# Patient Record
Sex: FEMALE | Race: OTHER | NOT HISPANIC OR LATINO | URBAN - METROPOLITAN AREA
[De-identification: names, ages, dates, MRNs, and addresses within clinical notes are randomized per-mention and may not be internally consistent; named-entity substitution may affect disease eponyms.]

---

## 2017-06-23 ENCOUNTER — EMERGENCY (EMERGENCY)
Facility: HOSPITAL | Age: 52
LOS: 1 days | Discharge: HOME | End: 2017-06-23
Attending: EMERGENCY MEDICINE | Admitting: EMERGENCY MEDICINE
Payer: MEDICAID

## 2017-06-23 VITALS
HEART RATE: 77 BPM | TEMPERATURE: 98 F | SYSTOLIC BLOOD PRESSURE: 155 MMHG | DIASTOLIC BLOOD PRESSURE: 105 MMHG | RESPIRATION RATE: 18 BRPM | OXYGEN SATURATION: 100 %

## 2017-06-23 DIAGNOSIS — Z90.710 ACQUIRED ABSENCE OF BOTH CERVIX AND UTERUS: Chronic | ICD-10-CM

## 2017-06-23 PROCEDURE — 99283 EMERGENCY DEPT VISIT LOW MDM: CPT

## 2017-06-23 PROCEDURE — 73564 X-RAY EXAM KNEE 4 OR MORE: CPT | Mod: 26,50

## 2017-06-23 NOTE — ED ADULT TRIAGE NOTE - CHIEF COMPLAINT QUOTE
patient c/o coxic and bilateral knee pain and swelling s/p fall on rear end 3 months ago. also c/o  tinglings to lower legs that began two days ago. Negative facial droop, arm weakness, or  weakness. Denies SOB SHARON or chest pain, appears in no distress. Did see PCP for coxic pain was told it was bone bruise.

## 2017-06-23 NOTE — ED PROVIDER NOTE - OBJECTIVE STATEMENT
53 yo F with hx of dm presenting with gradual intermentent nonraditing bilateral knee pain with no truma denies HA, dizziness, cp, sob, abd apin, n/v/d or dysuria. ptain improved with rest worse with walking

## 2017-06-23 NOTE — ED ADULT NURSE REASSESSMENT NOTE - NS ED NURSE REASSESS COMMENT FT1
Author received chart for Pt Darrion from  keisha Ortiz who reports Pt likely eloped from intake. Pt has not arrived in [place] area. 13:20 6/23/17 ELVIRA Begum

## 2017-06-23 NOTE — ED PROVIDER NOTE - PROGRESS NOTE DETAILS
Return to the ER immediately for any worsening symptoms, concerns, chest pain, fevers, shortness of breath, vomiting, abdominal pain, rashes, neck pain, back pain, numbness, paresthesias, pain or any difficulties at all.  Please follow up with your own private physician or our medical clinic at [phone] in the next 2-3 days.  Find a doctor at [phone].

## 2017-08-01 ENCOUNTER — FORM ENCOUNTER (OUTPATIENT)
Age: 52
End: 2017-08-01

## 2017-08-01 PROBLEM — Z00.00 ENCOUNTER FOR PREVENTIVE HEALTH EXAMINATION: Status: ACTIVE | Noted: 2017-08-01

## 2017-08-02 ENCOUNTER — OUTPATIENT (OUTPATIENT)
Dept: OUTPATIENT SERVICES | Facility: HOSPITAL | Age: 52
LOS: 1 days | End: 2017-08-02
Payer: MEDICAID

## 2017-08-02 ENCOUNTER — APPOINTMENT (OUTPATIENT)
Dept: ORTHOPEDIC SURGERY | Facility: HOSPITAL | Age: 52
End: 2017-08-02

## 2017-08-02 VITALS
WEIGHT: 158 LBS | DIASTOLIC BLOOD PRESSURE: 93 MMHG | BODY MASS INDEX: 26.98 KG/M2 | SYSTOLIC BLOOD PRESSURE: 135 MMHG | HEIGHT: 64 IN | HEART RATE: 81 BPM

## 2017-08-02 DIAGNOSIS — M62.89 OTHER SPECIFIED DISORDERS OF MUSCLE: ICD-10-CM

## 2017-08-02 DIAGNOSIS — M79.9 SOFT TISSUE DISORDER, UNSPECIFIED: ICD-10-CM

## 2017-08-02 DIAGNOSIS — Z90.710 ACQUIRED ABSENCE OF BOTH CERVIX AND UTERUS: Chronic | ICD-10-CM

## 2017-08-02 DIAGNOSIS — M25.571 PAIN IN RIGHT ANKLE AND JOINTS OF RIGHT FOOT: ICD-10-CM

## 2017-08-02 PROCEDURE — 73610 X-RAY EXAM OF ANKLE: CPT | Mod: 26,LT

## 2017-08-02 PROCEDURE — 73630 X-RAY EXAM OF FOOT: CPT | Mod: 26,50

## 2017-08-02 PROCEDURE — 73610 X-RAY EXAM OF ANKLE: CPT

## 2017-08-02 PROCEDURE — 73630 X-RAY EXAM OF FOOT: CPT

## 2017-08-02 PROCEDURE — G0463: CPT

## 2017-08-16 ENCOUNTER — OUTPATIENT (OUTPATIENT)
Dept: OUTPATIENT SERVICES | Facility: HOSPITAL | Age: 52
LOS: 1 days | End: 2017-08-16
Payer: MEDICAID

## 2017-08-16 ENCOUNTER — APPOINTMENT (OUTPATIENT)
Dept: ORTHOPEDIC SURGERY | Facility: HOSPITAL | Age: 52
End: 2017-08-16

## 2017-08-16 VITALS
RESPIRATION RATE: 14 BRPM | DIASTOLIC BLOOD PRESSURE: 38 MMHG | HEART RATE: 80 BPM | HEIGHT: 64 IN | WEIGHT: 162 LBS | SYSTOLIC BLOOD PRESSURE: 145 MMHG | BODY MASS INDEX: 27.66 KG/M2

## 2017-08-16 DIAGNOSIS — Z90.710 ACQUIRED ABSENCE OF BOTH CERVIX AND UTERUS: Chronic | ICD-10-CM

## 2017-08-16 DIAGNOSIS — M25.579 PAIN IN UNSPECIFIED ANKLE AND JOINTS OF UNSPECIFIED FOOT: ICD-10-CM

## 2017-08-16 PROCEDURE — G0463: CPT

## 2017-08-16 RX ORDER — DICLOFENAC SODIUM 10 MG/G
1 GEL TOPICAL
Qty: 160 | Refills: 0 | Status: ACTIVE | COMMUNITY
Start: 2017-08-16 | End: 1900-01-01

## 2017-08-18 DIAGNOSIS — M25.571 PAIN IN RIGHT ANKLE AND JOINTS OF RIGHT FOOT: ICD-10-CM

## 2017-10-02 ENCOUNTER — APPOINTMENT (OUTPATIENT)
Dept: NEUROLOGY | Facility: CLINIC | Age: 52
End: 2017-10-02

## 2017-11-30 ENCOUNTER — APPOINTMENT (OUTPATIENT)
Dept: NEUROLOGY | Facility: HOSPITAL | Age: 52
End: 2017-11-30

## 2018-02-01 ENCOUNTER — EMERGENCY (EMERGENCY)
Facility: HOSPITAL | Age: 53
LOS: 1 days | Discharge: HOME | End: 2018-02-01
Attending: EMERGENCY MEDICINE | Admitting: EMERGENCY MEDICINE
Payer: MEDICAID

## 2018-02-01 VITALS
RESPIRATION RATE: 16 BRPM | SYSTOLIC BLOOD PRESSURE: 171 MMHG | HEART RATE: 115 BPM | OXYGEN SATURATION: 100 % | TEMPERATURE: 101 F | DIASTOLIC BLOOD PRESSURE: 90 MMHG

## 2018-02-01 VITALS — HEART RATE: 98 BPM

## 2018-02-01 DIAGNOSIS — Z90.710 ACQUIRED ABSENCE OF BOTH CERVIX AND UTERUS: Chronic | ICD-10-CM

## 2018-02-01 LAB
ALBUMIN SERPL ELPH-MCNC: 4.4 G/DL — SIGNIFICANT CHANGE UP (ref 3.3–5)
ALP SERPL-CCNC: 77 U/L — SIGNIFICANT CHANGE UP (ref 40–120)
ALT FLD-CCNC: 20 U/L — SIGNIFICANT CHANGE UP (ref 4–33)
APPEARANCE UR: CLEAR — SIGNIFICANT CHANGE UP
AST SERPL-CCNC: 24 U/L — SIGNIFICANT CHANGE UP (ref 4–32)
BASE EXCESS BLDV CALC-SCNC: 4.9 MMOL/L — SIGNIFICANT CHANGE UP
BASOPHILS # BLD AUTO: 0.01 K/UL — SIGNIFICANT CHANGE UP (ref 0–0.2)
BASOPHILS NFR BLD AUTO: 0.2 % — SIGNIFICANT CHANGE UP (ref 0–2)
BILIRUB SERPL-MCNC: 0.3 MG/DL — SIGNIFICANT CHANGE UP (ref 0.2–1.2)
BILIRUB UR-MCNC: NEGATIVE — SIGNIFICANT CHANGE UP
BLOOD GAS VENOUS - CREATININE: 0.82 MG/DL — SIGNIFICANT CHANGE UP (ref 0.5–1.3)
BLOOD UR QL VISUAL: HIGH
BUN SERPL-MCNC: 13 MG/DL — SIGNIFICANT CHANGE UP (ref 7–23)
CALCIUM SERPL-MCNC: 9.4 MG/DL — SIGNIFICANT CHANGE UP (ref 8.4–10.5)
CHLORIDE BLDV-SCNC: 99 MMOL/L — SIGNIFICANT CHANGE UP (ref 96–108)
CHLORIDE SERPL-SCNC: 96 MMOL/L — LOW (ref 98–107)
CO2 SERPL-SCNC: 27 MMOL/L — SIGNIFICANT CHANGE UP (ref 22–31)
COLOR SPEC: SIGNIFICANT CHANGE UP
CREAT SERPL-MCNC: 0.93 MG/DL — SIGNIFICANT CHANGE UP (ref 0.5–1.3)
EOSINOPHIL # BLD AUTO: 0.07 K/UL — SIGNIFICANT CHANGE UP (ref 0–0.5)
EOSINOPHIL NFR BLD AUTO: 1.1 % — SIGNIFICANT CHANGE UP (ref 0–6)
GAS PNL BLDV: 137 MMOL/L — SIGNIFICANT CHANGE UP (ref 136–146)
GLUCOSE BLDV-MCNC: 241 — HIGH (ref 70–99)
GLUCOSE SERPL-MCNC: 231 MG/DL — HIGH (ref 70–99)
GLUCOSE UR-MCNC: >1000 — HIGH
HCO3 BLDV-SCNC: 27 MMOL/L — SIGNIFICANT CHANGE UP (ref 20–27)
HCT VFR BLD CALC: 35.1 % — SIGNIFICANT CHANGE UP (ref 34.5–45)
HCT VFR BLDV CALC: 36 % — SIGNIFICANT CHANGE UP (ref 34.5–45)
HGB BLD-MCNC: 11.6 G/DL — SIGNIFICANT CHANGE UP (ref 11.5–15.5)
HGB BLDV-MCNC: 11.7 G/DL — SIGNIFICANT CHANGE UP (ref 11.5–15.5)
IMM GRANULOCYTES # BLD AUTO: 0.02 # — SIGNIFICANT CHANGE UP
IMM GRANULOCYTES NFR BLD AUTO: 0.3 % — SIGNIFICANT CHANGE UP (ref 0–1.5)
KETONES UR-MCNC: NEGATIVE — SIGNIFICANT CHANGE UP
LACTATE BLDV-MCNC: 1.8 MMOL/L — SIGNIFICANT CHANGE UP (ref 0.5–2)
LEUKOCYTE ESTERASE UR-ACNC: NEGATIVE — SIGNIFICANT CHANGE UP
LYMPHOCYTES # BLD AUTO: 0.81 K/UL — LOW (ref 1–3.3)
LYMPHOCYTES # BLD AUTO: 13 % — SIGNIFICANT CHANGE UP (ref 13–44)
MCHC RBC-ENTMCNC: 27.2 PG — SIGNIFICANT CHANGE UP (ref 27–34)
MCHC RBC-ENTMCNC: 33 % — SIGNIFICANT CHANGE UP (ref 32–36)
MCV RBC AUTO: 82.2 FL — SIGNIFICANT CHANGE UP (ref 80–100)
MONOCYTES # BLD AUTO: 0.69 K/UL — SIGNIFICANT CHANGE UP (ref 0–0.9)
MONOCYTES NFR BLD AUTO: 11.1 % — SIGNIFICANT CHANGE UP (ref 2–14)
NEUTROPHILS # BLD AUTO: 4.63 K/UL — SIGNIFICANT CHANGE UP (ref 1.8–7.4)
NEUTROPHILS NFR BLD AUTO: 74.3 % — SIGNIFICANT CHANGE UP (ref 43–77)
NITRITE UR-MCNC: NEGATIVE — SIGNIFICANT CHANGE UP
NRBC # FLD: 0 — SIGNIFICANT CHANGE UP
PCO2 BLDV: 50 MMHG — SIGNIFICANT CHANGE UP (ref 41–51)
PH BLDV: 7.39 PH — SIGNIFICANT CHANGE UP (ref 7.32–7.43)
PH UR: 7.5 — SIGNIFICANT CHANGE UP (ref 4.6–8)
PLATELET # BLD AUTO: 266 K/UL — SIGNIFICANT CHANGE UP (ref 150–400)
PMV BLD: 10.9 FL — SIGNIFICANT CHANGE UP (ref 7–13)
PO2 BLDV: 27 MMHG — LOW (ref 35–40)
POTASSIUM BLDV-SCNC: 3.8 MMOL/L — SIGNIFICANT CHANGE UP (ref 3.4–4.5)
POTASSIUM SERPL-MCNC: 3.9 MMOL/L — SIGNIFICANT CHANGE UP (ref 3.5–5.3)
POTASSIUM SERPL-SCNC: 3.9 MMOL/L — SIGNIFICANT CHANGE UP (ref 3.5–5.3)
PROT SERPL-MCNC: 8 G/DL — SIGNIFICANT CHANGE UP (ref 6–8.3)
PROT UR-MCNC: NEGATIVE MG/DL — SIGNIFICANT CHANGE UP
RBC # BLD: 4.27 M/UL — SIGNIFICANT CHANGE UP (ref 3.8–5.2)
RBC # FLD: 14.3 % — SIGNIFICANT CHANGE UP (ref 10.3–14.5)
RBC CASTS # UR COMP ASSIST: SIGNIFICANT CHANGE UP (ref 0–?)
SAO2 % BLDV: 40.8 % — LOW (ref 60–85)
SODIUM SERPL-SCNC: 136 MMOL/L — SIGNIFICANT CHANGE UP (ref 135–145)
SP GR SPEC: 1.01 — SIGNIFICANT CHANGE UP (ref 1–1.04)
SQUAMOUS # UR AUTO: SIGNIFICANT CHANGE UP
UROBILINOGEN FLD QL: NORMAL MG/DL — SIGNIFICANT CHANGE UP
WBC # BLD: 6.23 K/UL — SIGNIFICANT CHANGE UP (ref 3.8–10.5)
WBC # FLD AUTO: 6.23 K/UL — SIGNIFICANT CHANGE UP (ref 3.8–10.5)

## 2018-02-01 PROCEDURE — 99284 EMERGENCY DEPT VISIT MOD MDM: CPT | Mod: 25

## 2018-02-01 PROCEDURE — 71046 X-RAY EXAM CHEST 2 VIEWS: CPT | Mod: 26

## 2018-02-01 RX ORDER — IBUPROFEN 200 MG
1 TABLET ORAL
Qty: 15 | Refills: 0
Start: 2018-02-01 | End: 2018-02-05

## 2018-02-01 RX ORDER — ACETAMINOPHEN 500 MG
1000 TABLET ORAL ONCE
Refills: 0 | Status: COMPLETED | OUTPATIENT
Start: 2018-02-01 | End: 2018-02-01

## 2018-02-01 RX ORDER — SODIUM CHLORIDE 9 MG/ML
1000 INJECTION INTRAMUSCULAR; INTRAVENOUS; SUBCUTANEOUS ONCE
Refills: 0 | Status: COMPLETED | OUTPATIENT
Start: 2018-02-01 | End: 2018-02-01

## 2018-02-01 RX ADMIN — SODIUM CHLORIDE 1000 MILLILITER(S): 9 INJECTION INTRAMUSCULAR; INTRAVENOUS; SUBCUTANEOUS at 07:57

## 2018-02-01 RX ADMIN — Medication 75 MILLIGRAM(S): at 07:57

## 2018-02-01 RX ADMIN — Medication 400 MILLIGRAM(S): at 07:57

## 2018-02-01 NOTE — ED PROVIDER NOTE - PLAN OF CARE
PLEASE DRINK PLENTY OF FLUIDS, TAKE TYLENOL 650MG EVERY 6HRS OR MOTRIN 600MG EVERY 8HRS, TAKE TAMIFLU, REST A LOT. FOLLOW UP WITH YOUR DOCTOR WITHIN NEXT 48HRS; RETURN TO ER FOR WORSENING SYMPTOMS.

## 2018-02-01 NOTE — ED ADULT TRIAGE NOTE - CHIEF COMPLAINT QUOTE
pt comes to ED for flu like symptoms x 1 day. pt denies going to PCP for these symptoms. pt only took Tylenol x 1. pt is complaining of cough and body aches. pt has a low grade temp in triage. face mask provided. pmhx of htn and DM FS in triage 307

## 2018-02-01 NOTE — ED PROVIDER NOTE - ATTENDING CONTRIBUTION TO CARE
I performed a face-to-face evaluation of the patient and performed a history and physical examination. I agree with the history and physical examination.    Maikel: 53 F, DM, flu-like Sx. Fever, tachycardia. Appears well. Lungs clear. Concern for flu. Check CXR. Give IVF.

## 2018-02-01 NOTE — ED ADULT NURSE NOTE - OBJECTIVE STATEMENT
Ambulatory complaining of "flu like symptoms" for 1 day. Patient report cough, fever/chills, and body aches. Has only taken medication (tylenol) for her symptoms and only one time. Denies recent travel, sick contacts, N/V/D, CP, SOB. States that she has not gone to her PCP for this issue. Came to the ED because "she might have the flu." Patient is well appearing, NAD noted at this time. Hx of DM,  in triage. Hx HTN. Waiting to be seen by provider. Safety maintained, needs attended, will continue to monitor.

## 2018-02-01 NOTE — ED PROVIDER NOTE - CARE PLAN
Principal Discharge DX:	Influenza-like illness Principal Discharge DX:	Influenza-like illness  Assessment and plan of treatment:	PLEASE DRINK PLENTY OF FLUIDS, TAKE TYLENOL 650MG EVERY 6HRS OR MOTRIN 600MG EVERY 8HRS, TAKE TAMIFLU, REST A LOT. FOLLOW UP WITH YOUR DOCTOR WITHIN NEXT 48HRS; RETURN TO ER FOR WORSENING SYMPTOMS.

## 2018-02-01 NOTE — ED PROVIDER NOTE - MEDICAL DECISION MAKING DETAILS
Maikel: 53 F, DM, flu-like Sx. Fever, tachycardia. Appears well. Lungs clear. Concern for flu. Check CXR. Give IVF.

## 2018-05-17 ENCOUNTER — EMERGENCY (EMERGENCY)
Facility: HOSPITAL | Age: 53
LOS: 1 days | Discharge: LEFT BEFORE DISCHARGE | End: 2018-05-17
Attending: EMERGENCY MEDICINE
Payer: MEDICAID

## 2018-05-17 VITALS
TEMPERATURE: 98 F | SYSTOLIC BLOOD PRESSURE: 172 MMHG | HEART RATE: 80 BPM | DIASTOLIC BLOOD PRESSURE: 123 MMHG | WEIGHT: 160.06 LBS | RESPIRATION RATE: 16 BRPM | OXYGEN SATURATION: 98 %

## 2018-05-17 DIAGNOSIS — Z90.710 ACQUIRED ABSENCE OF BOTH CERVIX AND UTERUS: Chronic | ICD-10-CM

## 2018-05-17 PROCEDURE — 99281 EMR DPT VST MAYX REQ PHY/QHP: CPT

## 2018-05-17 NOTE — ED ADULT NURSE NOTE - PMH
HTN (hypertension)    Other specified diabetes mellitus without complication, without long-term current use of insulin

## 2018-05-18 VITALS
HEART RATE: 83 BPM | DIASTOLIC BLOOD PRESSURE: 97 MMHG | TEMPERATURE: 98 F | RESPIRATION RATE: 18 BRPM | SYSTOLIC BLOOD PRESSURE: 153 MMHG | OXYGEN SATURATION: 100 %

## 2018-05-18 PROCEDURE — 93005 ELECTROCARDIOGRAM TRACING: CPT

## 2018-05-18 PROCEDURE — 99281 EMR DPT VST MAYX REQ PHY/QHP: CPT

## 2018-05-18 PROCEDURE — 82962 GLUCOSE BLOOD TEST: CPT

## 2018-05-18 NOTE — ED PROVIDER NOTE - PROGRESS NOTE DETAILS
Pt had been advised to have ED workup including labs and CT head, CXR, however RN could not find her to do the tests and she was found to have eloped.  I called Pt this morning and she says she had to leave and she felt better with improved BP on her own check as well. She has appointment with her own PMD at 11:30 am this morning.  All questions answered and Pt was offered to return here for further evaluation.

## 2018-05-18 NOTE — ED PROVIDER NOTE - OBJECTIVE STATEMENT
52 y/o F pt with PMHx of HTN (on Losartan; compliant with medication) and DM (on medication; compliant with medication) and PSHx of Hysterectomy presents to ED c/o L shoulder pain radiating down the L arm since this morning (today). Pt additionally reports intermittent elevated blood pressure x12 days. Pt also notes cramping to b/l legs x7 days, as well as neck pain and mild HA. Per pt, pt with recent foreign travel to [place] where pt began experiencing intermittent elevated blood pressure; pt returned to the [place] x[place] days ago and elevated blood pressure has persisted. Pt notes maximum blood pressure of 172 systolic over the course of that time period. Pt denies CP, SOB, back pain, b/l leg pain, b/l leg swelling, or any other complaints. Pt also denies Hx of MI, Hx of CVA, Hx of CA, Hx of DVTs/PEs, Hx of smoking, EtOH use/abuse, or drug use/abuse. NKDA.

## 2018-05-18 NOTE — ED PROVIDER NOTE - MEDICAL DECISION MAKING DETAILS
52 y/o F pt with Hx of HTN and DM presents with intermittent elevated blood pressure and x1 day of L arm pain. Pt denies chest pain. Will check EKG, labs, CXR, CT Head, and will re-evaluate.

## 2018-10-16 ENCOUNTER — APPOINTMENT (OUTPATIENT)
Dept: ORTHOPEDIC SURGERY | Facility: CLINIC | Age: 53
End: 2018-10-16

## 2018-12-24 ENCOUNTER — EMERGENCY (EMERGENCY)
Facility: HOSPITAL | Age: 53
LOS: 1 days | Discharge: HOME | End: 2018-12-24
Admitting: EMERGENCY MEDICINE
Payer: MEDICAID

## 2018-12-24 VITALS
SYSTOLIC BLOOD PRESSURE: 152 MMHG | OXYGEN SATURATION: 100 % | RESPIRATION RATE: 16 BRPM | DIASTOLIC BLOOD PRESSURE: 94 MMHG | TEMPERATURE: 98 F | HEART RATE: 87 BPM

## 2018-12-24 DIAGNOSIS — Z90.710 ACQUIRED ABSENCE OF BOTH CERVIX AND UTERUS: Chronic | ICD-10-CM

## 2018-12-24 LAB
ALBUMIN SERPL ELPH-MCNC: 4.7 G/DL — SIGNIFICANT CHANGE UP (ref 3.3–5)
ALP SERPL-CCNC: 122 U/L — HIGH (ref 40–120)
ALT FLD-CCNC: 21 U/L — SIGNIFICANT CHANGE UP (ref 4–33)
APPEARANCE UR: CLEAR — SIGNIFICANT CHANGE UP
AST SERPL-CCNC: 25 U/L — SIGNIFICANT CHANGE UP (ref 4–32)
BACTERIA # UR AUTO: NEGATIVE — SIGNIFICANT CHANGE UP
BASOPHILS # BLD AUTO: 0.02 K/UL — SIGNIFICANT CHANGE UP (ref 0–0.2)
BASOPHILS NFR BLD AUTO: 0.2 % — SIGNIFICANT CHANGE UP (ref 0–2)
BILIRUB SERPL-MCNC: 0.4 MG/DL — SIGNIFICANT CHANGE UP (ref 0.2–1.2)
BILIRUB UR-MCNC: NEGATIVE — SIGNIFICANT CHANGE UP
BLOOD UR QL VISUAL: SIGNIFICANT CHANGE UP
BUN SERPL-MCNC: 15 MG/DL — SIGNIFICANT CHANGE UP (ref 7–23)
CALCIUM SERPL-MCNC: 10.3 MG/DL — SIGNIFICANT CHANGE UP (ref 8.4–10.5)
CHLORIDE SERPL-SCNC: 95 MMOL/L — LOW (ref 98–107)
CO2 SERPL-SCNC: 29 MMOL/L — SIGNIFICANT CHANGE UP (ref 22–31)
COLOR SPEC: SIGNIFICANT CHANGE UP
CREAT SERPL-MCNC: 0.76 MG/DL — SIGNIFICANT CHANGE UP (ref 0.5–1.3)
EOSINOPHIL # BLD AUTO: 0.08 K/UL — SIGNIFICANT CHANGE UP (ref 0–0.5)
EOSINOPHIL NFR BLD AUTO: 0.9 % — SIGNIFICANT CHANGE UP (ref 0–6)
GLUCOSE SERPL-MCNC: 185 MG/DL — HIGH (ref 70–99)
GLUCOSE UR-MCNC: NEGATIVE — SIGNIFICANT CHANGE UP
HCT VFR BLD CALC: 36.3 % — SIGNIFICANT CHANGE UP (ref 34.5–45)
HGB BLD-MCNC: 12.3 G/DL — SIGNIFICANT CHANGE UP (ref 11.5–15.5)
HYALINE CASTS # UR AUTO: NEGATIVE — SIGNIFICANT CHANGE UP
IMM GRANULOCYTES # BLD AUTO: 0.03 # — SIGNIFICANT CHANGE UP
IMM GRANULOCYTES NFR BLD AUTO: 0.3 % — SIGNIFICANT CHANGE UP (ref 0–1.5)
KETONES UR-MCNC: NEGATIVE — SIGNIFICANT CHANGE UP
LEUKOCYTE ESTERASE UR-ACNC: SIGNIFICANT CHANGE UP
LYMPHOCYTES # BLD AUTO: 3.58 K/UL — HIGH (ref 1–3.3)
LYMPHOCYTES # BLD AUTO: 38.3 % — SIGNIFICANT CHANGE UP (ref 13–44)
MCHC RBC-ENTMCNC: 28 PG — SIGNIFICANT CHANGE UP (ref 27–34)
MCHC RBC-ENTMCNC: 33.9 % — SIGNIFICANT CHANGE UP (ref 32–36)
MCV RBC AUTO: 82.5 FL — SIGNIFICANT CHANGE UP (ref 80–100)
MONOCYTES # BLD AUTO: 0.71 K/UL — SIGNIFICANT CHANGE UP (ref 0–0.9)
MONOCYTES NFR BLD AUTO: 7.6 % — SIGNIFICANT CHANGE UP (ref 2–14)
NEUTROPHILS # BLD AUTO: 4.92 K/UL — SIGNIFICANT CHANGE UP (ref 1.8–7.4)
NEUTROPHILS NFR BLD AUTO: 52.7 % — SIGNIFICANT CHANGE UP (ref 43–77)
NITRITE UR-MCNC: NEGATIVE — SIGNIFICANT CHANGE UP
NRBC # FLD: 0 — SIGNIFICANT CHANGE UP
PH UR: 7.5 — SIGNIFICANT CHANGE UP (ref 5–8)
PLATELET # BLD AUTO: 353 K/UL — SIGNIFICANT CHANGE UP (ref 150–400)
PMV BLD: 10.5 FL — SIGNIFICANT CHANGE UP (ref 7–13)
POTASSIUM SERPL-MCNC: 3.4 MMOL/L — LOW (ref 3.5–5.3)
POTASSIUM SERPL-SCNC: 3.4 MMOL/L — LOW (ref 3.5–5.3)
PROT SERPL-MCNC: 8.5 G/DL — HIGH (ref 6–8.3)
PROT UR-MCNC: 10 — SIGNIFICANT CHANGE UP
RBC # BLD: 4.4 M/UL — SIGNIFICANT CHANGE UP (ref 3.8–5.2)
RBC # FLD: 13.9 % — SIGNIFICANT CHANGE UP (ref 10.3–14.5)
RBC CASTS # UR COMP ASSIST: SIGNIFICANT CHANGE UP (ref 0–?)
SODIUM SERPL-SCNC: 136 MMOL/L — SIGNIFICANT CHANGE UP (ref 135–145)
SP GR SPEC: 1.01 — SIGNIFICANT CHANGE UP (ref 1–1.04)
SQUAMOUS # UR AUTO: SIGNIFICANT CHANGE UP
TROPONIN T, HIGH SENSITIVITY: < 6 NG/L — SIGNIFICANT CHANGE UP (ref ?–14)
TROPONIN T, HIGH SENSITIVITY: < 6 NG/L — SIGNIFICANT CHANGE UP (ref ?–14)
UROBILINOGEN FLD QL: NORMAL — SIGNIFICANT CHANGE UP
WBC # BLD: 9.34 K/UL — SIGNIFICANT CHANGE UP (ref 3.8–10.5)
WBC # FLD AUTO: 9.34 K/UL — SIGNIFICANT CHANGE UP (ref 3.8–10.5)
WBC UR QL: HIGH (ref 0–?)

## 2018-12-24 PROCEDURE — 71046 X-RAY EXAM CHEST 2 VIEWS: CPT | Mod: 26

## 2018-12-24 PROCEDURE — 99284 EMERGENCY DEPT VISIT MOD MDM: CPT

## 2018-12-24 PROCEDURE — 73030 X-RAY EXAM OF SHOULDER: CPT | Mod: 26,LT

## 2018-12-24 RX ORDER — IBUPROFEN 200 MG
1 TABLET ORAL
Qty: 20 | Refills: 0
Start: 2018-12-24 | End: 2018-12-28

## 2018-12-24 RX ORDER — OXYCODONE AND ACETAMINOPHEN 5; 325 MG/1; MG/1
1 TABLET ORAL
Qty: 12 | Refills: 0
Start: 2018-12-24 | End: 2018-12-26

## 2018-12-24 RX ORDER — ASPIRIN/CALCIUM CARB/MAGNESIUM 324 MG
162 TABLET ORAL ONCE
Refills: 0 | Status: COMPLETED | OUTPATIENT
Start: 2018-12-24 | End: 2018-12-24

## 2018-12-24 RX ORDER — OXYCODONE AND ACETAMINOPHEN 5; 325 MG/1; MG/1
1 TABLET ORAL ONCE
Refills: 0 | Status: DISCONTINUED | OUTPATIENT
Start: 2018-12-24 | End: 2018-12-24

## 2018-12-24 RX ORDER — KETOROLAC TROMETHAMINE 30 MG/ML
15 SYRINGE (ML) INJECTION ONCE
Refills: 0 | Status: DISCONTINUED | OUTPATIENT
Start: 2018-12-24 | End: 2018-12-24

## 2018-12-24 RX ADMIN — Medication 15 MILLIGRAM(S): at 16:52

## 2018-12-24 RX ADMIN — OXYCODONE AND ACETAMINOPHEN 1 TABLET(S): 5; 325 TABLET ORAL at 14:21

## 2018-12-24 RX ADMIN — OXYCODONE AND ACETAMINOPHEN 1 TABLET(S): 5; 325 TABLET ORAL at 16:49

## 2018-12-24 RX ADMIN — Medication 162 MILLIGRAM(S): at 14:21

## 2018-12-24 NOTE — ED PROVIDER NOTE - OBJECTIVE STATEMENT
54yo F w/ pmh of DM Type 2, HLD presents to the ED c/o left shoulder pain starting this morning. Pt states there was no preceding injury/trauma. Pt also c/o lightheadedness, states with ambulation she feels like she is going to pass out. Pt took 81mgs ASA PO prior to arrival. Denies any pleuritic pain, dizziness, syncope, interscapular pain, N/V/D, diaphoresis, palpitations, cough, abdominal pain, pain associated with food intake, fevers, chills or night sweats. Patient denies recent surgery, prolonged immobility or travel, hemoptysis, hx of active ca, extremity swelling, hx of dvt/pe, dizziness, blurred vision, headaches, shortness of breath. No family hx of sudden cardiac death or first degree relative with dissection. Pt is a non-smoker.

## 2018-12-24 NOTE — ED ADULT TRIAGE NOTE - CHIEF COMPLAINT QUOTE
Pt c/o Lt shoulder pain starting this morning, denies cp/discomfort, denies headache/dizziness, denies sob, breathing even and unlabored.

## 2018-12-24 NOTE — ED PROVIDER NOTE - PLAN OF CARE
Follow up with your primary care provider within 48 hours  Follow up with an orthopedic doctor within one week  Take Motrin 600mg every 8 hours as needed for pain, take with food. Take Percocet 325/5 once every 6 hours as needed for severe pain -- causes drowsiness; DO NOT drink alcohol, drive, or operate heavy machinery with this medication.  Caution federal law prohibits the transfer of this drug to any person other  than the person for whom it was prescribed. May cause drowsiness.  Alcohol may intensify this effect.  Use care when operating dangerous machinery.  This prescription cannot be refilled. Using more of this medication than prescribed may cause serious breathing problems. Return to the emergency department with any worsening or concerning symptoms, swelling, numbness/tingling, inability to bear weight or any other concerns.

## 2018-12-24 NOTE — ED PROVIDER NOTE - CARE PLAN
Principal Discharge DX:	Shoulder tendonitis  Assessment and plan of treatment:	Follow up with your primary care provider within 48 hours  Follow up with an orthopedic doctor within one week  Take Motrin 600mg every 8 hours as needed for pain, take with food. Take Percocet 325/5 once every 6 hours as needed for severe pain -- causes drowsiness; DO NOT drink alcohol, drive, or operate heavy machinery with this medication.  Caution federal law prohibits the transfer of this drug to any person other  than the person for whom it was prescribed. May cause drowsiness.  Alcohol may intensify this effect.  Use care when operating dangerous machinery.  This prescription cannot be refilled. Using more of this medication than prescribed may cause serious breathing problems. Return to the emergency department with any worsening or concerning symptoms, swelling, numbness/tingling, inability to bear weight or any other concerns.

## 2018-12-24 NOTE — ED ADULT NURSE NOTE - OBJECTIVE STATEMENT
received pt in bed  A and OX 3  in NAD, pt reports " I woke up this morning and I have so much pain in my left shoulder I could not touch it". presents with eft arm sling,  tender to touch no skin discoloration observed,  distal pulses present, pt denies fall or injury.

## 2018-12-24 NOTE — ED PROVIDER NOTE - PROGRESS NOTE DETAILS
Labs/imaging appreciated, pt's lightheadedness likely from pain, no acute cardiac pathology found on EKG, no elevation of troponin, pt is advised to follow up with her PMD regarding carotid artery doppler and stress test ASAP.  Patient sitting in bed comfortably in no acute distress, vital signs are stable. Patient talking in full sentences, no evidence of respiratory distress. Patient able to ambulate well in the emergency room with no assistance. Able tolerate by mouth fluids. Patient is stable to be discharged. Patient expresses understanding of the diagnosis and has been told to return to the emergency room if any fevers, chills, chest pain, shortness of breath, new or worsening symptoms. Patient expresses desire for discharge and agrees to follow-up as discussed.

## 2019-03-10 ENCOUNTER — EMERGENCY (EMERGENCY)
Facility: HOSPITAL | Age: 54
LOS: 1 days | Discharge: HOME | End: 2019-03-10
Attending: EMERGENCY MEDICINE
Payer: MEDICAID

## 2019-03-10 VITALS
OXYGEN SATURATION: 99 % | TEMPERATURE: 98 F | RESPIRATION RATE: 16 BRPM | DIASTOLIC BLOOD PRESSURE: 86 MMHG | SYSTOLIC BLOOD PRESSURE: 125 MMHG | WEIGHT: 160.06 LBS | HEART RATE: 86 BPM | HEIGHT: 64 IN

## 2019-03-10 DIAGNOSIS — Z90.710 ACQUIRED ABSENCE OF BOTH CERVIX AND UTERUS: Chronic | ICD-10-CM

## 2019-03-10 LAB
ALBUMIN SERPL ELPH-MCNC: 4.4 G/DL — SIGNIFICANT CHANGE UP (ref 3.5–5)
ALP SERPL-CCNC: 116 U/L — SIGNIFICANT CHANGE UP (ref 40–120)
ALT FLD-CCNC: 36 U/L DA — SIGNIFICANT CHANGE UP (ref 10–60)
ANION GAP SERPL CALC-SCNC: 8 MMOL/L — SIGNIFICANT CHANGE UP (ref 5–17)
APPEARANCE UR: CLEAR — SIGNIFICANT CHANGE UP
APTT BLD: 29.5 SEC — SIGNIFICANT CHANGE UP (ref 27.5–36.3)
AST SERPL-CCNC: 25 U/L — SIGNIFICANT CHANGE UP (ref 10–40)
BASOPHILS # BLD AUTO: 0.03 K/UL — SIGNIFICANT CHANGE UP (ref 0–0.2)
BASOPHILS NFR BLD AUTO: 0.3 % — SIGNIFICANT CHANGE UP (ref 0–2)
BILIRUB SERPL-MCNC: 0.6 MG/DL — SIGNIFICANT CHANGE UP (ref 0.2–1.2)
BILIRUB UR-MCNC: NEGATIVE — SIGNIFICANT CHANGE UP
BUN SERPL-MCNC: 19 MG/DL — HIGH (ref 7–18)
CALCIUM SERPL-MCNC: 9.6 MG/DL — SIGNIFICANT CHANGE UP (ref 8.4–10.5)
CHLORIDE SERPL-SCNC: 96 MMOL/L — SIGNIFICANT CHANGE UP (ref 96–108)
CO2 SERPL-SCNC: 29 MMOL/L — SIGNIFICANT CHANGE UP (ref 22–31)
COLOR SPEC: YELLOW — SIGNIFICANT CHANGE UP
CREAT SERPL-MCNC: 0.91 MG/DL — SIGNIFICANT CHANGE UP (ref 0.5–1.3)
DIFF PNL FLD: ABNORMAL
EOSINOPHIL # BLD AUTO: 0.08 K/UL — SIGNIFICANT CHANGE UP (ref 0–0.5)
EOSINOPHIL NFR BLD AUTO: 0.7 % — SIGNIFICANT CHANGE UP (ref 0–6)
GLUCOSE SERPL-MCNC: 186 MG/DL — HIGH (ref 70–99)
GLUCOSE UR QL: 1000 MG/DL
HCG SERPL-ACNC: 5 MIU/ML — SIGNIFICANT CHANGE UP
HCG UR QL: NEGATIVE — SIGNIFICANT CHANGE UP
HCT VFR BLD CALC: 38.8 % — SIGNIFICANT CHANGE UP (ref 34.5–45)
HGB BLD-MCNC: 13.1 G/DL — SIGNIFICANT CHANGE UP (ref 11.5–15.5)
IMM GRANULOCYTES NFR BLD AUTO: 0.3 % — SIGNIFICANT CHANGE UP (ref 0–1.5)
INR BLD: 0.98 RATIO — SIGNIFICANT CHANGE UP (ref 0.88–1.16)
KETONES UR-MCNC: NEGATIVE — SIGNIFICANT CHANGE UP
LEUKOCYTE ESTERASE UR-ACNC: NEGATIVE — SIGNIFICANT CHANGE UP
LYMPHOCYTES # BLD AUTO: 36.6 % — SIGNIFICANT CHANGE UP (ref 13–44)
LYMPHOCYTES # BLD AUTO: 4.34 K/UL — HIGH (ref 1–3.3)
MCHC RBC-ENTMCNC: 28.5 PG — SIGNIFICANT CHANGE UP (ref 27–34)
MCHC RBC-ENTMCNC: 33.8 GM/DL — SIGNIFICANT CHANGE UP (ref 32–36)
MCV RBC AUTO: 84.5 FL — SIGNIFICANT CHANGE UP (ref 80–100)
MONOCYTES # BLD AUTO: 0.9 K/UL — SIGNIFICANT CHANGE UP (ref 0–0.9)
MONOCYTES NFR BLD AUTO: 7.6 % — SIGNIFICANT CHANGE UP (ref 2–14)
NEUTROPHILS # BLD AUTO: 6.48 K/UL — SIGNIFICANT CHANGE UP (ref 1.8–7.4)
NEUTROPHILS NFR BLD AUTO: 54.5 % — SIGNIFICANT CHANGE UP (ref 43–77)
NITRITE UR-MCNC: NEGATIVE — SIGNIFICANT CHANGE UP
NRBC # BLD: 0 /100 WBCS — SIGNIFICANT CHANGE UP (ref 0–0)
PH UR: 5 — SIGNIFICANT CHANGE UP (ref 5–8)
PLATELET # BLD AUTO: 338 K/UL — SIGNIFICANT CHANGE UP (ref 150–400)
POTASSIUM SERPL-MCNC: 3.4 MMOL/L — LOW (ref 3.5–5.3)
POTASSIUM SERPL-SCNC: 3.4 MMOL/L — LOW (ref 3.5–5.3)
PROT SERPL-MCNC: 9 G/DL — HIGH (ref 6–8.3)
PROT UR-MCNC: NEGATIVE — SIGNIFICANT CHANGE UP
PROTHROM AB SERPL-ACNC: 10.9 SEC — SIGNIFICANT CHANGE UP (ref 10–12.9)
RBC # BLD: 4.59 M/UL — SIGNIFICANT CHANGE UP (ref 3.8–5.2)
RBC # FLD: 13.8 % — SIGNIFICANT CHANGE UP (ref 10.3–14.5)
SODIUM SERPL-SCNC: 133 MMOL/L — LOW (ref 135–145)
SP GR SPEC: 1.01 — SIGNIFICANT CHANGE UP (ref 1.01–1.02)
TROPONIN I SERPL-MCNC: <0.015 NG/ML — SIGNIFICANT CHANGE UP (ref 0–0.04)
UROBILINOGEN FLD QL: NEGATIVE — SIGNIFICANT CHANGE UP
WBC # BLD: 11.86 K/UL — HIGH (ref 3.8–10.5)
WBC # FLD AUTO: 11.86 K/UL — HIGH (ref 3.8–10.5)

## 2019-03-10 PROCEDURE — 36415 COLL VENOUS BLD VENIPUNCTURE: CPT

## 2019-03-10 PROCEDURE — 96374 THER/PROPH/DIAG INJ IV PUSH: CPT

## 2019-03-10 PROCEDURE — 85730 THROMBOPLASTIN TIME PARTIAL: CPT

## 2019-03-10 PROCEDURE — 80053 COMPREHEN METABOLIC PANEL: CPT

## 2019-03-10 PROCEDURE — 81025 URINE PREGNANCY TEST: CPT

## 2019-03-10 PROCEDURE — 99284 EMERGENCY DEPT VISIT MOD MDM: CPT

## 2019-03-10 PROCEDURE — 85027 COMPLETE CBC AUTOMATED: CPT

## 2019-03-10 PROCEDURE — 70450 CT HEAD/BRAIN W/O DYE: CPT

## 2019-03-10 PROCEDURE — 99284 EMERGENCY DEPT VISIT MOD MDM: CPT | Mod: 25

## 2019-03-10 PROCEDURE — 81001 URINALYSIS AUTO W/SCOPE: CPT

## 2019-03-10 PROCEDURE — 70450 CT HEAD/BRAIN W/O DYE: CPT | Mod: 26

## 2019-03-10 PROCEDURE — 82962 GLUCOSE BLOOD TEST: CPT

## 2019-03-10 PROCEDURE — 83036 HEMOGLOBIN GLYCOSYLATED A1C: CPT

## 2019-03-10 PROCEDURE — 86901 BLOOD TYPING SEROLOGIC RH(D): CPT

## 2019-03-10 PROCEDURE — 84484 ASSAY OF TROPONIN QUANT: CPT

## 2019-03-10 PROCEDURE — 86850 RBC ANTIBODY SCREEN: CPT

## 2019-03-10 PROCEDURE — 86900 BLOOD TYPING SEROLOGIC ABO: CPT

## 2019-03-10 PROCEDURE — 96375 TX/PRO/DX INJ NEW DRUG ADDON: CPT

## 2019-03-10 PROCEDURE — 85610 PROTHROMBIN TIME: CPT

## 2019-03-10 PROCEDURE — 84702 CHORIONIC GONADOTROPIN TEST: CPT

## 2019-03-10 PROCEDURE — 87086 URINE CULTURE/COLONY COUNT: CPT

## 2019-03-10 RX ORDER — DIPHENHYDRAMINE HCL 50 MG
25 CAPSULE ORAL ONCE
Refills: 0 | Status: COMPLETED | OUTPATIENT
Start: 2019-03-10 | End: 2019-03-10

## 2019-03-10 RX ORDER — METOCLOPRAMIDE HCL 10 MG
10 TABLET ORAL ONCE
Refills: 0 | Status: COMPLETED | OUTPATIENT
Start: 2019-03-10 | End: 2019-03-10

## 2019-03-10 RX ORDER — KETOROLAC TROMETHAMINE 30 MG/ML
30 SYRINGE (ML) INJECTION ONCE
Refills: 0 | Status: DISCONTINUED | OUTPATIENT
Start: 2019-03-10 | End: 2019-03-10

## 2019-03-10 RX ORDER — SODIUM CHLORIDE 9 MG/ML
1000 INJECTION INTRAMUSCULAR; INTRAVENOUS; SUBCUTANEOUS ONCE
Refills: 0 | Status: COMPLETED | OUTPATIENT
Start: 2019-03-10 | End: 2019-03-10

## 2019-03-10 RX ADMIN — Medication 10 MILLIGRAM(S): at 22:47

## 2019-03-10 RX ADMIN — Medication 25 MILLIGRAM(S): at 22:47

## 2019-03-10 RX ADMIN — SODIUM CHLORIDE 1000 MILLILITER(S): 9 INJECTION INTRAMUSCULAR; INTRAVENOUS; SUBCUTANEOUS at 22:47

## 2019-03-10 RX ADMIN — Medication 30 MILLIGRAM(S): at 22:47

## 2019-03-10 NOTE — ED PROVIDER NOTE - CLINICAL SUMMARY MEDICAL DECISION MAKING FREE TEXT BOX
54 year old female with elevated glucose and HA. vitals WNL. PE as above.  labs, ecg, ct head, migraine cocktail 54 year old female with elevated glucose and HA. vitals WNL. PE as above.  labs, ecg, ct head, migraine cocktail  labs are unremarkable. glucose in the ED is elevated, but WNL. ct head WNL. after medications HA resolved. advised to continue taking medications for DM. f/u PMD in a week. return precautions given.

## 2019-03-10 NOTE — ED PROVIDER NOTE - OBJECTIVE STATEMENT
54 year old female PMH DM and HLD coming in for elevated glucose levels for the past week. states usually her bgm is around 120 and for past week its in 300s. states went to PMD 3 days ago who increased metformin to 750mg BID and farxiga, but states that has still had elevated glucose levels. also states that since yesterday has had left sided HA radiating to left neck and 1second episodes of blurred vision that resolved almost instantly. Denies cp, sob, palpitations, dizziness, numbness, tingling, weakness, urinary complaints, fevers, chills, sweats, back pains, abd pains, N/V/D/C. also states that she was getting steroid injections in her foot this month and thinks that elevated glucose could be from that.

## 2019-03-11 LAB — HBA1C BLD-MCNC: 9.2 % — HIGH (ref 4–5.6)

## 2019-03-11 NOTE — ED ADULT NURSE NOTE - NSIMPLEMENTINTERV_GEN_ALL_ED
Implemented All Universal Safety Interventions:  [place] to call system. Call bell, personal items and telephone within reach. Instruct patient to call for assistance. Room bathroom lighting operational. Non-slip footwear when patient is off stretcher. Physically safe environment: no spills, clutter or unnecessary equipment. Stretcher in lowest position, wheels locked, appropriate side rails in place.

## 2019-03-12 LAB
CULTURE RESULTS: NO GROWTH — SIGNIFICANT CHANGE UP
SPECIMEN SOURCE: SIGNIFICANT CHANGE UP

## 2020-08-06 ENCOUNTER — APPOINTMENT (OUTPATIENT)
Dept: ORTHOPEDIC SURGERY | Facility: CLINIC | Age: 55
End: 2020-08-06
Payer: MEDICAID

## 2020-08-06 VITALS
WEIGHT: 160 LBS | SYSTOLIC BLOOD PRESSURE: 138 MMHG | TEMPERATURE: 97.5 F | DIASTOLIC BLOOD PRESSURE: 85 MMHG | HEART RATE: 88 BPM | BODY MASS INDEX: 27.31 KG/M2 | HEIGHT: 64 IN

## 2020-08-06 PROCEDURE — 99204 OFFICE O/P NEW MOD 45 MIN: CPT

## 2020-08-10 NOTE — ED PROVIDER NOTE - CROS ED GI ALL NEG
Jairo Coaets  : 1945  MRN: [id number]    [unfilled]  8/10/2020    ABLE TO WALK: No. Unable to walk reason: chest pain  INDICATION: CP  CAD  Cardiac Markers: WNL  ECG Baseline: Same as baseline  MEETS CRITERIA FOR STRESS TEST: Yes         Patient underwent stress test per Lexiscan protocol with MPI.  Patient exercised for 3min, 5sec.  Exercise was stopped for completion of test.  Resting HR was 53 and peak HR was 83, what represents 56% of target HR.  Resting BP was 168/111 and peak BP was 198/89  Poor exercise capacity 1.00 METS.   During the test the patient was asymptomatic    IMPRESSIONS:  stress test was equivocal.    Nuclear images pending.  I was present at the time of test.    Quincy Rivera M.D.         negative...

## 2020-08-12 RX ORDER — MELOXICAM 15 MG/1
15 TABLET ORAL DAILY
Qty: 1 | Refills: 0 | Status: DISCONTINUED | COMMUNITY
Start: 2020-08-06 | End: 2020-08-12

## 2020-08-13 RX ORDER — NAPROXEN 500 MG/1
500 TABLET ORAL
Qty: 60 | Refills: 0 | Status: ACTIVE | COMMUNITY
Start: 2020-08-13 | End: 1900-01-01

## 2020-08-13 RX ORDER — CELECOXIB 200 MG/1
200 CAPSULE ORAL
Qty: 30 | Refills: 0 | Status: DISCONTINUED | COMMUNITY
Start: 2020-08-12 | End: 2020-08-13

## 2020-09-07 ENCOUNTER — RX RENEWAL (OUTPATIENT)
Age: 55
End: 2020-09-07

## 2020-09-17 ENCOUNTER — APPOINTMENT (OUTPATIENT)
Dept: ORTHOPEDIC SURGERY | Facility: CLINIC | Age: 55
End: 2020-09-17

## 2020-10-20 ENCOUNTER — APPOINTMENT (OUTPATIENT)
Dept: ORTHOPEDIC SURGERY | Facility: CLINIC | Age: 55
End: 2020-10-20
Payer: MEDICAID

## 2020-10-20 VITALS — HEART RATE: 88 BPM | DIASTOLIC BLOOD PRESSURE: 85 MMHG | SYSTOLIC BLOOD PRESSURE: 144 MMHG

## 2020-10-20 VITALS — TEMPERATURE: 98.2 F

## 2020-10-20 PROCEDURE — 99213 OFFICE O/P EST LOW 20 MIN: CPT

## 2020-10-20 PROCEDURE — 99072 ADDL SUPL MATRL&STAF TM PHE: CPT

## 2020-10-20 RX ORDER — ATORVASTATIN CALCIUM 80 MG/1
80 TABLET, FILM COATED ORAL
Qty: 30 | Refills: 0 | Status: ACTIVE | COMMUNITY
Start: 2020-09-16

## 2020-10-20 RX ORDER — SITAGLIPTIN AND METFORMIN HYDROCHLORIDE 50; 500 MG/1; MG/1
50-500 TABLET, FILM COATED, EXTENDED RELEASE ORAL
Qty: 60 | Refills: 0 | Status: ACTIVE | COMMUNITY
Start: 2020-10-13

## 2020-10-20 RX ORDER — BLOOD SUGAR DIAGNOSTIC
STRIP MISCELLANEOUS
Qty: 100 | Refills: 0 | Status: ACTIVE | COMMUNITY
Start: 2020-10-13

## 2020-10-20 RX ORDER — VALSARTAN 160 MG/1
160 TABLET, COATED ORAL
Qty: 30 | Refills: 0 | Status: ACTIVE | COMMUNITY
Start: 2020-10-15

## 2020-10-20 RX ORDER — PEN NEEDLE, DIABETIC 29 G X1/2"
29G X 12.7MM NEEDLE, DISPOSABLE MISCELLANEOUS
Qty: 100 | Refills: 0 | Status: ACTIVE | COMMUNITY
Start: 2020-09-02

## 2020-10-20 RX ORDER — HYDROCHLOROTHIAZIDE 25 MG/1
25 TABLET ORAL
Qty: 30 | Refills: 0 | Status: ACTIVE | COMMUNITY
Start: 2020-10-05

## 2020-10-30 NOTE — ED ADULT NURSE NOTE - NS ED PATIENT SAFETY CONCERN
No Implemented All Fall with Harm Risk Interventions:  [place] to call system. Call bell, personal items and telephone within reach. Instruct patient to call for assistance. Room bathroom lighting operational. Non-slip footwear when patient is off stretcher. Physically safe environment: no spills, clutter or unnecessary equipment. Stretcher in lowest position, wheels locked, appropriate side rails in place. Provide visual cue, wrist band, yellow gown, etc. Monitor gait and stability. Monitor for mental status changes and reorient to person, place, and time. Review medications for side effects contributing to fall risk. Reinforce activity limits and safety measures with patient and family. Provide visual clues: red socks.

## 2020-11-10 ENCOUNTER — APPOINTMENT (OUTPATIENT)
Dept: ORTHOPEDIC SURGERY | Facility: CLINIC | Age: 55
End: 2020-11-10

## 2021-02-10 ENCOUNTER — EMERGENCY (EMERGENCY)
Facility: HOSPITAL | Age: 56
LOS: 1 days | Discharge: HOME | End: 2021-02-10
Attending: STUDENT IN AN ORGANIZED HEALTH CARE EDUCATION/TRAINING PROGRAM
Payer: MEDICAID

## 2021-02-10 VITALS
DIASTOLIC BLOOD PRESSURE: 86 MMHG | TEMPERATURE: 99 F | RESPIRATION RATE: 18 BRPM | HEART RATE: 104 BPM | WEIGHT: 158.07 LBS | OXYGEN SATURATION: 98 % | HEIGHT: 64 IN | SYSTOLIC BLOOD PRESSURE: 124 MMHG

## 2021-02-10 VITALS
DIASTOLIC BLOOD PRESSURE: 85 MMHG | SYSTOLIC BLOOD PRESSURE: 127 MMHG | RESPIRATION RATE: 18 BRPM | OXYGEN SATURATION: 96 % | TEMPERATURE: 98 F | HEART RATE: 88 BPM

## 2021-02-10 DIAGNOSIS — Z90.710 ACQUIRED ABSENCE OF BOTH CERVIX AND UTERUS: Chronic | ICD-10-CM

## 2021-02-10 LAB
ALBUMIN SERPL ELPH-MCNC: 3.5 G/DL — SIGNIFICANT CHANGE UP (ref 3.5–5)
ALP SERPL-CCNC: 112 U/L — SIGNIFICANT CHANGE UP (ref 40–120)
ALT FLD-CCNC: 33 U/L DA — SIGNIFICANT CHANGE UP (ref 10–60)
ANION GAP SERPL CALC-SCNC: 8 MMOL/L — SIGNIFICANT CHANGE UP (ref 5–17)
APPEARANCE UR: CLEAR — SIGNIFICANT CHANGE UP
APTT BLD: 33.2 SEC — SIGNIFICANT CHANGE UP (ref 27.5–35.5)
AST SERPL-CCNC: 29 U/L — SIGNIFICANT CHANGE UP (ref 10–40)
BACTERIA # UR AUTO: ABNORMAL /HPF
BASOPHILS # BLD AUTO: 0.02 K/UL — SIGNIFICANT CHANGE UP (ref 0–0.2)
BASOPHILS NFR BLD AUTO: 0.3 % — SIGNIFICANT CHANGE UP (ref 0–2)
BILIRUB SERPL-MCNC: 0.3 MG/DL — SIGNIFICANT CHANGE UP (ref 0.2–1.2)
BILIRUB UR-MCNC: NEGATIVE — SIGNIFICANT CHANGE UP
BUN SERPL-MCNC: 16 MG/DL — SIGNIFICANT CHANGE UP (ref 7–18)
CALCIUM SERPL-MCNC: 9.3 MG/DL — SIGNIFICANT CHANGE UP (ref 8.4–10.5)
CHLORIDE SERPL-SCNC: 99 MMOL/L — SIGNIFICANT CHANGE UP (ref 96–108)
CO2 SERPL-SCNC: 30 MMOL/L — SIGNIFICANT CHANGE UP (ref 22–31)
COLOR SPEC: YELLOW — SIGNIFICANT CHANGE UP
CREAT SERPL-MCNC: 1.18 MG/DL — SIGNIFICANT CHANGE UP (ref 0.5–1.3)
DIFF PNL FLD: ABNORMAL
EOSINOPHIL # BLD AUTO: 0.03 K/UL — SIGNIFICANT CHANGE UP (ref 0–0.5)
EOSINOPHIL NFR BLD AUTO: 0.5 % — SIGNIFICANT CHANGE UP (ref 0–6)
EPI CELLS # UR: SIGNIFICANT CHANGE UP /HPF
GLUCOSE SERPL-MCNC: 176 MG/DL — HIGH (ref 70–99)
GLUCOSE UR QL: NEGATIVE — SIGNIFICANT CHANGE UP
HCT VFR BLD CALC: 34.2 % — LOW (ref 34.5–45)
HGB BLD-MCNC: 11.2 G/DL — LOW (ref 11.5–15.5)
IMM GRANULOCYTES NFR BLD AUTO: 0.2 % — SIGNIFICANT CHANGE UP (ref 0–1.5)
INR BLD: 1.04 RATIO — SIGNIFICANT CHANGE UP (ref 0.88–1.16)
KETONES UR-MCNC: NEGATIVE — SIGNIFICANT CHANGE UP
LACTATE SERPL-SCNC: 1.3 MMOL/L — SIGNIFICANT CHANGE UP (ref 0.7–2)
LEUKOCYTE ESTERASE UR-ACNC: NEGATIVE — SIGNIFICANT CHANGE UP
LYMPHOCYTES # BLD AUTO: 1.95 K/UL — SIGNIFICANT CHANGE UP (ref 1–3.3)
LYMPHOCYTES # BLD AUTO: 32.8 % — SIGNIFICANT CHANGE UP (ref 13–44)
MCHC RBC-ENTMCNC: 26.6 PG — LOW (ref 27–34)
MCHC RBC-ENTMCNC: 32.7 GM/DL — SIGNIFICANT CHANGE UP (ref 32–36)
MCV RBC AUTO: 81.2 FL — SIGNIFICANT CHANGE UP (ref 80–100)
MONOCYTES # BLD AUTO: 0.69 K/UL — SIGNIFICANT CHANGE UP (ref 0–0.9)
MONOCYTES NFR BLD AUTO: 11.6 % — SIGNIFICANT CHANGE UP (ref 2–14)
NEUTROPHILS # BLD AUTO: 3.24 K/UL — SIGNIFICANT CHANGE UP (ref 1.8–7.4)
NEUTROPHILS NFR BLD AUTO: 54.6 % — SIGNIFICANT CHANGE UP (ref 43–77)
NITRITE UR-MCNC: NEGATIVE — SIGNIFICANT CHANGE UP
NRBC # BLD: 0 /100 WBCS — SIGNIFICANT CHANGE UP (ref 0–0)
PH UR: 5 — SIGNIFICANT CHANGE UP (ref 5–8)
PLATELET # BLD AUTO: 266 K/UL — SIGNIFICANT CHANGE UP (ref 150–400)
POTASSIUM SERPL-MCNC: 3.3 MMOL/L — LOW (ref 3.5–5.3)
POTASSIUM SERPL-SCNC: 3.3 MMOL/L — LOW (ref 3.5–5.3)
PROT SERPL-MCNC: 8.5 G/DL — HIGH (ref 6–8.3)
PROT UR-MCNC: 30 MG/DL
PROTHROM AB SERPL-ACNC: 12.4 SEC — SIGNIFICANT CHANGE UP (ref 10.6–13.6)
RAPID RVP RESULT: DETECTED
RBC # BLD: 4.21 M/UL — SIGNIFICANT CHANGE UP (ref 3.8–5.2)
RBC # FLD: 13.8 % — SIGNIFICANT CHANGE UP (ref 10.3–14.5)
RBC CASTS # UR COMP ASSIST: >50 /HPF (ref 0–2)
SARS-COV-2 RNA SPEC QL NAA+PROBE: DETECTED
SODIUM SERPL-SCNC: 137 MMOL/L — SIGNIFICANT CHANGE UP (ref 135–145)
SP GR SPEC: 1.01 — SIGNIFICANT CHANGE UP (ref 1.01–1.02)
UROBILINOGEN FLD QL: NEGATIVE — SIGNIFICANT CHANGE UP
WBC # BLD: 5.94 K/UL — SIGNIFICANT CHANGE UP (ref 3.8–10.5)
WBC # FLD AUTO: 5.94 K/UL — SIGNIFICANT CHANGE UP (ref 3.8–10.5)
WBC UR QL: SIGNIFICANT CHANGE UP /HPF (ref 0–5)

## 2021-02-10 PROCEDURE — 87040 BLOOD CULTURE FOR BACTERIA: CPT

## 2021-02-10 PROCEDURE — 85610 PROTHROMBIN TIME: CPT

## 2021-02-10 PROCEDURE — 96360 HYDRATION IV INFUSION INIT: CPT

## 2021-02-10 PROCEDURE — 93005 ELECTROCARDIOGRAM TRACING: CPT

## 2021-02-10 PROCEDURE — 99284 EMERGENCY DEPT VISIT MOD MDM: CPT | Mod: 25

## 2021-02-10 PROCEDURE — 71045 X-RAY EXAM CHEST 1 VIEW: CPT

## 2021-02-10 PROCEDURE — 99285 EMERGENCY DEPT VISIT HI MDM: CPT

## 2021-02-10 PROCEDURE — 71045 X-RAY EXAM CHEST 1 VIEW: CPT | Mod: 26

## 2021-02-10 PROCEDURE — 87086 URINE CULTURE/COLONY COUNT: CPT

## 2021-02-10 PROCEDURE — 85730 THROMBOPLASTIN TIME PARTIAL: CPT

## 2021-02-10 PROCEDURE — 0225U NFCT DS DNA&RNA 21 SARSCOV2: CPT

## 2021-02-10 PROCEDURE — 83605 ASSAY OF LACTIC ACID: CPT

## 2021-02-10 PROCEDURE — 80053 COMPREHEN METABOLIC PANEL: CPT

## 2021-02-10 PROCEDURE — 85379 FIBRIN DEGRADATION QUANT: CPT

## 2021-02-10 PROCEDURE — 36415 COLL VENOUS BLD VENIPUNCTURE: CPT

## 2021-02-10 PROCEDURE — 81001 URINALYSIS AUTO W/SCOPE: CPT

## 2021-02-10 PROCEDURE — 85025 COMPLETE CBC W/AUTO DIFF WBC: CPT

## 2021-02-10 RX ORDER — CEPHALEXIN 500 MG
500 CAPSULE ORAL ONCE
Refills: 0 | Status: COMPLETED | OUTPATIENT
Start: 2021-02-10 | End: 2021-02-10

## 2021-02-10 RX ORDER — SODIUM CHLORIDE 9 MG/ML
2200 INJECTION INTRAMUSCULAR; INTRAVENOUS; SUBCUTANEOUS ONCE
Refills: 0 | Status: COMPLETED | OUTPATIENT
Start: 2021-02-10 | End: 2021-02-10

## 2021-02-10 RX ORDER — CEPHALEXIN 500 MG
1 CAPSULE ORAL
Qty: 10 | Refills: 0
Start: 2021-02-10 | End: 2021-02-14

## 2021-02-10 RX ADMIN — Medication 500 MILLIGRAM(S): at 11:26

## 2021-02-10 RX ADMIN — SODIUM CHLORIDE 2200 MILLILITER(S): 9 INJECTION INTRAMUSCULAR; INTRAVENOUS; SUBCUTANEOUS at 11:30

## 2021-02-10 RX ADMIN — SODIUM CHLORIDE 2200 MILLILITER(S): 9 INJECTION INTRAMUSCULAR; INTRAVENOUS; SUBCUTANEOUS at 10:30

## 2021-02-10 NOTE — ED PROVIDER NOTE - OBJECTIVE STATEMENT
56F, pmh of htn, DM, hld, presenting with three days of fever, nausea, cough, sore throat and body aches. today had numbness in left arm. was concerned for covid. no chest pain, shortness of breath, abdominal pain, pain or burning with urination, pain or swelling of lower extremities. no known sick contacts.

## 2021-02-10 NOTE — ED PROVIDER NOTE - PATIENT PORTAL LINK FT
You can access the FollowMyHealth Patient Portal offered by [facility] by registering at the following website: [email or web address]. By joining [employer]’s FollowMyHealth portal, you will also be able to view your health information using other applications (apps) compatible with our system.

## 2021-02-10 NOTE — ED ADULT NURSE NOTE - OBJECTIVE STATEMENT
Pt states has fever, chills, body aches, cough and burning in the chest, sore throat.  Loss of taste and smell

## 2021-02-10 NOTE — ED PROVIDER NOTE - PROGRESS NOTE DETAILS
patient updated on results. all current questions answered. will discharge with antibiotics for UTI and pulse oxAlex Gong

## 2021-02-10 NOTE — ED PROVIDER NOTE - PMH
DM (diabetes mellitus)    HTN (hypertension)    Other specified diabetes mellitus without complication, without long-term current use of insulin

## 2021-02-10 NOTE — ED PROVIDER NOTE - CLINICAL SUMMARY MEDICAL DECISION MAKING FREE TEXT BOX
56F presenting with fever, cough and body aches. left arm numbness but non-focal neuro exam. likely viral illness, particularly covid as patient endorses loss of sense of taste. will get labs, cxr, UA, symptom control. will reassess.

## 2021-02-10 NOTE — ED PROVIDER NOTE - PHYSICAL EXAMINATION
General: uncomfortable appearing female, no acute distress   HEENT: normocephalic, atraumatic   Respiratory: normal work of breathing, lungs clear to auscultation bilaterally   Cardiac: regular rate and rhythm   Abdomen: soft, non-tender, no guarding or rebound   MSK: no swelling or tenderness of lower extremities, moving all extremities spontaneously   Skin: warm, dry   Neuro: A&Ox3  Psych: appropriate affect

## 2021-02-10 NOTE — ED PROVIDER NOTE - NSFOLLOWUPINSTRUCTIONS_ED_ALL_ED_FT
You were seen in the emergency department for fever and cough and were found to have Covid-19 and a UTI.     Please follow-up with your primary care doctor in the next 24-48 hours.     Please complete all of your antibiotics even if your symptoms improve.     Please continue to drink plenty of fluids and take Tylenol and Ibuprofen as prescribed on the bottles for symptom control     If you have any worsening symptoms, severe headache, chest pain, shortness off breath, nausea or vomiting, please return to the emergency department.

## 2021-02-10 NOTE — ED ADULT NURSE NOTE - NSIMPLEMENTINTERV_GEN_ALL_ED
Implemented All Universal Safety Interventions:  [place] to call system. Call bell, personal items and telephone within reach. Instruct patient to call for assistance. Room bathroom lighting operational. Non-slip footwear when patient is off stretcher. Physically safe environment: no spills, clutter or unnecessary equipment. Stretcher in lowest position, wheels locked, appropriate side rails in place.

## 2021-02-10 NOTE — ED ADULT TRIAGE NOTE - CHIEF COMPLAINT QUOTE
"Not breathing properly, nausea, cough, sore throat, fever, chills, headache x 3 days and this morning my fingers on my left hand are tingling". Took amoxicillin x 2 days, acetaminophen take at 0545

## 2021-02-11 ENCOUNTER — TRANSCRIPTION ENCOUNTER (OUTPATIENT)
Age: 56
End: 2021-02-11

## 2021-02-11 LAB
CULTURE RESULTS: SIGNIFICANT CHANGE UP
SPECIMEN SOURCE: SIGNIFICANT CHANGE UP

## 2021-02-15 LAB
CULTURE RESULTS: SIGNIFICANT CHANGE UP
CULTURE RESULTS: SIGNIFICANT CHANGE UP
SPECIMEN SOURCE: SIGNIFICANT CHANGE UP
SPECIMEN SOURCE: SIGNIFICANT CHANGE UP

## 2022-05-01 ENCOUNTER — EMERGENCY (EMERGENCY)
Facility: HOSPITAL | Age: 57
LOS: 1 days | Discharge: HOME | End: 2022-05-01
Attending: EMERGENCY MEDICINE
Payer: MEDICAID

## 2022-05-01 VITALS
HEIGHT: 64 IN | TEMPERATURE: 98 F | DIASTOLIC BLOOD PRESSURE: 95 MMHG | RESPIRATION RATE: 18 BRPM | HEART RATE: 87 BPM | SYSTOLIC BLOOD PRESSURE: 156 MMHG | OXYGEN SATURATION: 100 % | WEIGHT: 151.02 LBS

## 2022-05-01 VITALS
DIASTOLIC BLOOD PRESSURE: 77 MMHG | OXYGEN SATURATION: 99 % | SYSTOLIC BLOOD PRESSURE: 120 MMHG | RESPIRATION RATE: 18 BRPM | HEART RATE: 68 BPM | TEMPERATURE: 98 F

## 2022-05-01 DIAGNOSIS — Z90.710 ACQUIRED ABSENCE OF BOTH CERVIX AND UTERUS: Chronic | ICD-10-CM

## 2022-05-01 LAB
ALBUMIN SERPL ELPH-MCNC: 4.1 G/DL — SIGNIFICANT CHANGE UP (ref 3.5–5)
ALP SERPL-CCNC: 83 U/L — SIGNIFICANT CHANGE UP (ref 40–120)
ALT FLD-CCNC: 23 U/L DA — SIGNIFICANT CHANGE UP (ref 10–60)
ANION GAP SERPL CALC-SCNC: 7 MMOL/L — SIGNIFICANT CHANGE UP (ref 5–17)
APPEARANCE UR: CLEAR — SIGNIFICANT CHANGE UP
AST SERPL-CCNC: 15 U/L — SIGNIFICANT CHANGE UP (ref 10–40)
BASOPHILS # BLD AUTO: 0.04 K/UL — SIGNIFICANT CHANGE UP (ref 0–0.2)
BASOPHILS NFR BLD AUTO: 0.5 % — SIGNIFICANT CHANGE UP (ref 0–2)
BILIRUB SERPL-MCNC: 0.5 MG/DL — SIGNIFICANT CHANGE UP (ref 0.2–1.2)
BILIRUB UR-MCNC: NEGATIVE — SIGNIFICANT CHANGE UP
BUN SERPL-MCNC: 12 MG/DL — SIGNIFICANT CHANGE UP (ref 7–18)
CALCIUM SERPL-MCNC: 10.3 MG/DL — SIGNIFICANT CHANGE UP (ref 8.4–10.5)
CHLORIDE SERPL-SCNC: 102 MMOL/L — SIGNIFICANT CHANGE UP (ref 96–108)
CO2 SERPL-SCNC: 31 MMOL/L — SIGNIFICANT CHANGE UP (ref 22–31)
COLOR SPEC: YELLOW — SIGNIFICANT CHANGE UP
CREAT SERPL-MCNC: 0.88 MG/DL — SIGNIFICANT CHANGE UP (ref 0.5–1.3)
DIFF PNL FLD: ABNORMAL
EGFR: 77 ML/MIN/1.73M2 — SIGNIFICANT CHANGE UP
EOSINOPHIL # BLD AUTO: 0.27 K/UL — SIGNIFICANT CHANGE UP (ref 0–0.5)
EOSINOPHIL NFR BLD AUTO: 3.6 % — SIGNIFICANT CHANGE UP (ref 0–6)
GLUCOSE SERPL-MCNC: 110 MG/DL — HIGH (ref 70–99)
GLUCOSE UR QL: NEGATIVE — SIGNIFICANT CHANGE UP
HCG SERPL-ACNC: 7 MIU/ML — SIGNIFICANT CHANGE UP
HCT VFR BLD CALC: 33.1 % — LOW (ref 34.5–45)
HGB BLD-MCNC: 10.9 G/DL — LOW (ref 11.5–15.5)
IMM GRANULOCYTES NFR BLD AUTO: 0.3 % — SIGNIFICANT CHANGE UP (ref 0–1.5)
KETONES UR-MCNC: NEGATIVE — SIGNIFICANT CHANGE UP
LEUKOCYTE ESTERASE UR-ACNC: NEGATIVE — SIGNIFICANT CHANGE UP
LIDOCAIN IGE QN: 260 U/L — SIGNIFICANT CHANGE UP (ref 73–393)
LYMPHOCYTES # BLD AUTO: 2.73 K/UL — SIGNIFICANT CHANGE UP (ref 1–3.3)
LYMPHOCYTES # BLD AUTO: 36.4 % — SIGNIFICANT CHANGE UP (ref 13–44)
MCHC RBC-ENTMCNC: 26.9 PG — LOW (ref 27–34)
MCHC RBC-ENTMCNC: 32.9 GM/DL — SIGNIFICANT CHANGE UP (ref 32–36)
MCV RBC AUTO: 81.7 FL — SIGNIFICANT CHANGE UP (ref 80–100)
MONOCYTES # BLD AUTO: 0.53 K/UL — SIGNIFICANT CHANGE UP (ref 0–0.9)
MONOCYTES NFR BLD AUTO: 7.1 % — SIGNIFICANT CHANGE UP (ref 2–14)
NEUTROPHILS # BLD AUTO: 3.92 K/UL — SIGNIFICANT CHANGE UP (ref 1.8–7.4)
NEUTROPHILS NFR BLD AUTO: 52.1 % — SIGNIFICANT CHANGE UP (ref 43–77)
NITRITE UR-MCNC: NEGATIVE — SIGNIFICANT CHANGE UP
NRBC # BLD: 0 /100 WBCS — SIGNIFICANT CHANGE UP (ref 0–0)
PH UR: 7 — SIGNIFICANT CHANGE UP (ref 5–8)
PLATELET # BLD AUTO: 293 K/UL — SIGNIFICANT CHANGE UP (ref 150–400)
POTASSIUM SERPL-MCNC: 3.5 MMOL/L — SIGNIFICANT CHANGE UP (ref 3.5–5.3)
POTASSIUM SERPL-SCNC: 3.5 MMOL/L — SIGNIFICANT CHANGE UP (ref 3.5–5.3)
PROT SERPL-MCNC: 7.9 G/DL — SIGNIFICANT CHANGE UP (ref 6–8.3)
PROT UR-MCNC: NEGATIVE — SIGNIFICANT CHANGE UP
RBC # BLD: 4.05 M/UL — SIGNIFICANT CHANGE UP (ref 3.8–5.2)
RBC # FLD: 13.7 % — SIGNIFICANT CHANGE UP (ref 10.3–14.5)
SODIUM SERPL-SCNC: 140 MMOL/L — SIGNIFICANT CHANGE UP (ref 135–145)
SP GR SPEC: 1 — LOW (ref 1.01–1.02)
UROBILINOGEN FLD QL: NEGATIVE — SIGNIFICANT CHANGE UP
WBC # BLD: 7.51 K/UL — SIGNIFICANT CHANGE UP (ref 3.8–10.5)
WBC # FLD AUTO: 7.51 K/UL — SIGNIFICANT CHANGE UP (ref 3.8–10.5)

## 2022-05-01 PROCEDURE — 80053 COMPREHEN METABOLIC PANEL: CPT

## 2022-05-01 PROCEDURE — 76705 ECHO EXAM OF ABDOMEN: CPT

## 2022-05-01 PROCEDURE — 99285 EMERGENCY DEPT VISIT HI MDM: CPT

## 2022-05-01 PROCEDURE — 81001 URINALYSIS AUTO W/SCOPE: CPT

## 2022-05-01 PROCEDURE — 85025 COMPLETE CBC W/AUTO DIFF WBC: CPT

## 2022-05-01 PROCEDURE — 96374 THER/PROPH/DIAG INJ IV PUSH: CPT

## 2022-05-01 PROCEDURE — 36415 COLL VENOUS BLD VENIPUNCTURE: CPT

## 2022-05-01 PROCEDURE — 96361 HYDRATE IV INFUSION ADD-ON: CPT

## 2022-05-01 PROCEDURE — 83690 ASSAY OF LIPASE: CPT

## 2022-05-01 PROCEDURE — 99284 EMERGENCY DEPT VISIT MOD MDM: CPT | Mod: 25

## 2022-05-01 PROCEDURE — 96375 TX/PRO/DX INJ NEW DRUG ADDON: CPT

## 2022-05-01 PROCEDURE — 76705 ECHO EXAM OF ABDOMEN: CPT | Mod: 26

## 2022-05-01 PROCEDURE — 84702 CHORIONIC GONADOTROPIN TEST: CPT

## 2022-05-01 PROCEDURE — 82962 GLUCOSE BLOOD TEST: CPT

## 2022-05-01 RX ORDER — SODIUM CHLORIDE 9 MG/ML
1000 INJECTION INTRAMUSCULAR; INTRAVENOUS; SUBCUTANEOUS ONCE
Refills: 0 | Status: COMPLETED | OUTPATIENT
Start: 2022-05-01 | End: 2022-05-01

## 2022-05-01 RX ORDER — KETOROLAC TROMETHAMINE 30 MG/ML
30 SYRINGE (ML) INJECTION ONCE
Refills: 0 | Status: DISCONTINUED | OUTPATIENT
Start: 2022-05-01 | End: 2022-05-01

## 2022-05-01 RX ORDER — ONDANSETRON 8 MG/1
4 TABLET, FILM COATED ORAL ONCE
Refills: 0 | Status: COMPLETED | OUTPATIENT
Start: 2022-05-01 | End: 2022-05-01

## 2022-05-01 RX ADMIN — Medication 30 MILLIGRAM(S): at 10:27

## 2022-05-01 RX ADMIN — SODIUM CHLORIDE 1000 MILLILITER(S): 9 INJECTION INTRAMUSCULAR; INTRAVENOUS; SUBCUTANEOUS at 10:02

## 2022-05-01 RX ADMIN — ONDANSETRON 4 MILLIGRAM(S): 8 TABLET, FILM COATED ORAL at 09:57

## 2022-05-01 RX ADMIN — Medication 30 MILLIGRAM(S): at 09:57

## 2022-05-01 RX ADMIN — SODIUM CHLORIDE 1000 MILLILITER(S): 9 INJECTION INTRAMUSCULAR; INTRAVENOUS; SUBCUTANEOUS at 11:02

## 2022-05-01 NOTE — ED ADULT NURSE NOTE - OBJECTIVE STATEMENT
Pt states has RUQ abd pain x 1 month intermittently, worse after eating , especially fatty foods  c/o nausea, denies vomiting or diarrhea, denies fever and chills

## 2022-05-01 NOTE — ED PROVIDER NOTE - PROGRESS NOTE DETAILS
pt reassessed, abdomen soft.  pt feels much better.  Labs and imaging negative for acute process.  Will dc

## 2022-05-01 NOTE — ED PROVIDER NOTE - OBJECTIVE STATEMENT
58 y/o female, history of diabetes, hypertension, hyperlipidemia; patient has been having on and off abdominal pain for 1 month. Patient says not food-related. Nausea; no vomiting, no diarrhea. The pain got worse yesterday, but patient has no appetite secondary to pain. No fever, no chest pain or shortness of breath, no urinary complaints. No known drug allergies.

## 2022-05-01 NOTE — ED PROVIDER NOTE - CHPI ED SYMPTOMS NEG
no chest pain, no shortness of breath/no diarrhea/no dysuria/no fever/no hematuria/no vomiting/no burning urination

## 2022-05-01 NOTE — ED PROVIDER NOTE - NSFOLLOWUPINSTRUCTIONS_ED_ALL_ED_FT
Biliary Colic, Adult       Biliary colic is severe pain caused by a problem with the gallbladder. The gallbladder is a small organ in the upper right part of the abdomen. The gallbladder stores a digestive fluid produced in the liver (bile) that helps the body break down fat. Bile and other digestive enzymes are carried from the liver to the small intestine through tube-like structures called bile ducts. The gallbladder and the bile ducts form the biliary tract.    Sometimes, hard deposits of digestive fluids (gallstones) form in the gallbladder and block the flow of bile from the gallbladder, causing biliary colic. This condition is also called a gallbladder attack. Gallstones can be as small as a grain of sand or as big as a golf ball. There could be just one gallstone in the gallbladder, or there could be many.      What are the causes?    This condition is usually caused by gallstones. Less often, a tumor could block the flow of bile from the gallbladder and trigger biliary colic.      What increases the risk?    The following factors may make you more likely to develop this condition:  •Being female.      •Having a family history of gallstones.      •Being obese.      •Losing weight suddenly or quickly.      •Eating a diet that is high in calories, low in fiber, and rich in refined carbohydrates, such as white bread and white rice.    •Having certain health conditions, such as:  •An intestinal disease that affects nutrient absorption, such as Crohn's disease.      •A metabolic condition, such as diabetes or metabolic syndrome. Metabolic syndrome occurs when someone has high blood pressure, high cholesterol, and diabetes.      •A blood condition, such as hemolytic anemia or sickle cell disease.          What are the signs or symptoms?    The main symptom of this condition is severe pain in the upper right side of the abdomen. You may feel this pain below the chest but above the hip. This pain often occurs at night or after eating a meal that is high in fat. This pain may get worse for up to an hour and last as long as 12 hours. In most cases, the pain fades (subsides) within 2 hours.    Other symptoms of this condition include:  •Nausea and vomiting.      •Pain under the right shoulder.        How is this diagnosed?    This condition is diagnosed based on your medical history, your symptoms, and a physical exam.    You may also have tests, including:  •Blood tests to rule out infection or inflammation of the bile ducts, gallbladder, pancreas, or liver.    •Imaging studies, such as:  •An ultrasound.      •A CT scan.      •An MRI.        In some cases, you may need to have an imaging study done using a small amount of radioactive material (nuclear medicine) to confirm the diagnosis.      How is this treated?    This condition may be treated with medicines to:  •Relieve your pain or nausea.      •Dissolve the gallstones. It may take months or years before the gallstones are completely gone.      If you have gallstones, or if you have a tumor in the gallbladder that is causing biliary colic, you may need surgery to remove the gallbladder (cholecystectomy).      Follow these instructions at home:    Eating and drinking     •Drink enough fluid to keep your urine pale yellow.    •Follow instructions from your health care provider about eating or drinking restrictions. These may include avoiding:  •Fatty, greasy, and fried foods.      •Any foods that make the pain worse.      •Overeating.      •Having a large meal after not eating for a while.        General instructions     •Take over-the-counter and prescription medicines only as told by your health care provider.      •Keep all follow-up visits as told by your health care provider. This is important.        How is this prevented?    Steps to prevent this condition include:  •Maintaining a healthy body weight.      •Getting regular exercise.      •Eating a healthy diet that is high in fiber and low in fat.      •Limiting how much sugar and refined carbohydrates you eat.        Contact a health care provider if:    •Your pain lasts more than 5 hours.      •You vomit.      •You have a fever and chills.      •Your pain gets worse.        Get help right away if:    •Your skin or the whites of your eyes look yellow (jaundice).      •Your have tea-colored urine and light-colored stools (feces).      •You are dizzy or you faint.        Summary    •Biliary colic is severe pain caused by a problem with the gallbladder. The gallbladder is a small organ in the upper right part of your abdomen.      •Treatment for this condition may include medicine to relieve your pain or nausea, or medicine to slowly dissolve the gallstones.      •If you have gallstones, or if you have a tumor in the gallbladder that is causing biliary colic, you may need surgery to remove the gallbladder (cholecystectomy).      This information is not intended to replace advice given to you by your health care provider. Make sure you discuss any questions you have with your health care provider.      Document Revised: 12/29/2020 Document Reviewed: 10/20/2020    Elsevier Patient Education © 2022 [employer]

## 2022-05-01 NOTE — ED PROVIDER NOTE - NSICDXPASTMEDICALHX_GEN_ALL_CORE_FT
PAST MEDICAL HISTORY:  DM (diabetes mellitus)     HTN (hypertension)     Other specified diabetes mellitus without complication, without long-term current use of insulin       HLD (hyperlipidemia)

## 2022-05-01 NOTE — ED PROVIDER NOTE - PATIENT PORTAL LINK FT
You can access the FollowMyHealth Patient Portal offered by [facility] by registering at the following website: [email or web address]. By joining [employer]’s FollowMyHealth portal, you will also be able to view your health information using other applications (apps) compatible with our system.

## 2022-09-27 NOTE — ED PROVIDER NOTE - OBJECTIVE STATEMENT
Pt is 54 y/o female with PMhx of DM here for eval of cough productive of yellow sputum, nasal congestion, subjective fever, myalgias and generalized malaise x 2 days. also sore throat and frontal HA, denies drooling, trismus, change in voice, denies dysphagia, rash, neck pain/stiffness, denies photophobia, cp, sob, abd pain, n/v/d, denies urinary sx. (-) sick contacts. similar sx 2 wks ago, resolved on its own, pt hasn't seen pcp at that time. pcp - Dr moctezuma. Doxepin Pregnancy And Lactation Text: This medication is Pregnancy Category C and it isn't known if it is safe during pregnancy. It is also excreted in breast milk and breast feeding isn't recommended.

## 2022-10-07 NOTE — ED ADULT NURSE NOTE - PT NEEDS ASSIST
Assisted patient with getting dressed. Discharged off unit via wheelchair. No complaints or complications noted at this time.    no

## 2022-11-08 NOTE — ED ADULT TRIAGE NOTE - AS TEMP SITE
oral Clofazimine Counseling:  I discussed with the patient the risks of clofazimine including but not limited to skin and eye pigmentation, liver damage, nausea/vomiting, gastrointestinal bleeding and allergy.

## 2023-02-19 ENCOUNTER — EMERGENCY (EMERGENCY)
Facility: HOSPITAL | Age: 58
LOS: 1 days | Discharge: HOME | End: 2023-02-19
Attending: EMERGENCY MEDICINE
Payer: MEDICAID

## 2023-02-19 VITALS
SYSTOLIC BLOOD PRESSURE: 159 MMHG | DIASTOLIC BLOOD PRESSURE: 92 MMHG | RESPIRATION RATE: 17 BRPM | OXYGEN SATURATION: 100 % | TEMPERATURE: 98 F | HEART RATE: 75 BPM

## 2023-02-19 DIAGNOSIS — Z90.710 ACQUIRED ABSENCE OF BOTH CERVIX AND UTERUS: Chronic | ICD-10-CM

## 2023-02-19 LAB
APPEARANCE UR: CLEAR — SIGNIFICANT CHANGE UP
BILIRUB UR-MCNC: NEGATIVE — SIGNIFICANT CHANGE UP
COLOR SPEC: YELLOW — SIGNIFICANT CHANGE UP
DIFF PNL FLD: ABNORMAL
GLUCOSE UR QL: NEGATIVE — SIGNIFICANT CHANGE UP
KETONES UR-MCNC: NEGATIVE — SIGNIFICANT CHANGE UP
LEUKOCYTE ESTERASE UR-ACNC: ABNORMAL
NITRITE UR-MCNC: NEGATIVE — SIGNIFICANT CHANGE UP
PH UR: 7 — SIGNIFICANT CHANGE UP (ref 5–8)
PROT UR-MCNC: 15
SP GR SPEC: 1 — LOW (ref 1.01–1.02)
UROBILINOGEN FLD QL: NEGATIVE — SIGNIFICANT CHANGE UP

## 2023-02-19 PROCEDURE — 87086 URINE CULTURE/COLONY COUNT: CPT

## 2023-02-19 PROCEDURE — 99284 EMERGENCY DEPT VISIT MOD MDM: CPT

## 2023-02-19 PROCEDURE — 99283 EMERGENCY DEPT VISIT LOW MDM: CPT

## 2023-02-19 PROCEDURE — 81001 URINALYSIS AUTO W/SCOPE: CPT

## 2023-02-19 RX ORDER — CYCLOBENZAPRINE HYDROCHLORIDE 10 MG/1
1 TABLET, FILM COATED ORAL
Qty: 21 | Refills: 0
Start: 2023-02-19 | End: 2023-02-25

## 2023-02-19 RX ORDER — CYCLOBENZAPRINE HYDROCHLORIDE 10 MG/1
5 TABLET, FILM COATED ORAL ONCE
Refills: 0 | Status: COMPLETED | OUTPATIENT
Start: 2023-02-19 | End: 2023-02-19

## 2023-02-19 RX ORDER — NITROFURANTOIN MACROCRYSTAL 50 MG
1 CAPSULE ORAL
Qty: 14 | Refills: 0
Start: 2023-02-19 | End: 2023-02-25

## 2023-02-19 RX ADMIN — CYCLOBENZAPRINE HYDROCHLORIDE 5 MILLIGRAM(S): 10 TABLET, FILM COATED ORAL at 17:12

## 2023-02-19 NOTE — ED PROVIDER NOTE - NSICDXPASTMEDICALHX_GEN_ALL_CORE_FT
PAST MEDICAL HISTORY:  DM (diabetes mellitus)     HLD (hyperlipidemia)     HTN (hypertension)     Other specified diabetes mellitus without complication, without long-term current use of insulin

## 2023-02-19 NOTE — ED PROVIDER NOTE - MUSCULOSKELETAL, MLM
Right lower lumbar soft tissue muscular tenderness, without midline tenderness or true CVA tenderness.

## 2023-02-19 NOTE — ED PROVIDER NOTE - CLINICAL SUMMARY MEDICAL DECISION MAKING FREE TEXT BOX
58 year old female with 9 days of right lumbar back pain, reproducible and worsened with certain positions. Found to have muscular tenderness and no CVA tenderness. Consistent with mechanical lumbar back pain, likely muscular in nature. Do not suspect compression, CVAT, or kidney stone pain. Will give muscle relaxant and reassess. Patient also concerned for UTI given urinary frequency and dysuria. Will check urinalysis and reassess. 58 year old female with 9 days of right lumbar back pain, reproducible and worsened with certain positions. Found to have muscular tenderness and no CVA tenderness. Consistent with mechanical lumbar back pain, likely muscular in nature. Do not suspect compression, CVAT, or kidney stone pain. Will give muscle relaxant and reassess. Patient also concerned for UTI given urinary frequency and dysuria. Will check urinalysis and reassess.  Based on h/p do not suspect pyelo.

## 2023-02-19 NOTE — ED ADULT NURSE NOTE - OBJECTIVE STATEMENT
Pt presents to the ED with c/o lower back pain x Pt presents to the ED with c/o lower back pain x 9 days. Denies fall or injury.

## 2023-02-19 NOTE — ED PROVIDER NOTE - IV ALTEPLASE DOOR HIDDEN
Brought here by [employer] and [employer] for penitentiary clearance for alleged disorderly conduct. Per PD report several officers and a canine were required to get the pt out of his car. No apparent injury noted. Clothing intact and although light in color, there were no signs of wear or trauma. Pt does not appear dehydrated as he has an abundance of saliva in his mouth, skin is not tenting, and appears to have showered recently. States that he hasn't eaten or had any water for a week. Refusing all cares.  
show

## 2023-02-19 NOTE — ED PROVIDER NOTE - PATIENT PORTAL LINK FT
You can access the FollowMyHealth Patient Portal offered by [facility] by registering at the following website: [email or web address]. By joining [facility]’s FollowMyHealth portal, you will also be able to view your health information using other applications (apps) compatible with our system.

## 2023-02-19 NOTE — ED PROVIDER NOTE - OBJECTIVE STATEMENT
58 year old female with hypertension, hyperlipidemia, and diabetes presents to ED complaining of right lumbar back pain for 9 days. She denies any heavy lifting, stating that she just woke up with the pain. Notes trouble walking secondary to pain. Patient reports that she has had a known right-sided kidney stone for several months and is being treated by a urologist. Also complains of burning urination and urinary frequency. NKDA.

## 2023-02-19 NOTE — ED PROVIDER NOTE - NSFOLLOWUPINSTRUCTIONS_ED_ALL_ED_FT
Urinary Tract Infection    A urinary tract infection (UTI) is an infection of any part of the urinary tract, which includes the kidneys, ureters, bladder, and urethra. Risk factors include ignoring your need to urinate, wiping back to front if female, being an uncircumcised male, and having diabetes or a weak immune system. Symptoms include frequent urination, pain or burning with urination, foul smelling urine, cloudy urine, pain in the lower abdomen, blood in the urine, and fever. If you were prescribed an antibiotic medicine, take it as told by your health care provider. Do not stop taking the antibiotic even if you start to feel better.    SEEK IMMEDIATE MEDICAL CARE IF YOU HAVE ANY OF THE FOLLOWING SYMPTOMS: severe back or abdominal pain, fever, inability to keep fluids or medicine down, dizziness/lightheadedness, or a change in mental status.     Back Pain    Back pain is very common in adults. The cause of back pain is rarely dangerous and the pain often gets better over time. The cause of your back pain may not be known and may include strain of muscles or ligaments, degeneration of the spinal disks, or arthritis. Occasionally the pain may radiate down your leg(s). Over-the-counter medicines to reduce pain and inflammation are often the most helpful. Stretching and remaining active frequently helps the healing process.     SEEK IMMEDIATE MEDICAL CARE IF YOU HAVE ANY OF THE FOLLOWING SYMPTOMS: bowel or bladder control problems, unusual weakness or numbness in your arms or legs, nausea or vomiting, abdominal pain, fever, dizziness/lightheadedness.     FOLLOW UP WITH YOUR DOCTOR IN 2-3 days.

## 2023-02-20 LAB
CULTURE RESULTS: SIGNIFICANT CHANGE UP
SPECIMEN SOURCE: SIGNIFICANT CHANGE UP

## 2023-04-30 ENCOUNTER — TRANSCRIPTION ENCOUNTER (OUTPATIENT)
Age: 58
End: 2023-04-30

## 2023-05-01 ENCOUNTER — RESULT REVIEW (OUTPATIENT)
Age: 58
End: 2023-05-01

## 2023-05-01 ENCOUNTER — INPATIENT (INPATIENT)
Facility: HOSPITAL | Age: 58
LOS: 0 days | Discharge: HOME | DRG: 343 | End: 2023-05-02
Attending: SURGERY | Admitting: SURGERY
Payer: MEDICAID

## 2023-05-01 ENCOUNTER — TRANSCRIPTION ENCOUNTER (OUTPATIENT)
Age: 58
End: 2023-05-01

## 2023-05-01 VITALS
WEIGHT: 152.34 LBS | RESPIRATION RATE: 18 BRPM | SYSTOLIC BLOOD PRESSURE: 139 MMHG | HEIGHT: 64 IN | OXYGEN SATURATION: 98 % | HEART RATE: 96 BPM | TEMPERATURE: 98 F | DIASTOLIC BLOOD PRESSURE: 82 MMHG

## 2023-05-01 DIAGNOSIS — Z90.710 ACQUIRED ABSENCE OF BOTH CERVIX AND UTERUS: Chronic | ICD-10-CM

## 2023-05-01 DIAGNOSIS — K35.80 UNSPECIFIED ACUTE APPENDICITIS: ICD-10-CM

## 2023-05-01 LAB
ALBUMIN SERPL ELPH-MCNC: 3.9 G/DL — SIGNIFICANT CHANGE UP (ref 3.5–5)
ALP SERPL-CCNC: 82 U/L — SIGNIFICANT CHANGE UP (ref 40–120)
ALT FLD-CCNC: 22 U/L DA — SIGNIFICANT CHANGE UP (ref 10–60)
ANION GAP SERPL CALC-SCNC: 8 MMOL/L — SIGNIFICANT CHANGE UP (ref 5–17)
APPEARANCE UR: CLEAR — SIGNIFICANT CHANGE UP
APTT BLD: 30.3 SEC — SIGNIFICANT CHANGE UP (ref 27.5–35.5)
AST SERPL-CCNC: 22 U/L — SIGNIFICANT CHANGE UP (ref 10–40)
BASOPHILS # BLD AUTO: 0.04 K/UL — SIGNIFICANT CHANGE UP (ref 0–0.2)
BASOPHILS NFR BLD AUTO: 0.5 % — SIGNIFICANT CHANGE UP (ref 0–2)
BILIRUB DIRECT SERPL-MCNC: 0.1 MG/DL — SIGNIFICANT CHANGE UP (ref 0–0.3)
BILIRUB INDIRECT FLD-MCNC: 0.3 MG/DL — SIGNIFICANT CHANGE UP (ref 0.2–1)
BILIRUB SERPL-MCNC: 0.4 MG/DL — SIGNIFICANT CHANGE UP (ref 0.2–1.2)
BILIRUB SERPL-MCNC: 0.4 MG/DL — SIGNIFICANT CHANGE UP (ref 0.2–1.2)
BILIRUB UR-MCNC: NEGATIVE — SIGNIFICANT CHANGE UP
BUN SERPL-MCNC: 14 MG/DL — SIGNIFICANT CHANGE UP (ref 7–18)
CALCIUM SERPL-MCNC: 9.9 MG/DL — SIGNIFICANT CHANGE UP (ref 8.4–10.5)
CHLORIDE SERPL-SCNC: 102 MMOL/L — SIGNIFICANT CHANGE UP (ref 96–108)
CO2 SERPL-SCNC: 27 MMOL/L — SIGNIFICANT CHANGE UP (ref 22–31)
COLOR SPEC: YELLOW — SIGNIFICANT CHANGE UP
CREAT SERPL-MCNC: 0.97 MG/DL — SIGNIFICANT CHANGE UP (ref 0.5–1.3)
DIFF PNL FLD: ABNORMAL
EGFR: 68 ML/MIN/1.73M2 — SIGNIFICANT CHANGE UP
EOSINOPHIL # BLD AUTO: 0.28 K/UL — SIGNIFICANT CHANGE UP (ref 0–0.5)
EOSINOPHIL NFR BLD AUTO: 3.3 % — SIGNIFICANT CHANGE UP (ref 0–6)
GLUCOSE BLDC GLUCOMTR-MCNC: 115 MG/DL — HIGH (ref 70–99)
GLUCOSE BLDC GLUCOMTR-MCNC: 188 MG/DL — HIGH (ref 70–99)
GLUCOSE SERPL-MCNC: 150 MG/DL — HIGH (ref 70–99)
GLUCOSE UR QL: NEGATIVE — SIGNIFICANT CHANGE UP
HCT VFR BLD CALC: 35.8 % — SIGNIFICANT CHANGE UP (ref 34.5–45)
HGB BLD-MCNC: 11.8 G/DL — SIGNIFICANT CHANGE UP (ref 11.5–15.5)
IMM GRANULOCYTES NFR BLD AUTO: 0.2 % — SIGNIFICANT CHANGE UP (ref 0–0.9)
INR BLD: 0.88 RATIO — SIGNIFICANT CHANGE UP (ref 0.88–1.16)
KETONES UR-MCNC: NEGATIVE — SIGNIFICANT CHANGE UP
LEUKOCYTE ESTERASE UR-ACNC: NEGATIVE — SIGNIFICANT CHANGE UP
LIDOCAIN IGE QN: 402 U/L — HIGH (ref 73–393)
LYMPHOCYTES # BLD AUTO: 2.52 K/UL — SIGNIFICANT CHANGE UP (ref 1–3.3)
LYMPHOCYTES # BLD AUTO: 29.7 % — SIGNIFICANT CHANGE UP (ref 13–44)
MAGNESIUM SERPL-MCNC: 1.6 MG/DL — SIGNIFICANT CHANGE UP (ref 1.6–2.6)
MCHC RBC-ENTMCNC: 27.2 PG — SIGNIFICANT CHANGE UP (ref 27–34)
MCHC RBC-ENTMCNC: 33 GM/DL — SIGNIFICANT CHANGE UP (ref 32–36)
MCV RBC AUTO: 82.5 FL — SIGNIFICANT CHANGE UP (ref 80–100)
MONOCYTES # BLD AUTO: 0.51 K/UL — SIGNIFICANT CHANGE UP (ref 0–0.9)
MONOCYTES NFR BLD AUTO: 6 % — SIGNIFICANT CHANGE UP (ref 2–14)
NEUTROPHILS # BLD AUTO: 5.12 K/UL — SIGNIFICANT CHANGE UP (ref 1.8–7.4)
NEUTROPHILS NFR BLD AUTO: 60.3 % — SIGNIFICANT CHANGE UP (ref 43–77)
NITRITE UR-MCNC: NEGATIVE — SIGNIFICANT CHANGE UP
NRBC # BLD: 0 /100 WBCS — SIGNIFICANT CHANGE UP (ref 0–0)
PH UR: 5 — SIGNIFICANT CHANGE UP (ref 5–8)
PLATELET # BLD AUTO: 380 K/UL — SIGNIFICANT CHANGE UP (ref 150–400)
POTASSIUM SERPL-MCNC: 4.1 MMOL/L — SIGNIFICANT CHANGE UP (ref 3.5–5.3)
POTASSIUM SERPL-SCNC: 4.1 MMOL/L — SIGNIFICANT CHANGE UP (ref 3.5–5.3)
PROT SERPL-MCNC: 8.6 G/DL — HIGH (ref 6–8.3)
PROT UR-MCNC: NEGATIVE — SIGNIFICANT CHANGE UP
PROTHROM AB SERPL-ACNC: 10.4 SEC — LOW (ref 10.5–13.4)
RBC # BLD: 4.34 M/UL — SIGNIFICANT CHANGE UP (ref 3.8–5.2)
RBC # FLD: 13.8 % — SIGNIFICANT CHANGE UP (ref 10.3–14.5)
SODIUM SERPL-SCNC: 137 MMOL/L — SIGNIFICANT CHANGE UP (ref 135–145)
SP GR SPEC: 1.01 — SIGNIFICANT CHANGE UP (ref 1.01–1.02)
UROBILINOGEN FLD QL: NEGATIVE — SIGNIFICANT CHANGE UP
WBC # BLD: 8.49 K/UL — SIGNIFICANT CHANGE UP (ref 3.8–10.5)
WBC # FLD AUTO: 8.49 K/UL — SIGNIFICANT CHANGE UP (ref 3.8–10.5)

## 2023-05-01 PROCEDURE — 76700 US EXAM ABDOM COMPLETE: CPT | Mod: 26

## 2023-05-01 PROCEDURE — 74177 CT ABD & PELVIS W/CONTRAST: CPT | Mod: 26,MA

## 2023-05-01 PROCEDURE — 44970 LAPAROSCOPY APPENDECTOMY: CPT | Mod: AS

## 2023-05-01 PROCEDURE — 99285 EMERGENCY DEPT VISIT HI MDM: CPT

## 2023-05-01 PROCEDURE — 99221 1ST HOSP IP/OBS SF/LOW 40: CPT | Mod: 57

## 2023-05-01 PROCEDURE — 44970 LAPAROSCOPY APPENDECTOMY: CPT

## 2023-05-01 PROCEDURE — 93010 ELECTROCARDIOGRAM REPORT: CPT

## 2023-05-01 PROCEDURE — 88304 TISSUE EXAM BY PATHOLOGIST: CPT | Mod: 26

## 2023-05-01 RX ORDER — CEFOTETAN DISODIUM 1 G
1 VIAL (EA) INJECTION ONCE
Refills: 0 | Status: COMPLETED | OUTPATIENT
Start: 2023-05-01 | End: 2023-05-01

## 2023-05-01 RX ORDER — ONDANSETRON 8 MG/1
4 TABLET, FILM COATED ORAL ONCE
Refills: 0 | Status: DISCONTINUED | OUTPATIENT
Start: 2023-05-01 | End: 2023-05-01

## 2023-05-01 RX ORDER — FENTANYL CITRATE 50 UG/ML
50 INJECTION INTRAVENOUS
Refills: 0 | Status: DISCONTINUED | OUTPATIENT
Start: 2023-05-01 | End: 2023-05-01

## 2023-05-01 RX ORDER — SODIUM CHLORIDE 9 MG/ML
1000 INJECTION INTRAMUSCULAR; INTRAVENOUS; SUBCUTANEOUS ONCE
Refills: 0 | Status: COMPLETED | OUTPATIENT
Start: 2023-05-01 | End: 2023-05-01

## 2023-05-01 RX ORDER — ACETAMINOPHEN 500 MG
650 TABLET ORAL EVERY 6 HOURS
Refills: 0 | Status: DISCONTINUED | OUTPATIENT
Start: 2023-05-01 | End: 2023-05-02

## 2023-05-01 RX ORDER — KETOROLAC TROMETHAMINE 30 MG/ML
30 SYRINGE (ML) INJECTION ONCE
Refills: 0 | Status: DISCONTINUED | OUTPATIENT
Start: 2023-05-01 | End: 2023-05-01

## 2023-05-01 RX ORDER — HEPARIN SODIUM 5000 [USP'U]/ML
5000 INJECTION INTRAVENOUS; SUBCUTANEOUS EVERY 8 HOURS
Refills: 0 | Status: DISCONTINUED | OUTPATIENT
Start: 2023-05-01 | End: 2023-05-02

## 2023-05-01 RX ORDER — ONDANSETRON 8 MG/1
4 TABLET, FILM COATED ORAL EVERY 6 HOURS
Refills: 0 | Status: DISCONTINUED | OUTPATIENT
Start: 2023-05-01 | End: 2023-05-02

## 2023-05-01 RX ORDER — CEFOTETAN DISODIUM 1 G
2 VIAL (EA) INJECTION ONCE
Refills: 0 | Status: COMPLETED | OUTPATIENT
Start: 2023-05-01 | End: 2023-05-01

## 2023-05-01 RX ORDER — ONDANSETRON 8 MG/1
4 TABLET, FILM COATED ORAL ONCE
Refills: 0 | Status: COMPLETED | OUTPATIENT
Start: 2023-05-01 | End: 2023-05-01

## 2023-05-01 RX ORDER — CEFOTETAN DISODIUM 1 G
1 VIAL (EA) INJECTION EVERY 12 HOURS
Refills: 0 | Status: DISCONTINUED | OUTPATIENT
Start: 2023-05-02 | End: 2023-05-02

## 2023-05-01 RX ORDER — MORPHINE SULFATE 50 MG/1
4 CAPSULE, EXTENDED RELEASE ORAL ONCE
Refills: 0 | Status: DISCONTINUED | OUTPATIENT
Start: 2023-05-01 | End: 2023-05-01

## 2023-05-01 RX ORDER — CEFOTETAN DISODIUM 1 G
VIAL (EA) INJECTION
Refills: 0 | Status: DISCONTINUED | OUTPATIENT
Start: 2023-05-01 | End: 2023-05-02

## 2023-05-01 RX ORDER — FENTANYL CITRATE 50 UG/ML
25 INJECTION INTRAVENOUS
Refills: 0 | Status: DISCONTINUED | OUTPATIENT
Start: 2023-05-01 | End: 2023-05-01

## 2023-05-01 RX ORDER — HYDROMORPHONE HYDROCHLORIDE 2 MG/ML
0.5 INJECTION INTRAMUSCULAR; INTRAVENOUS; SUBCUTANEOUS EVERY 4 HOURS
Refills: 0 | Status: DISCONTINUED | OUTPATIENT
Start: 2023-05-01 | End: 2023-05-02

## 2023-05-01 RX ORDER — SODIUM CHLORIDE 9 MG/ML
1000 INJECTION, SOLUTION INTRAVENOUS
Refills: 0 | Status: DISCONTINUED | OUTPATIENT
Start: 2023-05-01 | End: 2023-05-02

## 2023-05-01 RX ADMIN — SODIUM CHLORIDE 100 MILLILITER(S): 9 INJECTION, SOLUTION INTRAVENOUS at 14:25

## 2023-05-01 RX ADMIN — SODIUM CHLORIDE 1000 MILLILITER(S): 9 INJECTION INTRAMUSCULAR; INTRAVENOUS; SUBCUTANEOUS at 09:26

## 2023-05-01 RX ADMIN — Medication 2 GRAM(S): at 13:49

## 2023-05-01 RX ADMIN — Medication 30 MILLIGRAM(S): at 09:55

## 2023-05-01 RX ADMIN — ONDANSETRON 4 MILLIGRAM(S): 8 TABLET, FILM COATED ORAL at 09:25

## 2023-05-01 RX ADMIN — Medication 30 MILLIGRAM(S): at 09:25

## 2023-05-01 RX ADMIN — HYDROMORPHONE HYDROCHLORIDE 0.5 MILLIGRAM(S): 2 INJECTION INTRAMUSCULAR; INTRAVENOUS; SUBCUTANEOUS at 19:52

## 2023-05-01 RX ADMIN — HYDROMORPHONE HYDROCHLORIDE 0.5 MILLIGRAM(S): 2 INJECTION INTRAMUSCULAR; INTRAVENOUS; SUBCUTANEOUS at 23:56

## 2023-05-01 RX ADMIN — HEPARIN SODIUM 5000 UNIT(S): 5000 INJECTION INTRAVENOUS; SUBCUTANEOUS at 14:25

## 2023-05-01 RX ADMIN — SODIUM CHLORIDE 1000 MILLILITER(S): 9 INJECTION INTRAMUSCULAR; INTRAVENOUS; SUBCUTANEOUS at 10:26

## 2023-05-01 RX ADMIN — Medication 100 GRAM(S): at 13:19

## 2023-05-01 RX ADMIN — SODIUM CHLORIDE 100 MILLILITER(S): 9 INJECTION, SOLUTION INTRAVENOUS at 23:36

## 2023-05-01 RX ADMIN — HYDROMORPHONE HYDROCHLORIDE 0.5 MILLIGRAM(S): 2 INJECTION INTRAMUSCULAR; INTRAVENOUS; SUBCUTANEOUS at 19:22

## 2023-05-01 NOTE — DISCHARGE NOTE PROVIDER - NSDCMRMEDTOKEN_GEN_ALL_CORE_FT
atorvastatin 20 mg oral tablet: 1 orally  MetFORMIN (Eqv-Fortamet) 1000 mg oral tablet, extended release: 1 orally 2 times a day  valsartan 160 mg oral capsule: orally   atorvastatin 20 mg oral tablet: 1 orally  Bactrim  mg-160 mg oral tablet: 1 tab(s) orally every 12 hours  MetFORMIN (Eqv-Fortamet) 1000 mg oral tablet, extended release: 1 orally 2 times a day  valsartan 160 mg oral capsule: orally

## 2023-05-01 NOTE — H&P ADULT - ASSESSMENT
58 year old female with CT findings of tip appendicitis   afebrile, wbc wnl     - admit to surgery under Dr. Cortes   - NPO, IVF  - IV abx   - OR for laparoscopic appendectomy, possible open   - consent pending   - dvt ppx, oob, ambulate as tolerate  - antiemetic, antipyretic, pain meds prn   - discussed and agreed with Dr. Cortes

## 2023-05-01 NOTE — ED PROVIDER NOTE - CLINICAL SUMMARY MEDICAL DECISION MAKING FREE TEXT BOX
58 year old female with RUQ abd pain and nausea. PE as above.  labs, ua, pain control, US abdomen, reassess

## 2023-05-01 NOTE — ED ADULT NURSE NOTE - OBJECTIVE STATEMENT
patient complains of R upper quadrant abdominal pain for 2 days. Patient states she has had a problem with her gall bladder for a year now and since 2 days ago the pain has increased. patient complains of nausea, but no vomiting and states her stool has been very light colored.

## 2023-05-01 NOTE — DISCHARGE NOTE PROVIDER - NSDCCPTREATMENT_GEN_ALL_CORE_FT
PRINCIPAL PROCEDURE  Procedure: Laparoscopic appendectomy  Findings and Treatment: Please follow-up with your surgeon in 1 week. Drink plenty of fluids and rest as needed. Call for any fever over 101, nausea, vomiting, severe pain, no passing of gas or bowel movement.   DIET  You may resume your regular diet as normal.   SURGICAL SITES  Remove outer dressing and keep white steri-strips in place allowing them to fall off on their own. You may shower 48 hours post-operatively but do not bathe or soak in the water for 1-2 weeks; pat dry. If you notice any signs of surgical site infection (ie. redness, swelling, pain, pus drainage), please seek medical care immediately.  ACTIVITY  Do not lift anything heavier than 10 pounds for 2 weeks (2-3 months for hernia, no exercise for 2 weeks) and avoid strenuous activity for 4-6 weeks.   PAIN CONTROL  You may take Motrin 600mg (with food) or Tylenol 650mg as needed for mild pain. Stagger one medication 3 hours after the other for maximum pain control. Maximum daily dose of Tylenol should not exceed 4grams/day.   You may take your prescribed oxycodone for severe breakthrough pain not that is not relieved by Tylenol/Motrin. Do not drive or make important decisions while taking this medication and do not take more than 4 pills in 24 hours.Please refer to medical records/ progress notes for in depth hospital course. Discharge plan discussed and agreed with attending.     PRINCIPAL PROCEDURE  Procedure: Laparoscopic appendectomy  Findings and Treatment: Please follow-up with your surgeon in 1 week. Drink plenty of fluids and rest as needed. Call for any fever over 101, nausea, vomiting, severe pain, no passing of gas or bowel movement.   DIET  You may resume your regular diet as normal.   SURGICAL SITES  Remove outer dressing and keep white steri-strips in place allowing them to fall off on their own. You may shower 48 hours post-operatively but do not bathe or soak in the water for 1-2 weeks; pat dry. If you notice any signs of surgical site infection (ie. redness, swelling, pain, pus drainage), please seek medical care immediately.  abx  take as prescribed   monitor for any signs of allergy, respiratory issues and go to nearest ED  ACTIVITY  Do not lift anything heavier than 10 pounds for 2 weeks (2-3 months for hernia, no exercise for 2 weeks) and avoid strenuous activity for 4-6 weeks.   PAIN CONTROL  You may take Motrin 600mg (with food) or Tylenol 650mg as needed for mild pain. Stagger one medication 3 hours after the other for maximum pain control. Maximum daily dose of Tylenol should not exceed 4grams/day.   You may take your prescribed oxycodone for severe breakthrough pain not that is not relieved by Tylenol/Motrin. Do not drive or make important decisions while taking this medication and do not take more than 4 pills in 24 hours.Please refer to medical records/ progress notes for in depth hospital course. Discharge plan discussed and agreed with attending.

## 2023-05-01 NOTE — DISCHARGE NOTE PROVIDER - HOSPITAL COURSE
H58 year old female with PMHX of HTN, HLD, DM and PSHX of partial hysterectomy and tubal ligation (about 14 years ago) being seen in ED on 5/1 for RUQ pain that radiates to the RLQ upon ambulation for the past 2 days. Admits to flatus, states last bowel movement was light in color. Patient denies any N/V, fever, chills, urinary complaints or any other constitutional symptoms. She states that she routinely sees her GI, Dr. Caputo. Her last colonoscopy was 3 years ago and revealed a polyp which was removed. She states her past EGD showed gastric ulcers for which she was treated in the past, not currently on any treatment.    CT showed tip appendicitis.   Patient underwent laparoscopic appendectomy on 5/1. The procedure was uncomplicated and well tolerated by the patient. The post-operative course was uncomplicated. Diet was advanced as tolerated and according to bowel function, and pain was well controlled on medication. At the time of discharge, the patient was hemodynamically stable, was tolerating PO diet, was voiding urine and passing flatus, was ambulating, and was comfortable with adequate pain control. The patient was instructed to follow up with Dr. Cortes within 1-2 weeks after discharge from the hospital.

## 2023-05-01 NOTE — DISCHARGE NOTE PROVIDER - CARE PROVIDER_API CALL
Tino Cortes)  Surgery  [address] [place] [address]  Phone: [phone]  Fax: [phone]  Follow Up Time: 2 weeks

## 2023-05-01 NOTE — ED ADULT TRIAGE NOTE - SOURCE OF INFORMATION
Patient Patient seen [facility] standards of care, Chinmay Scale Score: 16 (07/22/19 0800), skin check reassessment. No open areas noted. Reported to have a deep tissue injury to coccyx. examined area. Buttocks has blanchable erythema present to coccyx, with three faint linear brown discoloration present. NO deep tissue injury seen today, does have multiple scabs present to extremities and trunk. No erythema noted to heels.  Education reinforced.    Spent 15 minutes with patient and documentation

## 2023-05-01 NOTE — H&P ADULT - HISTORY OF PRESENT ILLNESS
patient seen   note pending  58 year old female with PMHX of HTN, HLD, DM and PSHX of partial hysterectomy and tubal ligation (about 14 years ago) being seen for RUQ pain that radiates to the RLQ upon ambulation for the past 2 days. Admits to flatus, states last bowel movement was light in color. Patient denies any N/V, fever, chills, urinary complaints or any other constitutional symptoms.   She states that she routinely sees her GI, Dr. Caputo. Her last colonoscopy was 3 years ago and revealed a polyp which was removed. She states her past EGD showed gastric ulcers for which she was treated in the past, not currently on any treatment.

## 2023-05-01 NOTE — ED PROVIDER NOTE - PROGRESS NOTE DETAILS
labs are unremarkable. US abdomen no acute abnormality. ct abdo/pelvis with tip appendicitis. surgery consulted. NPO. abx ordered. will admit to surgery

## 2023-05-01 NOTE — H&P ADULT - NSHPPHYSICALEXAM_GEN_ALL_CORE
General: Well nourished/Well developed, NAD  Neurology: A&Ox3  Respiratory:  unlabored  Skin: warm and dry  Abdominal: Soft, tender in RLQ, Nondistended  Extremities: No edema,  no calf pain bilaterally
chest pain/ right sided torso pain/complains of pain/discomfort

## 2023-05-01 NOTE — PATIENT PROFILE ADULT - FALL HARM RISK - HARM RISK INTERVENTIONS

## 2023-05-01 NOTE — H&P ADULT - NSHPLABSRESULTS_GEN_ALL_CORE
Vital Signs Last 24 Hrs  T(C): 36.8 (01 May 2023 14:44), Max: 36.9 (01 May 2023 07:34)  T(F): 98.2 (01 May 2023 14:44), Max: 98.5 (01 May 2023 07:34)  HR: 80 (01 May 2023 14:44) (77 - 96)  BP: 142/87 (01 May 2023 14:44) (134/84 - 142/87)  BP(mean): --  RR: 17 (01 May 2023 14:44) (17 - 18)  SpO2: 99% (01 May 2023 14:44) (98% - 100%)    Parameters below as of 01 May 2023 14:44  Patient On (Oxygen Delivery Method): room air          I&O's Detail      LABS:                        11.8   8.49  )-----------( 380      ( 01 May 2023 08:42 )             35.8             05-01    137  |  102  |  14  ----------------------------<  150<H>  4.1   |  27  |  0.97    Ca    9.9      01 May 2023 08:42  Mg     1.6     05-01    TPro  8.6<H>  /  Alb  3.9  /  TBili  0.4  /  DBili  0.1  /  AST  22  /  ALT  22  /  AlkPhos  82  05-01    PT/INR - ( 01 May 2023 08:42 )   PT: 10.4 sec;   INR: 0.88 ratio         PTT - ( 01 May 2023 08:42 )  PTT:30.3 sec    CT findings < from: CT Abdomen and Pelvis w/ IV Cont (05.01.23 @ 11:55) >    PROCEDURE:  CT of the Abdomen and Pelvis was performed.  Sagittal and coronal reformats were performed.    FINDINGS:  LOWERCHEST: Within normal limits.    < from: US Abdomen Complete (US Abdomen Complete .) (05.01.23 @ 09:08) >    GALLBLADDER: Within normal limits.  PANCREAS: Within normal limits.  KIDNEYS/URETERS: Bilateralrenal hypodensities, too small to accurately   characterize. No hydronephrosis.    BLADDER: Within normal limits.  REPRODUCTIVE ORGANS: Uterus and adnexa within normal limits.    BOWEL: No bowel obstruction. Colonic diverticulosis The tip of the   appendix is mildly dilated and hyperemic, measuring up to 9 mm with mild   periappendiceal inflammatory change.  PERITONEUM: No ascites.  VESSELS: Atherosclerotic changes.  RETROPERITONEUM/LYMPH NODES: No lymphadenopathy.  ABDOMINAL WALL: Postsurgical changes.  BONES: Degenerative changes.    IMPRESSION:  Findings compatible with tip appendicitis, as described above.    < end of copied text >    < from: US Abdomen Complete (US Abdomen Complete .) (05.01.23 @ 09:08) >    FINDINGS:  Liver: Mild hepatic steatosis.  Bile ducts: Normal caliber. Common bile duct measures 3 mm.  Gallbladder: Within normal limits.  Pancreas: Visualized portions are within normal limits.  Spleen: 8.7 cm. Within normal limits.  Right kidney: 10.0 cm. No hydronephrosis. Grossly stable 0.8 cm   hyperechoic lesion in the right kidney.  Left kidney: 9.7 cm. No hydronephrosis. 1.4 cm cyst in the left kidney  Ascites: None.  Aorta and IVC: Visualized portions are within normal limits.    IMPRESSION:  Unremarkable gallbladder.    Stable 0.8 cm hyperechoic lesion in the right kidney. This may represent   an angiomyolipoma or a nonspecific mass lesion. If clinically indicated,   nonemergent abdominal MR without and with IV contrast may be pursued for   further evaluation.    Hepatic steatosis.< from: US Abdomen Complete (US Abdomen Complete .) (05.01.23 @ 09:08) >

## 2023-05-01 NOTE — PATIENT PROFILE ADULT - FUNCTIONAL ASSESSMENT - DAILY ACTIVITY SECTION LABEL
s/p controller change on 12/16   -  on 1/15   - Off aspirin due to drop in Hgb. will resume when feasible   - Off coumadin due to persistent recurrent GI bleeding. .

## 2023-05-01 NOTE — ED PROVIDER NOTE - OBJECTIVE STATEMENT
58 year old female PMH HTN, HLD, DM coming in with RUQ abd pain with associated nausea and white stools for the past 2 days. states 1 year ago had similar symptoms. denies all other complaints.

## 2023-05-01 NOTE — DISCHARGE NOTE PROVIDER - NSDCFUADDAPPT_GEN_ALL_CORE_FT
please call to schedule follow up appointment  please call the office at [phone] to schedule follow up appointment, if office does not answer please leave a message and they will call you back

## 2023-05-02 ENCOUNTER — TRANSCRIPTION ENCOUNTER (OUTPATIENT)
Age: 58
End: 2023-05-02

## 2023-05-02 VITALS
DIASTOLIC BLOOD PRESSURE: 84 MMHG | TEMPERATURE: 98 F | SYSTOLIC BLOOD PRESSURE: 137 MMHG | OXYGEN SATURATION: 99 % | HEART RATE: 78 BPM | RESPIRATION RATE: 18 BRPM

## 2023-05-02 LAB
ANION GAP SERPL CALC-SCNC: 5 MMOL/L — SIGNIFICANT CHANGE UP (ref 5–17)
BUN SERPL-MCNC: 12 MG/DL — SIGNIFICANT CHANGE UP (ref 7–18)
CALCIUM SERPL-MCNC: 9.3 MG/DL — SIGNIFICANT CHANGE UP (ref 8.4–10.5)
CHLORIDE SERPL-SCNC: 102 MMOL/L — SIGNIFICANT CHANGE UP (ref 96–108)
CO2 SERPL-SCNC: 29 MMOL/L — SIGNIFICANT CHANGE UP (ref 22–31)
CREAT SERPL-MCNC: 0.94 MG/DL — SIGNIFICANT CHANGE UP (ref 0.5–1.3)
CULTURE RESULTS: SIGNIFICANT CHANGE UP
EGFR: 71 ML/MIN/1.73M2 — SIGNIFICANT CHANGE UP
GLUCOSE BLDC GLUCOMTR-MCNC: 171 MG/DL — HIGH (ref 70–99)
GLUCOSE BLDC GLUCOMTR-MCNC: 200 MG/DL — HIGH (ref 70–99)
GLUCOSE SERPL-MCNC: 193 MG/DL — HIGH (ref 70–99)
HCT VFR BLD CALC: 31.6 % — LOW (ref 34.5–45)
HGB BLD-MCNC: 10.5 G/DL — LOW (ref 11.5–15.5)
MCHC RBC-ENTMCNC: 27.3 PG — SIGNIFICANT CHANGE UP (ref 27–34)
MCHC RBC-ENTMCNC: 33.2 GM/DL — SIGNIFICANT CHANGE UP (ref 32–36)
MCV RBC AUTO: 82.1 FL — SIGNIFICANT CHANGE UP (ref 80–100)
NRBC # BLD: 0 /100 WBCS — SIGNIFICANT CHANGE UP (ref 0–0)
PLATELET # BLD AUTO: 318 K/UL — SIGNIFICANT CHANGE UP (ref 150–400)
POTASSIUM SERPL-MCNC: 4.4 MMOL/L — SIGNIFICANT CHANGE UP (ref 3.5–5.3)
POTASSIUM SERPL-SCNC: 4.4 MMOL/L — SIGNIFICANT CHANGE UP (ref 3.5–5.3)
RBC # BLD: 3.85 M/UL — SIGNIFICANT CHANGE UP (ref 3.8–5.2)
RBC # FLD: 13.5 % — SIGNIFICANT CHANGE UP (ref 10.3–14.5)
SODIUM SERPL-SCNC: 136 MMOL/L — SIGNIFICANT CHANGE UP (ref 135–145)
SPECIMEN SOURCE: SIGNIFICANT CHANGE UP
WBC # BLD: 10.8 K/UL — HIGH (ref 3.8–10.5)
WBC # FLD AUTO: 10.8 K/UL — HIGH (ref 3.8–10.5)

## 2023-05-02 PROCEDURE — 74177 CT ABD & PELVIS W/CONTRAST: CPT | Mod: MA

## 2023-05-02 PROCEDURE — 87086 URINE CULTURE/COLONY COUNT: CPT

## 2023-05-02 PROCEDURE — 86900 BLOOD TYPING SEROLOGIC ABO: CPT

## 2023-05-02 PROCEDURE — 86901 BLOOD TYPING SEROLOGIC RH(D): CPT

## 2023-05-02 PROCEDURE — 36415 COLL VENOUS BLD VENIPUNCTURE: CPT

## 2023-05-02 PROCEDURE — 81001 URINALYSIS AUTO W/SCOPE: CPT

## 2023-05-02 PROCEDURE — 83735 ASSAY OF MAGNESIUM: CPT

## 2023-05-02 PROCEDURE — 85730 THROMBOPLASTIN TIME PARTIAL: CPT

## 2023-05-02 PROCEDURE — 82248 BILIRUBIN DIRECT: CPT

## 2023-05-02 PROCEDURE — 76700 US EXAM ABDOM COMPLETE: CPT

## 2023-05-02 PROCEDURE — 99285 EMERGENCY DEPT VISIT HI MDM: CPT

## 2023-05-02 PROCEDURE — 86850 RBC ANTIBODY SCREEN: CPT

## 2023-05-02 PROCEDURE — 88304 TISSUE EXAM BY PATHOLOGIST: CPT

## 2023-05-02 PROCEDURE — 80048 BASIC METABOLIC PNL TOTAL CA: CPT

## 2023-05-02 PROCEDURE — 82247 BILIRUBIN TOTAL: CPT

## 2023-05-02 PROCEDURE — 96375 TX/PRO/DX INJ NEW DRUG ADDON: CPT

## 2023-05-02 PROCEDURE — 80053 COMPREHEN METABOLIC PANEL: CPT

## 2023-05-02 PROCEDURE — 82962 GLUCOSE BLOOD TEST: CPT

## 2023-05-02 PROCEDURE — 93005 ELECTROCARDIOGRAM TRACING: CPT

## 2023-05-02 PROCEDURE — 96365 THER/PROPH/DIAG IV INF INIT: CPT

## 2023-05-02 PROCEDURE — 83690 ASSAY OF LIPASE: CPT

## 2023-05-02 PROCEDURE — 85025 COMPLETE CBC W/AUTO DIFF WBC: CPT

## 2023-05-02 PROCEDURE — 85027 COMPLETE CBC AUTOMATED: CPT

## 2023-05-02 PROCEDURE — 85610 PROTHROMBIN TIME: CPT

## 2023-05-02 RX ADMIN — Medication 100 GRAM(S): at 05:52

## 2023-05-02 RX ADMIN — SODIUM CHLORIDE 100 MILLILITER(S): 9 INJECTION, SOLUTION INTRAVENOUS at 05:52

## 2023-05-02 RX ADMIN — HYDROMORPHONE HYDROCHLORIDE 0.5 MILLIGRAM(S): 2 INJECTION INTRAMUSCULAR; INTRAVENOUS; SUBCUTANEOUS at 00:15

## 2023-05-02 RX ADMIN — HEPARIN SODIUM 5000 UNIT(S): 5000 INJECTION INTRAVENOUS; SUBCUTANEOUS at 05:50

## 2023-05-02 NOTE — DISCHARGE NOTE NURSING/CASE MANAGEMENT/SOCIAL WORK - PATIENT PORTAL LINK FT
You can access the FollowMyHealth Patient Portal offered by [facility] by registering at the following website: [email or web address]. By joining [employer]’s FollowMyHealth portal, you will also be able to view your health information using other applications (apps) compatible with our system.

## 2023-05-02 NOTE — DISCHARGE NOTE NURSING/CASE MANAGEMENT/SOCIAL WORK - NSDCPEFALRISK_GEN_ALL_CORE
For information on Fall & Injury Prevention, visit: [email or web address] OR  [email or web address] OR  https://www.cdc.gov/steadi/patient.html

## 2023-05-02 NOTE — PROGRESS NOTE ADULT - ASSESSMENT
57 y/o female s/p lap appy 5/1, POD#1, postop stable, VSS  -Reg diet   -Pain control PRN   -DVT ppx   -DC planning   -Discussed with Dr. Cortes

## 2023-05-02 NOTE — PROGRESS NOTE ADULT - SUBJECTIVE AND OBJECTIVE BOX
POST-OPERATIVE NOTE    Subjective:   58y Female s/p lap appendectomy POD #0 . Seen and examined at bed side . Denies nausea, vomiting, chest pain, sob, fevers chills. Pain is well controlled. . Voiding .    Vital Signs Last 24 Hrs  T(C): 36.7 (02 May 2023 05:51), Max: 36.9 (01 May 2023 07:34)  T(F): 98 (02 May 2023 05:51), Max: 98.5 (01 May 2023 07:34)  HR: 80 (02 May 2023 05:51) (61 - 96)  BP: 116/56 (02 May 2023 05:51) (113/70 - 158/84)  BP(mean): 71 (02 May 2023 05:51) (71 - 102)  RR: 18 (02 May 2023 05:51) (13 - 18)  SpO2: 98% (02 May 2023 05:51) (95% - 100%)    Parameters below as of 02 May 2023 05:51  Patient On (Oxygen Delivery Method): room air      I&O's Detail      Physical Exam:  General: NAD, resting comfortably in bed  Pulmonary: Nonlabored breathing, no respiratory distress  Cardiovascular: NSR, S1, S2  Abdominal: soft, NT/ND, dressing c/d/i  Extremities: no edema, no calf tenderness, distal pulses are palpable     LABS:                        10.5   10.80 )-----------( 318      ( 02 May 2023 04:46 )             31.6     05-02    136  |  102  |  12  ----------------------------<  193<H>  4.4   |  29  |  0.94    Ca    9.3      02 May 2023 04:46  Mg     1.6     05-01    TPro  8.6<H>  /  Alb  3.9  /  TBili  0.4  /  DBili  0.1  /  AST  22  /  ALT  22  /  AlkPhos  82  05-01    LIVER FUNCTIONS - ( 01 May 2023 08:42 )  Alb: 3.9 g/dL / Pro: 8.6 g/dL / ALK PHOS: 82 U/L / ALT: 22 U/L DA / AST: 22 U/L / GGT: x             MEDICATIONS  (STANDING):  cefoTEtan  IVPB      cefoTEtan  IVPB 1 Gram(s) IV Intermittent every 12 hours  heparin   Injectable 5000 Unit(s) SubCutaneous every 8 hours  lactated ringers. 1000 milliLiter(s) (100 mL/Hr) IV Continuous <Continuous>    MEDICATIONS  (PRN):  acetaminophen     Tablet .. 650 milliGRAM(s) Oral every 6 hours PRN Temp greater or equal to 38C (100.4F), Mild Pain (1 - 3)  HYDROmorphone  Injectable 0.5 milliGRAM(s) IV Push every 4 hours PRN Severe Pain (7 - 10)  ondansetron Injectable 4 milliGRAM(s) IV Push every 6 hours PRN Nausea      Assessment:   58y Female who is s/p lap appendectomy POD #0. Stable    Plan:  - Pain control prn  -Dressing change prn   - Reg diet and DC IV fluids  - Incentive Spirometry  - OOB and ambulating as tolerated  - F/u AM labs  - DVT ppx
INTERVAL HPI/OVERNIGHT EVENTS:  Pt seen and examined at bedside.   Pt resting comfortably. No acute complaints.   Tolerating diet.   Denies N/V  Afebrile     MEDICATIONS  (STANDING):  cefoTEtan  IVPB      cefoTEtan  IVPB 1 Gram(s) IV Intermittent every 12 hours  heparin   Injectable 5000 Unit(s) SubCutaneous every 8 hours  lactated ringers. 1000 milliLiter(s) (100 mL/Hr) IV Continuous <Continuous>    MEDICATIONS  (PRN):  acetaminophen     Tablet .. 650 milliGRAM(s) Oral every 6 hours PRN Temp greater or equal to 38C (100.4F), Mild Pain (1 - 3)  HYDROmorphone  Injectable 0.5 milliGRAM(s) IV Push every 4 hours PRN Severe Pain (7 - 10)  ondansetron Injectable 4 milliGRAM(s) IV Push every 6 hours PRN Nausea      Vital Signs Last 24 Hrs  T(C): 36.7 (02 May 2023 05:51), Max: 36.9 (01 May 2023 21:23)  T(F): 98 (02 May 2023 05:51), Max: 98.5 (01 May 2023 21:23)  HR: 80 (02 May 2023 05:51) (61 - 91)  BP: 116/56 (02 May 2023 05:51) (113/70 - 158/84)  BP(mean): 71 (02 May 2023 05:51) (71 - 102)  RR: 18 (02 May 2023 05:51) (13 - 18)  SpO2: 98% (02 May 2023 05:51) (95% - 100%)    Parameters below as of 02 May 2023 05:51  Patient On (Oxygen Delivery Method): room air        Physical:  General: A&Ox3. NAD.  Respirations: Unlabored   Abdomen: Soft nondistended, appropriate incisional tenderness, dressings CDI     I&O's Detail      LABS:                        10.5   10.80 )-----------( 318      ( 02 May 2023 04:46 )             31.6             05-02    136  |  102  |  12  ----------------------------<  193<H>  4.4   |  29  |  0.94    Ca    9.3      02 May 2023 04:46  Mg     1.6     05-01    TPro  8.6<H>  /  Alb  3.9  /  TBili  0.4  /  DBili  0.1  /  AST  22  /  ALT  22  /  AlkPhos  82  05-01

## 2023-05-02 NOTE — DISCHARGE NOTE NURSING/CASE MANAGEMENT/SOCIAL WORK - NSDCFUADDAPPT_GEN_ALL_CORE_FT
please call the office at [phone] to schedule follow up appointment, if office does not answer please leave a message and they will call you back

## 2023-05-10 LAB — SURGICAL PATHOLOGY STUDY: SIGNIFICANT CHANGE UP

## 2023-05-12 ENCOUNTER — APPOINTMENT (OUTPATIENT)
Dept: SURGERY | Facility: CLINIC | Age: 58
End: 2023-05-12
Payer: MEDICAID

## 2023-05-12 VITALS
WEIGHT: 152 LBS | BODY MASS INDEX: 25.95 KG/M2 | TEMPERATURE: 98 F | SYSTOLIC BLOOD PRESSURE: 108 MMHG | OXYGEN SATURATION: 98 % | HEIGHT: 64 IN | DIASTOLIC BLOOD PRESSURE: 74 MMHG | HEART RATE: 93 BPM

## 2023-05-12 PROCEDURE — 99024 POSTOP FOLLOW-UP VISIT: CPT

## 2023-05-12 NOTE — HISTORY OF PRESENT ILLNESS
[de-identified] : pt was seen and examined. pt is s/p lap appendectomy. Path: Acute appendicitis. on exam, wound edges are healing well. return to office as needed.

## 2023-09-13 NOTE — ED ADULT NURSE NOTE - EXTENSIONS OF SELF_ADULT
Approved.  
Approved.  
Called [facility] and were told that they only need a prescription for a temp handicap placard it can be from 6 months to 10 years, they said its up to you but it would be easier for Hugo until he gets better  
None

## 2024-01-17 ENCOUNTER — RESULT REVIEW (OUTPATIENT)
Age: 59
End: 2024-01-17

## 2024-01-17 ENCOUNTER — APPOINTMENT (OUTPATIENT)
Dept: ULTRASOUND IMAGING | Facility: CLINIC | Age: 59
End: 2024-01-17
Payer: MEDICAID

## 2024-01-17 ENCOUNTER — APPOINTMENT (OUTPATIENT)
Dept: SURGERY | Facility: CLINIC | Age: 59
End: 2024-01-17
Payer: MEDICAID

## 2024-01-17 VITALS
HEIGHT: 64 IN | WEIGHT: 156 LBS | SYSTOLIC BLOOD PRESSURE: 132 MMHG | BODY MASS INDEX: 26.63 KG/M2 | DIASTOLIC BLOOD PRESSURE: 85 MMHG | OXYGEN SATURATION: 99 % | HEART RATE: 92 BPM | TEMPERATURE: 98.2 F

## 2024-01-17 DIAGNOSIS — Z78.9 OTHER SPECIFIED HEALTH STATUS: ICD-10-CM

## 2024-01-17 PROCEDURE — 99243 OFF/OP CNSLTJ NEW/EST LOW 30: CPT

## 2024-01-17 PROCEDURE — 76882 US LMTD JT/FCL EVL NVASC XTR: CPT | Mod: RT

## 2024-01-17 NOTE — CONSULT LETTER
[Dear  ___] : Dear  [unfilled], [Consult Letter:] : I had the pleasure of evaluating your patient, [unfilled]. [Consult Closing:] : Thank you very much for allowing me to participate in the care of this patient.  If you have any questions, please do not hesitate to contact me. [Sincerely,] : Sincerely, [FreeTextEntry3] : Maritza Lopez MD FACS

## 2024-01-17 NOTE — ASSESSMENT
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   Results of her recent imaging and physical examination findings were discussed in detail. she was informed of significance of findings. All of the options, risks and benefits were explained.   We recommend bilateral lower ext. ultrasound of r/o inguinal or femoral hernia.  Gabapentin 100 MG Oral Capsule for pain management.   RTO post testing.

## 2024-01-17 NOTE — DATA REVIEWED
[FreeTextEntry1] : AMY MONREAL 1965 CT PELVIS with contrast 2023-11-27 5:48 PM [id number] RENETTAMORENITA MICHELLE MSR5 RADIOLOGY REPORT FINDINGS FINDING Reason for examination : Right lower quadrant pain.  Comparison: None  Technique: CT of the pelvis was performed with 5 mm thin sections. Intravenous and oral contrast were administered. Thin section reconstruction of 0.75 mm were performed and coronal reformations were obtained.   This CT exam was performed using one or more of the following dose reduction techniques: Automated exposure control, adjustment of the mA and/or kV according to patient size, or use of iterative reconstruction technique.  FINDINGS:  Other: There is mild diastases of the anterior pelvic wall with loops of small bowel extending to the level of the peritoneal cavity anteriorly (just deep to the subcutaneous tissues) and between the inferior epigastric muscles. There is no bilateral inguinal hernia.  Visualized bowel: No bowel obstruction. Free fluid: None Free air: None Lymph nodes: No pathologically enlarged abdominopelvic lymph nodes.  Pelvic organs: Post hysterectomy. Urinary bladder is underdistended and incompletely evaluated. Osseous structures: unremarkable. IMPRESSION: 1. No bilateral inguinal hernia or ventral pelvic hernia. 2. Mild diastases of the anterior pelvic wall with loops of small bowel extending to the level of the anterior peritoneal wall (just deep to the subcutaneous tissues) and between the inferior epigastric muscles.  Electronically signed by: Lisa Duncan MD 11/28/2023 8:46 AM   Workstation: [id number] Contrast:  Administered 100.0 ml of 350 mg/ml OMNIPAQUE total iodine 35.000 gI. Volume_Wasted: 0.5 ml.  RESULTS:  As a requirement of performing this exam, ISTAT was used to calculate Creatinine and GFR for your patient.  EGFR: 74 Electronically Signed By: Lisa Duncan MD  Sign Date: 28-NOV-23 [facility] _________________________________  AMY MONREAL 1965 MR LUMBAR SPINE w/o contrast 2023-12-20 3:17 PM [id number] RENETTAMORENITA MICHELLE MSR5   RADIOLOGY REPORT   FINDINGS   FINDING History: Right lower quadrant pain.  Technique: Multiplanar multi sequential MRI of the lumbar spine was performed before and after the administration of approximately 7 cc intravenous Gadavist contrast.  Comparison: MRI lumbar spine 9/14/2019.  Findings:   Scoliosis: None  Alignment: Normal.  Vertebra: Vertebral body heights are preserved.  Marrow: Normal signal intensity without destructive lesion.  Distal Cord / Cauda Equina: Normal signal intensity and morphology.  Contrast: No abnormal enhancement.  Disc Levels:  At T12-L1: There is no disc herniation, stenosis or foraminal narrowing.  At L1-2: Disc bulge flattens the ventral thecal sac without spinal canal or foraminal stenosis.  At L2-3: There is no disc herniation, stenosis or foraminal narrowing.  At L3-4: There is no disc herniation, stenosis or foraminal narrowing.   At L4-5: There is no disc herniation, stenosis or foraminal narrowing.  At L5-S1: No significant disc pathology. Facet arthropathy is seen. There is no spinal canal or foraminal stenosis.  Other: Left renal cyst  IMPRESSION:  Minimal degenerative changes as above. No significant spinal canal or foraminal stenosis.  Electronically signed by: Sung Sanchez MD 12/20/2023 4:09 PM   Workstation: [id number]   Electronically Signed By: Sung Sanchez MD  Sign Date: 20-DEC-23 [facility]

## 2024-01-17 NOTE — PHYSICAL EXAM
[Normal Breath Sounds] : Normal breath sounds [Normal Heart Sounds] : normal heart sounds [Normal Rate and Rhythm] : normal rate and rhythm [No Rash or Lesion] : No rash or lesion [Alert] : alert [Oriented to Person] : oriented to person [Oriented to Place] : oriented to place [Oriented to Time] : oriented to time [Calm] : calm [de-identified] : The patient is alert, well-groomed  [de-identified] : Normoactive bowel sounds, soft and nontender, no hepatosplenomegaly or masses noted, well healed scars no hernias   [de-identified] : full range of motion and no deformities appreciated.

## 2024-01-17 NOTE — REVIEW OF SYSTEMS
[Negative] : Heme/Lymph [Vomiting] : no vomiting [Constipation] : no constipation [Diarrhea] : no diarrhea [Heartburn] : no heartburn [Melena] : no melena [FreeTextEntry7] : RLQ paint that radiates to the back and leg

## 2024-01-19 ENCOUNTER — LABORATORY RESULT (OUTPATIENT)
Age: 59
End: 2024-01-19

## 2024-01-19 ENCOUNTER — APPOINTMENT (OUTPATIENT)
Dept: ULTRASOUND IMAGING | Facility: CLINIC | Age: 59
End: 2024-01-19
Payer: MEDICAID

## 2024-01-19 ENCOUNTER — OUTPATIENT (OUTPATIENT)
Dept: OUTPATIENT SERVICES | Facility: HOSPITAL | Age: 59
LOS: 1 days | End: 2024-01-19
Payer: MEDICAID

## 2024-01-19 ENCOUNTER — APPOINTMENT (OUTPATIENT)
Dept: OBGYN | Facility: CLINIC | Age: 59
End: 2024-01-19
Payer: MEDICAID

## 2024-01-19 VITALS
HEIGHT: 64 IN | HEART RATE: 82 BPM | BODY MASS INDEX: 26.8 KG/M2 | WEIGHT: 157 LBS | RESPIRATION RATE: 18 BRPM | DIASTOLIC BLOOD PRESSURE: 79 MMHG | OXYGEN SATURATION: 99 % | TEMPERATURE: 98.4 F | SYSTOLIC BLOOD PRESSURE: 130 MMHG

## 2024-01-19 DIAGNOSIS — Z86.018 PERSONAL HISTORY OF OTHER BENIGN NEOPLASM: ICD-10-CM

## 2024-01-19 DIAGNOSIS — Z90.711 ACQUIRED ABSENCE OF UTERUS WITH REMAINING CERVICAL STUMP: ICD-10-CM

## 2024-01-19 DIAGNOSIS — G89.29 RIGHT UPPER QUADRANT PAIN: ICD-10-CM

## 2024-01-19 DIAGNOSIS — G89.29 PAIN IN RIGHT ANKLE AND JOINTS OF RIGHT FOOT: ICD-10-CM

## 2024-01-19 DIAGNOSIS — Z82.49 FAMILY HISTORY OF ISCHEMIC HEART DISEASE AND OTHER DISEASES OF THE CIRCULATORY SYSTEM: ICD-10-CM

## 2024-01-19 DIAGNOSIS — M76.829 POSTERIOR TIBIAL TENDINITIS, UNSPECIFIED LEG: ICD-10-CM

## 2024-01-19 DIAGNOSIS — Z90.710 ACQUIRED ABSENCE OF BOTH CERVIX AND UTERUS: Chronic | ICD-10-CM

## 2024-01-19 DIAGNOSIS — M77.8 OTHER ENTHESOPATHIES, NOT ELSEWHERE CLASSIFIED: ICD-10-CM

## 2024-01-19 DIAGNOSIS — M25.571 PAIN IN RIGHT ANKLE AND JOINTS OF RIGHT FOOT: ICD-10-CM

## 2024-01-19 DIAGNOSIS — I10 ESSENTIAL (PRIMARY) HYPERTENSION: ICD-10-CM

## 2024-01-19 DIAGNOSIS — M25.572 PAIN IN RIGHT ANKLE AND JOINTS OF RIGHT FOOT: ICD-10-CM

## 2024-01-19 DIAGNOSIS — E78.5 HYPERLIPIDEMIA, UNSPECIFIED: ICD-10-CM

## 2024-01-19 DIAGNOSIS — N31.9 NEUROMUSCULAR DYSFUNCTION OF BLADDER, UNSPECIFIED: ICD-10-CM

## 2024-01-19 DIAGNOSIS — E11.9 TYPE 2 DIABETES MELLITUS W/OUT COMPLICATIONS: ICD-10-CM

## 2024-01-19 DIAGNOSIS — R10.11 RIGHT UPPER QUADRANT PAIN: ICD-10-CM

## 2024-01-19 DIAGNOSIS — E11.49 TYPE 2 DIABETES MELLITUS WITH OTHER DIABETIC NEUROLOGICAL COMPLICATION: ICD-10-CM

## 2024-01-19 DIAGNOSIS — Z00.00 ENCOUNTER FOR GENERAL ADULT MEDICAL EXAMINATION WITHOUT ABNORMAL FINDINGS: ICD-10-CM

## 2024-01-19 DIAGNOSIS — Z86.19 PERSONAL HISTORY OF OTHER INFECTIOUS AND PARASITIC DISEASES: ICD-10-CM

## 2024-01-19 DIAGNOSIS — O03.9 COMPLETE OR UNSPECIFIED SPONTANEOUS ABORTION W/OUT COMPLICATION: ICD-10-CM

## 2024-01-19 PROCEDURE — 76856 US EXAM PELVIC COMPLETE: CPT

## 2024-01-19 PROCEDURE — 87800 DETECT AGNT MULT DNA DIREC: CPT

## 2024-01-19 PROCEDURE — 87625 HPV TYPES 16 & 18 ONLY: CPT

## 2024-01-19 PROCEDURE — 76830 TRANSVAGINAL US NON-OB: CPT

## 2024-01-19 PROCEDURE — 87591 N.GONORRHOEAE DNA AMP PROB: CPT

## 2024-01-19 PROCEDURE — 87491 CHLMYD TRACH DNA AMP PROBE: CPT

## 2024-01-19 PROCEDURE — G0463: CPT

## 2024-01-19 PROCEDURE — 99204 OFFICE O/P NEW MOD 45 MIN: CPT

## 2024-01-19 PROCEDURE — 87624 HPV HI-RISK TYP POOLED RSLT: CPT

## 2024-01-19 RX ORDER — METFORMIN HYDROCHLORIDE 625 MG/1
TABLET ORAL
Refills: 0 | Status: ACTIVE | COMMUNITY

## 2024-01-19 RX ORDER — INSULIN GLARGINE 100 [IU]/ML
100 INJECTION, SOLUTION SUBCUTANEOUS
Refills: 0 | Status: ACTIVE | COMMUNITY

## 2024-01-19 RX ORDER — ATORVASTATIN CALCIUM 80 MG/1
TABLET, FILM COATED ORAL
Refills: 0 | Status: ACTIVE | COMMUNITY

## 2024-01-19 RX ORDER — INSULIN LISPRO 100 U/ML
100 INJECTION, SOLUTION SUBCUTANEOUS
Qty: 15 | Refills: 0 | Status: DISCONTINUED | COMMUNITY
Start: 2020-10-19 | End: 2024-01-19

## 2024-01-19 RX ORDER — VALSARTAN 40 MG/1
TABLET, COATED ORAL
Refills: 0 | Status: ACTIVE | COMMUNITY

## 2024-01-19 NOTE — PLAN
[FreeTextEntry1] : Annual - Pap/STD/HPV sent; Hx/o Bladder dysfunction - Referral for Urogynecology consult given today; Vaginitis - Affirm sent.  -I spent a total of 55 minutes on the date of the encounter reviewing labs, ultrasound reports, evaluating and treating the patient.

## 2024-01-19 NOTE — HISTORY OF PRESENT ILLNESS
[postmenopausal] : postmenopausal [N] : Patient is not sexually active [Y] : Positive pregnancy history [Mammogramdate] : 9/2023 [PapSmeardate] : 1/2023 as per pt [TextBox_31] : pt reports hpv + [ColonoscopyDate] : 2 yrs ago [LMPDate] : 2012 [PGHxTotal] : 3 [[facility]] : 2 [PGHxPremature] : 0 [PGHxAbortions] : 1 [[facility]Living] : 2 [PGHxABInduced] : 0 [PGHxABSpont] : 1 [PGHxEctopic] : 0 [PGHxMultBirths] : 0 [Post-Menopause, No Sxs] : post-menopausal, currently without symptoms [Menopause Age: ____] : age at menopause was [unfilled] [Yes] : Patient has concerns regarding sex [Previously active] : previously active [Men] : men [Vaginal] : vaginal [FreeTextEntry1] : Dyspareunia secondary to vaginal dryness

## 2024-01-19 NOTE — PHYSICAL EXAM
[Chaperone Present] : A chaperone was present in the examining room during all aspects of the physical examination [Appropriately responsive] : appropriately responsive [Alert] : alert [No Acute Distress] : no acute distress [No Lymphadenopathy] : no lymphadenopathy [Soft] : soft [Non-tender] : non-tender [Non-distended] : non-distended [No HSM] : No HSM [No Lesions] : no lesions [No Mass] : no mass [Oriented x3] : oriented x3 [Examination Of The Breasts] : a normal appearance [No Masses] : no breast masses were palpable [Labia Majora] : normal [Labia Minora] : normal [Cystocele] : a cystocele [Rectocele] : a rectocele [Normal] : normal

## 2024-01-22 DIAGNOSIS — Z01.419 ENCOUNTER FOR GYNECOLOGICAL EXAMINATION (GENERAL) (ROUTINE) WITHOUT ABNORMAL FINDINGS: ICD-10-CM

## 2024-01-22 DIAGNOSIS — R10.2 PELVIC AND PERINEAL PAIN: ICD-10-CM

## 2024-01-22 DIAGNOSIS — Z12.39 ENCOUNTER FOR OTHER SCREENING FOR MALIGNANT NEOPLASM OF BREAST: ICD-10-CM

## 2024-01-22 DIAGNOSIS — N95.2 POSTMENOPAUSAL ATROPHIC VAGINITIS: ICD-10-CM

## 2024-01-22 DIAGNOSIS — Z11.3 ENCOUNTER FOR SCREENING FOR INFECTIONS WITH A PREDOMINANTLY SEXUAL MODE OF TRANSMISSION: ICD-10-CM

## 2024-01-22 DIAGNOSIS — N31.9 NEUROMUSCULAR DYSFUNCTION OF BLADDER, UNSPECIFIED: ICD-10-CM

## 2024-01-22 DIAGNOSIS — Z12.4 ENCOUNTER FOR SCREENING FOR MALIGNANT NEOPLASM OF CERVIX: ICD-10-CM

## 2024-01-22 DIAGNOSIS — Z90.711 ACQUIRED ABSENCE OF UTERUS WITH REMAINING CERVICAL STUMP: ICD-10-CM

## 2024-01-22 DIAGNOSIS — E11.49 TYPE 2 DIABETES MELLITUS WITH OTHER DIABETIC NEUROLOGICAL COMPLICATION: ICD-10-CM

## 2024-01-22 DIAGNOSIS — Z78.0 ASYMPTOMATIC MENOPAUSAL STATE: ICD-10-CM

## 2024-01-22 LAB
CANDIDA VAG CYTO: NOT DETECTED
G VAGINALIS+PREV SP MTYP VAG QL MICRO: NOT DETECTED
HPV HIGH+LOW RISK DNA PNL CVX: DETECTED
T VAGINALIS VAG QL WET PREP: NOT DETECTED

## 2024-01-23 LAB
C TRACH RRNA SPEC QL NAA+PROBE: NOT DETECTED
N GONORRHOEA RRNA SPEC QL NAA+PROBE: NOT DETECTED
SOURCE TP AMPLIFICATION: NORMAL

## 2024-01-24 ENCOUNTER — APPOINTMENT (OUTPATIENT)
Dept: SURGERY | Facility: CLINIC | Age: 59
End: 2024-01-24
Payer: MEDICAID

## 2024-01-24 VITALS
BODY MASS INDEX: 26.63 KG/M2 | OXYGEN SATURATION: 98 % | DIASTOLIC BLOOD PRESSURE: 89 MMHG | HEART RATE: 93 BPM | TEMPERATURE: 98 F | HEIGHT: 64 IN | WEIGHT: 156 LBS | SYSTOLIC BLOOD PRESSURE: 134 MMHG

## 2024-01-24 LAB — CYTOLOGY CVX/VAG DOC THIN PREP: NORMAL

## 2024-01-24 PROCEDURE — XXXXX: CPT | Mod: 1L

## 2024-01-24 NOTE — CONSULT LETTER
[Dear  ___] : Dear  [unfilled], [Courtesy Letter:] : I had the pleasure of seeing your patient, [unfilled], in my office today. [Consult Closing:] : Thank you very much for allowing me to participate in the care of this patient.  If you have any questions, please do not hesitate to contact me. [Sincerely,] : Sincerely, [FreeTextEntry3] : Maritza Lopez MD FACS

## 2024-01-24 NOTE — HISTORY OF PRESENT ILLNESS
[de-identified] : AMY MONREAL is a 59 year old female who presents in the office for follow up visit. She had bilateral lower ext. ultrasound results are c/w normal ultrasound of the bilateral groin. No inguinal hernia is seen. No cystic or solid mass is seen; there is no lymphadenopathy.

## 2024-01-24 NOTE — DATA REVIEWED
[FreeTextEntry1] : EXAM: [id number] - US NONVASC EXT LTD BI  - ORDERED BY: WILLARD CANTRELL PROCEDURE DATE:  01/17/2024 INTERPRETATION:  INDICATION: Bilateral groin pain  TECHNIQUE: Ultrasound of the bilateral groin with and without Valsalva maneuver.  COMPARISON: CT scan dated 5/1/2023  IMPRESSION: Normal ultrasound of the bilateral groin. No inguinal hernia is seen. No cystic or solid mass is seen; there is no lymphadenopathy.  --- End of Report ---

## 2024-02-02 ENCOUNTER — APPOINTMENT (OUTPATIENT)
Dept: SURGERY | Facility: CLINIC | Age: 59
End: 2024-02-02
Payer: MEDICAID

## 2024-02-02 VITALS
TEMPERATURE: 98.3 F | HEIGHT: 64 IN | DIASTOLIC BLOOD PRESSURE: 84 MMHG | OXYGEN SATURATION: 97 % | HEART RATE: 89 BPM | WEIGHT: 157 LBS | BODY MASS INDEX: 26.8 KG/M2 | SYSTOLIC BLOOD PRESSURE: 125 MMHG

## 2024-02-02 DIAGNOSIS — K43.9 VENTRAL HERNIA W/OUT OBSTRUCTION OR GANGRENE: ICD-10-CM

## 2024-02-02 PROCEDURE — 99213 OFFICE O/P EST LOW 20 MIN: CPT

## 2024-02-02 NOTE — ASSESSMENT
[FreeTextEntry1] : AMY MONREAL is a 59 year old female for follow up visit with Spigelian hernia ???.    Results of her recent imaging and physical examination findings were discussed in detail. she was informed of significance of findings. All of the options, risks and benefits were explained.   Patient has a follow up visit Dr Cortes on 02/15/2024 reports that his office is to far and she is unable to see him in that office.

## 2024-02-02 NOTE — PLAN
[FreeTextEntry1] : Please follow up at the office in 2 weeks.  and as needed for any questions or concerns

## 2024-02-02 NOTE — PHYSICAL EXAM
[Normal Breath Sounds] : Normal breath sounds [Normal Heart Sounds] : normal heart sounds [Normal Rate and Rhythm] : normal rate and rhythm [No Rash or Lesion] : No rash or lesion [Alert] : alert [Oriented to Person] : oriented to person [Oriented to Place] : oriented to place [Oriented to Time] : oriented to time [Calm] : calm [de-identified] : The patient is alert, well-groomed  [de-identified] : Normoactive bowel sounds, soft and nontender, no hepatosplenomegaly or masses noted, well healed scars no hernias   [de-identified] : full range of motion and no deformities appreciated.

## 2024-02-02 NOTE — HISTORY OF PRESENT ILLNESS
[de-identified] : AMY MONREAL is a 59 year old female who presents in the office for follow up visit. She had bilateral lower ext. ultrasound on 01/17/2024 results are c/w normal ultrasound of the bilateral groin. No inguinal or femoral hernia was seen. No cystic or solid mass is seen; there is no lymphadenopathy. Patient c/o RLQ/ pelvic pain which worsens after being upright and walking. She reports associated sharp pain radiating down her right leg, c/o umbilical discomfort at times as well.  Area of discomfort correlates to diastasis, possible tear? with adjacent small  bowel. Patient wishes repair of the hernia to be scheduled. She reports that she was seen by her PCP last week and was cleared.

## 2024-02-05 ENCOUNTER — TRANSCRIPTION ENCOUNTER (OUTPATIENT)
Age: 59
End: 2024-02-05

## 2024-02-05 ENCOUNTER — INPATIENT (INPATIENT)
Facility: HOSPITAL | Age: 59
LOS: 8 days | Discharge: HOME | DRG: 336 | End: 2024-02-14
Attending: SURGERY | Admitting: SURGERY
Payer: MEDICAID

## 2024-02-05 VITALS
HEART RATE: 94 BPM | TEMPERATURE: 97 F | WEIGHT: 154.98 LBS | HEIGHT: 64 IN | DIASTOLIC BLOOD PRESSURE: 95 MMHG | SYSTOLIC BLOOD PRESSURE: 152 MMHG | OXYGEN SATURATION: 98 % | RESPIRATION RATE: 17 BRPM

## 2024-02-05 DIAGNOSIS — Z90.710 ACQUIRED ABSENCE OF BOTH CERVIX AND UTERUS: Chronic | ICD-10-CM

## 2024-02-05 DIAGNOSIS — R10.9 UNSPECIFIED ABDOMINAL PAIN: ICD-10-CM

## 2024-02-05 LAB
ALBUMIN SERPL ELPH-MCNC: 3.7 G/DL — SIGNIFICANT CHANGE UP (ref 3.5–5)
ALP SERPL-CCNC: 90 U/L — SIGNIFICANT CHANGE UP (ref 40–120)
ALT FLD-CCNC: 25 U/L DA — SIGNIFICANT CHANGE UP (ref 10–60)
ANION GAP SERPL CALC-SCNC: 7 MMOL/L — SIGNIFICANT CHANGE UP (ref 5–17)
APPEARANCE UR: CLEAR — SIGNIFICANT CHANGE UP
APTT BLD: 29.1 SEC — SIGNIFICANT CHANGE UP (ref 24.5–35.6)
AST SERPL-CCNC: 13 U/L — SIGNIFICANT CHANGE UP (ref 10–40)
BASOPHILS # BLD AUTO: 0.03 K/UL — SIGNIFICANT CHANGE UP (ref 0–0.2)
BASOPHILS NFR BLD AUTO: 0.4 % — SIGNIFICANT CHANGE UP (ref 0–2)
BILIRUB SERPL-MCNC: 0.4 MG/DL — SIGNIFICANT CHANGE UP (ref 0.2–1.2)
BILIRUB UR-MCNC: NEGATIVE — SIGNIFICANT CHANGE UP
BUN SERPL-MCNC: 16 MG/DL — SIGNIFICANT CHANGE UP (ref 7–18)
CALCIUM SERPL-MCNC: 9.7 MG/DL — SIGNIFICANT CHANGE UP (ref 8.4–10.5)
CHLORIDE SERPL-SCNC: 99 MMOL/L — SIGNIFICANT CHANGE UP (ref 96–108)
CO2 SERPL-SCNC: 32 MMOL/L — HIGH (ref 22–31)
COLOR SPEC: YELLOW — SIGNIFICANT CHANGE UP
CREAT SERPL-MCNC: 0.95 MG/DL — SIGNIFICANT CHANGE UP (ref 0.5–1.3)
DIFF PNL FLD: NEGATIVE — SIGNIFICANT CHANGE UP
EGFR: 69 ML/MIN/1.73M2 — SIGNIFICANT CHANGE UP
EOSINOPHIL # BLD AUTO: 0.16 K/UL — SIGNIFICANT CHANGE UP (ref 0–0.5)
EOSINOPHIL NFR BLD AUTO: 2 % — SIGNIFICANT CHANGE UP (ref 0–6)
GLUCOSE BLDC GLUCOMTR-MCNC: 103 MG/DL — HIGH (ref 70–99)
GLUCOSE BLDC GLUCOMTR-MCNC: 115 MG/DL — HIGH (ref 70–99)
GLUCOSE SERPL-MCNC: 286 MG/DL — HIGH (ref 70–99)
GLUCOSE UR QL: >=1000 MG/DL
HCT VFR BLD CALC: 34.7 % — SIGNIFICANT CHANGE UP (ref 34.5–45)
HGB BLD-MCNC: 11.4 G/DL — LOW (ref 11.5–15.5)
IMM GRANULOCYTES NFR BLD AUTO: 0.2 % — SIGNIFICANT CHANGE UP (ref 0–0.9)
INR BLD: 0.92 RATIO — SIGNIFICANT CHANGE UP (ref 0.85–1.18)
KETONES UR-MCNC: NEGATIVE MG/DL — SIGNIFICANT CHANGE UP
LACTATE SERPL-SCNC: 1.7 MMOL/L — SIGNIFICANT CHANGE UP (ref 0.7–2)
LEUKOCYTE ESTERASE UR-ACNC: NEGATIVE — SIGNIFICANT CHANGE UP
LIDOCAIN IGE QN: 75 U/L — SIGNIFICANT CHANGE UP (ref 13–75)
LYMPHOCYTES # BLD AUTO: 2.69 K/UL — SIGNIFICANT CHANGE UP (ref 1–3.3)
LYMPHOCYTES # BLD AUTO: 33.2 % — SIGNIFICANT CHANGE UP (ref 13–44)
MAGNESIUM SERPL-MCNC: 1.4 MG/DL — LOW (ref 1.6–2.6)
MCHC RBC-ENTMCNC: 27.1 PG — SIGNIFICANT CHANGE UP (ref 27–34)
MCHC RBC-ENTMCNC: 32.9 GM/DL — SIGNIFICANT CHANGE UP (ref 32–36)
MCV RBC AUTO: 82.4 FL — SIGNIFICANT CHANGE UP (ref 80–100)
MONOCYTES # BLD AUTO: 0.59 K/UL — SIGNIFICANT CHANGE UP (ref 0–0.9)
MONOCYTES NFR BLD AUTO: 7.3 % — SIGNIFICANT CHANGE UP (ref 2–14)
NEUTROPHILS # BLD AUTO: 4.62 K/UL — SIGNIFICANT CHANGE UP (ref 1.8–7.4)
NEUTROPHILS NFR BLD AUTO: 56.9 % — SIGNIFICANT CHANGE UP (ref 43–77)
NITRITE UR-MCNC: NEGATIVE — SIGNIFICANT CHANGE UP
NRBC # BLD: 0 /100 WBCS — SIGNIFICANT CHANGE UP (ref 0–0)
PH UR: 7.5 — SIGNIFICANT CHANGE UP (ref 5–8)
PLATELET # BLD AUTO: 282 K/UL — SIGNIFICANT CHANGE UP (ref 150–400)
POTASSIUM SERPL-MCNC: 3.5 MMOL/L — SIGNIFICANT CHANGE UP (ref 3.5–5.3)
POTASSIUM SERPL-SCNC: 3.5 MMOL/L — SIGNIFICANT CHANGE UP (ref 3.5–5.3)
PROT SERPL-MCNC: 8.1 G/DL — SIGNIFICANT CHANGE UP (ref 6–8.3)
PROT UR-MCNC: NEGATIVE MG/DL — SIGNIFICANT CHANGE UP
PROTHROM AB SERPL-ACNC: 10.5 SEC — SIGNIFICANT CHANGE UP (ref 9.5–13)
RBC # BLD: 4.21 M/UL — SIGNIFICANT CHANGE UP (ref 3.8–5.2)
RBC # FLD: 13.6 % — SIGNIFICANT CHANGE UP (ref 10.3–14.5)
SODIUM SERPL-SCNC: 138 MMOL/L — SIGNIFICANT CHANGE UP (ref 135–145)
SP GR SPEC: 1.02 — SIGNIFICANT CHANGE UP (ref 1–1.03)
UROBILINOGEN FLD QL: 0.2 MG/DL — SIGNIFICANT CHANGE UP (ref 0.2–1)
WBC # BLD: 8.11 K/UL — SIGNIFICANT CHANGE UP (ref 3.8–10.5)
WBC # FLD AUTO: 8.11 K/UL — SIGNIFICANT CHANGE UP (ref 3.8–10.5)

## 2024-02-05 PROCEDURE — G1004: CPT

## 2024-02-05 PROCEDURE — 74177 CT ABD & PELVIS W/CONTRAST: CPT | Mod: 26,MG

## 2024-02-05 PROCEDURE — 99285 EMERGENCY DEPT VISIT HI MDM: CPT

## 2024-02-05 RX ORDER — KETOROLAC TROMETHAMINE 30 MG/ML
30 SYRINGE (ML) INJECTION ONCE
Refills: 0 | Status: DISCONTINUED | OUTPATIENT
Start: 2024-02-05 | End: 2024-02-05

## 2024-02-05 RX ORDER — DEXTROSE 50 % IN WATER 50 %
15 SYRINGE (ML) INTRAVENOUS ONCE
Refills: 0 | Status: DISCONTINUED | OUTPATIENT
Start: 2024-02-05 | End: 2024-02-06

## 2024-02-05 RX ORDER — VALSARTAN 80 MG/1
160 TABLET ORAL DAILY
Refills: 0 | Status: DISCONTINUED | OUTPATIENT
Start: 2024-02-05 | End: 2024-02-06

## 2024-02-05 RX ORDER — DEXTROSE 50 % IN WATER 50 %
25 SYRINGE (ML) INTRAVENOUS ONCE
Refills: 0 | Status: DISCONTINUED | OUTPATIENT
Start: 2024-02-05 | End: 2024-02-06

## 2024-02-05 RX ORDER — SODIUM CHLORIDE 9 MG/ML
1000 INJECTION INTRAMUSCULAR; INTRAVENOUS; SUBCUTANEOUS ONCE
Refills: 0 | Status: COMPLETED | OUTPATIENT
Start: 2024-02-05 | End: 2024-02-05

## 2024-02-05 RX ORDER — ATORVASTATIN CALCIUM 80 MG/1
20 TABLET, FILM COATED ORAL AT BEDTIME
Refills: 0 | Status: DISCONTINUED | OUTPATIENT
Start: 2024-02-05 | End: 2024-02-06

## 2024-02-05 RX ORDER — SODIUM CHLORIDE 9 MG/ML
1000 INJECTION, SOLUTION INTRAVENOUS
Refills: 0 | Status: DISCONTINUED | OUTPATIENT
Start: 2024-02-05 | End: 2024-02-06

## 2024-02-05 RX ORDER — GLUCAGON INJECTION, SOLUTION 0.5 MG/.1ML
1 INJECTION, SOLUTION SUBCUTANEOUS ONCE
Refills: 0 | Status: DISCONTINUED | OUTPATIENT
Start: 2024-02-05 | End: 2024-02-06

## 2024-02-05 RX ORDER — INSULIN LISPRO 100/ML
VIAL (ML) SUBCUTANEOUS
Refills: 0 | Status: DISCONTINUED | OUTPATIENT
Start: 2024-02-05 | End: 2024-02-06

## 2024-02-05 RX ORDER — METFORMIN HYDROCHLORIDE 850 MG/1
1000 TABLET ORAL
Refills: 0 | Status: DISCONTINUED | OUTPATIENT
Start: 2024-02-05 | End: 2024-02-05

## 2024-02-05 RX ORDER — HEPARIN SODIUM 5000 [USP'U]/ML
5000 INJECTION INTRAVENOUS; SUBCUTANEOUS EVERY 8 HOURS
Refills: 0 | Status: DISCONTINUED | OUTPATIENT
Start: 2024-02-05 | End: 2024-02-06

## 2024-02-05 RX ORDER — ACETAMINOPHEN 500 MG
1000 TABLET ORAL EVERY 8 HOURS
Refills: 0 | Status: DISCONTINUED | OUTPATIENT
Start: 2024-02-05 | End: 2024-02-06

## 2024-02-05 RX ORDER — INFLUENZA VIRUS VACCINE 15; 15; 15; 15 UG/.5ML; UG/.5ML; UG/.5ML; UG/.5ML
0.5 SUSPENSION INTRAMUSCULAR ONCE
Refills: 0 | Status: DISCONTINUED | OUTPATIENT
Start: 2024-02-05 | End: 2024-02-14

## 2024-02-05 RX ORDER — LANOLIN ALCOHOL/MO/W.PET/CERES
5 CREAM (GRAM) TOPICAL AT BEDTIME
Refills: 0 | Status: DISCONTINUED | OUTPATIENT
Start: 2024-02-05 | End: 2024-02-06

## 2024-02-05 RX ORDER — INSULIN LISPRO 100/ML
VIAL (ML) SUBCUTANEOUS AT BEDTIME
Refills: 0 | Status: DISCONTINUED | OUTPATIENT
Start: 2024-02-05 | End: 2024-02-06

## 2024-02-05 RX ORDER — SODIUM CHLORIDE 9 MG/ML
1000 INJECTION INTRAMUSCULAR; INTRAVENOUS; SUBCUTANEOUS
Refills: 0 | Status: DISCONTINUED | OUTPATIENT
Start: 2024-02-05 | End: 2024-02-06

## 2024-02-05 RX ORDER — DEXTROSE 50 % IN WATER 50 %
12.5 SYRINGE (ML) INTRAVENOUS ONCE
Refills: 0 | Status: DISCONTINUED | OUTPATIENT
Start: 2024-02-05 | End: 2024-02-06

## 2024-02-05 RX ADMIN — HEPARIN SODIUM 5000 UNIT(S): 5000 INJECTION INTRAVENOUS; SUBCUTANEOUS at 21:30

## 2024-02-05 RX ADMIN — Medication 400 MILLIGRAM(S): at 21:30

## 2024-02-05 RX ADMIN — SODIUM CHLORIDE 1000 MILLILITER(S): 9 INJECTION INTRAMUSCULAR; INTRAVENOUS; SUBCUTANEOUS at 08:53

## 2024-02-05 RX ADMIN — Medication 5 MILLIGRAM(S): at 21:37

## 2024-02-05 RX ADMIN — Medication 30 MILLIGRAM(S): at 08:53

## 2024-02-05 RX ADMIN — SODIUM CHLORIDE 100 MILLILITER(S): 9 INJECTION INTRAMUSCULAR; INTRAVENOUS; SUBCUTANEOUS at 16:06

## 2024-02-05 RX ADMIN — VALSARTAN 160 MILLIGRAM(S): 80 TABLET ORAL at 18:36

## 2024-02-05 RX ADMIN — Medication 30 MILLIGRAM(S): at 10:00

## 2024-02-05 RX ADMIN — Medication 1000 MILLIGRAM(S): at 21:50

## 2024-02-05 RX ADMIN — ATORVASTATIN CALCIUM 20 MILLIGRAM(S): 80 TABLET, FILM COATED ORAL at 21:29

## 2024-02-05 NOTE — H&P ADULT - NSHPREVIEWOFSYSTEMS_GEN_ALL_CORE
no wt loss  no vag bleeding  no hematuria  pt has had renal colic in idstant past and states this is not similar to that pain

## 2024-02-05 NOTE — ED ADULT NURSE NOTE - OBJECTIVE STATEMENT
Patient came to ED- c/o right quadrant abdominal pain radiating to right groin since last 3 months but worsening over last 1 month. Patient has used multiple pain relievers but ineffective per patient. Patient has difficulty walking long distance due to pain.

## 2024-02-05 NOTE — H&P ADULT - ASSESSMENT
abd pain  CTnegative  r/o possible abd hernia difficult to observe on CT  admit to surgery  npo after midnight  Dx Laparoscopy in am

## 2024-02-05 NOTE — H&P ADULT - HISTORY OF PRESENT ILLNESS
58 yo female PMHx HTN, DM, to ER c/o periumbilical abd pain and RLQ pain rediating down right thigh, which is present when she walks.  Pt states she has had this pain for months, starting 3 months after her appendix Sx and she layne snot associate this pain with that surgery timing.  No change in bm, no n/v, no fever. Pt has seen surgeons in clinic setting about this pain and told there is a possibility of a hernia which is difficult to assess by Ct scan only. Pt saw a GYN recenlty but no dx related to this pain. (pt is post hysterectemy many years ago, pt still has ovaries)  Pt states she is comfortable while lying down but cannot live with the pain while walking.  no hx trauma, no lower extremity paresthesia or weakness.

## 2024-02-05 NOTE — H&P ADULT - NSHPPHYSICALEXAM_GEN_ALL_CORE
alert nad  ICU Vital Signs Last 24 Hrs  T(C): 36.8 (05 Feb 2024 11:23), Max: 36.8 (05 Feb 2024 11:23)  T(F): 98.2 (05 Feb 2024 11:23), Max: 98.2 (05 Feb 2024 11:23)  HR: 65 (05 Feb 2024 11:23) (65 - 95)  BP: 150/83 (05 Feb 2024 11:23) (150/83 - 163/116)  BP(mean): --  ABP: --  ABP(mean): --  RR: 18 (05 Feb 2024 11:23) (17 - 20)  SpO2: 99% (05 Feb 2024 11:23) (98% - 99%)    O2 Parameters below as of 05 Feb 2024 11:23  Patient On (Oxygen Delivery Method): room air        Abd: soft nt nd no cvat, no hernias palpated with valsalva  Lungs: cta  EXT: no calf swelling or erythema  s1 s2 rrr

## 2024-02-05 NOTE — ED ADULT TRIAGE NOTE - TEMPERATURE IN FAHRENHEIT (DEGREES F)
97.3 Consent (Marginal Mandibular)/Introductory Paragraph: The rationale for Mohs was explained to the patient and consent was obtained. The risks, benefits and alternatives to therapy were discussed in detail. Specifically, the risks of damage to the marginal mandibular branch of the facial nerve, infection, scarring, bleeding, prolonged wound healing, incomplete removal, allergy to anesthesia, and recurrence were addressed. Prior to the procedure, the treatment site was clearly identified and confirmed by the patient. All components of Universal Protocol/PAUSE Rule completed.

## 2024-02-05 NOTE — ED PROVIDER NOTE - CLINICAL SUMMARY MEDICAL DECISION MAKING FREE TEXT BOX
59-year-old female history of diabetes, hypertension, hyperlipidemia, surgical history of hysterectomy and appendectomy presents to ED with continued right lower quadrant pain.  As per patient persistent for the past month.  Patient tender on exam.  Concern for possible postsurgical complication.  Will check labs.  Scan, surgical consult, reassess

## 2024-02-05 NOTE — H&P ADULT - NS ATTEND AMEND GEN_ALL_CORE FT
Pt seen and examined. C/o worsening pain after being seen in clinic. Passing flatus and having BMs  Reports pain no causing a pulling at her belly button which is new.  Urin glucose > 1000 and serum glucose 286. Pt reports taking her insulin 15 units this am and Metformin 500. She ate a low carb breakfast so is surprised glucose is high  CT reviewed. Observe redemonstrated diastasis on CT same as prior CT though is not mentioned in read. SB in proximity with abd wall diastasis in area of pain to right of midline in pelvis.   Abd- soft, ND, + RLQ periumbilical tenderness, mild guarding no rebound. Pt received pain medication with significant relief of pain  May require diagnostic robot assisted laparoscopy. D/w pt will need to check hgba1c

## 2024-02-05 NOTE — ED PROVIDER NOTE - PROGRESS NOTE DETAILS
no acute pathology seen on CT.  Pt still uncomfortable.  Surgery consulted. pt seen by sugery team, will admit for intractable abdominal pain.

## 2024-02-05 NOTE — ED PROVIDER NOTE - OBJECTIVE STATEMENT
59-year-old female history of diabetes, hypertension, hyperlipidemia, and surgical history of hysterectomy, and appendectomy presents to ED with continued right lower quadrant pain.  As per patient she follows with Dr. Nicola Orozco surgery clinic.  She last saw Dr. Astorga last week.  The pain has persisted and the patient returns to the ED.  Symptoms associate with nausea no vomiting no urinary complaints.  No fever.  No chest pain no shortness of breath

## 2024-02-05 NOTE — H&P ADULT - NSHPLABSRESULTS_GEN_ALL_CORE
11.4   8.11  )-----------( 282      ( 05 Feb 2024 08:58 )             34.7   02-05    138  |  99  |  16  ----------------------------<  286<H>  3.5   |  32<H>  |  0.95    Ca    9.7      05 Feb 2024 08:58  Mg     1.4     02-05    TPro  8.1  /  Alb  3.7  /  TBili  0.4  /  DBili  x   /  AST  13  /  ALT  25  /  AlkPhos  90  02-05  < from: CT Abdomen and Pelvis w/ IV Cont (02.05.24 @ 09:52) >    ACC: [id number] EXAM:  CT ABDOMEN AND PELVIS IC   ORDERED BY: RADHA BRADY     PROCEDURE DATE:  02/05/2024          INTERPRETATION:  CLINICAL INFORMATION: Right abdominal pain    COMPARISON: 5/1/2023    CONTRAST/COMPLICATIONS:  IV Contrast: Omnipaque 350  90 cc administered   10 cc discarded  Oral Contrast: NONE  Complications: None reported at time of study completion    PROCEDURE:  CT of the Abdomen and Pelvis was performed.  Sagittal and coronal reformats were performed.    FINDINGS:  LOWER CHEST: Clear lung bases.    LIVER: Within normal limits.  BILE DUCTS: Normal caliber.  GALLBLADDER: Within normal limits.  SPLEEN: Within normal limits.  PANCREAS: Within normal limits.  ADRENALS: Within normal limits.  KIDNEYS/URETERS: Cysts and too small to further characterize   hypodensities. No hydronephrosis.    BLADDER: Within normal limits.  REPRODUCTIVE ORGANS: Supracervical hysterectomy.    BOWEL: No bowel obstruction. Appendix is surgically absent.  PERITONEUM: No ascites.  VESSELS:Mild atherosclerotic changes.  RETROPERITONEUM/LYMPH NODES: No lymphadenopathy.  ABDOMINAL WALL: Postsurgical changes.  BONES: Mild degenerative changes.    IMPRESSION:  Etiology of abdominal pain is not elucidated.    < end of copied text >

## 2024-02-05 NOTE — ED ADULT NURSE NOTE - NSFALLUNIVINTERV_ED_ALL_ED
Bed/Stretcher in lowest position, wheels locked, appropriate side rails in place/Call bell, personal items and telephone in reach/Instruct patient to call for assistance before getting out of bed/chair/stretcher/Non-slip footwear applied when patient is off stretcher/[place] to call system/Physically safe environment - no spills, clutter or unnecessary equipment/Purposeful proactive rounding/Room/bathroom lighting operational, light cord in reach

## 2024-02-06 ENCOUNTER — TRANSCRIPTION ENCOUNTER (OUTPATIENT)
Age: 59
End: 2024-02-06

## 2024-02-06 LAB
A1C WITH ESTIMATED AVERAGE GLUCOSE RESULT: 8.4 % — HIGH (ref 4–5.6)
ANION GAP SERPL CALC-SCNC: 6 MMOL/L — SIGNIFICANT CHANGE UP (ref 5–17)
BUN SERPL-MCNC: 12 MG/DL — SIGNIFICANT CHANGE UP (ref 7–18)
CALCIUM SERPL-MCNC: 9.1 MG/DL — SIGNIFICANT CHANGE UP (ref 8.4–10.5)
CHLORIDE SERPL-SCNC: 105 MMOL/L — SIGNIFICANT CHANGE UP (ref 96–108)
CO2 SERPL-SCNC: 28 MMOL/L — SIGNIFICANT CHANGE UP (ref 22–31)
CREAT SERPL-MCNC: 0.64 MG/DL — SIGNIFICANT CHANGE UP (ref 0.5–1.3)
EGFR: 102 ML/MIN/1.73M2 — SIGNIFICANT CHANGE UP
ESTIMATED AVERAGE GLUCOSE: 194 MG/DL — HIGH (ref 68–114)
GLUCOSE BLDC GLUCOMTR-MCNC: 155 MG/DL — HIGH (ref 70–99)
GLUCOSE BLDC GLUCOMTR-MCNC: 191 MG/DL — HIGH (ref 70–99)
GLUCOSE SERPL-MCNC: 155 MG/DL — HIGH (ref 70–99)
HCT VFR BLD CALC: 31 % — LOW (ref 34.5–45)
HGB BLD-MCNC: 10.2 G/DL — LOW (ref 11.5–15.5)
MCHC RBC-ENTMCNC: 27 PG — SIGNIFICANT CHANGE UP (ref 27–34)
MCHC RBC-ENTMCNC: 32.9 GM/DL — SIGNIFICANT CHANGE UP (ref 32–36)
MCV RBC AUTO: 82 FL — SIGNIFICANT CHANGE UP (ref 80–100)
NRBC # BLD: 0 /100 WBCS — SIGNIFICANT CHANGE UP (ref 0–0)
PLATELET # BLD AUTO: 253 K/UL — SIGNIFICANT CHANGE UP (ref 150–400)
POTASSIUM SERPL-MCNC: 3.1 MMOL/L — LOW (ref 3.5–5.3)
POTASSIUM SERPL-SCNC: 3.1 MMOL/L — LOW (ref 3.5–5.3)
RBC # BLD: 3.78 M/UL — LOW (ref 3.8–5.2)
RBC # FLD: 13.5 % — SIGNIFICANT CHANGE UP (ref 10.3–14.5)
SODIUM SERPL-SCNC: 139 MMOL/L — SIGNIFICANT CHANGE UP (ref 135–145)
WBC # BLD: 5.73 K/UL — SIGNIFICANT CHANGE UP (ref 3.8–10.5)
WBC # FLD AUTO: 5.73 K/UL — SIGNIFICANT CHANGE UP (ref 3.8–10.5)

## 2024-02-06 PROCEDURE — 15734 MUSCLE-SKIN GRAFT TRUNK: CPT | Mod: 1L

## 2024-02-06 PROCEDURE — 49591 RPR AA HRN 1ST < 3 CM RDC: CPT | Mod: 1L

## 2024-02-06 DEVICE — SEPRAFILM 3 X 5": Type: IMPLANTABLE DEVICE | Status: FUNCTIONAL

## 2024-02-06 DEVICE — CLIP APPLIER COVIDIEN ENDOCLIP III 5MM: Type: IMPLANTABLE DEVICE | Status: FUNCTIONAL

## 2024-02-06 RX ORDER — DEXTROSE 50 % IN WATER 50 %
25 SYRINGE (ML) INTRAVENOUS ONCE
Refills: 0 | Status: DISCONTINUED | OUTPATIENT
Start: 2024-02-06 | End: 2024-02-14

## 2024-02-06 RX ORDER — ACETAMINOPHEN 500 MG
1000 TABLET ORAL ONCE
Refills: 0 | Status: COMPLETED | OUTPATIENT
Start: 2024-02-07 | End: 2024-02-07

## 2024-02-06 RX ORDER — DEXTROSE 50 % IN WATER 50 %
15 SYRINGE (ML) INTRAVENOUS ONCE
Refills: 0 | Status: DISCONTINUED | OUTPATIENT
Start: 2024-02-06 | End: 2024-02-14

## 2024-02-06 RX ORDER — DEXTROSE 50 % IN WATER 50 %
12.5 SYRINGE (ML) INTRAVENOUS ONCE
Refills: 0 | Status: DISCONTINUED | OUTPATIENT
Start: 2024-02-06 | End: 2024-02-14

## 2024-02-06 RX ORDER — GLUCAGON INJECTION, SOLUTION 0.5 MG/.1ML
1 INJECTION, SOLUTION SUBCUTANEOUS ONCE
Refills: 0 | Status: DISCONTINUED | OUTPATIENT
Start: 2024-02-06 | End: 2024-02-14

## 2024-02-06 RX ORDER — INSULIN LISPRO 100/ML
VIAL (ML) SUBCUTANEOUS
Refills: 0 | Status: DISCONTINUED | OUTPATIENT
Start: 2024-02-06 | End: 2024-02-11

## 2024-02-06 RX ORDER — PANTOPRAZOLE SODIUM 20 MG/1
40 TABLET, DELAYED RELEASE ORAL DAILY
Refills: 0 | Status: DISCONTINUED | OUTPATIENT
Start: 2024-02-06 | End: 2024-02-14

## 2024-02-06 RX ORDER — SODIUM CHLORIDE 9 MG/ML
1000 INJECTION, SOLUTION INTRAVENOUS
Refills: 0 | Status: DISCONTINUED | OUTPATIENT
Start: 2024-02-06 | End: 2024-02-14

## 2024-02-06 RX ORDER — MORPHINE SULFATE 50 MG/1
2 CAPSULE, EXTENDED RELEASE ORAL EVERY 4 HOURS
Refills: 0 | Status: DISCONTINUED | OUTPATIENT
Start: 2024-02-06 | End: 2024-02-08

## 2024-02-06 RX ORDER — FENTANYL CITRATE 50 UG/ML
50 INJECTION INTRAVENOUS ONCE
Refills: 0 | Status: DISCONTINUED | OUTPATIENT
Start: 2024-02-06 | End: 2024-02-06

## 2024-02-06 RX ORDER — CIPROFLOXACIN LACTATE 400MG/40ML
400 VIAL (ML) INTRAVENOUS EVERY 12 HOURS
Refills: 0 | Status: DISCONTINUED | OUTPATIENT
Start: 2024-02-06 | End: 2024-02-08

## 2024-02-06 RX ORDER — FENTANYL CITRATE 50 UG/ML
25 INJECTION INTRAVENOUS
Refills: 0 | Status: DISCONTINUED | OUTPATIENT
Start: 2024-02-06 | End: 2024-02-06

## 2024-02-06 RX ORDER — SODIUM CHLORIDE 9 MG/ML
1000 INJECTION, SOLUTION INTRAVENOUS
Refills: 0 | Status: DISCONTINUED | OUTPATIENT
Start: 2024-02-06 | End: 2024-02-07

## 2024-02-06 RX ORDER — FENTANYL CITRATE 50 UG/ML
50 INJECTION INTRAVENOUS
Refills: 0 | Status: DISCONTINUED | OUTPATIENT
Start: 2024-02-06 | End: 2024-02-06

## 2024-02-06 RX ORDER — HYDROMORPHONE HYDROCHLORIDE 2 MG/ML
0.5 INJECTION INTRAMUSCULAR; INTRAVENOUS; SUBCUTANEOUS
Refills: 0 | Status: DISCONTINUED | OUTPATIENT
Start: 2024-02-06 | End: 2024-02-06

## 2024-02-06 RX ORDER — METRONIDAZOLE 500 MG
500 TABLET ORAL EVERY 8 HOURS
Refills: 0 | Status: DISCONTINUED | OUTPATIENT
Start: 2024-02-06 | End: 2024-02-08

## 2024-02-06 RX ADMIN — VALSARTAN 160 MILLIGRAM(S): 80 TABLET ORAL at 05:31

## 2024-02-06 RX ADMIN — MORPHINE SULFATE 2 MILLIGRAM(S): 50 CAPSULE, EXTENDED RELEASE ORAL at 21:49

## 2024-02-06 RX ADMIN — Medication 2: at 08:28

## 2024-02-06 RX ADMIN — MORPHINE SULFATE 2 MILLIGRAM(S): 50 CAPSULE, EXTENDED RELEASE ORAL at 21:17

## 2024-02-06 RX ADMIN — Medication 100 MILLIGRAM(S): at 21:18

## 2024-02-06 NOTE — CHART NOTE - NSCHARTNOTEFT_GEN_A_CORE
Pt POD 0 s/p diagnostic laparoscopy, lysis of adhesions, conversion to laparotomy for adhesiolysis and incisional hernia repair without mesh  resting comfortably  no n/v  ZEKE ss min  fowler 250cc    Vital Signs Last 24 Hrs  T(C): 36.7 (06 Feb 2024 20:20), Max: 36.7 (06 Feb 2024 18:20)  T(F): 98.1 (06 Feb 2024 20:20), Max: 98.1 (06 Feb 2024 18:20)  HR: 103 (06 Feb 2024 20:20) (67 - 105)  BP: 124/78 (06 Feb 2024 20:20) (114/75 - 140/77)  BP(mean): 90 (06 Feb 2024 20:20) (88 - 97)  RR: 13 (06 Feb 2024 20:20) (12 - 22)  SpO2: 94% (06 Feb 2024 20:20) (93% - 97%)    Parameters below as of 06 Feb 2024 20:20  Patient On (Oxygen Delivery Method): room air    abd soft, binder CDI    stable post-op

## 2024-02-06 NOTE — BRIEF OPERATIVE NOTE - OPERATION/FINDINGS
diagnostic laparoscopy, lysis of adhesions, conversion to laparotomy for adhesiolysis and incisional hernia repair without mesh
- - -

## 2024-02-06 NOTE — CHART NOTE - NSCHARTNOTESELECT_GEN_ALL_CORE
Event Note Burow's Advancement Flap Text: The defect edges were debeveled with a #15 scalpel blade.  Given the location of the defect and the proximity to free margins a Burow's advancement flap was deemed most appropriate.  Using a sterile surgical marker, the appropriate advancement flap was drawn incorporating the defect and placing the expected incisions within the relaxed skin tension lines where possible.    The area thus outlined was incised deep to adipose tissue with a #15 scalpel blade.  The skin margins were undermined to an appropriate distance in all directions utilizing iris scissors.

## 2024-02-06 NOTE — BRIEF OPERATIVE NOTE - NSICDXBRIEFPROCEDURE_GEN_ALL_CORE_FT
PROCEDURES:  Lysis, adhesions, abdomen, robot-assisted 06-Feb-2024 18:39:05  Jack Richardson  Exploratory laparoscopy with conversion to laparotomy if indicated 06-Feb-2024 18:40:16  Jack Richardson  Open repair of incisional hernia using component separation technique 06-Feb-2024 18:42:41  Jack Richardson

## 2024-02-07 LAB
ANION GAP SERPL CALC-SCNC: 8 MMOL/L — SIGNIFICANT CHANGE UP (ref 5–17)
BASOPHILS # BLD AUTO: 0.03 K/UL — SIGNIFICANT CHANGE UP (ref 0–0.2)
BASOPHILS NFR BLD AUTO: 0.3 % — SIGNIFICANT CHANGE UP (ref 0–2)
BUN SERPL-MCNC: 12 MG/DL — SIGNIFICANT CHANGE UP (ref 7–18)
CALCIUM SERPL-MCNC: 8.4 MG/DL — SIGNIFICANT CHANGE UP (ref 8.4–10.5)
CHLORIDE SERPL-SCNC: 107 MMOL/L — SIGNIFICANT CHANGE UP (ref 96–108)
CO2 SERPL-SCNC: 26 MMOL/L — SIGNIFICANT CHANGE UP (ref 22–31)
CREAT SERPL-MCNC: 0.77 MG/DL — SIGNIFICANT CHANGE UP (ref 0.5–1.3)
EGFR: 89 ML/MIN/1.73M2 — SIGNIFICANT CHANGE UP
EOSINOPHIL # BLD AUTO: 0 K/UL — SIGNIFICANT CHANGE UP (ref 0–0.5)
EOSINOPHIL NFR BLD AUTO: 0 % — SIGNIFICANT CHANGE UP (ref 0–6)
GLUCOSE BLDC GLUCOMTR-MCNC: 154 MG/DL — HIGH (ref 70–99)
GLUCOSE BLDC GLUCOMTR-MCNC: 166 MG/DL — HIGH (ref 70–99)
GLUCOSE BLDC GLUCOMTR-MCNC: 176 MG/DL — HIGH (ref 70–99)
GLUCOSE BLDC GLUCOMTR-MCNC: 197 MG/DL — HIGH (ref 70–99)
GLUCOSE SERPL-MCNC: 167 MG/DL — HIGH (ref 70–99)
HCT VFR BLD CALC: 28.5 % — LOW (ref 34.5–45)
HGB BLD-MCNC: 9.5 G/DL — LOW (ref 11.5–15.5)
IMM GRANULOCYTES NFR BLD AUTO: 0.5 % — SIGNIFICANT CHANGE UP (ref 0–0.9)
LYMPHOCYTES # BLD AUTO: 1.54 K/UL — SIGNIFICANT CHANGE UP (ref 1–3.3)
LYMPHOCYTES # BLD AUTO: 14.1 % — SIGNIFICANT CHANGE UP (ref 13–44)
MAGNESIUM SERPL-MCNC: 1.4 MG/DL — LOW (ref 1.6–2.6)
MCHC RBC-ENTMCNC: 27 PG — SIGNIFICANT CHANGE UP (ref 27–34)
MCHC RBC-ENTMCNC: 33.3 GM/DL — SIGNIFICANT CHANGE UP (ref 32–36)
MCV RBC AUTO: 81 FL — SIGNIFICANT CHANGE UP (ref 80–100)
MONOCYTES # BLD AUTO: 0.65 K/UL — SIGNIFICANT CHANGE UP (ref 0–0.9)
MONOCYTES NFR BLD AUTO: 5.9 % — SIGNIFICANT CHANGE UP (ref 2–14)
NEUTROPHILS # BLD AUTO: 8.67 K/UL — HIGH (ref 1.8–7.4)
NEUTROPHILS NFR BLD AUTO: 79.2 % — HIGH (ref 43–77)
NRBC # BLD: 0 /100 WBCS — SIGNIFICANT CHANGE UP (ref 0–0)
PHOSPHATE SERPL-MCNC: 4.1 MG/DL — SIGNIFICANT CHANGE UP (ref 2.5–4.5)
PLATELET # BLD AUTO: 251 K/UL — SIGNIFICANT CHANGE UP (ref 150–400)
POTASSIUM SERPL-MCNC: 3.3 MMOL/L — LOW (ref 3.5–5.3)
POTASSIUM SERPL-SCNC: 3.3 MMOL/L — LOW (ref 3.5–5.3)
RBC # BLD: 3.52 M/UL — LOW (ref 3.8–5.2)
RBC # FLD: 13.9 % — SIGNIFICANT CHANGE UP (ref 10.3–14.5)
SODIUM SERPL-SCNC: 141 MMOL/L — SIGNIFICANT CHANGE UP (ref 135–145)
WBC # BLD: 10.94 K/UL — HIGH (ref 3.8–10.5)
WBC # FLD AUTO: 10.94 K/UL — HIGH (ref 3.8–10.5)

## 2024-02-07 RX ORDER — MAGNESIUM SULFATE 500 MG/ML
1 VIAL (ML) INJECTION ONCE
Refills: 0 | Status: COMPLETED | OUTPATIENT
Start: 2024-02-07 | End: 2024-02-07

## 2024-02-07 RX ORDER — MAGNESIUM SULFATE 500 MG/ML
2 VIAL (ML) INJECTION ONCE
Refills: 0 | Status: COMPLETED | OUTPATIENT
Start: 2024-02-07 | End: 2024-02-07

## 2024-02-07 RX ORDER — KETOROLAC TROMETHAMINE 30 MG/ML
15 SYRINGE (ML) INJECTION EVERY 6 HOURS
Refills: 0 | Status: DISCONTINUED | OUTPATIENT
Start: 2024-02-07 | End: 2024-02-11

## 2024-02-07 RX ORDER — SODIUM CHLORIDE 9 MG/ML
1000 INJECTION, SOLUTION INTRAVENOUS
Refills: 0 | Status: DISCONTINUED | OUTPATIENT
Start: 2024-02-07 | End: 2024-02-09

## 2024-02-07 RX ORDER — POTASSIUM CHLORIDE 20 MEQ
10 PACKET (EA) ORAL
Refills: 0 | Status: COMPLETED | OUTPATIENT
Start: 2024-02-07 | End: 2024-02-07

## 2024-02-07 RX ADMIN — Medication 400 MILLIGRAM(S): at 00:16

## 2024-02-07 RX ADMIN — Medication 100 GRAM(S): at 16:57

## 2024-02-07 RX ADMIN — MORPHINE SULFATE 2 MILLIGRAM(S): 50 CAPSULE, EXTENDED RELEASE ORAL at 03:32

## 2024-02-07 RX ADMIN — Medication 100 MILLIEQUIVALENT(S): at 13:34

## 2024-02-07 RX ADMIN — Medication 15 MILLIGRAM(S): at 20:25

## 2024-02-07 RX ADMIN — Medication 15 MILLIGRAM(S): at 20:19

## 2024-02-07 RX ADMIN — Medication 100 MILLIGRAM(S): at 14:52

## 2024-02-07 RX ADMIN — Medication 1000 MILLIGRAM(S): at 23:10

## 2024-02-07 RX ADMIN — SODIUM CHLORIDE 100 MILLILITER(S): 9 INJECTION, SOLUTION INTRAVENOUS at 09:50

## 2024-02-07 RX ADMIN — Medication 400 MILLIGRAM(S): at 05:05

## 2024-02-07 RX ADMIN — Medication 100 MILLIGRAM(S): at 05:29

## 2024-02-07 RX ADMIN — Medication 1000 MILLIGRAM(S): at 05:05

## 2024-02-07 RX ADMIN — PANTOPRAZOLE SODIUM 40 MILLIGRAM(S): 20 TABLET, DELAYED RELEASE ORAL at 13:35

## 2024-02-07 RX ADMIN — Medication 200 MILLIGRAM(S): at 06:37

## 2024-02-07 RX ADMIN — MORPHINE SULFATE 2 MILLIGRAM(S): 50 CAPSULE, EXTENDED RELEASE ORAL at 10:10

## 2024-02-07 RX ADMIN — Medication 25 GRAM(S): at 12:12

## 2024-02-07 RX ADMIN — Medication 200 MILLIGRAM(S): at 19:58

## 2024-02-07 RX ADMIN — Medication 100 MILLIEQUIVALENT(S): at 09:45

## 2024-02-07 RX ADMIN — MORPHINE SULFATE 2 MILLIGRAM(S): 50 CAPSULE, EXTENDED RELEASE ORAL at 03:50

## 2024-02-07 RX ADMIN — Medication 100 MILLIEQUIVALENT(S): at 12:12

## 2024-02-07 RX ADMIN — Medication 100 MILLIGRAM(S): at 22:37

## 2024-02-07 RX ADMIN — SODIUM CHLORIDE 100 MILLILITER(S): 9 INJECTION, SOLUTION INTRAVENOUS at 20:19

## 2024-02-07 RX ADMIN — Medication 1000 MILLIGRAM(S): at 00:16

## 2024-02-07 RX ADMIN — Medication 400 MILLIGRAM(S): at 22:37

## 2024-02-07 RX ADMIN — Medication 400 MILLIGRAM(S): at 14:52

## 2024-02-08 ENCOUNTER — APPOINTMENT (OUTPATIENT)
Dept: ENDOCRINOLOGY | Facility: CLINIC | Age: 59
End: 2024-02-08

## 2024-02-08 LAB
ANION GAP SERPL CALC-SCNC: 6 MMOL/L — SIGNIFICANT CHANGE UP (ref 5–17)
BASOPHILS # BLD AUTO: 0.03 K/UL — SIGNIFICANT CHANGE UP (ref 0–0.2)
BASOPHILS NFR BLD AUTO: 0.3 % — SIGNIFICANT CHANGE UP (ref 0–2)
BUN SERPL-MCNC: 7 MG/DL — SIGNIFICANT CHANGE UP (ref 7–18)
CALCIUM SERPL-MCNC: 8.2 MG/DL — LOW (ref 8.4–10.5)
CHLORIDE SERPL-SCNC: 107 MMOL/L — SIGNIFICANT CHANGE UP (ref 96–108)
CO2 SERPL-SCNC: 27 MMOL/L — SIGNIFICANT CHANGE UP (ref 22–31)
CREAT SERPL-MCNC: 0.73 MG/DL — SIGNIFICANT CHANGE UP (ref 0.5–1.3)
EGFR: 95 ML/MIN/1.73M2 — SIGNIFICANT CHANGE UP
EOSINOPHIL # BLD AUTO: 0.05 K/UL — SIGNIFICANT CHANGE UP (ref 0–0.5)
EOSINOPHIL NFR BLD AUTO: 0.6 % — SIGNIFICANT CHANGE UP (ref 0–6)
GLUCOSE BLDC GLUCOMTR-MCNC: 138 MG/DL — HIGH (ref 70–99)
GLUCOSE BLDC GLUCOMTR-MCNC: 175 MG/DL — HIGH (ref 70–99)
GLUCOSE BLDC GLUCOMTR-MCNC: 179 MG/DL — HIGH (ref 70–99)
GLUCOSE BLDC GLUCOMTR-MCNC: 205 MG/DL — HIGH (ref 70–99)
GLUCOSE SERPL-MCNC: 190 MG/DL — HIGH (ref 70–99)
HCT VFR BLD CALC: 26.8 % — LOW (ref 34.5–45)
HGB BLD-MCNC: 8.8 G/DL — LOW (ref 11.5–15.5)
IMM GRANULOCYTES NFR BLD AUTO: 0.6 % — SIGNIFICANT CHANGE UP (ref 0–0.9)
LYMPHOCYTES # BLD AUTO: 1.37 K/UL — SIGNIFICANT CHANGE UP (ref 1–3.3)
LYMPHOCYTES # BLD AUTO: 15.4 % — SIGNIFICANT CHANGE UP (ref 13–44)
MAGNESIUM SERPL-MCNC: 1.8 MG/DL — SIGNIFICANT CHANGE UP (ref 1.6–2.6)
MCHC RBC-ENTMCNC: 27.2 PG — SIGNIFICANT CHANGE UP (ref 27–34)
MCHC RBC-ENTMCNC: 32.8 GM/DL — SIGNIFICANT CHANGE UP (ref 32–36)
MCV RBC AUTO: 83 FL — SIGNIFICANT CHANGE UP (ref 80–100)
MONOCYTES # BLD AUTO: 0.61 K/UL — SIGNIFICANT CHANGE UP (ref 0–0.9)
MONOCYTES NFR BLD AUTO: 6.9 % — SIGNIFICANT CHANGE UP (ref 2–14)
NEUTROPHILS # BLD AUTO: 6.79 K/UL — SIGNIFICANT CHANGE UP (ref 1.8–7.4)
NEUTROPHILS NFR BLD AUTO: 76.2 % — SIGNIFICANT CHANGE UP (ref 43–77)
NRBC # BLD: 0 /100 WBCS — SIGNIFICANT CHANGE UP (ref 0–0)
PHOSPHATE SERPL-MCNC: 0.9 MG/DL — CRITICAL LOW (ref 2.5–4.5)
PLATELET # BLD AUTO: 229 K/UL — SIGNIFICANT CHANGE UP (ref 150–400)
POTASSIUM SERPL-MCNC: 3.2 MMOL/L — LOW (ref 3.5–5.3)
POTASSIUM SERPL-SCNC: 3.2 MMOL/L — LOW (ref 3.5–5.3)
RBC # BLD: 3.23 M/UL — LOW (ref 3.8–5.2)
RBC # FLD: 14 % — SIGNIFICANT CHANGE UP (ref 10.3–14.5)
SODIUM SERPL-SCNC: 140 MMOL/L — SIGNIFICANT CHANGE UP (ref 135–145)
WBC # BLD: 8.9 K/UL — SIGNIFICANT CHANGE UP (ref 3.8–10.5)
WBC # FLD AUTO: 8.9 K/UL — SIGNIFICANT CHANGE UP (ref 3.8–10.5)

## 2024-02-08 RX ORDER — POTASSIUM PHOSPHATE, MONOBASIC POTASSIUM PHOSPHATE, DIBASIC 236; 224 MG/ML; MG/ML
30 INJECTION, SOLUTION INTRAVENOUS ONCE
Refills: 0 | Status: COMPLETED | OUTPATIENT
Start: 2024-02-08 | End: 2024-02-08

## 2024-02-08 RX ORDER — POTASSIUM CHLORIDE 20 MEQ
10 PACKET (EA) ORAL
Refills: 0 | Status: COMPLETED | OUTPATIENT
Start: 2024-02-08 | End: 2024-02-08

## 2024-02-08 RX ORDER — ACETAMINOPHEN 500 MG
1000 TABLET ORAL ONCE
Refills: 0 | Status: COMPLETED | OUTPATIENT
Start: 2024-02-09 | End: 2024-02-09

## 2024-02-08 RX ORDER — ACETAMINOPHEN 500 MG
1000 TABLET ORAL ONCE
Refills: 0 | Status: COMPLETED | OUTPATIENT
Start: 2024-02-08 | End: 2024-02-08

## 2024-02-08 RX ORDER — LANOLIN ALCOHOL/MO/W.PET/CERES
3 CREAM (GRAM) TOPICAL AT BEDTIME
Refills: 0 | Status: DISCONTINUED | OUTPATIENT
Start: 2024-02-08 | End: 2024-02-14

## 2024-02-08 RX ORDER — ENOXAPARIN SODIUM 100 MG/ML
40 INJECTION SUBCUTANEOUS EVERY 24 HOURS
Refills: 0 | Status: DISCONTINUED | OUTPATIENT
Start: 2024-02-08 | End: 2024-02-14

## 2024-02-08 RX ORDER — POTASSIUM PHOSPHATE, MONOBASIC POTASSIUM PHOSPHATE, DIBASIC 236; 224 MG/ML; MG/ML
30 INJECTION, SOLUTION INTRAVENOUS ONCE
Refills: 0 | Status: DISCONTINUED | OUTPATIENT
Start: 2024-02-08 | End: 2024-02-09

## 2024-02-08 RX ORDER — MAGNESIUM SULFATE 500 MG/ML
1 VIAL (ML) INJECTION ONCE
Refills: 0 | Status: COMPLETED | OUTPATIENT
Start: 2024-02-08 | End: 2024-02-08

## 2024-02-08 RX ORDER — ACETAMINOPHEN 500 MG
1000 TABLET ORAL ONCE
Refills: 0 | Status: COMPLETED | OUTPATIENT
Start: 2024-02-08 | End: 2024-02-09

## 2024-02-08 RX ORDER — CYCLOBENZAPRINE HYDROCHLORIDE 10 MG/1
5 TABLET, FILM COATED ORAL THREE TIMES A DAY
Refills: 0 | Status: DISCONTINUED | OUTPATIENT
Start: 2024-02-08 | End: 2024-02-14

## 2024-02-08 RX ORDER — HYDROMORPHONE HYDROCHLORIDE 2 MG/ML
0.2 INJECTION INTRAMUSCULAR; INTRAVENOUS; SUBCUTANEOUS ONCE
Refills: 0 | Status: DISCONTINUED | OUTPATIENT
Start: 2024-02-08 | End: 2024-02-08

## 2024-02-08 RX ORDER — LIDOCAINE 4 G/100G
1 CREAM TOPICAL DAILY
Refills: 0 | Status: DISCONTINUED | OUTPATIENT
Start: 2024-02-08 | End: 2024-02-14

## 2024-02-08 RX ADMIN — PANTOPRAZOLE SODIUM 40 MILLIGRAM(S): 20 TABLET, DELAYED RELEASE ORAL at 12:42

## 2024-02-08 RX ADMIN — Medication 15 MILLIGRAM(S): at 09:36

## 2024-02-08 RX ADMIN — Medication 200 MILLIGRAM(S): at 06:37

## 2024-02-08 RX ADMIN — POTASSIUM PHOSPHATE, MONOBASIC POTASSIUM PHOSPHATE, DIBASIC 83.33 MILLIMOLE(S): 236; 224 INJECTION, SOLUTION INTRAVENOUS at 08:59

## 2024-02-08 RX ADMIN — Medication 1000 MILLIGRAM(S): at 20:53

## 2024-02-08 RX ADMIN — Medication 100 MILLIGRAM(S): at 05:12

## 2024-02-08 RX ADMIN — SODIUM CHLORIDE 100 MILLILITER(S): 9 INJECTION, SOLUTION INTRAVENOUS at 12:56

## 2024-02-08 RX ADMIN — Medication 100 MILLIEQUIVALENT(S): at 08:28

## 2024-02-08 RX ADMIN — POTASSIUM PHOSPHATE, MONOBASIC POTASSIUM PHOSPHATE, DIBASIC 83.33 MILLIMOLE(S): 236; 224 INJECTION, SOLUTION INTRAVENOUS at 14:37

## 2024-02-08 RX ADMIN — Medication 400 MILLIGRAM(S): at 20:38

## 2024-02-08 RX ADMIN — Medication 100 MILLIGRAM(S): at 14:39

## 2024-02-08 RX ADMIN — ENOXAPARIN SODIUM 40 MILLIGRAM(S): 100 INJECTION SUBCUTANEOUS at 19:25

## 2024-02-08 RX ADMIN — Medication 100 GRAM(S): at 14:37

## 2024-02-08 RX ADMIN — LIDOCAINE 1 PATCH: 4 CREAM TOPICAL at 20:29

## 2024-02-08 RX ADMIN — Medication 15 MILLIGRAM(S): at 05:00

## 2024-02-08 RX ADMIN — Medication 100 MILLIEQUIVALENT(S): at 11:22

## 2024-02-08 RX ADMIN — Medication 15 MILLIGRAM(S): at 11:00

## 2024-02-08 RX ADMIN — Medication 15 MILLIGRAM(S): at 03:58

## 2024-02-08 RX ADMIN — HYDROMORPHONE HYDROCHLORIDE 0.2 MILLIGRAM(S): 2 INJECTION INTRAMUSCULAR; INTRAVENOUS; SUBCUTANEOUS at 15:57

## 2024-02-08 RX ADMIN — Medication 400 MILLIGRAM(S): at 10:11

## 2024-02-08 RX ADMIN — Medication 100 MILLIEQUIVALENT(S): at 12:43

## 2024-02-08 RX ADMIN — Medication 15 MILLIGRAM(S): at 19:40

## 2024-02-08 RX ADMIN — Medication 3 MILLIGRAM(S): at 21:43

## 2024-02-08 RX ADMIN — Medication 15 MILLIGRAM(S): at 19:25

## 2024-02-08 RX ADMIN — LIDOCAINE 1 PATCH: 4 CREAM TOPICAL at 12:42

## 2024-02-08 NOTE — DIETITIAN INITIAL EVALUATION ADULT - PERTINENT MEDS FT
MEDICATIONS  (STANDING):  acetaminophen   IVPB .. 1000 milliGRAM(s) IV Intermittent once  acetaminophen   IVPB .. 1000 milliGRAM(s) IV Intermittent once  ciprofloxacin   IVPB 400 milliGRAM(s) IV Intermittent every 12 hours  dextrose 5% + lactated ringers. 1000 milliLiter(s) (100 mL/Hr) IV Continuous <Continuous>  dextrose 5%. 1000 milliLiter(s) (50 mL/Hr) IV Continuous <Continuous>  dextrose 5%. 1000 milliLiter(s) (100 mL/Hr) IV Continuous <Continuous>  dextrose 50% Injectable 25 Gram(s) IV Push once  dextrose 50% Injectable 25 Gram(s) IV Push once  dextrose 50% Injectable 12.5 Gram(s) IV Push once  glucagon  Injectable 1 milliGRAM(s) IntraMuscular once  influenza   Vaccine 0.5 milliLiter(s) IntraMuscular once  insulin lispro (ADMELOG) corrective regimen sliding scale   SubCutaneous Before meals and at bedtime  lidocaine   4% Patch 1 Patch Transdermal daily  magnesium sulfate  IVPB 1 Gram(s) IV Intermittent once  metroNIDAZOLE  IVPB 500 milliGRAM(s) IV Intermittent every 8 hours  pantoprazole  Injectable 40 milliGRAM(s) IV Push daily  potassium phosphate IVPB 30 milliMole(s) IV Intermittent once    MEDICATIONS  (PRN):  dextrose Oral Gel 15 Gram(s) Oral once PRN Blood Glucose LESS THAN 70 milliGRAM(s)/deciliter  ketorolac   Injectable 15 milliGRAM(s) IV Push every 6 hours PRN Moderate Pain (4 - 6)

## 2024-02-08 NOTE — DIETITIAN INITIAL EVALUATION ADULT - NS FNS DIET ORDER
Diet, Clear Liquid:   Supplement Feeding Modality:  Oral  Ensure Clear Cans or Servings Per Day:  1       Frequency:  Daily (02-08-24 @ 13:25)

## 2024-02-08 NOTE — DIETITIAN INITIAL EVALUATION ADULT - OTHER INFO
Admit w/ abdominal pain. Pt s/p open repair incisional hernia 2/6. Pt visited, she reports she is hungry. Discussed advancement to clears and Ensure clear supplement (tray delivered). Pt reports no weight changes, NKFA. Accepted business card w/ kitchen phone # (to help w/ menu selection)

## 2024-02-08 NOTE — DIETITIAN INITIAL EVALUATION ADULT - PERTINENT LABORATORY DATA
02-08    140  |  107  |  7   ----------------------------<  190<H>  3.2<L>   |  27  |  0.73    Ca    8.2<L>      08 Feb 2024 05:27  Phos  0.9     02-08  Mg     1.8     02-08    POCT Blood Glucose.: 138 mg/dL (02-08-24 @ 11:44)  A1C with Estimated Average Glucose Result: 8.4 % (02-06-24 @ 06:45)

## 2024-02-08 NOTE — PHARMACOTHERAPY INTERVENTION NOTE - COMMENTS
Given ongoing ciprofloxacin shortage, suggest to switch to ceftriaxone if ongoing intra-abdominal coverage is needed.

## 2024-02-09 LAB
ANION GAP SERPL CALC-SCNC: 8 MMOL/L — SIGNIFICANT CHANGE UP (ref 5–17)
BUN SERPL-MCNC: 4 MG/DL — LOW (ref 7–18)
CALCIUM SERPL-MCNC: 8.6 MG/DL — SIGNIFICANT CHANGE UP (ref 8.4–10.5)
CHLORIDE SERPL-SCNC: 106 MMOL/L — SIGNIFICANT CHANGE UP (ref 96–108)
CO2 SERPL-SCNC: 23 MMOL/L — SIGNIFICANT CHANGE UP (ref 22–31)
CREAT SERPL-MCNC: 0.66 MG/DL — SIGNIFICANT CHANGE UP (ref 0.5–1.3)
EGFR: 102 ML/MIN/1.73M2 — SIGNIFICANT CHANGE UP
GLUCOSE BLDC GLUCOMTR-MCNC: 149 MG/DL — HIGH (ref 70–99)
GLUCOSE BLDC GLUCOMTR-MCNC: 154 MG/DL — HIGH (ref 70–99)
GLUCOSE BLDC GLUCOMTR-MCNC: 188 MG/DL — HIGH (ref 70–99)
GLUCOSE BLDC GLUCOMTR-MCNC: 195 MG/DL — HIGH (ref 70–99)
GLUCOSE SERPL-MCNC: 149 MG/DL — HIGH (ref 70–99)
HCT VFR BLD CALC: 27.9 % — LOW (ref 34.5–45)
HGB BLD-MCNC: 9.3 G/DL — LOW (ref 11.5–15.5)
MAGNESIUM SERPL-MCNC: 1.9 MG/DL — SIGNIFICANT CHANGE UP (ref 1.6–2.6)
MCHC RBC-ENTMCNC: 27 PG — SIGNIFICANT CHANGE UP (ref 27–34)
MCHC RBC-ENTMCNC: 33.3 GM/DL — SIGNIFICANT CHANGE UP (ref 32–36)
MCV RBC AUTO: 80.9 FL — SIGNIFICANT CHANGE UP (ref 80–100)
NRBC # BLD: 0 /100 WBCS — SIGNIFICANT CHANGE UP (ref 0–0)
PHOSPHATE SERPL-MCNC: 2.5 MG/DL — SIGNIFICANT CHANGE UP (ref 2.5–4.5)
PLATELET # BLD AUTO: 239 K/UL — SIGNIFICANT CHANGE UP (ref 150–400)
POTASSIUM SERPL-MCNC: 3.7 MMOL/L — SIGNIFICANT CHANGE UP (ref 3.5–5.3)
POTASSIUM SERPL-SCNC: 3.7 MMOL/L — SIGNIFICANT CHANGE UP (ref 3.5–5.3)
RBC # BLD: 3.45 M/UL — LOW (ref 3.8–5.2)
RBC # FLD: 14.1 % — SIGNIFICANT CHANGE UP (ref 10.3–14.5)
SODIUM SERPL-SCNC: 137 MMOL/L — SIGNIFICANT CHANGE UP (ref 135–145)
WBC # BLD: 7.17 K/UL — SIGNIFICANT CHANGE UP (ref 3.8–10.5)
WBC # FLD AUTO: 7.17 K/UL — SIGNIFICANT CHANGE UP (ref 3.8–10.5)

## 2024-02-09 RX ORDER — POTASSIUM CHLORIDE 20 MEQ
20 PACKET (EA) ORAL ONCE
Refills: 0 | Status: COMPLETED | OUTPATIENT
Start: 2024-02-09 | End: 2024-02-09

## 2024-02-09 RX ORDER — MAGNESIUM OXIDE 400 MG ORAL TABLET 241.3 MG
400 TABLET ORAL ONCE
Refills: 0 | Status: COMPLETED | OUTPATIENT
Start: 2024-02-09 | End: 2024-02-09

## 2024-02-09 RX ORDER — HYDROMORPHONE HYDROCHLORIDE 2 MG/ML
0.5 INJECTION INTRAMUSCULAR; INTRAVENOUS; SUBCUTANEOUS EVERY 12 HOURS
Refills: 0 | Status: DISCONTINUED | OUTPATIENT
Start: 2024-02-09 | End: 2024-02-14

## 2024-02-09 RX ORDER — SODIUM,POTASSIUM PHOSPHATES 278-250MG
2 POWDER IN PACKET (EA) ORAL ONCE
Refills: 0 | Status: COMPLETED | OUTPATIENT
Start: 2024-02-09 | End: 2024-02-09

## 2024-02-09 RX ADMIN — LIDOCAINE 1 PATCH: 4 CREAM TOPICAL at 18:09

## 2024-02-09 RX ADMIN — PANTOPRAZOLE SODIUM 40 MILLIGRAM(S): 20 TABLET, DELAYED RELEASE ORAL at 11:57

## 2024-02-09 RX ADMIN — Medication 15 MILLIGRAM(S): at 22:05

## 2024-02-09 RX ADMIN — Medication 15 MILLIGRAM(S): at 21:50

## 2024-02-09 RX ADMIN — LIDOCAINE 1 PATCH: 4 CREAM TOPICAL at 00:00

## 2024-02-09 RX ADMIN — Medication 20 MILLIEQUIVALENT(S): at 11:57

## 2024-02-09 RX ADMIN — Medication 15 MILLIGRAM(S): at 16:07

## 2024-02-09 RX ADMIN — Medication 15 MILLIGRAM(S): at 01:19

## 2024-02-09 RX ADMIN — Medication 400 MILLIGRAM(S): at 11:57

## 2024-02-09 RX ADMIN — HYDROMORPHONE HYDROCHLORIDE 0.5 MILLIGRAM(S): 2 INJECTION INTRAMUSCULAR; INTRAVENOUS; SUBCUTANEOUS at 18:49

## 2024-02-09 RX ADMIN — Medication 2 PACKET(S): at 11:57

## 2024-02-09 RX ADMIN — LIDOCAINE 1 PATCH: 4 CREAM TOPICAL at 12:01

## 2024-02-09 RX ADMIN — Medication 15 MILLIGRAM(S): at 18:10

## 2024-02-09 RX ADMIN — Medication 1000 MILLIGRAM(S): at 12:31

## 2024-02-09 RX ADMIN — Medication 1000 MILLIGRAM(S): at 04:30

## 2024-02-09 RX ADMIN — LIDOCAINE 1 PATCH: 4 CREAM TOPICAL at 23:19

## 2024-02-09 RX ADMIN — Medication 400 MILLIGRAM(S): at 04:07

## 2024-02-09 RX ADMIN — Medication 15 MILLIGRAM(S): at 09:09

## 2024-02-09 RX ADMIN — MAGNESIUM OXIDE 400 MG ORAL TABLET 400 MILLIGRAM(S): 241.3 TABLET ORAL at 11:57

## 2024-02-09 RX ADMIN — Medication 15 MILLIGRAM(S): at 10:21

## 2024-02-09 RX ADMIN — Medication 15 MILLIGRAM(S): at 01:35

## 2024-02-10 LAB
ANION GAP SERPL CALC-SCNC: 10 MMOL/L — SIGNIFICANT CHANGE UP (ref 5–17)
APPEARANCE UR: CLEAR — SIGNIFICANT CHANGE UP
BACTERIA # UR AUTO: ABNORMAL /HPF
BILIRUB UR-MCNC: NEGATIVE — SIGNIFICANT CHANGE UP
BUN SERPL-MCNC: 12 MG/DL — SIGNIFICANT CHANGE UP (ref 7–18)
CALCIUM SERPL-MCNC: 9.5 MG/DL — SIGNIFICANT CHANGE UP (ref 8.4–10.5)
CHLORIDE SERPL-SCNC: 101 MMOL/L — SIGNIFICANT CHANGE UP (ref 96–108)
CO2 SERPL-SCNC: 23 MMOL/L — SIGNIFICANT CHANGE UP (ref 22–31)
COLOR SPEC: YELLOW — SIGNIFICANT CHANGE UP
CREAT SERPL-MCNC: 0.98 MG/DL — SIGNIFICANT CHANGE UP (ref 0.5–1.3)
DIFF PNL FLD: ABNORMAL
EGFR: 67 ML/MIN/1.73M2 — SIGNIFICANT CHANGE UP
EPI CELLS # UR: PRESENT
GLUCOSE BLDC GLUCOMTR-MCNC: 200 MG/DL — HIGH (ref 70–99)
GLUCOSE BLDC GLUCOMTR-MCNC: 206 MG/DL — HIGH (ref 70–99)
GLUCOSE BLDC GLUCOMTR-MCNC: 208 MG/DL — HIGH (ref 70–99)
GLUCOSE BLDC GLUCOMTR-MCNC: 208 MG/DL — HIGH (ref 70–99)
GLUCOSE BLDC GLUCOMTR-MCNC: 266 MG/DL — HIGH (ref 70–99)
GLUCOSE SERPL-MCNC: 220 MG/DL — HIGH (ref 70–99)
GLUCOSE UR QL: 250 MG/DL
HCT VFR BLD CALC: 33.6 % — LOW (ref 34.5–45)
HGB BLD-MCNC: 11.2 G/DL — LOW (ref 11.5–15.5)
KETONES UR-MCNC: 40 MG/DL
LEUKOCYTE ESTERASE UR-ACNC: NEGATIVE — SIGNIFICANT CHANGE UP
MAGNESIUM SERPL-MCNC: 1.6 MG/DL — SIGNIFICANT CHANGE UP (ref 1.6–2.6)
MCHC RBC-ENTMCNC: 27 PG — SIGNIFICANT CHANGE UP (ref 27–34)
MCHC RBC-ENTMCNC: 33.3 GM/DL — SIGNIFICANT CHANGE UP (ref 32–36)
MCV RBC AUTO: 81 FL — SIGNIFICANT CHANGE UP (ref 80–100)
NITRITE UR-MCNC: NEGATIVE — SIGNIFICANT CHANGE UP
NRBC # BLD: 0 /100 WBCS — SIGNIFICANT CHANGE UP (ref 0–0)
PH UR: 6.5 — SIGNIFICANT CHANGE UP (ref 5–8)
PHOSPHATE SERPL-MCNC: 3.1 MG/DL — SIGNIFICANT CHANGE UP (ref 2.5–4.5)
PLATELET # BLD AUTO: 336 K/UL — SIGNIFICANT CHANGE UP (ref 150–400)
POTASSIUM SERPL-MCNC: 4.1 MMOL/L — SIGNIFICANT CHANGE UP (ref 3.5–5.3)
POTASSIUM SERPL-SCNC: 4.1 MMOL/L — SIGNIFICANT CHANGE UP (ref 3.5–5.3)
PROT UR-MCNC: NEGATIVE MG/DL — SIGNIFICANT CHANGE UP
RBC # BLD: 4.15 M/UL — SIGNIFICANT CHANGE UP (ref 3.8–5.2)
RBC # FLD: 14.1 % — SIGNIFICANT CHANGE UP (ref 10.3–14.5)
RBC CASTS # UR COMP ASSIST: 10 /HPF — HIGH (ref 0–4)
SODIUM SERPL-SCNC: 134 MMOL/L — LOW (ref 135–145)
SP GR SPEC: 1.01 — SIGNIFICANT CHANGE UP (ref 1–1.03)
UROBILINOGEN FLD QL: 0.2 MG/DL — SIGNIFICANT CHANGE UP (ref 0.2–1)
WBC # BLD: 14.1 K/UL — HIGH (ref 3.8–10.5)
WBC # FLD AUTO: 14.1 K/UL — HIGH (ref 3.8–10.5)
WBC UR QL: 3 /HPF — SIGNIFICANT CHANGE UP (ref 0–5)

## 2024-02-10 PROCEDURE — 71045 X-RAY EXAM CHEST 1 VIEW: CPT | Mod: 26

## 2024-02-10 RX ORDER — ACETAMINOPHEN 500 MG
650 TABLET ORAL EVERY 6 HOURS
Refills: 0 | Status: DISCONTINUED | OUTPATIENT
Start: 2024-02-10 | End: 2024-02-11

## 2024-02-10 RX ADMIN — Medication 15 MILLIGRAM(S): at 04:24

## 2024-02-10 RX ADMIN — LIDOCAINE 1 PATCH: 4 CREAM TOPICAL at 21:44

## 2024-02-10 RX ADMIN — Medication 15 MILLIGRAM(S): at 04:09

## 2024-02-10 RX ADMIN — PANTOPRAZOLE SODIUM 40 MILLIGRAM(S): 20 TABLET, DELAYED RELEASE ORAL at 13:06

## 2024-02-10 RX ADMIN — Medication 2: at 16:34

## 2024-02-10 RX ADMIN — LIDOCAINE 1 PATCH: 4 CREAM TOPICAL at 13:06

## 2024-02-10 RX ADMIN — Medication 15 MILLIGRAM(S): at 19:22

## 2024-02-10 RX ADMIN — HYDROMORPHONE HYDROCHLORIDE 0.5 MILLIGRAM(S): 2 INJECTION INTRAMUSCULAR; INTRAVENOUS; SUBCUTANEOUS at 10:38

## 2024-02-10 RX ADMIN — Medication 3 MILLIGRAM(S): at 21:10

## 2024-02-10 RX ADMIN — Medication 15 MILLIGRAM(S): at 19:52

## 2024-02-10 RX ADMIN — Medication 2: at 08:01

## 2024-02-10 RX ADMIN — Medication 4: at 12:07

## 2024-02-10 RX ADMIN — Medication 650 MILLIGRAM(S): at 21:22

## 2024-02-10 RX ADMIN — HYDROMORPHONE HYDROCHLORIDE 0.5 MILLIGRAM(S): 2 INJECTION INTRAMUSCULAR; INTRAVENOUS; SUBCUTANEOUS at 13:30

## 2024-02-10 RX ADMIN — ENOXAPARIN SODIUM 40 MILLIGRAM(S): 100 INJECTION SUBCUTANEOUS at 19:22

## 2024-02-10 RX ADMIN — Medication 650 MILLIGRAM(S): at 20:52

## 2024-02-10 RX ADMIN — Medication 15 MILLIGRAM(S): at 13:30

## 2024-02-10 RX ADMIN — CYCLOBENZAPRINE HYDROCHLORIDE 5 MILLIGRAM(S): 10 TABLET, FILM COATED ORAL at 04:13

## 2024-02-10 RX ADMIN — Medication 15 MILLIGRAM(S): at 13:07

## 2024-02-10 RX ADMIN — LIDOCAINE 1 PATCH: 4 CREAM TOPICAL at 19:45

## 2024-02-11 LAB
ANION GAP SERPL CALC-SCNC: 9 MMOL/L — SIGNIFICANT CHANGE UP (ref 5–17)
BUN SERPL-MCNC: 15 MG/DL — SIGNIFICANT CHANGE UP (ref 7–18)
CALCIUM SERPL-MCNC: 9.9 MG/DL — SIGNIFICANT CHANGE UP (ref 8.4–10.5)
CHLORIDE SERPL-SCNC: 99 MMOL/L — SIGNIFICANT CHANGE UP (ref 96–108)
CO2 SERPL-SCNC: 24 MMOL/L — SIGNIFICANT CHANGE UP (ref 22–31)
CREAT SERPL-MCNC: 1.13 MG/DL — SIGNIFICANT CHANGE UP (ref 0.5–1.3)
EGFR: 56 ML/MIN/1.73M2 — LOW
GLUCOSE BLDC GLUCOMTR-MCNC: 191 MG/DL — HIGH (ref 70–99)
GLUCOSE BLDC GLUCOMTR-MCNC: 218 MG/DL — HIGH (ref 70–99)
GLUCOSE BLDC GLUCOMTR-MCNC: 219 MG/DL — HIGH (ref 70–99)
GLUCOSE BLDC GLUCOMTR-MCNC: 240 MG/DL — HIGH (ref 70–99)
GLUCOSE BLDC GLUCOMTR-MCNC: 333 MG/DL — HIGH (ref 70–99)
GLUCOSE SERPL-MCNC: 251 MG/DL — HIGH (ref 70–99)
HCT VFR BLD CALC: 30.7 % — LOW (ref 34.5–45)
HGB BLD-MCNC: 10.2 G/DL — LOW (ref 11.5–15.5)
MAGNESIUM SERPL-MCNC: 1.5 MG/DL — LOW (ref 1.6–2.6)
MCHC RBC-ENTMCNC: 26.8 PG — LOW (ref 27–34)
MCHC RBC-ENTMCNC: 33.2 GM/DL — SIGNIFICANT CHANGE UP (ref 32–36)
MCV RBC AUTO: 80.8 FL — SIGNIFICANT CHANGE UP (ref 80–100)
NRBC # BLD: 0 /100 WBCS — SIGNIFICANT CHANGE UP (ref 0–0)
PHOSPHATE SERPL-MCNC: 3.2 MG/DL — SIGNIFICANT CHANGE UP (ref 2.5–4.5)
PLATELET # BLD AUTO: 320 K/UL — SIGNIFICANT CHANGE UP (ref 150–400)
POTASSIUM SERPL-MCNC: 4.1 MMOL/L — SIGNIFICANT CHANGE UP (ref 3.5–5.3)
POTASSIUM SERPL-SCNC: 4.1 MMOL/L — SIGNIFICANT CHANGE UP (ref 3.5–5.3)
RBC # BLD: 3.8 M/UL — SIGNIFICANT CHANGE UP (ref 3.8–5.2)
RBC # FLD: 14.6 % — HIGH (ref 10.3–14.5)
SODIUM SERPL-SCNC: 132 MMOL/L — LOW (ref 135–145)
WBC # BLD: 15.94 K/UL — HIGH (ref 3.8–10.5)
WBC # FLD AUTO: 15.94 K/UL — HIGH (ref 3.8–10.5)

## 2024-02-11 PROCEDURE — 74177 CT ABD & PELVIS W/CONTRAST: CPT | Mod: 26

## 2024-02-11 RX ORDER — CIPROFLOXACIN LACTATE 400MG/40ML
VIAL (ML) INTRAVENOUS
Refills: 0 | Status: DISCONTINUED | OUTPATIENT
Start: 2024-02-11 | End: 2024-02-14

## 2024-02-11 RX ORDER — MAGNESIUM OXIDE 400 MG ORAL TABLET 241.3 MG
400 TABLET ORAL
Refills: 0 | Status: COMPLETED | OUTPATIENT
Start: 2024-02-11 | End: 2024-02-12

## 2024-02-11 RX ORDER — INSULIN LISPRO 100/ML
VIAL (ML) SUBCUTANEOUS EVERY 6 HOURS
Refills: 0 | Status: DISCONTINUED | OUTPATIENT
Start: 2024-02-11 | End: 2024-02-14

## 2024-02-11 RX ORDER — SODIUM CHLORIDE 9 MG/ML
1000 INJECTION, SOLUTION INTRAVENOUS
Refills: 0 | Status: DISCONTINUED | OUTPATIENT
Start: 2024-02-11 | End: 2024-02-14

## 2024-02-11 RX ORDER — KETOROLAC TROMETHAMINE 30 MG/ML
30 SYRINGE (ML) INJECTION EVERY 6 HOURS
Refills: 0 | Status: DISCONTINUED | OUTPATIENT
Start: 2024-02-11 | End: 2024-02-12

## 2024-02-11 RX ORDER — BENZOCAINE AND MENTHOL 5; 1 G/100ML; G/100ML
1 LIQUID ORAL EVERY 4 HOURS
Refills: 0 | Status: DISCONTINUED | OUTPATIENT
Start: 2024-02-11 | End: 2024-02-14

## 2024-02-11 RX ORDER — METRONIDAZOLE 500 MG
500 TABLET ORAL EVERY 8 HOURS
Refills: 0 | Status: DISCONTINUED | OUTPATIENT
Start: 2024-02-11 | End: 2024-02-11

## 2024-02-11 RX ORDER — CIPROFLOXACIN LACTATE 400MG/40ML
400 VIAL (ML) INTRAVENOUS ONCE
Refills: 0 | Status: COMPLETED | OUTPATIENT
Start: 2024-02-11 | End: 2024-02-11

## 2024-02-11 RX ORDER — DIATRIZOATE MEGLUMINE 180 MG/ML
30 INJECTION, SOLUTION INTRAVESICAL ONCE
Refills: 0 | Status: COMPLETED | OUTPATIENT
Start: 2024-02-11 | End: 2024-02-11

## 2024-02-11 RX ORDER — METRONIDAZOLE 500 MG
500 TABLET ORAL EVERY 8 HOURS
Refills: 0 | Status: DISCONTINUED | OUTPATIENT
Start: 2024-02-11 | End: 2024-02-14

## 2024-02-11 RX ORDER — SODIUM CHLORIDE 9 MG/ML
1000 INJECTION, SOLUTION INTRAVENOUS
Refills: 0 | Status: DISCONTINUED | OUTPATIENT
Start: 2024-02-11 | End: 2024-02-11

## 2024-02-11 RX ORDER — CIPROFLOXACIN LACTATE 400MG/40ML
400 VIAL (ML) INTRAVENOUS EVERY 12 HOURS
Refills: 0 | Status: DISCONTINUED | OUTPATIENT
Start: 2024-02-11 | End: 2024-02-14

## 2024-02-11 RX ADMIN — HYDROMORPHONE HYDROCHLORIDE 0.5 MILLIGRAM(S): 2 INJECTION INTRAMUSCULAR; INTRAVENOUS; SUBCUTANEOUS at 06:59

## 2024-02-11 RX ADMIN — HYDROMORPHONE HYDROCHLORIDE 0.5 MILLIGRAM(S): 2 INJECTION INTRAMUSCULAR; INTRAVENOUS; SUBCUTANEOUS at 06:29

## 2024-02-11 RX ADMIN — Medication 15 MILLIGRAM(S): at 02:14

## 2024-02-11 RX ADMIN — HYDROMORPHONE HYDROCHLORIDE 0.5 MILLIGRAM(S): 2 INJECTION INTRAMUSCULAR; INTRAVENOUS; SUBCUTANEOUS at 18:39

## 2024-02-11 RX ADMIN — Medication 3: at 18:38

## 2024-02-11 RX ADMIN — Medication 15 MILLIGRAM(S): at 01:44

## 2024-02-11 RX ADMIN — MAGNESIUM OXIDE 400 MG ORAL TABLET 400 MILLIGRAM(S): 241.3 TABLET ORAL at 12:12

## 2024-02-11 RX ADMIN — Medication 6: at 12:11

## 2024-02-11 RX ADMIN — SODIUM CHLORIDE 110 MILLILITER(S): 9 INJECTION, SOLUTION INTRAVENOUS at 14:27

## 2024-02-11 RX ADMIN — Medication 15 MILLIGRAM(S): at 10:35

## 2024-02-11 RX ADMIN — CYCLOBENZAPRINE HYDROCHLORIDE 5 MILLIGRAM(S): 10 TABLET, FILM COATED ORAL at 14:54

## 2024-02-11 RX ADMIN — DIATRIZOATE MEGLUMINE 30 MILLILITER(S): 180 INJECTION, SOLUTION INTRAVESICAL at 10:36

## 2024-02-11 RX ADMIN — LIDOCAINE 1 PATCH: 4 CREAM TOPICAL at 12:12

## 2024-02-11 RX ADMIN — PANTOPRAZOLE SODIUM 40 MILLIGRAM(S): 20 TABLET, DELAYED RELEASE ORAL at 12:11

## 2024-02-11 RX ADMIN — Medication 100 MILLIGRAM(S): at 22:09

## 2024-02-11 RX ADMIN — Medication 15 MILLIGRAM(S): at 12:30

## 2024-02-11 RX ADMIN — Medication 15 MILLIGRAM(S): at 16:19

## 2024-02-11 RX ADMIN — ENOXAPARIN SODIUM 40 MILLIGRAM(S): 100 INJECTION SUBCUTANEOUS at 19:25

## 2024-02-11 RX ADMIN — Medication 30 MILLIGRAM(S): at 23:10

## 2024-02-11 RX ADMIN — MAGNESIUM OXIDE 400 MG ORAL TABLET 400 MILLIGRAM(S): 241.3 TABLET ORAL at 19:26

## 2024-02-11 RX ADMIN — Medication 2: at 08:04

## 2024-02-11 RX ADMIN — Medication 650 MILLIGRAM(S): at 16:19

## 2024-02-11 RX ADMIN — LIDOCAINE 1 PATCH: 4 CREAM TOPICAL at 19:08

## 2024-02-11 RX ADMIN — Medication 200 MILLIGRAM(S): at 10:44

## 2024-02-11 RX ADMIN — Medication 30 MILLIGRAM(S): at 22:10

## 2024-02-11 RX ADMIN — Medication 100 MILLIGRAM(S): at 14:27

## 2024-02-11 RX ADMIN — Medication 200 MILLIGRAM(S): at 19:46

## 2024-02-11 RX ADMIN — SODIUM CHLORIDE 110 MILLILITER(S): 9 INJECTION, SOLUTION INTRAVENOUS at 22:09

## 2024-02-11 RX ADMIN — BENZOCAINE AND MENTHOL 1 LOZENGE: 5; 1 LIQUID ORAL at 22:10

## 2024-02-11 NOTE — CHART NOTE - NSCHARTNOTEFT_GEN_A_CORE
59y.o. f s/p dx lap converted to open, primary repair of incisional hernia POD#2. Admits nausea but no vomiting . Awaiting return of no bowel function started on clears today. Pain control prn. Denies  vomiting, chest pain, sob, fevers chills. Pain is well controlled. . Voiding .    Vital Signs Last 24 Hrs  T(C): 37.5 (12 Feb 2024 05:35), Max: 37.5 (12 Feb 2024 05:35)  T(F): 99.5 (12 Feb 2024 05:35), Max: 99.5 (12 Feb 2024 05:35)  HR: 96 (12 Feb 2024 05:35) (96 - 114)  BP: 143/87 (12 Feb 2024 05:35) (112/75 - 143/87)  BP(mean): --  RR: 18 (12 Feb 2024 05:35) (18 - 19)  SpO2: 96% (12 Feb 2024 05:35) (96% - 98%)    Parameters below as of 12 Feb 2024 05:35  Patient On (Oxygen Delivery Method): room air      I&O's Detail    10 Feb 2024 07:01  -  11 Feb 2024 07:00  --------------------------------------------------------  IN:  Total IN: 0 mL    OUT:    Bulb (mL): 7.5 mL  Total OUT: 7.5 mL    Total NET: -7.5 mL      11 Feb 2024 07:01  -  12 Feb 2024 06:43  --------------------------------------------------------  IN:  Total IN: 0 mL    OUT:    Bulb (mL): 5 mL  Total OUT: 5 mL    Total NET: -5 mL          Physical Exam:  General: NAD, resting comfortably in bed  Pulmonary: Nonlabored breathing, no respiratory distress  Cardiovascular: NSR, S1, S2  Abdominal: soft, moderately distended, non specific generalized tenderness ,dressing c/d/i  Extremities: no edema, no calf tenderness, distal pulses are palpable     LABS:                        10.2   15.94 )-----------( 320      ( 11 Feb 2024 06:42 )             30.7     02-11    132<L>  |  99  |  15  ----------------------------<  251<H>  4.1   |  24  |  1.13    Ca    9.9      11 Feb 2024 06:42  Phos  3.2     02-11  Mg     1.5     02-11          MEDICATIONS  (STANDING):  ciprofloxacin   IVPB 400 milliGRAM(s) IV Intermittent every 12 hours  ciprofloxacin   IVPB      dextrose 5%. 1000 milliLiter(s) (50 mL/Hr) IV Continuous <Continuous>  dextrose 5%. 1000 milliLiter(s) (100 mL/Hr) IV Continuous <Continuous>  dextrose 50% Injectable 25 Gram(s) IV Push once  dextrose 50% Injectable 12.5 Gram(s) IV Push once  dextrose 50% Injectable 25 Gram(s) IV Push once  enoxaparin Injectable 40 milliGRAM(s) SubCutaneous every 24 hours  glucagon  Injectable 1 milliGRAM(s) IntraMuscular once  influenza   Vaccine 0.5 milliLiter(s) IntraMuscular once  insulin lispro (ADMELOG) corrective regimen sliding scale   SubCutaneous every 6 hours  lactated ringers. 1000 milliLiter(s) (110 mL/Hr) IV Continuous <Continuous>  lidocaine   4% Patch 1 Patch Transdermal daily  magnesium oxide 400 milliGRAM(s) Oral three times a day with meals  melatonin 3 milliGRAM(s) Oral at bedtime  metroNIDAZOLE  IVPB 500 milliGRAM(s) IV Intermittent every 8 hours  pantoprazole  Injectable 40 milliGRAM(s) IV Push daily    MEDICATIONS  (PRN):  benzocaine/menthol Lozenge 1 Lozenge Oral every 4 hours PRN Sore Throat  cyclobenzaprine 5 milliGRAM(s) Oral three times a day PRN Muscle Spasm  dextrose Oral Gel 15 Gram(s) Oral once PRN Blood Glucose LESS THAN 70 milliGRAM(s)/deciliter  HYDROmorphone  Injectable 0.5 milliGRAM(s) IV Push every 12 hours PRN Severe Pain (7 - 10)  ketorolac   Injectable 30 milliGRAM(s) IV Push every 6 hours PRN Moderate Pain (4 - 6)      Assessment:  59y.o. f s/p dx lap converted to open, primary repair of incisional hernia POD#2 with abdominal pain/ distention and  nausea but no vomiting  now with high grade SBO      Plan:  -NPO  -IVF  -NGT to LCS (.600cc of bilious vomiting )  - Pain control prn  -Dressing change prn   - F/u AM labs  - DVT ppx

## 2024-02-11 NOTE — PROGRESS NOTE ADULT - ATTENDING COMMENTS
Pt seen and examined. OOB to ambulate independently. Feels gas moving around. Awaitng bowel function  Abd- soft, non distended, + incisional tenderness, no guarding no rebound ZEKE serosang  Labs reviewed. Repleted phos  Await return of bowel function
Pt seen and examined. Reports had a bowel movement this morning after having breakfast. Unsure if passed flatus. Family at bedside. As pt with increased WBC and tmax 102 ordered CT abd pelvis. Pt denies any nausea or vomiting.  Abd- soft, increased upper abd distension compared to yesterdays exam.  +lower abd tenderness, mild rebound, no guarding  CT images reviewed- report not up yet- demonstrates diffuse dilation of small bowel with decreased caliber distal SB in area of extensive adhesiolysis. Contrast did not reach distal SB. Small fluid in pelvis with expected small dots of extraluminal within nml for postop period  Demontrates postop PSBO. Discussed with pt given appearance on CT recommend NPO today. Will also obtain delayed AXR to review progression of contrast. Pt encouraged to continue ambulating as tolerated.
Pt seen and examined. agree with above note. Abd- soft, non distended. Slight incisional tenderness, no guarding no rebound  Reports passing flatus and had BM  As pt with increased WBC today will keep another day, check CXR, UA
Pt seen and examined. Feels well. + flatus, reports had bowel movement this afternoon. Tolerating diet, taking it slow.  Abd- soft, non distended. Mild incisional tenderness, no guarding no rebound. ZEKE serosanguinous  Labs wnl  Continue regular diet. Possible discharge tomorrow

## 2024-02-11 NOTE — PROGRESS NOTE ADULT - NSPROGADDITIONALINFOA_GEN_ALL_CORE
Pt seen and examined. Reports had a bowel movement this morning after having breakfast. Unsure if passed flatus. Family at bedside. As pt with increased WBC and tmax 102 ordered CT abd pelvis. Pt denies any nausea or vomiting.  Abd- soft, increased upper abd distension compared to yesterdays exam.  +lower abd tenderness, mild rebound, no guarding  CT images reviewed- report not up yet- demonstrates diffuse dilation of small bowel with decreased caliber distal SB in area of extensive adhesiolysis. Contrast did not reach distal SB. Small fluid in pelvis with expected small dots of extraluminal within nml for postop period  Demontrates postop PSBO. Discussed with pt given appearance on CT recommend NPO today. Will also obtain delayed AXR to review progression of contrast. Pt encouraged to continue ambulating as tolerated.

## 2024-02-12 LAB
ANION GAP SERPL CALC-SCNC: 9 MMOL/L — SIGNIFICANT CHANGE UP (ref 5–17)
BUN SERPL-MCNC: 12 MG/DL — SIGNIFICANT CHANGE UP (ref 7–18)
CALCIUM SERPL-MCNC: 9 MG/DL — SIGNIFICANT CHANGE UP (ref 8.4–10.5)
CHLORIDE SERPL-SCNC: 101 MMOL/L — SIGNIFICANT CHANGE UP (ref 96–108)
CO2 SERPL-SCNC: 24 MMOL/L — SIGNIFICANT CHANGE UP (ref 22–31)
CREAT SERPL-MCNC: 0.96 MG/DL — SIGNIFICANT CHANGE UP (ref 0.5–1.3)
EGFR: 69 ML/MIN/1.73M2 — SIGNIFICANT CHANGE UP
GLUCOSE BLDC GLUCOMTR-MCNC: 104 MG/DL — HIGH (ref 70–99)
GLUCOSE BLDC GLUCOMTR-MCNC: 142 MG/DL — HIGH (ref 70–99)
GLUCOSE BLDC GLUCOMTR-MCNC: 143 MG/DL — HIGH (ref 70–99)
GLUCOSE BLDC GLUCOMTR-MCNC: 150 MG/DL — HIGH (ref 70–99)
GLUCOSE SERPL-MCNC: 216 MG/DL — HIGH (ref 70–99)
HCT VFR BLD CALC: 26 % — LOW (ref 34.5–45)
HGB BLD-MCNC: 8.6 G/DL — LOW (ref 11.5–15.5)
MAGNESIUM SERPL-MCNC: 1.6 MG/DL — SIGNIFICANT CHANGE UP (ref 1.6–2.6)
MCHC RBC-ENTMCNC: 26.5 PG — LOW (ref 27–34)
MCHC RBC-ENTMCNC: 33.1 GM/DL — SIGNIFICANT CHANGE UP (ref 32–36)
MCV RBC AUTO: 80.2 FL — SIGNIFICANT CHANGE UP (ref 80–100)
NRBC # BLD: 0 /100 WBCS — SIGNIFICANT CHANGE UP (ref 0–0)
PHOSPHATE SERPL-MCNC: 2.9 MG/DL — SIGNIFICANT CHANGE UP (ref 2.5–4.5)
PLATELET # BLD AUTO: 277 K/UL — SIGNIFICANT CHANGE UP (ref 150–400)
POTASSIUM SERPL-MCNC: 3.6 MMOL/L — SIGNIFICANT CHANGE UP (ref 3.5–5.3)
POTASSIUM SERPL-SCNC: 3.6 MMOL/L — SIGNIFICANT CHANGE UP (ref 3.5–5.3)
RBC # BLD: 3.24 M/UL — LOW (ref 3.8–5.2)
RBC # FLD: 14.4 % — SIGNIFICANT CHANGE UP (ref 10.3–14.5)
SODIUM SERPL-SCNC: 134 MMOL/L — LOW (ref 135–145)
WBC # BLD: 10.11 K/UL — SIGNIFICANT CHANGE UP (ref 3.8–10.5)
WBC # FLD AUTO: 10.11 K/UL — SIGNIFICANT CHANGE UP (ref 3.8–10.5)

## 2024-02-12 PROCEDURE — 74018 RADEX ABDOMEN 1 VIEW: CPT | Mod: 26

## 2024-02-12 RX ORDER — KETOROLAC TROMETHAMINE 30 MG/ML
15 SYRINGE (ML) INJECTION ONCE
Refills: 0 | Status: DISCONTINUED | OUTPATIENT
Start: 2024-02-12 | End: 2024-02-12

## 2024-02-12 RX ORDER — MAGNESIUM SULFATE 500 MG/ML
2 VIAL (ML) INJECTION ONCE
Refills: 0 | Status: COMPLETED | OUTPATIENT
Start: 2024-02-12 | End: 2024-02-13

## 2024-02-12 RX ADMIN — HYDROMORPHONE HYDROCHLORIDE 0.5 MILLIGRAM(S): 2 INJECTION INTRAMUSCULAR; INTRAVENOUS; SUBCUTANEOUS at 13:38

## 2024-02-12 RX ADMIN — Medication 100 MILLIGRAM(S): at 13:39

## 2024-02-12 RX ADMIN — BENZOCAINE AND MENTHOL 1 LOZENGE: 5; 1 LIQUID ORAL at 19:25

## 2024-02-12 RX ADMIN — Medication 30 MILLIGRAM(S): at 07:10

## 2024-02-12 RX ADMIN — PANTOPRAZOLE SODIUM 40 MILLIGRAM(S): 20 TABLET, DELAYED RELEASE ORAL at 13:38

## 2024-02-12 RX ADMIN — Medication 15 MILLIGRAM(S): at 21:54

## 2024-02-12 RX ADMIN — Medication 30 MILLIGRAM(S): at 06:55

## 2024-02-12 RX ADMIN — CYCLOBENZAPRINE HYDROCHLORIDE 5 MILLIGRAM(S): 10 TABLET, FILM COATED ORAL at 21:54

## 2024-02-12 RX ADMIN — Medication 200 MILLIGRAM(S): at 05:19

## 2024-02-12 RX ADMIN — Medication 15 MILLIGRAM(S): at 22:10

## 2024-02-12 RX ADMIN — Medication 100 MILLIGRAM(S): at 05:19

## 2024-02-12 RX ADMIN — Medication 3 MILLIGRAM(S): at 21:45

## 2024-02-12 RX ADMIN — LIDOCAINE 1 PATCH: 4 CREAM TOPICAL at 20:19

## 2024-02-12 RX ADMIN — LIDOCAINE 1 PATCH: 4 CREAM TOPICAL at 13:39

## 2024-02-12 RX ADMIN — Medication 100 MILLIGRAM(S): at 21:45

## 2024-02-12 RX ADMIN — LIDOCAINE 1 PATCH: 4 CREAM TOPICAL at 00:09

## 2024-02-12 RX ADMIN — Medication 200 MILLIGRAM(S): at 17:56

## 2024-02-13 LAB
ALBUMIN SERPL ELPH-MCNC: 2.6 G/DL — LOW (ref 3.5–5)
ALP SERPL-CCNC: 117 U/L — SIGNIFICANT CHANGE UP (ref 40–120)
ALT FLD-CCNC: 80 U/L DA — HIGH (ref 10–60)
ANION GAP SERPL CALC-SCNC: 12 MMOL/L — SIGNIFICANT CHANGE UP (ref 5–17)
AST SERPL-CCNC: 32 U/L — SIGNIFICANT CHANGE UP (ref 10–40)
BILIRUB SERPL-MCNC: 0.6 MG/DL — SIGNIFICANT CHANGE UP (ref 0.2–1.2)
BUN SERPL-MCNC: 8 MG/DL — SIGNIFICANT CHANGE UP (ref 7–18)
CALCIUM SERPL-MCNC: 9.8 MG/DL — SIGNIFICANT CHANGE UP (ref 8.4–10.5)
CHLORIDE SERPL-SCNC: 100 MMOL/L — SIGNIFICANT CHANGE UP (ref 96–108)
CO2 SERPL-SCNC: 24 MMOL/L — SIGNIFICANT CHANGE UP (ref 22–31)
CREAT SERPL-MCNC: 0.76 MG/DL — SIGNIFICANT CHANGE UP (ref 0.5–1.3)
EGFR: 91 ML/MIN/1.73M2 — SIGNIFICANT CHANGE UP
GLUCOSE BLDC GLUCOMTR-MCNC: 119 MG/DL — HIGH (ref 70–99)
GLUCOSE BLDC GLUCOMTR-MCNC: 140 MG/DL — HIGH (ref 70–99)
GLUCOSE BLDC GLUCOMTR-MCNC: 186 MG/DL — HIGH (ref 70–99)
GLUCOSE BLDC GLUCOMTR-MCNC: 191 MG/DL — HIGH (ref 70–99)
GLUCOSE SERPL-MCNC: 148 MG/DL — HIGH (ref 70–99)
HCT VFR BLD CALC: 25.9 % — LOW (ref 34.5–45)
HGB BLD-MCNC: 8.6 G/DL — LOW (ref 11.5–15.5)
MAGNESIUM SERPL-MCNC: 1.7 MG/DL — SIGNIFICANT CHANGE UP (ref 1.6–2.6)
MCHC RBC-ENTMCNC: 27.3 PG — SIGNIFICANT CHANGE UP (ref 27–34)
MCHC RBC-ENTMCNC: 33.2 GM/DL — SIGNIFICANT CHANGE UP (ref 32–36)
MCV RBC AUTO: 82.2 FL — SIGNIFICANT CHANGE UP (ref 80–100)
NRBC # BLD: 0 /100 WBCS — SIGNIFICANT CHANGE UP (ref 0–0)
PHOSPHATE SERPL-MCNC: 2.8 MG/DL — SIGNIFICANT CHANGE UP (ref 2.5–4.5)
PLATELET # BLD AUTO: 355 K/UL — SIGNIFICANT CHANGE UP (ref 150–400)
POTASSIUM SERPL-MCNC: 3.6 MMOL/L — SIGNIFICANT CHANGE UP (ref 3.5–5.3)
POTASSIUM SERPL-SCNC: 3.6 MMOL/L — SIGNIFICANT CHANGE UP (ref 3.5–5.3)
PROT SERPL-MCNC: 7.3 G/DL — SIGNIFICANT CHANGE UP (ref 6–8.3)
RBC # BLD: 3.15 M/UL — LOW (ref 3.8–5.2)
RBC # FLD: 14.2 % — SIGNIFICANT CHANGE UP (ref 10.3–14.5)
SODIUM SERPL-SCNC: 136 MMOL/L — SIGNIFICANT CHANGE UP (ref 135–145)
WBC # BLD: 9.16 K/UL — SIGNIFICANT CHANGE UP (ref 3.8–10.5)
WBC # FLD AUTO: 9.16 K/UL — SIGNIFICANT CHANGE UP (ref 3.8–10.5)

## 2024-02-13 RX ORDER — SIMETHICONE 80 MG/1
80 TABLET, CHEWABLE ORAL THREE TIMES A DAY
Refills: 0 | Status: DISCONTINUED | OUTPATIENT
Start: 2024-02-13 | End: 2024-02-14

## 2024-02-13 RX ADMIN — LIDOCAINE 1 PATCH: 4 CREAM TOPICAL at 01:59

## 2024-02-13 RX ADMIN — Medication 100 MILLIGRAM(S): at 05:15

## 2024-02-13 RX ADMIN — ENOXAPARIN SODIUM 40 MILLIGRAM(S): 100 INJECTION SUBCUTANEOUS at 18:30

## 2024-02-13 RX ADMIN — Medication 200 MILLIGRAM(S): at 18:32

## 2024-02-13 RX ADMIN — HYDROMORPHONE HYDROCHLORIDE 0.5 MILLIGRAM(S): 2 INJECTION INTRAMUSCULAR; INTRAVENOUS; SUBCUTANEOUS at 04:10

## 2024-02-13 RX ADMIN — PANTOPRAZOLE SODIUM 40 MILLIGRAM(S): 20 TABLET, DELAYED RELEASE ORAL at 12:13

## 2024-02-13 RX ADMIN — LIDOCAINE 1 PATCH: 4 CREAM TOPICAL at 20:04

## 2024-02-13 RX ADMIN — HYDROMORPHONE HYDROCHLORIDE 0.5 MILLIGRAM(S): 2 INJECTION INTRAMUSCULAR; INTRAVENOUS; SUBCUTANEOUS at 18:42

## 2024-02-13 RX ADMIN — Medication 200 MILLIGRAM(S): at 05:15

## 2024-02-13 RX ADMIN — Medication 100 MILLIGRAM(S): at 21:11

## 2024-02-13 RX ADMIN — Medication 100 MILLIGRAM(S): at 13:36

## 2024-02-13 RX ADMIN — LIDOCAINE 1 PATCH: 4 CREAM TOPICAL at 11:39

## 2024-02-13 RX ADMIN — SODIUM CHLORIDE 110 MILLILITER(S): 9 INJECTION, SOLUTION INTRAVENOUS at 11:36

## 2024-02-13 RX ADMIN — Medication 25 GRAM(S): at 16:01

## 2024-02-13 NOTE — PROGRESS NOTE ADULT - NS ATTEND AMEND GEN_ALL_CORE FT
Pt seen and examined. Reports passing flatus and having multiple small bowel movements. Tolerating clears, denies nausea or vomiting.  Abd- soft, slightly distended, mild incisional RLQ tenderness, no guarding no rebound. ZEKE drain in sc serosanguinous  Labs wnl. HCT decreased, likely dilutional.  OK to advance to full liquids
Pt seen and examined. Discussed intraoperative findings. Has been OOB to ambulate independently. Walked at least 3 times today as she wants to pass gas. Reports was painful to get OOB. Advised pt to ask for staff to help her get OOB. Able to void,  Abd- Soft, non distended, + incisional tenderness. No guarding no rebound.  Dressing C/D/I. ZEKE drain subcutaneous serosanguinous  OK to have ice chips. Advised to take it slow. Avoid opiods postop if possible.

## 2024-02-13 NOTE — CHART NOTE - NSCHARTNOTEFT_GEN_A_CORE
Assessment:   Patient is a 59y old  Female who presents with a chief complaint of abd pain (13 Feb 2024 06:53). Pt's diet put back to NPO (2/11), then to slerars 2/12 PM0. Sx PMD advancinf diet to full liquids (consistent CHO) lactose free w/ Ensure Plant Based BID. Sx PMD requesting diet ed/ copy on Low Fiber diet. Provided this to pt.       Factors impacting intake: [ ] none [ ] nausea  [ ] vomiting [ ] diarrhea [ ] constipation  [ ]chewing problems [ ] swallowing issues  [x ] other: altered GI fx/ structure    Diet Prescription: Diet, Consistent Carbohydrate Clear Liquid (02-12-24 @ 19:52)      Pertinent Medications: MEDICATIONS  (STANDING):  ciprofloxacin   IVPB 400 milliGRAM(s) IV Intermittent every 12 hours  ciprofloxacin   IVPB      dextrose 5%. 1000 milliLiter(s) (100 mL/Hr) IV Continuous <Continuous>  dextrose 5%. 1000 milliLiter(s) (50 mL/Hr) IV Continuous <Continuous>  dextrose 50% Injectable 12.5 Gram(s) IV Push once  dextrose 50% Injectable 25 Gram(s) IV Push once  dextrose 50% Injectable 25 Gram(s) IV Push once  enoxaparin Injectable 40 milliGRAM(s) SubCutaneous every 24 hours  glucagon  Injectable 1 milliGRAM(s) IntraMuscular once  influenza   Vaccine 0.5 milliLiter(s) IntraMuscular once  insulin lispro (ADMELOG) corrective regimen sliding scale   SubCutaneous every 6 hours  lactated ringers. 1000 milliLiter(s) (110 mL/Hr) IV Continuous <Continuous>  lidocaine   4% Patch 1 Patch Transdermal daily  magnesium sulfate  IVPB 2 Gram(s) IV Intermittent once  melatonin 3 milliGRAM(s) Oral at bedtime  metroNIDAZOLE  IVPB 500 milliGRAM(s) IV Intermittent every 8 hours  pantoprazole  Injectable 40 milliGRAM(s) IV Push daily    MEDICATIONS  (PRN):  benzocaine/menthol Lozenge 1 Lozenge Oral every 4 hours PRN Sore Throat  cyclobenzaprine 5 milliGRAM(s) Oral three times a day PRN Muscle Spasm  dextrose Oral Gel 15 Gram(s) Oral once PRN Blood Glucose LESS THAN 70 milliGRAM(s)/deciliter  HYDROmorphone  Injectable 0.5 milliGRAM(s) IV Push every 12 hours PRN Severe Pain (7 - 10)    Pertinent Labs: 02-13 Na136 mmol/L Glu 148 mg/dL<H> K+ 3.6 mmol/L Cr  0.76 mg/dL BUN 8 mg/dL 02-13 Phos 2.8 mg/dL 02-13 Alb 2.6 g/dL<L>     CAPILLARY BLOOD GLUCOSE      POCT Blood Glucose.: 140 mg/dL (13 Feb 2024 11:16)  POCT Blood Glucose.: 119 mg/dL (13 Feb 2024 05:35)  POCT Blood Glucose.: 142 mg/dL (12 Feb 2024 23:52)  POCT Blood Glucose.: 104 mg/dL (12 Feb 2024 17:24)        Estimated Needs:   [x ] no change since previous assessment  [ ] recalculated:       Previous Nutrition Diagnosis:   [ ] Altered GI function  [x ]Inadequate Oral Intake [ ] Swallowing Difficulty   [ ] Altered nutrition related labs [ ] Increased Nutrient Needs [ ] Overweight/Obesity   [ ] Unintended Weight Loss [ ] Food & Nutrition Related Knowledge Deficit [ ] Malnutrition   [ ] Other:     Nutrition Diagnosis is [x ] ongoing  [ ] resolved [ ] not applicable       Interventions:   Recommend  [ ] Change Diet To:  [ ] Nutrition Supplement  [ ] Nutrition Support  [x ] Other: See above, diet being advanced + Supplemented. Diet ed/ copy given. MD to monitor. RD available.     Monitoring and Evaluation:   [ x ] follow up per protocol  [ ] other:

## 2024-02-14 ENCOUNTER — TRANSCRIPTION ENCOUNTER (OUTPATIENT)
Age: 59
End: 2024-02-14

## 2024-02-14 VITALS
HEART RATE: 92 BPM | OXYGEN SATURATION: 98 % | DIASTOLIC BLOOD PRESSURE: 89 MMHG | RESPIRATION RATE: 18 BRPM | TEMPERATURE: 99 F | SYSTOLIC BLOOD PRESSURE: 152 MMHG

## 2024-02-14 LAB
ANION GAP SERPL CALC-SCNC: 8 MMOL/L — SIGNIFICANT CHANGE UP (ref 5–17)
BUN SERPL-MCNC: 6 MG/DL — LOW (ref 7–18)
CALCIUM SERPL-MCNC: 9 MG/DL — SIGNIFICANT CHANGE UP (ref 8.4–10.5)
CHLORIDE SERPL-SCNC: 101 MMOL/L — SIGNIFICANT CHANGE UP (ref 96–108)
CO2 SERPL-SCNC: 26 MMOL/L — SIGNIFICANT CHANGE UP (ref 22–31)
CREAT SERPL-MCNC: 0.78 MG/DL — SIGNIFICANT CHANGE UP (ref 0.5–1.3)
EGFR: 88 ML/MIN/1.73M2 — SIGNIFICANT CHANGE UP
GLUCOSE BLDC GLUCOMTR-MCNC: 164 MG/DL — HIGH (ref 70–99)
GLUCOSE BLDC GLUCOMTR-MCNC: 190 MG/DL — HIGH (ref 70–99)
GLUCOSE BLDC GLUCOMTR-MCNC: 203 MG/DL — HIGH (ref 70–99)
GLUCOSE SERPL-MCNC: 204 MG/DL — HIGH (ref 70–99)
HCT VFR BLD CALC: 27.7 % — LOW (ref 34.5–45)
HGB BLD-MCNC: 9.1 G/DL — LOW (ref 11.5–15.5)
MAGNESIUM SERPL-MCNC: 2.1 MG/DL — SIGNIFICANT CHANGE UP (ref 1.6–2.6)
MCHC RBC-ENTMCNC: 26.5 PG — LOW (ref 27–34)
MCHC RBC-ENTMCNC: 32.9 GM/DL — SIGNIFICANT CHANGE UP (ref 32–36)
MCV RBC AUTO: 80.8 FL — SIGNIFICANT CHANGE UP (ref 80–100)
NRBC # BLD: 0 /100 WBCS — SIGNIFICANT CHANGE UP (ref 0–0)
PHOSPHATE SERPL-MCNC: 2.3 MG/DL — LOW (ref 2.5–4.5)
PLATELET # BLD AUTO: 407 K/UL — HIGH (ref 150–400)
POTASSIUM SERPL-MCNC: 3.8 MMOL/L — SIGNIFICANT CHANGE UP (ref 3.5–5.3)
POTASSIUM SERPL-SCNC: 3.8 MMOL/L — SIGNIFICANT CHANGE UP (ref 3.5–5.3)
RBC # BLD: 3.43 M/UL — LOW (ref 3.8–5.2)
RBC # FLD: 14.3 % — SIGNIFICANT CHANGE UP (ref 10.3–14.5)
SODIUM SERPL-SCNC: 135 MMOL/L — SIGNIFICANT CHANGE UP (ref 135–145)
WBC # BLD: 8.38 K/UL — SIGNIFICANT CHANGE UP (ref 3.8–10.5)
WBC # FLD AUTO: 8.38 K/UL — SIGNIFICANT CHANGE UP (ref 3.8–10.5)

## 2024-02-14 PROCEDURE — 83036 HEMOGLOBIN GLYCOSYLATED A1C: CPT

## 2024-02-14 PROCEDURE — 82962 GLUCOSE BLOOD TEST: CPT

## 2024-02-14 PROCEDURE — C1765: CPT

## 2024-02-14 PROCEDURE — 83690 ASSAY OF LIPASE: CPT

## 2024-02-14 PROCEDURE — 84100 ASSAY OF PHOSPHORUS: CPT

## 2024-02-14 PROCEDURE — 81001 URINALYSIS AUTO W/SCOPE: CPT

## 2024-02-14 PROCEDURE — 99285 EMERGENCY DEPT VISIT HI MDM: CPT

## 2024-02-14 PROCEDURE — G1004: CPT

## 2024-02-14 PROCEDURE — 86901 BLOOD TYPING SEROLOGIC RH(D): CPT

## 2024-02-14 PROCEDURE — 81003 URINALYSIS AUTO W/O SCOPE: CPT

## 2024-02-14 PROCEDURE — 96374 THER/PROPH/DIAG INJ IV PUSH: CPT

## 2024-02-14 PROCEDURE — 80053 COMPREHEN METABOLIC PANEL: CPT

## 2024-02-14 PROCEDURE — 36415 COLL VENOUS BLD VENIPUNCTURE: CPT

## 2024-02-14 PROCEDURE — 93005 ELECTROCARDIOGRAM TRACING: CPT

## 2024-02-14 PROCEDURE — 86850 RBC ANTIBODY SCREEN: CPT

## 2024-02-14 PROCEDURE — 74018 RADEX ABDOMEN 1 VIEW: CPT

## 2024-02-14 PROCEDURE — 74177 CT ABD & PELVIS W/CONTRAST: CPT | Mod: MG

## 2024-02-14 PROCEDURE — 71045 X-RAY EXAM CHEST 1 VIEW: CPT

## 2024-02-14 PROCEDURE — 83605 ASSAY OF LACTIC ACID: CPT

## 2024-02-14 PROCEDURE — 86900 BLOOD TYPING SEROLOGIC ABO: CPT

## 2024-02-14 PROCEDURE — 80048 BASIC METABOLIC PNL TOTAL CA: CPT

## 2024-02-14 PROCEDURE — 83735 ASSAY OF MAGNESIUM: CPT

## 2024-02-14 PROCEDURE — 86923 COMPATIBILITY TEST ELECTRIC: CPT

## 2024-02-14 PROCEDURE — 85025 COMPLETE CBC W/AUTO DIFF WBC: CPT

## 2024-02-14 PROCEDURE — 85610 PROTHROMBIN TIME: CPT

## 2024-02-14 PROCEDURE — 85027 COMPLETE CBC AUTOMATED: CPT

## 2024-02-14 PROCEDURE — S2900: CPT

## 2024-02-14 PROCEDURE — 85730 THROMBOPLASTIN TIME PARTIAL: CPT

## 2024-02-14 RX ORDER — POTASSIUM PHOSPHATE, MONOBASIC POTASSIUM PHOSPHATE, DIBASIC 236; 224 MG/ML; MG/ML
15 INJECTION, SOLUTION INTRAVENOUS ONCE
Refills: 0 | Status: COMPLETED | OUTPATIENT
Start: 2024-02-14 | End: 2024-02-14

## 2024-02-14 RX ADMIN — SIMETHICONE 80 MILLIGRAM(S): 80 TABLET, CHEWABLE ORAL at 20:31

## 2024-02-14 RX ADMIN — PANTOPRAZOLE SODIUM 40 MILLIGRAM(S): 20 TABLET, DELAYED RELEASE ORAL at 12:11

## 2024-02-14 RX ADMIN — LIDOCAINE 1 PATCH: 4 CREAM TOPICAL at 18:03

## 2024-02-14 RX ADMIN — LIDOCAINE 1 PATCH: 4 CREAM TOPICAL at 00:43

## 2024-02-14 RX ADMIN — SIMETHICONE 80 MILLIGRAM(S): 80 TABLET, CHEWABLE ORAL at 02:30

## 2024-02-14 RX ADMIN — POTASSIUM PHOSPHATE, MONOBASIC POTASSIUM PHOSPHATE, DIBASIC 62.5 MILLIMOLE(S): 236; 224 INJECTION, SOLUTION INTRAVENOUS at 15:38

## 2024-02-14 RX ADMIN — SIMETHICONE 80 MILLIGRAM(S): 80 TABLET, CHEWABLE ORAL at 12:10

## 2024-02-14 RX ADMIN — SODIUM CHLORIDE 110 MILLILITER(S): 9 INJECTION, SOLUTION INTRAVENOUS at 08:07

## 2024-02-14 RX ADMIN — Medication 100 MILLIGRAM(S): at 15:38

## 2024-02-14 RX ADMIN — Medication 100 MILLIGRAM(S): at 05:12

## 2024-02-14 RX ADMIN — Medication 200 MILLIGRAM(S): at 05:13

## 2024-02-14 RX ADMIN — LIDOCAINE 1 PATCH: 4 CREAM TOPICAL at 15:38

## 2024-02-14 NOTE — DISCHARGE NOTE PROVIDER - NSDCMRMEDTOKEN_GEN_ALL_CORE_FT
atorvastatin 20 mg oral tablet: 1 orally  MetFORMIN (Eqv-Fortamet) 1000 mg oral tablet, extended release: 1 orally 2 times a day  valsartan 160 mg oral capsule: orally

## 2024-02-14 NOTE — DISCHARGE NOTE PROVIDER - CARE PROVIDER_API CALL
Cade Lopezssica  Surgery  [address], [address]  [place] [address]  Phone: [phone]  Fax: [phone]  Follow Up Time: 1 week

## 2024-02-14 NOTE — DISCHARGE NOTE NURSING/CASE MANAGEMENT/SOCIAL WORK - NSDCPEFALRISK_GEN_ALL_CORE
For information on Fall & Injury Prevention, visit: [email or web address] OR  [email or web address] OR  https://www.cdc.gov/steadi/patient.html

## 2024-02-14 NOTE — DISCHARGE NOTE PROVIDER - NSTOBACCOUSAGEY/N_GEN_A_CS
Here for 2 mo well check w/ mom   Doing well   ALL:Reviewed &/or Reconciled.  MEDS: none  PMH:Healthy infant  FH:Reviewed  SH:Lives w/ family  DIET: on formula and breast-milk in bottle, taking 4 oz every 3 hours    DEVELOPMENT:Smiles responsively, regards face, follows past midline, attends to voice, coos, head up 45 degrees, bears wt on legs, grasps & releases. See PDQII    ROS   GEN: Sleeps well, active when awake, not irritable   SKIN:No rash, lesions   HEENT:No eye, ear or nasal d/c, looks at mother while feeding, startles to noise, sucks & swallows well, NL ROM of neck   CHEST:NL breathing, no cough or SOB   CV:no fatigue,or cyanosis    ABD:nl BMs, no vomiting   :nl urination, no blood   MS:Equal movements, no swelling or pain   NEURO:No lethargy or irritability, no spells or abnormal movements    PHYSICAL:NL VS (see nurses note), See Growth Chart   GEN: WD, active, alert, smiles, no distress.   SKIN:No rash/lesions or bruises, no edema or pallor, pink & well perfused   HEAD:NCAT, AF open & flat   EYES:Fixes & follows, EOMI, PERRL, conjunctiva clear, nl red reflex   EARS:Attends to voice, clear canals, nl pinnae & TMs   NOSE:Nares patent, no discharge, straight septum   MOUTH:No mass, MMM, NL gums & palate   NECK:NL ROM, no mass   CHEST:NL chest wall & resp effort, no stridor, clear BBS   CV:RRR, no murmur, NL S1S2,no CCE, nl femoral pulses   ABD:nl BS, ND, soft; no HSM, mass or hernia   : no adhesions or discharge, no mass or hernia, shallow sacral dimple, no oozing   MS:No deformity or swelling, nl ROM, neg Ortolani& Quiroz, NL spine   NEURO:NL tone & strength, no abn movement   LN:No enlarged cervical or inguinal nodes    IMP: Diagnoses and all orders for this visit:    Encounter for routine child health examination without abnormal findings  -     DTaP HiB IPV combined vaccine IM (PENTACEL)  -     Hepatitis B vaccine pediatric / adolescent 3-dose IM  -     Pneumococcal conjugate vaccine 13-valent less  than 6yo IM  -     Rotavirus vaccine pentavalent 3 dose oral    Sacral dimple      PLAN:Imm. counseling done. Individual vaccine components reviewed.Pentacel, PCV, RV, HepB today  PKU WNL, PDQ WNL, Subjec.vision:PASS Subjec.hearing:PASS   Educ. growth, development, & feeds. Safety(back sleep,handwash,tobacco,car, don't overbundle,smoke detec.,bath) Educ. fever/Tylenol. Interpretive conf. conducted.Addressed concerns.     F/U @ 4 mo.& prn     No

## 2024-02-14 NOTE — DISCHARGE NOTE NURSING/CASE MANAGEMENT/SOCIAL WORK - PATIENT PORTAL LINK FT
You can access the FollowMyHealth Patient Portal offered by [facility] by registering at the following website: [email or web address]. By joining [facility]’s FollowMyHealth portal, you will also be able to view your health information using other applications (apps) compatible with our system.

## 2024-02-14 NOTE — DISCHARGE NOTE PROVIDER - NSDCCPTREATMENT_GEN_ALL_CORE_FT
PRINCIPAL PROCEDURE  Procedure: Exploratory laparoscopy with conversion to laparotomy if indicated  Findings and Treatment:       SECONDARY PROCEDURE  Procedure: Lysis, adhesions, abdomen, robot-assisted  Findings and Treatment:     Procedure: Open repair of incisional hernia using component separation technique  Findings and Treatment:

## 2024-02-14 NOTE — PROGRESS NOTE ADULT - ASSESSMENT
59y.o. Female s/p dx lap converted to open, primary repair of incisional hernia POD#1    -OOB ambulate  -Await bowel function  -d/c fowler  -Pain control prn  -DVT ppx  -Incentive spirometry     Hypokalemia  -IV replete    DM  -ISS    This note and its recommendations herein are preliminary until such time as cosigned by an attending.
59y.o. Female with abd pain    -OR 2/6/24  -NPO after midnight except PO meds  -IVF when NPO  -Chlorhexidine wipes  -DVT ppx    This note and its recommendations herein are preliminary until such time as cosigned by an attending.
60 y/o Female with abd pain x 6 months  VSS, afebrile.    Plan   - OR today 2/6/24 for diagnostic laparoscopy  - NPO + IVF  - pain / nausea control prn   - f/u A1C  - consent pending   
A/P:   59y.o. Female s/p dx lap converted to open, primary repair of incisional hernia POD#3. + bowel function however WBC increased to 14.   - OOBTC, ambulate, IS  - Regular diet  - monitor ZEKE output  - Pain control prn  - DVT ppx   - UA, CXR  
59 y.o. Female s/p dx lap converted to open, primary repair of incisional hernia POD#6. + bowel function. WBC down trending patient endorses feeling better   - OOBTC, ambulate, IS  - Regular diet  - monitor ZEKE output  - Pain control prn  - DVT ppx   - Restart Cipro/Flagyl  -monitor NG tube output, will consider d/c and starting on clears if output decreases and distention improves   
59 y.o. Female s/p dx lap converted to open, primary repair of incisional hernia POD#7. + bowel function. WBC down trending patient endorses feeling better   - OOBTC, ambulate, IS  -clear diet  - monitor ZEKE output  - Pain control prn  - DVT ppx   -Cipro/Flagyl  
A/P:   59y.o. Female s/p dx lap converted to open, primary repair of incisional hernia POD#2. No bowel function  - OOBTC, ambulate, IS  - Advance to full liquid  - likely DC ZEKE today  - Await bowel function  - Pain control prn  - DVT ppx 
59 y.o. Female s/p dx lap converted to open, primary repair of incisional hernia POD#8. + bowel function. WBC down trending patient endorses feeling better and tolerating full liquids.    - OOBTC, ambulate, IS  - Full liquid diet  - monitor for more febrile episodes  - monitor ZEKE output  - Pain control prn  - DVT ppx   -Cipro/Flagyl  
A/P:   59y.o. Female s/p dx lap converted to open, primary repair of incisional hernia POD#2. No bowel function  - OOBTC, ambulate, IS  - possible clears today  - likely DC ZEKE today  - Await bowel function  - Pain control prn  - DVT ppx 
A/P:   59y.o. Female s/p dx lap converted to open, primary repair of incisional hernia POD#5. + bowel function however WBC increased to 16 now with fevers, chills.   - OOBTC, ambulate, IS  - Regular diet  - monitor ZEKE output  - Pain control prn  - DVT ppx   - Plan for CT abd/pelvis with IV and PO contrast  - Restart Cipro/Flagyl

## 2024-02-14 NOTE — DISCHARGE NOTE PROVIDER - HOSPITAL COURSE
58 yo female PMHx HTN, DM, to ER c/o periumbilical abd pain and RLQ pain rediating down right thigh, which is present when she walks.  Pt states she has had this pain for months, starting 3 months after her appendix Sx and she layne snot associate this pain with that surgery timing. No change in bm, no n/v, no fever. Pt has seen surgeons in clinic setting about this pain and told there is a possibility of a hernia which is difficult to assess by Ct scan only. Pt saw a GYN recenlty but no dx related to this pain. (pt is post hysterectemy many years ago, pt still has ovaries) Pt states she is comfortable while lying down but cannot live with the pain while walking. Patient was admitted to the surgery service. On HD2, patient was taken to the OR for a diagnostic laparoscopy, was converted to an open procedure after she was found to have several areas of adhesions and an incisional hernia, adhesiolysis was performed and the hernia was repaired using a mesh. Post-operative course complicated by a high grade small bowel obstruction, which was managed conservatively with an NG tube and NPO. Once bowel function returned, her diet was advanced slowly. Patient tolerated a regular diet on post-operative day 8, and was discharged home.

## 2024-02-14 NOTE — PROGRESS NOTE ADULT - SUBJECTIVE AND OBJECTIVE BOX
Patient seen and examined at the bedside during AM rounds. No acute events overnight. Patient was slightly tachy during the day yesterday to max of 114, has now normalized. States she is feeling better this morning, having bowel movements and passing gas. NG tube remains to suction with output of 700 cc of bilious output, ZEKE drain 5 cc of serosaingous fluid. States she has been ambulating.     Vital Signs Last 24 Hrs  T(C): 37.5 (12 Feb 2024 05:35), Max: 37.5 (12 Feb 2024 05:35)  T(F): 99.5 (12 Feb 2024 05:35), Max: 99.5 (12 Feb 2024 05:35)  HR: 96 (12 Feb 2024 05:35) (96 - 114)  BP: 143/87 (12 Feb 2024 05:35) (112/75 - 143/87)  BP(mean): --  RR: 18 (12 Feb 2024 05:35) (18 - 19)  SpO2: 96% (12 Feb 2024 05:35) (96% - 98%)    Parameters below as of 12 Feb 2024 05:35  Patient On (Oxygen Delivery Method): room air    No acute distress, AAOX3   non labored breathin, saturating well on room air  abd is soft, appropriately tender post operatively, distended, no guarding, steri strips C/D/I; ZEKE drain with serosaginous fluid   full ROM X 4   skin well perfused                           8.6    10.11 )-----------( 277      ( 12 Feb 2024 07:00 )             26.0   02-11    132<L>  |  99  |  15  ----------------------------<  251<H>  4.1   |  24  |  1.13    Ca    9.9      11 Feb 2024 06:42  Phos  3.2     02-11  Mg     1.5     02-11    I&O's Detail    11 Feb 2024 07:01  -  12 Feb 2024 07:00  --------------------------------------------------------  IN:  Total IN: 0 mL    OUT:    Bulb (mL): 5 mL    Nasogastric/Oral tube (mL): 700 mL  Total OUT: 705 mL    Total NET: -705 mL          
Patient seen and examined at the bedside. No acute events. Tmax 102 overnight with persistent low grade tachycardia. Pain improving slowly however still with significant lower abdominal pain around incision and RLQ.  Tolerating regular diet.  Denies nausea, vomiting.  Endorses flatus and multiple bowel movements.  Ambulating and urinating appropriately. ZEKE 7.5cc serosanguinous.     Vital Signs Last 24 Hrs  T(C): 36.6 (11 Feb 2024 05:24), Max: 38.9 (10 Feb 2024 20:52)  T(F): 97.9 (11 Feb 2024 05:24), Max: 102 (10 Feb 2024 20:52)  HR: 104 (11 Feb 2024 05:24) (103 - 107)  BP: 123/80 (11 Feb 2024 05:24) (112/73 - 125/79)  BP(mean): --  RR: 19 (11 Feb 2024 05:24) (18 - 19)  SpO2: 96% (11 Feb 2024 05:24) (95% - 97%)    Parameters below as of 11 Feb 2024 05:24  Patient On (Oxygen Delivery Method): room air    Physical:   GA: AAOx3, NAD  CVS: RRR  Pulm: nonlabored  Abd: Soft, non distended, mild incisional tenderness. No guarding no rebound.  Dressing C/D/I. ZEKE drain serosanguinous                          10.2   15.94 )-----------( 320      ( 11 Feb 2024 06:42 )             30.7   02-11    132<L>  |  99  |  15  ----------------------------<  251<H>  4.1   |  24  |  1.13    Ca    9.9      11 Feb 2024 06:42  Phos  3.2     02-11  Mg     1.5     02-11    
Seen and examined at the bedside. No acute events.  Tmax 100.3, otherwise vitals stable. Ambulating better today.  Pain improving. Tolerating clear liquids. Endorses flatus, no BM.     Vital Signs Last 24 Hrs  T(C): 36.8 (09 Feb 2024 05:06), Max: 37.9 (08 Feb 2024 20:29)  T(F): 98.3 (09 Feb 2024 05:06), Max: 100.3 (08 Feb 2024 20:29)  HR: 80 (09 Feb 2024 05:06) (80 - 95)  BP: 151/80 (09 Feb 2024 05:06) (143/84 - 153/84)  BP(mean): --  RR: 17 (09 Feb 2024 05:06) (17 - 17)  SpO2: 96% (09 Feb 2024 05:06) (96% - 96%)    Parameters below as of 09 Feb 2024 05:06  Patient On (Oxygen Delivery Method): room air    Physical:   GA: AAOx3, NAD  CVS: RRR  Pulm: nonlabored  Abd: Soft, non distended, mild incisional tenderness. No guarding no rebound.  Dressing C/D/I. ZEKE drain subcutaneous serosanguinous                          9.3    7.17  )-----------( 239      ( 09 Feb 2024 07:24 )             27.9   02-09    137  |  106  |  4<L>  ----------------------------<  149<H>  3.7   |  23  |  0.66    Ca    8.6      09 Feb 2024 07:24  Phos  2.5     02-09  Mg     1.9     02-09    
INTERVAL HPI/OVERNIGHT EVENTS:  Pt resting comfortably. Mild epigastric pain.  NPO.  Feeling hungry.  -flatus/BM.   Denies N/V    MEDICATIONS  (STANDING):  acetaminophen   IVPB .. 1000 milliGRAM(s) IV Intermittent once  acetaminophen   IVPB .. 1000 milliGRAM(s) IV Intermittent once  ciprofloxacin   IVPB 400 milliGRAM(s) IV Intermittent every 12 hours  dextrose 5%. 1000 milliLiter(s) (100 mL/Hr) IV Continuous <Continuous>  dextrose 5%. 1000 milliLiter(s) (50 mL/Hr) IV Continuous <Continuous>  dextrose 50% Injectable 25 Gram(s) IV Push once  dextrose 50% Injectable 25 Gram(s) IV Push once  dextrose 50% Injectable 12.5 Gram(s) IV Push once  glucagon  Injectable 1 milliGRAM(s) IntraMuscular once  influenza   Vaccine 0.5 milliLiter(s) IntraMuscular once  insulin lispro (ADMELOG) corrective regimen sliding scale   SubCutaneous Before meals and at bedtime  lactated ringers. 1000 milliLiter(s) (100 mL/Hr) IV Continuous <Continuous>  metroNIDAZOLE  IVPB 500 milliGRAM(s) IV Intermittent every 8 hours  pantoprazole  Injectable 40 milliGRAM(s) IV Push daily    MEDICATIONS  (PRN):  dextrose Oral Gel 15 Gram(s) Oral once PRN Blood Glucose LESS THAN 70 milliGRAM(s)/deciliter  morphine  - Injectable 2 milliGRAM(s) IV Push every 4 hours PRN Severe Pain (7 - 10)    Vital Signs Last 24 Hrs  T(C): 36.7 (07 Feb 2024 05:40), Max: 36.7 (06 Feb 2024 18:20)  T(F): 98.1 (07 Feb 2024 05:40), Max: 98.1 (06 Feb 2024 18:20)  HR: 86 (07 Feb 2024 05:40) (81 - 105)  BP: 106/72 (07 Feb 2024 05:40) (106/72 - 152/90)  BP(mean): 90 (06 Feb 2024 20:20) (90 - 97)  RR: 18 (07 Feb 2024 05:40) (12 - 22)  SpO2: 99% (07 Feb 2024 05:40) (93% - 99%)    Parameters below as of 07 Feb 2024 05:40  Patient On (Oxygen Delivery Method): room air    Physical:  General: A&Ox3. NAD.  Abdomen: Soft nondistended, nontender.    I&O's Detail    06 Feb 2024 07:01  -  07 Feb 2024 07:00  --------------------------------------------------------  IN:  Total IN: 0 mL    OUT:    Bulb (mL): 10 mL serosanguinous    Indwelling Catheter - Urethral (mL): 1150 mL clear yellow    Open/Penrose (mL): 5 mL  Total OUT: 1165 mL    Total NET: -1165 mL    LABS:                        9.5    10.94 )-----------( 251      ( 07 Feb 2024 06:00 )             28.5             02-07    141  |  107  |  12  ----------------------------<  167<H>  3.3<L>   |  26  |  0.77    Ca    8.4      07 Feb 2024 06:00  Phos  4.1     02-07  Mg     1.4     02-07    TPro  8.1  /  Alb  3.7  /  TBili  0.4  /  DBili  x   /  AST  13  /  ALT  25  /  AlkPhos  90  02-05    
INTERVAL HPI/OVERNIGHT EVENTS:  Pt resting comfortably. No acute complaints. Admits to continued lower abd pain radiating to R thigh. States pain resolved when seated/laying down. Able to ambulate. +F/+BM. Denies nausea, vomiting, fever, chills.     MEDICATIONS  (STANDING):  atorvastatin 20 milliGRAM(s) Oral at bedtime  dextrose 5%. 1000 milliLiter(s) (50 mL/Hr) IV Continuous <Continuous>  dextrose 5%. 1000 milliLiter(s) (100 mL/Hr) IV Continuous <Continuous>  dextrose 50% Injectable 12.5 Gram(s) IV Push once  dextrose 50% Injectable 25 Gram(s) IV Push once  dextrose 50% Injectable 25 Gram(s) IV Push once  glucagon  Injectable 1 milliGRAM(s) IntraMuscular once  heparin   Injectable 5000 Unit(s) SubCutaneous every 8 hours  influenza   Vaccine 0.5 milliLiter(s) IntraMuscular once  insulin lispro (ADMELOG) corrective regimen sliding scale   SubCutaneous three times a day before meals  insulin lispro (ADMELOG) corrective regimen sliding scale   SubCutaneous at bedtime  melatonin 5 milliGRAM(s) Oral at bedtime  sodium chloride 0.9%. 1000 milliLiter(s) (100 mL/Hr) IV Continuous <Continuous>  valsartan 160 milliGRAM(s) Oral daily    MEDICATIONS  (PRN):  acetaminophen   IVPB .. 1000 milliGRAM(s) IV Intermittent every 8 hours PRN Mild Pain (1 - 3), Moderate Pain (4 - 6)  dextrose Oral Gel 15 Gram(s) Oral once PRN Blood Glucose LESS THAN 70 milliGRAM(s)/deciliter      Vital Signs Last 24 Hrs  T(C): 36.5 (06 Feb 2024 05:06), Max: 36.8 (05 Feb 2024 11:23)  T(F): 97.7 (06 Feb 2024 05:06), Max: 98.2 (05 Feb 2024 11:23)  HR: 69 (06 Feb 2024 05:06) (65 - 70)  BP: 114/75 (06 Feb 2024 05:06) (114/75 - 162/87)  BP(mean): 88 (06 Feb 2024 05:06) (88 - 104)  RR: 17 (06 Feb 2024 05:06) (16 - 18)  SpO2: 96% (06 Feb 2024 05:06) (96% - 99%)    Parameters below as of 06 Feb 2024 05:06  Patient On (Oxygen Delivery Method): room air        Physical:  General: A&Ox3. NAD.  Resp: Unlabored breathing. Equal chest rise bilaterally.   Abdomen: Soft nondistended, minimal tenderness in lower abdominal quadrants. No voluntary guarding, no rebound tenderness, non peritonitic. No palpable masses.   Extremities: no calf tenderness.     I&O's Detail    05 Feb 2024 07:01  -  06 Feb 2024 07:00  --------------------------------------------------------  IN:    sodium chloride 0.9%: 1200 mL  Total IN: 1200 mL    OUT:  Total OUT: 0 mL    Total NET: 1200 mL          LABS:                        11.4   8.11  )-----------( 282      ( 05 Feb 2024 08:58 )             34.7             02-05    138  |  99  |  16  ----------------------------<  286<H>  3.5   |  32<H>  |  0.95    Ca    9.7      05 Feb 2024 08:58  Mg     1.4     02-05    TPro  8.1  /  Alb  3.7  /  TBili  0.4  /  DBili  x   /  AST  13  /  ALT  25  /  AlkPhos  90  02-05      59y.o. Female
Patient seen and examined at the bedside during AM rounds. No acute events overnight. Patient was hemodynamically stable, had an isolated spike to 100.5. Pain well controlled. Has been ambulating. Pt tolerating full liquids. Patient continues to have bowel movements and passing flatus. States she feels well.     Vital Signs Last 24 Hrs  T(C): 37.7 (14 Feb 2024 05:18), Max: 38.1 (14 Feb 2024 00:54)  T(F): 99.9 (14 Feb 2024 05:18), Max: 100.5 (14 Feb 2024 00:54)  HR: 74 (14 Feb 2024 05:18) (74 - 107)  BP: 161/94 (14 Feb 2024 05:18) (136/65 - 161/94)  BP(mean): --  RR: 17 (14 Feb 2024 05:18) (17 - 18)  SpO2: 96% (14 Feb 2024 05:18) (93% - 97%)    Parameters below as of 14 Feb 2024 05:18  Patient On (Oxygen Delivery Method): room air    I&O's Detail    13 Feb 2024 07:01  -  14 Feb 2024 07:00  --------------------------------------------------------  IN:    Lactated Ringers: 1430 mL  Total IN: 1430 mL    OUT:    Bulb (mL): 2.5 mL  Total OUT: 2.5 mL    Total NET: 1427.5 mL      No acute distress, AAOX3   non labored breathin, saturating well on room air  abd is soft, appropriately tender post operatively, nondistended, no guarding, steri strips C/D/I; ZEKE drain with serous fluid   full ROM X 4  skin well perfused                           9.1    8.38  )-----------( 407      ( 14 Feb 2024 06:50 )             27.7   02-14    135  |  101  |  6<L>  ----------------------------<  204<H>  3.8   |  26  |  0.78    Ca    9.0      14 Feb 2024 06:50  Phos  2.3     02-14  Mg     2.1     02-14    TPro  7.3  /  Alb  2.6<L>  /  TBili  0.6  /  DBili  x   /  AST  32  /  ALT  80<H>  /  AlkPhos  117  02-13            
Patient seen and examined at the bedside. No acute events. Afebrile, vitals stable. Pain improving slowly however still with significant lower abdominal pain.  Remains NPO, however endorses hunger.  Denies nausea, vomiting.  Denies stool, flatus.  Ambulating and urinating appropriately. ZEKE 20cc serosanguinous.     Vital Signs Last 24 Hrs  T(C): 37.1 (08 Feb 2024 05:29), Max: 37.8 (07 Feb 2024 21:18)  T(F): 98.8 (08 Feb 2024 05:29), Max: 100.1 (07 Feb 2024 21:18)  HR: 90 (08 Feb 2024 05:29) (84 - 90)  BP: 138/77 (08 Feb 2024 05:29) (109/71 - 138/77)  BP(mean): --  RR: 17 (08 Feb 2024 05:29) (17 - 18)  SpO2: 91% (08 Feb 2024 05:29) (91% - 96%)    Parameters below as of 08 Feb 2024 05:29  Patient On (Oxygen Delivery Method): room air    Physical:  GA: AAOx3, NAD  Abd: Soft, non distended, mild incisional tenderness. No guarding no rebound.  Dressing C/D/I. ZEKE drain subcutaneous serosanguinous                          8.8    8.90  )-----------( 229      ( 08 Feb 2024 05:27 )             26.8   02-08    140  |  107  |  7   ----------------------------<  190<H>  3.2<L>   |  27  |  0.73    Ca    8.2<L>      08 Feb 2024 05:27  Phos  0.9     02-08  Mg     1.8     02-08    
Surgery Pre-op    Dx: Abd pain  Procedure: Dx lap, poss hernia repair    Vital Signs Last 24 Hrs  T(C): 36.6 (2024 16:45), Max: 36.8 (2024 11:23)  T(F): 97.9 (2024 16:45), Max: 98.2 (2024 11:23)  HR: 70 (2024 16:45) (65 - 94)  BP: 145/84 (2024 16:45) (145/84 - 162/87)  BP(mean): 104 (2024 16:45) (104 - 104)  RR: 18 (2024 16:45) (16 - 18)  SpO2: 97% (2024 16:45) (97% - 99%)    Parameters below as of 2024 16:45  Patient On (Oxygen Delivery Method): room air    LABS:                        11.4   8.11  )-----------( 282      ( 2024 08:58 )             34.7              02-05    138  |  99  |  16  ----------------------------<  286<H>  3.5   |  32<H>  |  0.95    Ca    9.7      2024 08:58  Mg     1.4     02-05    TPro  8.1  /  Alb  3.7  /  TBili  0.4  /  DBili  x   /  AST  13  /  ALT  25  /  AlkPhos  90  02-05    PT/INR - ( 2024 11:35 )   PT: 10.5 sec;   INR: 0.92 ratio         PTT - ( 2024 11:35 )  PTT:29.1 sec  Urinalysis Basic - ( 2024 10:05 )    Color: Yellow / Appearance: Clear / S.017 / pH: x  Gluc: x / Ketone: Negative mg/dL  / Bili: Negative / Urobili: 0.2 mg/dL   Blood: x / Protein: Negative mg/dL / Nitrite: Negative   Leuk Esterase: Negative / RBC: x / WBC x   Sq Epi: x / Non Sq Epi: x / Bacteria: x    RADIOLOGY & ADDITIONAL STUDIES:  < from: CT Abdomen and Pelvis w/ IV Cont (24 @ 09:52) >  FINDINGS:  LOWER CHEST: Clear lung bases.    LIVER: Within normal limits.  BILE DUCTS: Normal caliber.  GALLBLADDER: Within normal limits.  SPLEEN: Within normal limits.  PANCREAS: Within normal limits.  ADRENALS: Within normal limits.  KIDNEYS/URETERS: Cysts and too small to further characterize   hypodensities. No hydronephrosis.    BLADDER: Within normal limits.  REPRODUCTIVE ORGANS: Supracervical hysterectomy.    BOWEL: No bowel obstruction. Appendix is surgically absent.  PERITONEUM: No ascites.  VESSELS:Mild atherosclerotic changes.  RETROPERITONEUM/LYMPH NODES: No lymphadenopathy.  ABDOMINAL WALL: Postsurgical changes.  BONES: Mild degenerative changes.      < end of copied text >    
Patient seen and examined at the bedside during AM rounds. No acute events overnight. Patient was hemodynamically stable. Pain well controlled. Has been ambulating. NG tube removed yesterday evening, patient started on clears but only had sips of water throughout the night. Patient continues to have bowel movements and passing flatus. States she feels well.     Vital Signs Last 24 Hrs  T(C): 36.9 (13 Feb 2024 06:02), Max: 36.9 (13 Feb 2024 06:02)  T(F): 98.4 (13 Feb 2024 06:02), Max: 98.4 (13 Feb 2024 06:02)  HR: 94 (13 Feb 2024 06:02) (94 - 96)  BP: 143/79 (13 Feb 2024 06:02) (135/86 - 143/79)  BP(mean): --  RR: 17 (13 Feb 2024 06:02) (17 - 18)  SpO2: 95% (13 Feb 2024 06:02) (95% - 99%)    Parameters below as of 13 Feb 2024 06:02  Patient On (Oxygen Delivery Method): room air  No acute distress, AAOX3   non labored breathin, saturating well on room air  abd is soft, appropriately tender post operatively, distended, no guarding, steri strips C/D/I; ZEKE drain with serosaginous fluid   full ROM X 4   skin well perfused                           8.6    10.11 )-----------( 277      ( 12 Feb 2024 07:00 )             26.0   02-12    134<L>  |  101  |  12  ----------------------------<  216<H>  3.6   |  24  |  0.96    Ca    9.0      12 Feb 2024 07:00  Phos  2.9     02-12  Mg     1.6     02-12    I&O's Detail    11 Feb 2024 07:01  -  12 Feb 2024 07:00  --------------------------------------------------------  IN:  Total IN: 0 mL    OUT:    Bulb (mL): 5 mL    Nasogastric/Oral tube (mL): 700 mL  Total OUT: 705 mL    Total NET: -705 mL      12 Feb 2024 07:01  -  13 Feb 2024 06:56  --------------------------------------------------------  IN:  Total IN: 0 mL    OUT:    Bulb (mL): 3.5 mL          
Patient seen and examined at the bedside. No acute events. Afebrile, vitals stable. Pain improving slowly however still with significant lower abdominal pain.  Tolerating regular diet.  Denies nausea, vomiting.  Endorses flatus and multiple bowel movements.  Ambulating and urinating appropriately. ZEKE 10cc serosanguinous.     Vital Signs Last 24 Hrs  T(C): 37.7 (10 Feb 2024 05:57), Max: 37.7 (10 Feb 2024 05:57)  T(F): 99.9 (10 Feb 2024 05:57), Max: 99.9 (10 Feb 2024 05:57)  HR: 100 (10 Feb 2024 05:57) (88 - 100)  BP: 115/76 (10 Feb 2024 05:57) (115/76 - 153/87)  BP(mean): 89 (10 Feb 2024 05:57) (89 - 89)  RR: 18 (10 Feb 2024 05:57) (18 - 18)  SpO2: 97% (10 Feb 2024 05:57) (95% - 97%)    Parameters below as of 10 Feb 2024 05:57  Patient On (Oxygen Delivery Method): room air    Physical:   GA: AAOx3, NAD  CVS: RRR  Pulm: nonlabored  Abd: Soft, non distended, mild incisional tenderness. No guarding no rebound.  Dressing C/D/I. ZEKE drain serosanguinous                          11.2   14.10 )-----------( x        ( 10 Feb 2024 07:37 )             33.6   02-10    134<L>  |  101  |  12  ----------------------------<  220<H>  4.1   |  23  |  0.98    Ca    9.5      10 Feb 2024 07:37  Phos  3.1     02-10  Mg     1.6     02-10

## 2024-02-14 NOTE — DISCHARGE NOTE PROVIDER - NSDCFUADDINST_GEN_ALL_CORE_FT
Maintain a low fiber diet until told otherwise by your surgeon. Eat smaller meals more frequently to aid in your tolerance.    You may shower with soap and water however avoid scrubbing, bathtubs, pools or hot tubs for 2 weeks.    Avoid heavy lifting for 4 weeks after your surgery (anything over 10 pounds).    Be sure to walk frequently even at home to promote bowel health.    For pain, alternate Tylenol 650mg and Ibuprofen 400mg every 6 hours as needed.

## 2024-02-14 NOTE — DISCHARGE NOTE PROVIDER - ATTENDING DISCHARGE PHYSICAL EXAMINATION:
Pt seen and examined. Denies abdominal pain. Tolerating soft diet. Passing flatus and having multiple BMs. Anxious to go home.  Abd- soft, NT ND. Min incisional tenderness. No guarding no rebound. Incision C/D/I  Stable for discharge to home. Discussed activity restrictions with her and dietary instructions, low residue diet. Discussed signs and symptoms to call for and she expressed understanding. F/u in clinic in 1-2 weeks.

## 2024-02-14 NOTE — DISCHARGE NOTE PROVIDER - NSDCFUSCHEDAPPT_GEN_ALL_CORE_FT
Tino Cortes  [facility]  GENSURG [address] [facility]  Scheduled Appointment: 02/15/2024    Vivian Sol  [facility]  OBGYNGEN OP [id number]  Scheduled Appointment: 03/01/2024     Vivian Slo  [facility]  OBGYNGEN OP [id number] [facility]  Scheduled Appointment: 03/01/2024

## 2024-02-15 ENCOUNTER — APPOINTMENT (OUTPATIENT)
Dept: SURGERY | Facility: CLINIC | Age: 59
End: 2024-02-15

## 2024-02-18 ENCOUNTER — TRANSCRIPTION ENCOUNTER (OUTPATIENT)
Age: 59
End: 2024-02-18

## 2024-02-19 ENCOUNTER — INPATIENT (INPATIENT)
Facility: HOSPITAL | Age: 59
LOS: 17 days | Discharge: HOME HEALTH | DRG: 856 | End: 2024-03-08
Attending: SURGERY | Admitting: SURGERY
Payer: MEDICAID

## 2024-02-19 ENCOUNTER — RESULT REVIEW (OUTPATIENT)
Age: 59
End: 2024-02-19

## 2024-02-19 ENCOUNTER — TRANSCRIPTION ENCOUNTER (OUTPATIENT)
Age: 59
End: 2024-02-19

## 2024-02-19 VITALS
OXYGEN SATURATION: 97 % | WEIGHT: 147.71 LBS | HEART RATE: 133 BPM | RESPIRATION RATE: 18 BRPM | DIASTOLIC BLOOD PRESSURE: 80 MMHG | TEMPERATURE: 100 F | HEIGHT: 64 IN | SYSTOLIC BLOOD PRESSURE: 143 MMHG

## 2024-02-19 DIAGNOSIS — K63.1 PERFORATION OF INTESTINE (NONTRAUMATIC): ICD-10-CM

## 2024-02-19 DIAGNOSIS — Z90.710 ACQUIRED ABSENCE OF BOTH CERVIX AND UTERUS: Chronic | ICD-10-CM

## 2024-02-19 LAB
ALBUMIN SERPL ELPH-MCNC: 2.7 G/DL — LOW (ref 3.5–5)
ALP SERPL-CCNC: 138 U/L — HIGH (ref 40–120)
ALT FLD-CCNC: 29 U/L DA — SIGNIFICANT CHANGE UP (ref 10–60)
ANION GAP SERPL CALC-SCNC: 9 MMOL/L — SIGNIFICANT CHANGE UP (ref 5–17)
APPEARANCE UR: ABNORMAL
APTT BLD: 20.9 SEC — LOW (ref 24.5–35.6)
AST SERPL-CCNC: 22 U/L — SIGNIFICANT CHANGE UP (ref 10–40)
BASOPHILS # BLD AUTO: 0.09 K/UL — SIGNIFICANT CHANGE UP (ref 0–0.2)
BASOPHILS NFR BLD AUTO: 0.5 % — SIGNIFICANT CHANGE UP (ref 0–2)
BILIRUB SERPL-MCNC: 0.6 MG/DL — SIGNIFICANT CHANGE UP (ref 0.2–1.2)
BILIRUB UR-MCNC: NEGATIVE — SIGNIFICANT CHANGE UP
BLD GP AB SCN SERPL QL: SIGNIFICANT CHANGE UP
BUN SERPL-MCNC: 37 MG/DL — HIGH (ref 7–18)
CALCIUM SERPL-MCNC: 9.9 MG/DL — SIGNIFICANT CHANGE UP (ref 8.4–10.5)
CHLORIDE SERPL-SCNC: 94 MMOL/L — LOW (ref 96–108)
CO2 SERPL-SCNC: 21 MMOL/L — LOW (ref 22–31)
COLOR SPEC: YELLOW — SIGNIFICANT CHANGE UP
CREAT SERPL-MCNC: 1.99 MG/DL — HIGH (ref 0.5–1.3)
DIFF PNL FLD: ABNORMAL
EGFR: 29 ML/MIN/1.73M2 — LOW
EOSINOPHIL # BLD AUTO: 0.18 K/UL — SIGNIFICANT CHANGE UP (ref 0–0.5)
EOSINOPHIL NFR BLD AUTO: 1.1 % — SIGNIFICANT CHANGE UP (ref 0–6)
FLUAV AG NPH QL: SIGNIFICANT CHANGE UP
FLUBV AG NPH QL: SIGNIFICANT CHANGE UP
GLUCOSE BLDC GLUCOMTR-MCNC: 176 MG/DL — HIGH (ref 70–99)
GLUCOSE BLDC GLUCOMTR-MCNC: 183 MG/DL — HIGH (ref 70–99)
GLUCOSE BLDC GLUCOMTR-MCNC: 224 MG/DL — HIGH (ref 70–99)
GLUCOSE SERPL-MCNC: 349 MG/DL — HIGH (ref 70–99)
GLUCOSE UR QL: 250 MG/DL
HCT VFR BLD CALC: 29.4 % — LOW (ref 34.5–45)
HGB BLD-MCNC: 10 G/DL — LOW (ref 11.5–15.5)
HIV 1 & 2 AB SERPL IA.RAPID: SIGNIFICANT CHANGE UP
IMM GRANULOCYTES NFR BLD AUTO: 0.6 % — SIGNIFICANT CHANGE UP (ref 0–0.9)
INR BLD: 1.14 RATIO — SIGNIFICANT CHANGE UP (ref 0.85–1.18)
KETONES UR-MCNC: NEGATIVE MG/DL — SIGNIFICANT CHANGE UP
LACTATE SERPL-SCNC: 1.8 MMOL/L — SIGNIFICANT CHANGE UP (ref 0.7–2)
LACTATE SERPL-SCNC: 2.9 MMOL/L — HIGH (ref 0.7–2)
LEUKOCYTE ESTERASE UR-ACNC: NEGATIVE — SIGNIFICANT CHANGE UP
LYMPHOCYTES # BLD AUTO: 1.18 K/UL — SIGNIFICANT CHANGE UP (ref 1–3.3)
LYMPHOCYTES # BLD AUTO: 6.9 % — LOW (ref 13–44)
MCHC RBC-ENTMCNC: 27.2 PG — SIGNIFICANT CHANGE UP (ref 27–34)
MCHC RBC-ENTMCNC: 34 GM/DL — SIGNIFICANT CHANGE UP (ref 32–36)
MCV RBC AUTO: 80.1 FL — SIGNIFICANT CHANGE UP (ref 80–100)
MONOCYTES # BLD AUTO: 0.61 K/UL — SIGNIFICANT CHANGE UP (ref 0–0.9)
MONOCYTES NFR BLD AUTO: 3.6 % — SIGNIFICANT CHANGE UP (ref 2–14)
NEUTROPHILS # BLD AUTO: 14.84 K/UL — HIGH (ref 1.8–7.4)
NEUTROPHILS NFR BLD AUTO: 87.3 % — HIGH (ref 43–77)
NITRITE UR-MCNC: NEGATIVE — SIGNIFICANT CHANGE UP
NRBC # BLD: 0 /100 WBCS — SIGNIFICANT CHANGE UP (ref 0–0)
PH UR: 5 — SIGNIFICANT CHANGE UP (ref 5–8)
PLATELET # BLD AUTO: 646 K/UL — HIGH (ref 150–400)
POTASSIUM SERPL-MCNC: 5.3 MMOL/L — SIGNIFICANT CHANGE UP (ref 3.5–5.3)
POTASSIUM SERPL-SCNC: 5.3 MMOL/L — SIGNIFICANT CHANGE UP (ref 3.5–5.3)
PROT SERPL-MCNC: 8.4 G/DL — HIGH (ref 6–8.3)
PROT UR-MCNC: 30 MG/DL
PROTHROM AB SERPL-ACNC: 12.9 SEC — SIGNIFICANT CHANGE UP (ref 9.5–13)
RBC # BLD: 3.67 M/UL — LOW (ref 3.8–5.2)
RBC # FLD: 14.1 % — SIGNIFICANT CHANGE UP (ref 10.3–14.5)
SARS-COV-2 RNA SPEC QL NAA+PROBE: SIGNIFICANT CHANGE UP
SODIUM SERPL-SCNC: 124 MMOL/L — LOW (ref 135–145)
SP GR SPEC: 1.01 — SIGNIFICANT CHANGE UP (ref 1–1.03)
TROPONIN I, HIGH SENSITIVITY RESULT: <3 NG/L — SIGNIFICANT CHANGE UP
UROBILINOGEN FLD QL: 0.2 MG/DL — SIGNIFICANT CHANGE UP (ref 0.2–1)
WBC # BLD: 17 K/UL — HIGH (ref 3.8–10.5)
WBC # FLD AUTO: 17 K/UL — HIGH (ref 3.8–10.5)

## 2024-02-19 PROCEDURE — 88307 TISSUE EXAM BY PATHOLOGIST: CPT | Mod: 26

## 2024-02-19 PROCEDURE — 74176 CT ABD & PELVIS W/O CONTRAST: CPT | Mod: 26,MA

## 2024-02-19 PROCEDURE — 44120 REMOVAL OF SMALL INTESTINE: CPT | Mod: 78

## 2024-02-19 PROCEDURE — 71045 X-RAY EXAM CHEST 1 VIEW: CPT | Mod: 26

## 2024-02-19 PROCEDURE — 99285 EMERGENCY DEPT VISIT HI MDM: CPT

## 2024-02-19 DEVICE — STAPLER COVIDIEN TRI-STAPLE 45MM PURPLE RELOAD: Type: IMPLANTABLE DEVICE | Status: FUNCTIONAL

## 2024-02-19 DEVICE — STAPLER COVIDIEN ENDO GIA 80-3.8MM BLUE RELOAD: Type: IMPLANTABLE DEVICE | Status: FUNCTIONAL

## 2024-02-19 DEVICE — STAPLER COVIDIEN ENDO GIA 80-3.8MM BLUE: Type: IMPLANTABLE DEVICE | Status: FUNCTIONAL

## 2024-02-19 RX ORDER — HYDROMORPHONE HYDROCHLORIDE 2 MG/ML
0.5 INJECTION INTRAMUSCULAR; INTRAVENOUS; SUBCUTANEOUS ONCE
Refills: 0 | Status: DISCONTINUED | OUTPATIENT
Start: 2024-02-19 | End: 2024-02-19

## 2024-02-19 RX ORDER — SODIUM CHLORIDE 9 MG/ML
2100 INJECTION INTRAMUSCULAR; INTRAVENOUS; SUBCUTANEOUS ONCE
Refills: 0 | Status: COMPLETED | OUTPATIENT
Start: 2024-02-19 | End: 2024-02-19

## 2024-02-19 RX ORDER — CEFEPIME 1 G/1
INJECTION, POWDER, FOR SOLUTION INTRAMUSCULAR; INTRAVENOUS
Refills: 0 | Status: COMPLETED | OUTPATIENT
Start: 2024-02-19 | End: 2024-02-19

## 2024-02-19 RX ORDER — SODIUM CHLORIDE 9 MG/ML
1000 INJECTION, SOLUTION INTRAVENOUS
Refills: 0 | Status: DISCONTINUED | OUTPATIENT
Start: 2024-02-19 | End: 2024-02-19

## 2024-02-19 RX ORDER — DEXTROSE 50 % IN WATER 50 %
25 SYRINGE (ML) INTRAVENOUS ONCE
Refills: 0 | Status: DISCONTINUED | OUTPATIENT
Start: 2024-02-19 | End: 2024-02-20

## 2024-02-19 RX ORDER — METRONIDAZOLE 500 MG
500 TABLET ORAL ONCE
Refills: 0 | Status: COMPLETED | OUTPATIENT
Start: 2024-02-19 | End: 2024-02-19

## 2024-02-19 RX ORDER — DEXTROSE MONOHYDRATE, SODIUM CHLORIDE, AND POTASSIUM CHLORIDE 50; .745; 4.5 G/1000ML; G/1000ML; G/1000ML
1000 INJECTION, SOLUTION INTRAVENOUS
Refills: 0 | Status: DISCONTINUED | OUTPATIENT
Start: 2024-02-19 | End: 2024-02-20

## 2024-02-19 RX ORDER — VANCOMYCIN HCL 1 G
1000 VIAL (EA) INTRAVENOUS EVERY 12 HOURS
Refills: 0 | Status: DISCONTINUED | OUTPATIENT
Start: 2024-02-19 | End: 2024-02-20

## 2024-02-19 RX ORDER — INSULIN LISPRO 100/ML
VIAL (ML) SUBCUTANEOUS EVERY 6 HOURS
Refills: 0 | Status: DISCONTINUED | OUTPATIENT
Start: 2024-02-19 | End: 2024-02-23

## 2024-02-19 RX ORDER — PIPERACILLIN AND TAZOBACTAM 4; .5 G/20ML; G/20ML
3.38 INJECTION, POWDER, LYOPHILIZED, FOR SOLUTION INTRAVENOUS EVERY 8 HOURS
Refills: 0 | Status: DISCONTINUED | OUTPATIENT
Start: 2024-02-20 | End: 2024-02-26

## 2024-02-19 RX ORDER — DIATRIZOATE MEGLUMINE 180 MG/ML
30 INJECTION, SOLUTION INTRAVESICAL ONCE
Refills: 0 | Status: COMPLETED | OUTPATIENT
Start: 2024-02-19 | End: 2024-02-19

## 2024-02-19 RX ORDER — SODIUM CHLORIDE 9 MG/ML
1000 INJECTION, SOLUTION INTRAVENOUS
Refills: 0 | Status: DISCONTINUED | OUTPATIENT
Start: 2024-02-19 | End: 2024-02-20

## 2024-02-19 RX ORDER — PIPERACILLIN AND TAZOBACTAM 4; .5 G/20ML; G/20ML
3.38 INJECTION, POWDER, LYOPHILIZED, FOR SOLUTION INTRAVENOUS ONCE
Refills: 0 | Status: COMPLETED | OUTPATIENT
Start: 2024-02-20 | End: 2024-02-20

## 2024-02-19 RX ORDER — PIPERACILLIN AND TAZOBACTAM 4; .5 G/20ML; G/20ML
3.38 INJECTION, POWDER, LYOPHILIZED, FOR SOLUTION INTRAVENOUS ONCE
Refills: 0 | Status: COMPLETED | OUTPATIENT
Start: 2024-02-19 | End: 2024-02-19

## 2024-02-19 RX ORDER — VANCOMYCIN HCL 1 G
1000 VIAL (EA) INTRAVENOUS ONCE
Refills: 0 | Status: COMPLETED | OUTPATIENT
Start: 2024-02-19 | End: 2024-02-19

## 2024-02-19 RX ORDER — HYDROMORPHONE HYDROCHLORIDE 2 MG/ML
0.5 INJECTION INTRAMUSCULAR; INTRAVENOUS; SUBCUTANEOUS
Refills: 0 | Status: DISCONTINUED | OUTPATIENT
Start: 2024-02-19 | End: 2024-02-20

## 2024-02-19 RX ORDER — HEPARIN SODIUM 5000 [USP'U]/ML
5000 INJECTION INTRAVENOUS; SUBCUTANEOUS EVERY 8 HOURS
Refills: 0 | Status: DISCONTINUED | OUTPATIENT
Start: 2024-02-19 | End: 2024-02-20

## 2024-02-19 RX ORDER — ACETAMINOPHEN 500 MG
1000 TABLET ORAL EVERY 6 HOURS
Refills: 0 | Status: COMPLETED | OUTPATIENT
Start: 2024-02-19 | End: 2024-02-20

## 2024-02-19 RX ORDER — ONDANSETRON 8 MG/1
4 TABLET, FILM COATED ORAL ONCE
Refills: 0 | Status: DISCONTINUED | OUTPATIENT
Start: 2024-02-19 | End: 2024-02-20

## 2024-02-19 RX ORDER — CEFEPIME 1 G/1
2000 INJECTION, POWDER, FOR SOLUTION INTRAMUSCULAR; INTRAVENOUS ONCE
Refills: 0 | Status: COMPLETED | OUTPATIENT
Start: 2024-02-19 | End: 2024-02-19

## 2024-02-19 RX ORDER — DEXTROSE 50 % IN WATER 50 %
15 SYRINGE (ML) INTRAVENOUS ONCE
Refills: 0 | Status: DISCONTINUED | OUTPATIENT
Start: 2024-02-19 | End: 2024-02-20

## 2024-02-19 RX ORDER — SODIUM CHLORIDE 9 MG/ML
1000 INJECTION INTRAMUSCULAR; INTRAVENOUS; SUBCUTANEOUS ONCE
Refills: 0 | Status: COMPLETED | OUTPATIENT
Start: 2024-02-19 | End: 2024-02-19

## 2024-02-19 RX ORDER — GLUCAGON INJECTION, SOLUTION 0.5 MG/.1ML
1 INJECTION, SOLUTION SUBCUTANEOUS ONCE
Refills: 0 | Status: DISCONTINUED | OUTPATIENT
Start: 2024-02-19 | End: 2024-02-20

## 2024-02-19 RX ORDER — MORPHINE SULFATE 50 MG/1
4 CAPSULE, EXTENDED RELEASE ORAL ONCE
Refills: 0 | Status: DISCONTINUED | OUTPATIENT
Start: 2024-02-19 | End: 2024-02-19

## 2024-02-19 RX ORDER — VANCOMYCIN HCL 1 G
VIAL (EA) INTRAVENOUS
Refills: 0 | Status: DISCONTINUED | OUTPATIENT
Start: 2024-02-19 | End: 2024-02-20

## 2024-02-19 RX ORDER — SODIUM CHLORIDE 9 MG/ML
1000 INJECTION INTRAMUSCULAR; INTRAVENOUS; SUBCUTANEOUS
Refills: 0 | Status: DISCONTINUED | OUTPATIENT
Start: 2024-02-19 | End: 2024-02-19

## 2024-02-19 RX ORDER — ACETAMINOPHEN 500 MG
650 TABLET ORAL ONCE
Refills: 0 | Status: COMPLETED | OUTPATIENT
Start: 2024-02-19 | End: 2024-02-19

## 2024-02-19 RX ORDER — DEXTROSE 50 % IN WATER 50 %
12.5 SYRINGE (ML) INTRAVENOUS ONCE
Refills: 0 | Status: DISCONTINUED | OUTPATIENT
Start: 2024-02-19 | End: 2024-02-20

## 2024-02-19 RX ORDER — CEFEPIME 1 G/1
2000 INJECTION, POWDER, FOR SOLUTION INTRAMUSCULAR; INTRAVENOUS EVERY 8 HOURS
Refills: 0 | Status: COMPLETED | OUTPATIENT
Start: 2024-02-19 | End: 2024-02-19

## 2024-02-19 RX ORDER — HYDROMORPHONE HYDROCHLORIDE 2 MG/ML
0.5 INJECTION INTRAMUSCULAR; INTRAVENOUS; SUBCUTANEOUS EVERY 4 HOURS
Refills: 0 | Status: DISCONTINUED | OUTPATIENT
Start: 2024-02-19 | End: 2024-02-20

## 2024-02-19 RX ADMIN — PIPERACILLIN AND TAZOBACTAM 200 GRAM(S): 4; .5 INJECTION, POWDER, LYOPHILIZED, FOR SOLUTION INTRAVENOUS at 23:47

## 2024-02-19 RX ADMIN — MORPHINE SULFATE 4 MILLIGRAM(S): 50 CAPSULE, EXTENDED RELEASE ORAL at 15:08

## 2024-02-19 RX ADMIN — CEFEPIME 100 MILLIGRAM(S): 1 INJECTION, POWDER, FOR SOLUTION INTRAMUSCULAR; INTRAVENOUS at 23:28

## 2024-02-19 RX ADMIN — CEFEPIME 100 MILLIGRAM(S): 1 INJECTION, POWDER, FOR SOLUTION INTRAMUSCULAR; INTRAVENOUS at 11:52

## 2024-02-19 RX ADMIN — Medication 650 MILLIGRAM(S): at 12:15

## 2024-02-19 RX ADMIN — HYDROMORPHONE HYDROCHLORIDE 0.5 MILLIGRAM(S): 2 INJECTION INTRAMUSCULAR; INTRAVENOUS; SUBCUTANEOUS at 17:40

## 2024-02-19 RX ADMIN — Medication 250 MILLIGRAM(S): at 14:07

## 2024-02-19 RX ADMIN — HYDROMORPHONE HYDROCHLORIDE 0.5 MILLIGRAM(S): 2 INJECTION INTRAMUSCULAR; INTRAVENOUS; SUBCUTANEOUS at 18:05

## 2024-02-19 RX ADMIN — Medication 100 MILLIGRAM(S): at 17:40

## 2024-02-19 RX ADMIN — SODIUM CHLORIDE 1000 MILLILITER(S): 9 INJECTION INTRAMUSCULAR; INTRAVENOUS; SUBCUTANEOUS at 17:40

## 2024-02-19 RX ADMIN — SODIUM CHLORIDE 2100 MILLILITER(S): 9 INJECTION INTRAMUSCULAR; INTRAVENOUS; SUBCUTANEOUS at 11:53

## 2024-02-19 RX ADMIN — Medication 4: at 17:52

## 2024-02-19 RX ADMIN — MORPHINE SULFATE 4 MILLIGRAM(S): 50 CAPSULE, EXTENDED RELEASE ORAL at 15:41

## 2024-02-19 RX ADMIN — Medication 650 MILLIGRAM(S): at 11:29

## 2024-02-19 RX ADMIN — DIATRIZOATE MEGLUMINE 30 MILLILITER(S): 180 INJECTION, SOLUTION INTRAVESICAL at 14:07

## 2024-02-19 NOTE — H&P ADULT - ASSESSMENT
58 y/o female with bowel perforation, pneumoperitoneum     Plan  - admit to Dr. Lopez   - add on for dx lap, poss ex lap, poss SBR today   - NPO + IVF  - NGT  - IV abx  - pain / nausea control prn   - T&S pending  - consent pending

## 2024-02-19 NOTE — H&P ADULT - NS ATTEND AMEND GEN_ALL_CORE FT
PT seen and examined. Discussed CT findings of small bowel perforation with her and that she would need to go to the OR. NGT placed in ED.  PARQ discussion held with pt and discussed with her would need to resect affected segment of bowel. She expressed understanding and consented to procedure. Discussed with partner as well per pt request.

## 2024-02-19 NOTE — CHART NOTE - NSCHARTNOTEFT_GEN_A_CORE
Pt seen and examined in ED. She called answering service c/o decreased appetite, decreased urine output and severe vaginal pain/spasms. She endorse some abdominal pain but reports is not worse than when she was discharged. Pt was in severe pain during phone call and was instructed to come to ED for further evaluation. In ED she reports feeling slightly better  Abd- soft slightly distended, incision C/D/I, no signs of infection,  no guarding mild rebound  Pt reports passed flatus today and has been having bowel movements at home  Labs in ED with leukocytosis, + UA, DEION with Cr 1.9, hyponatremia and elevated glucose.   Spoke with Dr. Cobb, recommend CT abd/pelvis to further evaluate. Gyn eval regarding pelvic symptoms

## 2024-02-19 NOTE — PACU DISCHARGE NOTE - COMMENTS
continue w/ O2 and pulse oximeter BP /54-66 mmHg, -130, O2 Sat 100% on O2 2LPM-NC.  Transferred to ICU with O2 and monitoring.

## 2024-02-19 NOTE — ED ADULT NURSE NOTE - NSSUHOSCREENINGYN_ED_ALL_ED
OccupationalTherapy Progress Note     Patient Name: Rosa MONTESINOS Date: 2/19/2021  Problem List  Principal Problem:    Pneumonia due to COVID-19 virus  Active Problems:    HTN (hypertension)    Methadone maintenance therapy patient ([facility] [address] )    BMI 28 0-28 9,adult    Type 2 diabetes mellitus, with long-term current use of insulin (HCC)    Acute respiratory failure with hypoxia (HCC)    Sepsis due to COVID-19 [facility])    Aortic thrombus (HCC)    Anemia    Muscle weakness of right upper extremity    Oropharyngeal dysphagia    Edema of right upper extremity    Ischemia of right lower extremity with suspected rhabdomyolysis    Urinary retention    Right femoral vein DVT (HCC)    Fever    Left Hydropneumothorax    RUE weakness    Acute encephalopathy    Prolonged Q-T interval on ECG       02/19/21 1045   OT Last Visit   OT Visit Date 02/19/21   Note Type   Note Type Treatment   Restrictions/Precautions   Weight Bearing Precautions Per Order Yes   RLE Weight Bearing Per Order NWB  (AKA)   Other Precautions Cognitive;WBS;Aspiration;Multiple lines; Fall Risk;Pain   Lifestyle   Autonomy I ADLS/IADLS, transfers and functional mobility PTA   Reciprocal Relationships Pt lives w/ his siblings and nephews   Service to Others Pt works full time   Pain Assessment   Pain Assessment Tool 0-10   Pain Score 8   Pain Location/Orientation Location: [address] [place] [address] Pain Intervention(s) Repositioned   ADL   Eating Comments Pt currently pending another speech consult 2* episode of emesis this AM   OT provided pt with blue builtup foam handles for eating and grooming utensils to increase independence with eating  Pt is able to effectively grasp utensils with builtup hands and simulates bringing them to mouth with increased time  RN updated regarding recommended use of builtup handles   UB Dressing Assistance 2  Maximal Assistance   UB Dressing Comments Requires assist to thread b/l LEs and pull around back       LB Dressing Assistance 1  Total Assistance   LB Dressing Deficit Don/doff R sock; Don/doff L sock   Toileting Assistance  1  Total Assistance   Toileting Comments Largely incontinent of bowel, requiring totalA for hygiene  Of note, BM in large sacral wound upon therapist entry  NAKIA Saini aware   Bed Mobility   Rolling R 4  Minimal assistance   Additional items Assist x 1; Increased time required;Verbal cues  (use of bedrail)   Rolling L 3  Moderate assistance   Additional items Assist x 1; Increased time required;LE management;Verbal cues   Supine to Sit 3  Moderate assistance   Additional items Assist x 1; Increased time required;Verbal cues;LE management   Sit to Supine 5  Supervision   Additional items Increased time required;Verbal cues; Assist x 1   Additional Comments Pt requires increased assist to roll to L side and heavy use of bedrails to roll to b/l sides (mostly with L UE, difficulty grasping with R)  Pt able to effectively move b/l LEs off EOB with max encouragement, continues to require assist with trunk mgmt  Pt tolerates sitting EOB for ~10 5 minutes with encouragement throughout  2 major LOB requiring modA to recover  CGA throughout, but at times requiring Nora 2* fatigue  ROM- Right Upper Extremities   RUE ROM Comment Pt completed UE therex supine in bed with HOB elevated following activities EOB 2* fatigue and limited sitting tolerance  Pt completed chest press 10x with 1lb therabar and able to maintain effective grasp (hands on top of bar) without OT assist, requires 1 rest break  Pt completes cylindrical grasp on stress ball 5x2 with AAROM  Pt completed 10x elbow flexion b/l UEs with assist to maintain grasp on 1lb therabar (hand underneath)  Pt completed theraputty exercises with yellow putty 5x thumb opposing to each finger with assist to maintain grasp  Cognition   Overall Cognitive Status Impaired   Arousal/Participation Alert; Responsive; Cooperative   Attention Attends with cues to redirect Orientation Level Oriented to person;Oriented to place;Oriented to situation   Memory Decreased recall of precautions;Decreased recall of recent events;Decreased short term memory   Following Commands Follows one step commands with increased time or repetition   Comments Pleasant, cooperative  Apathetic and depressed affect   Activity Tolerance   Activity Tolerance Patient limited by fatigue   Medical Staff Made Aware PT, CM   Assessment   Assessment Pt is seen for OT treatment session 2/19/21 including interventions to: increase functional use/strength R UE, improve trunk/postural strength, improve bed mobility, improve sitting balance and tolerance, increase independence with eating with compensatory strategies  Pt's reported goals are to be able to go to the bathroom and use his R hand  In comparison to previous sessions, pt with improved activity tolerance, sitting balance, endurance, R UE strength, and bed mobility  Pt is most motivated when giving very specific and short term goals (ex: goal is to sit for 10 minutes today) and frequent examples of progress he has made during POC   "I see other people and I just think, wow, they have all their limbs"  Towards end of session, pt expressing that he has been feeling increasingly hopeless and depressed  He denies specific suicidal ideation or thoughts of self harm, but reports "I wish I was just on hospice"  "I have to get home and pay my taxes and I can't even hold a pen "  ROSSY DEVI notified  Pt provided with builtup foam handles to increase independence with eating and grooming tasks, and RN notified to increase carry over during hospitalization  Pt also provided with stress ball and recommended to use intermittently throughout the day (AROM/AAROM with L hand assisting) to facilitate increased carry over with exercises in OT to maximize functional gains    Pt continues to function below his functional baseline and is to continue to benefit from skilled occupational therapy services while in the hospital to maximize functioning and independence with daily activities  OT to continue to recommend STR upon d/c    Plan   Treatment Interventions ADL retraining;Functional transfer training;UE strengthening/ROM; Endurance training;Cognitive reorientation;Patient/family training;Equipment evaluation/education; Compensatory technique education;Continued evaluation; Activityengagement   Goal Expiration Date 02/23/21   OT Treatment Day 9   OT Frequency 3-5x/wk   Recommendation   OT Discharge Recommendation Post-Acute Rehabilitation Services   OT - OK to Discharge Yes   AM-PAC Daily Activity Inpatient   Lower Body Dressing 1   Bathing 2   Toileting 1   Upper Body Dressing 2   Grooming 2   Eating 2   Daily Activity Raw Score 10   Turning Head Towards Sound 4   Follow Simple Instructions 3   Low Function Daily Activity Raw Score 17   Low Function Daily Activity Standardized Score 28 95   AM-PAC Applied Cognition Inpatient   Following a Speech/Presentation 2   Understanding Ordinary Conversation 3   Taking Medications 3   Remembering Where Things Are Placed or Put Away 2   Remembering List of 4-5 Errands 2   Taking Care of Complicated Tasks 2   Applied Cognition Raw Score 14   Applied Cognition Standardized Score 32 02     ELDER Pan, OTR/L Yes - the patient is able to be screened

## 2024-02-19 NOTE — H&P ADULT - HISTORY OF PRESENT ILLNESS
60 y/o female with pmhx of HTN, DM, to ER c/o decreased appetite over the last few days with severe vaginal pain/spasms. Pt admits to mild abdominal pain but states vaginal pain is worse. States abd pain has improved since last admission/discharge however was in severe pain earlier this morning and was instructed to come to the ED from the office. Pt has been passing flatus and having normal bowel movements at home. Pt denies nausea, vomiting, fever, chills, severe abd pain. No vaginal discharge.     As per chart, pt saw a GYN recenlty but no dx related to this pain. (pt is post hysterectemy many years ago, pt still has ovaries)  Pt is s/p diagnostic laparoscopy, lysis of adhesions, conversion to laparotomy for adhesiolysis and incisional hernia repair without mesh 2/6/24/ Pt did well postoperatively and was d/c 2/14/2024.

## 2024-02-19 NOTE — CONSULT NOTE ADULT - SUBJECTIVE AND OBJECTIVE BOX
MEDICAL CONSULT NOTE - INCOMPLETE    Patient is a 59y old  Female who presents with a chief complaint of bowel perf (2024 16:45)      Initial HPI on admission:  HPI:  58 y/o female with pmhx of HTN, DM, to ER c/o decreased appetite over the last few days with severe vaginal pain/spasms. Pt admits to mild abdominal pain but states vaginal pain is worse. States abd pain has improved since last admission/discharge however was in severe pain earlier this morning and was instructed to come to the ED from the office. Pt has been passing flatus and having normal bowel movements at home. Pt denies nausea, vomiting, fever, chills, severe abd pain. No vaginal discharge.   As per chart, pt saw a GYN recently but no dx related to this pain. (pt is post hysterectemy many years ago, pt still has ovaries)  Pt is s/p diagnostic laparoscopy, lysis of adhesions, conversion to laparotomy for adhesiolysis and incisional hernia repair without mesh 24/ Pt did well postoperatively and was d/c 2024.    (2024 16:45)      Brief Hospital Course: Pt was taken to OR for -------      PAST MEDICAL & SURGICAL HISTORY:  Other specified diabetes mellitus without complication, without long-term current use of insulin      HTN (hypertension)      DM (diabetes mellitus)      HLD (hyperlipidemia)      H/O abdominal hysterectomy            FAMILY HISTORY:  :       SOCIAL HX:  Denies smoking, EtOH or recreational drug use    ROS:  See HPI     Allergies    No Known Allergies    Intolerances          PHYSICAL EXAM    ICU Vital Signs Last 24 Hrs  T(C): 36.6 (2024 18:42), Max: 37.5 (2024 10:58)  T(F): 97.9 (2024 18:42), Max: 99.5 (2024 10:58)  HR: 93 (2024 18:42) (90 - 133)  BP: 128/76 (2024 18:42) (96/68 - 143/80)  BP(mean): --  ABP: --  ABP(mean): --  RR: 18 (2024 18:42) (18 - 20)  SpO2: 99% (2024 18:42) (97% - 100%)    O2 Parameters below as of 2024 18:42  Patient On (Oxygen Delivery Method): room air      GENERAL: patient appears well, NAD, pleasant  HEAD: normocephalic, atraumatic  EYES: sclera clear, no exudates  ENMT: oropharynx clear without erythema, no exudates, moist mucous membranes  NECK: supple, soft, no thyromegaly noted  LUNGS: clear to auscultation bilaterally, no wheezing, rhonchi or rales appreciated  HEART: S1/S2, regular rate and rhythm, no murmurs noted, no lower extremity edema  GASTROINTESTINAL: abdomen is soft, nondistended, nontender, normoactive bowel sounds, no palpable masses  INTEGUMENT: good skin turgor, no lesions noted  MUSCULOSKELETAL: no clubbing or cyanosis, no obvious deformity  NEUROLOGIC: AAO x3, CNII-XII intact, no obvious sensorimotor deficits noted       24 @ 07:01  -  24 @ 23:32  --------------------------------------------------------  IN:    Lactated Ringers: 2700 mL  Total IN: 2700 mL    OUT:    Blood Loss (mL): 25 mL    Bulb (mL): 10 mL    Indwelling Catheter - Urethral (mL): 1400 mL  Total OUT: 1435 mL    Total NET: 1265 mL          LABS:                          10.0   17.00 )-----------( 646      ( 2024 11:30 )             29.4                                                   124<L>  |  94<L>  |  37<H>  ----------------------------<  349<H>  5.3   |  21<L>  |  1.99<H>    Ca    9.9      2024 11:30    TPro  8.4<H>  /  Alb  2.7<L>  /  TBili  0.6  /  DBili  x   /  AST  22  /  ALT  29  /  AlkPhos  138<H>        PT/INR - ( 2024 11:30 )   PT: 12.9 sec;   INR: 1.14 ratio         PTT - ( 2024 11:30 )  PTT:20.9 sec                                       Urinalysis Basic - ( 2024 11:53 )    Color: Yellow / Appearance: Cloudy / S.013 / pH: x  Gluc: x / Ketone: Negative mg/dL  / Bili: Negative / Urobili: 0.2 mg/dL   Blood: x / Protein: 30 mg/dL / Nitrite: Negative   Leuk Esterase: Negative / RBC: 2 /HPF / WBC 8 /HPF   Sq Epi: x / Non Sq Epi: x / Bacteria: Few /HPF                                                  LIVER FUNCTIONS - ( 2024 11:30 )  Alb: 2.7 g/dL / Pro: 8.4 g/dL / ALK PHOS: 138 U/L / ALT: 29 U/L DA / AST: 22 U/L / GGT: x                                                               MEDICATIONS  (STANDING):  acetaminophen   IVPB .. 1000 milliGRAM(s) IV Intermittent every 6 hours  dextrose 5% + sodium chloride 0.9%. 1000 milliLiter(s) (50 mL/Hr) IV Continuous <Continuous>  dextrose 5%. 1000 milliLiter(s) (50 mL/Hr) IV Continuous <Continuous>  dextrose 5%. 1000 milliLiter(s) (100 mL/Hr) IV Continuous <Continuous>  dextrose 50% Injectable 25 Gram(s) IV Push once  dextrose 50% Injectable 12.5 Gram(s) IV Push once  dextrose 50% Injectable 25 Gram(s) IV Push once  glucagon  Injectable 1 milliGRAM(s) IntraMuscular once  heparin   Injectable 5000 Unit(s) SubCutaneous every 8 hours  insulin lispro (ADMELOG) corrective regimen sliding scale   SubCutaneous every 6 hours  piperacillin/tazobactam IVPB. 3.375 Gram(s) IV Intermittent once  sodium chloride 0.9% with potassium chloride 20 mEq/L 1000 milliLiter(s) (100 mL/Hr) IV Continuous <Continuous>  vancomycin  IVPB      vancomycin  IVPB 1000 milliGRAM(s) IV Intermittent every 12 hours    MEDICATIONS  (PRN):  dextrose Oral Gel 15 Gram(s) Oral once PRN Blood Glucose LESS THAN 70 milliGRAM(s)/deciliter  HYDROmorphone  Injectable 0.5 milliGRAM(s) IV Push every 10 minutes PRN Moderate Pain (4 - 6)  HYDROmorphone  Injectable 0.5 milliGRAM(s) IV Push every 4 hours PRN Severe Pain (7 - 10)  ondansetron Injectable 4 milliGRAM(s) IV Push once PRN Nausea and/or Vomiting        IMAGING:       ACC: [id number] EXAM:  CT ABDOMEN AND PELVIS OC   ORDERED BY: RUDY THURMAN     PROCEDURE DATE:  2024          INTERPRETATION:  CLINICAL INFORMATION: Abdominal pain and fever; recent   surgery    COMPARISON: 2024    CONTRAST/COMPLICATIONS:  IV Contrast: NONE  Oral Contrast: Gastroview  Complications: None reported at time of study completion    PROCEDURE:  CT of the Abdomen and Pelvis was performed.  Sagittal and coronal reformats were performed.    FINDINGS:  LOWER CHEST: Basilar atelectasis.    Please note that evaluation of the abdominal organs and vascular   structures is limited by lack of intravenous contrast.    LIVER: Within normal limits.  BILE DUCTS: Normal caliber.  GALLBLADDER: Within normal limits.  SPLEEN: Within normal limits.  PANCREAS: Within normal limits.  ADRENALS: Within normal limits.  KIDNEYS/URETERS: No renal stones or hydronephrosis. Renal hypodensities,   possibly cysts.    BLADDER: Small amount of intravesical gas.  REPRODUCTIVE ORGANS: Hysterectomy.    BOWEL: Mildly dilated loops of small bowel. Appendix surgically absent.  PERITONEUM: Free fluid, extraluminal gas and extraluminal oral contrast   in the lower abdomen.  VESSELS: Atherosclerotic calcifications.  RETROPERITONEUM/LYMPH NODES: No lymphadenopathy.  ABDOMINAL WALL: Postsurgical changes.  BONES: Degenerative changes.    IMPRESSION:  Mildly dilated loops of small bowel, possibly postsurgical ileus.  Extraluminal oral contrast in the lower abdomen compatible with bowel   perforation.  Pneumoperitoneum.    Findings were discussed with Dr. Thurman by telephone on 2024 at 1554   hours.    --- End of Report ---        JEANIE AZUL MD; Attending Radiologist  This document has been electronically signed. 2024  3:55PM   MEDICAL CONSULT NOTE - INCOMPLETE    Patient is a 59y old  Female who presents with a chief complaint of bowel perf (2024 16:45)      Initial HPI on admission:  HPI:  60 y/o female with pmhx of HTN, DM, to ER c/o decreased appetite over the last few days with severe vaginal pain/spasms. Pt admits to mild abdominal pain but states vaginal pain is worse. States abd pain has improved since last admission/discharge however was in severe pain earlier this morning and was instructed to come to the ED from the office. Pt has been passing flatus and having normal bowel movements at home. Pt denies nausea, vomiting, fever, chills, severe abd pain. No vaginal discharge.   As per chart, pt saw a GYN recently but no dx related to this pain. (pt is post hysterectemy many years ago, pt still has ovaries)  Pt is s/p diagnostic laparoscopy, lysis of adhesions, conversion to laparotomy for adhesiolysis and incisional hernia repair without mesh 24/ Pt did well postoperatively and was d/c 2024.    (2024 16:45)      Brief Hospital Course: Pt found to have pneumoperitoneum and evidence of small bowel perforation on CT imaging. Pt was admitted to surgery service and taken to OR for ex-lap with bowel resection. Medicine consulted for hyponatremia and medical co-management      PAST MEDICAL & SURGICAL HISTORY:  Other specified diabetes mellitus without complication, without long-term current use of insulin      HTN (hypertension)      DM (diabetes mellitus)      HLD (hyperlipidemia)      H/O abdominal hysterectomy            FAMILY HISTORY:  :       SOCIAL HX:  Denies smoking, EtOH or recreational drug use    ROS:  See HPI     Allergies    No Known Allergies    Intolerances          PHYSICAL EXAM    ICU Vital Signs Last 24 Hrs  T(C): 36.6 (2024 18:42), Max: 37.5 (2024 10:58)  T(F): 97.9 (2024 18:42), Max: 99.5 (2024 10:58)  HR: 93 (2024 18:42) (90 - 133)  BP: 128/76 (2024 18:42) (96/68 - 143/80)  BP(mean): --  ABP: --  ABP(mean): --  RR: 18 (2024 18:42) (18 - 20)  SpO2: 99% (2024 18:42) (97% - 100%)    O2 Parameters below as of 2024 18:42  Patient On (Oxygen Delivery Method): room air      GENERAL: patient appears well, NAD, pleasant  HEAD: normocephalic, atraumatic  EYES: sclera clear, no exudates  ENMT: oropharynx clear without erythema, no exudates, moist mucous membranes  NECK: supple, soft, no thyromegaly noted  LUNGS: clear to auscultation bilaterally, no wheezing, rhonchi or rales appreciated  HEART: S1/S2, regular rate and rhythm, no murmurs noted, no lower extremity edema  GASTROINTESTINAL: abdomen is soft, nondistended, nontender, normoactive bowel sounds, no palpable masses  INTEGUMENT: good skin turgor, no lesions noted  MUSCULOSKELETAL: no clubbing or cyanosis, no obvious deformity  NEUROLOGIC: AAO x3, CNII-XII intact, no obvious sensorimotor deficits noted       24 @ 07:01  -  24 @ 23:32  --------------------------------------------------------  IN:    Lactated Ringers: 2700 mL  Total IN: 2700 mL    OUT:    Blood Loss (mL): 25 mL    Bulb (mL): 10 mL    Indwelling Catheter - Urethral (mL): 1400 mL  Total OUT: 1435 mL    Total NET: 1265 mL          LABS:                          10.0   17.00 )-----------( 646      ( 2024 11:30 )             29.4                                               0219    124<L>  |  94<L>  |  37<H>  ----------------------------<  349<H>  5.3   |  21<L>  |  1.99<H>    Ca    9.9      2024 11:30    TPro  8.4<H>  /  Alb  2.7<L>  /  TBili  0.6  /  DBili  x   /  AST  22  /  ALT  29  /  AlkPhos  138<H>        PT/INR - ( 2024 11:30 )   PT: 12.9 sec;   INR: 1.14 ratio         PTT - ( 2024 11:30 )  PTT:20.9 sec                                       Urinalysis Basic - ( 2024 11:53 )    Color: Yellow / Appearance: Cloudy / S.013 / pH: x  Gluc: x / Ketone: Negative mg/dL  / Bili: Negative / Urobili: 0.2 mg/dL   Blood: x / Protein: 30 mg/dL / Nitrite: Negative   Leuk Esterase: Negative / RBC: 2 /HPF / WBC 8 /HPF   Sq Epi: x / Non Sq Epi: x / Bacteria: Few /HPF                                                  LIVER FUNCTIONS - ( 2024 11:30 )  Alb: 2.7 g/dL / Pro: 8.4 g/dL / ALK PHOS: 138 U/L / ALT: 29 U/L DA / AST: 22 U/L / GGT: x                                                               MEDICATIONS  (STANDING):  acetaminophen   IVPB .. 1000 milliGRAM(s) IV Intermittent every 6 hours  dextrose 5% + sodium chloride 0.9%. 1000 milliLiter(s) (50 mL/Hr) IV Continuous <Continuous>  dextrose 5%. 1000 milliLiter(s) (50 mL/Hr) IV Continuous <Continuous>  dextrose 5%. 1000 milliLiter(s) (100 mL/Hr) IV Continuous <Continuous>  dextrose 50% Injectable 25 Gram(s) IV Push once  dextrose 50% Injectable 12.5 Gram(s) IV Push once  dextrose 50% Injectable 25 Gram(s) IV Push once  glucagon  Injectable 1 milliGRAM(s) IntraMuscular once  heparin   Injectable 5000 Unit(s) SubCutaneous every 8 hours  insulin lispro (ADMELOG) corrective regimen sliding scale   SubCutaneous every 6 hours  piperacillin/tazobactam IVPB. 3.375 Gram(s) IV Intermittent once  sodium chloride 0.9% with potassium chloride 20 mEq/L 1000 milliLiter(s) (100 mL/Hr) IV Continuous <Continuous>  vancomycin  IVPB      vancomycin  IVPB 1000 milliGRAM(s) IV Intermittent every 12 hours    MEDICATIONS  (PRN):  dextrose Oral Gel 15 Gram(s) Oral once PRN Blood Glucose LESS THAN 70 milliGRAM(s)/deciliter  HYDROmorphone  Injectable 0.5 milliGRAM(s) IV Push every 10 minutes PRN Moderate Pain (4 - 6)  HYDROmorphone  Injectable 0.5 milliGRAM(s) IV Push every 4 hours PRN Severe Pain (7 - 10)  ondansetron Injectable 4 milliGRAM(s) IV Push once PRN Nausea and/or Vomiting        IMAGING:       ACC: [id number] EXAM:  CT ABDOMEN AND PELVIS OC   ORDERED BY: RUDY THURMAN     PROCEDURE DATE:  2024          INTERPRETATION:  CLINICAL INFORMATION: Abdominal pain and fever; recent   surgery    COMPARISON: 2024    CONTRAST/COMPLICATIONS:  IV Contrast: NONE  Oral Contrast: Gastroview  Complications: None reported at time of study completion    PROCEDURE:  CT of the Abdomen and Pelvis was performed.  Sagittal and coronal reformats were performed.    FINDINGS:  LOWER CHEST: Basilar atelectasis.    Please note that evaluation of the abdominal organs and vascular   structures is limited by lack of intravenous contrast.    LIVER: Within normal limits.  BILE DUCTS: Normal caliber.  GALLBLADDER: Within normal limits.  SPLEEN: Within normal limits.  PANCREAS: Within normal limits.  ADRENALS: Within normal limits.  KIDNEYS/URETERS: No renal stones or hydronephrosis. Renal hypodensities,   possibly cysts.    BLADDER: Small amount of intravesical gas.  REPRODUCTIVE ORGANS: Hysterectomy.    BOWEL: Mildly dilated loops of small bowel. Appendix surgically absent.  PERITONEUM: Free fluid, extraluminal gas and extraluminal oral contrast   in the lower abdomen.  VESSELS: Atherosclerotic calcifications.  RETROPERITONEUM/LYMPH NODES: No lymphadenopathy.  ABDOMINAL WALL: Postsurgical changes.  BONES: Degenerative changes.    IMPRESSION:  Mildly dilated loops of small bowel, possibly postsurgical ileus.  Extraluminal oral contrast in the lower abdomen compatible with bowel   perforation.  Pneumoperitoneum.    Findings were discussed with Dr. Thurman by telephone on 2024 at 1554   hours.    --- End of Report ---        JEANIE AZUL MD; Attending Radiologist  This document has been electronically signed. 2024  3:55PM

## 2024-02-19 NOTE — CONSULT NOTE ADULT - ASSESSMENT
59F with PMHx of HTN HLD, DM p/w decreased appetite of few days duration and vaginal pain/spasms. Pt is s/p recent diagnostic laparoscopy, lysis of adhesions, conversion to laparotomy for adhesiolysis and incisional hernia repair without mesh 2/6/24/ Pt did well postoperatively and was d/c 2/14/2024. Pt found to have pneumoperitoneum and evidence of small bowel perforation on CT imaging as above. Pt was admitted to surgery service and taken to OR for ex-lap with bowel resection. Medicine consulted for hyponatremia and medical co-management.    #Sepsis  -pt p/w hypotension, tachycardia, WBC 17k   -2/2 intraabdominal infection in setting of small-bowel perforation  -c/w broad spectrum abx and maintenance IVF  -f/u blood cx     #Small bowel perforation  -s/p OR 2/19 for ex-lap and bowel resection   -pain control  -rest of mgmt as per surgery    #hyponatremia  -hypovolemic hyponatremia  -Na 129 (corrected for hyperglycemia)  -c/w maintenance IVF with BMPq6   -avoid overcorrection (Goal 6-8meq in 24 hours)     #DEION   -likely pre-renal 2/2 hypovlemia and sepsis as above   -c/w IVF   -monitor Cr   -avoid nephrotoxins    #HTN  -hold HCTZ for hyponatremia and valsartan for DEION   -BP acceptable at this time     #HLD   -c/w home statin once taking PO    #DM  -on lantus 15u in AM, metformin and ozempic at home   -c/w lantus 7u and SSI q6 while NPO

## 2024-02-19 NOTE — ED PROVIDER NOTE - CLINICAL SUMMARY MEDICAL DECISION MAKING FREE TEXT BOX
59-year-old woman, history of hysterectomy, diabetes, hypertension, was here at [facility] last week,  had surgery done by Dr. Lopez  For lysis of adhesions that was converted to an open laparotomy, NG tube placement, then sent home with improvement however patient now complains of abdominal pain around the site of the surgical incision as well as few days of vaginal pain.  Pt spoke with Dr. Lopez and was referred to ED--  Patient has fever and tachycardia upon arrival, code sepsis called, labs, blood cultures, urinalysis, urine culture, IV fluid bolus, broad-spectrum antibiotics, flu/COVID swab; discussed with Dr. Lopez and she advised CT of the abdomen pelvis with oral contrast, as the patient has abnormal kidney function.  Patient declines any pain medication at this time.

## 2024-02-19 NOTE — ED PROVIDER NOTE - OBJECTIVE STATEMENT
59-year-old woman, history of hysterectomy, diabetes, hypertension, was here at [facility] last week,  had surgery done by Dr. Lopez  For lysis of adhesions that was converted to an open laparotomy, NG tube placement, then sent home with improvement however patient now complains of abdominal pain around the site of the surgical incision as well as few days of vaginal pain.  Pt spoke with Dr. Lopez and was referred to ED.  She has passed stool as recently as yesterday.  She has been passing gas copiously today.  She denies any fever/chills/dysuria/vaginal discharge/vaginal bleeding.  She has suffered for several years with intermittent vaginal pain issues and her GYN doctor told her it was mainly due to vaginal dryness.  She had a pelvic ultrasound done recently but has not received the results yet from her GYN doctor. 59-year-old woman, history of hysterectomy, diabetes, hypertension, was here at [facility] last week,  had surgery done by Dr. Lopez  For lysis of adhesions that was converted to an open laparotomy, NG tube placement, then sent home with improvement however patient now complains of abdominal pain around the site of the surgical incision as well as few days of vaginal pain.  Pt spoke with Dr. Lopez and was referred to ED.  She has passed stool as recently as yesterday.  She has been passing gas copiously today.  She denies any fever/chills/dysuria/vaginal discharge/vaginal bleeding.  She has suffered for several years with intermittent vaginal pain issues and her GYN doctor told her it was mainly due to vaginal dryness.  She had a pelvic ultrasound done recently but has not received the results yet from her GYN doctor.  She says that in triage, she mentioned shortness of breath but she attributes that to wearing the surgical mask, and that symptom has resolved.

## 2024-02-19 NOTE — ED PROVIDER NOTE - GASTROINTESTINAL, MLM
Abdomen soft, diffuse mild abdominal tenderness, no guarding/rebound; surgical wound is c/d/i, no erythema/crepitus/discharge

## 2024-02-19 NOTE — ED ADULT NURSE NOTE - OBJECTIVE STATEMENT
patient present to ED with c/o worsen vaginal pain since thursday night was discharged from this hospital s/p surgery on 2/7. patient notes it feels like someone is pulling out her vaginal pain level 10/10 only took tylenol

## 2024-02-19 NOTE — H&P ADULT - NSHPPHYSICALEXAM_GEN_ALL_CORE
General:  Appears stated age, well-groomed, mild distress 2/2 pain.  Eyes: EOMI  HENT:  WNL, no JVD  Respirations: Unlabored breathing. equal chest rise bilaterally.   Abdomen: soft, non distended, minimal tenderness to palpation in lower abd quadrants. Incisions well healed, clean, dry. Nonerythematous. No active drainage. No voluntary guarding, no rebound tenderness. No palpable masses.   Extremities: no edema bilaterally  Skin: warm and dry  Musculoskeletal: no calf tenderness b/l   Neuro:  Alert, oriented to time, place and person   Psych: normal affect

## 2024-02-19 NOTE — H&P ADULT - NSHPLABSRESULTS_GEN_ALL_CORE
10.0   17.00 )-----------( 646      ( 19 Feb 2024 11:30 )             29.4     02-19    124<L>  |  94<L>  |  37<H>  ----------------------------<  349<H>  5.3   |  21<L>  |  1.99<H>    Ca    9.9      19 Feb 2024 11:30    TPro  8.4<H>  /  Alb  2.7<L>  /  TBili  0.6  /  DBili  x   /  AST  22  /  ALT  29  /  AlkPhos  138<H>  02-19    < from: CT Abdomen and Pelvis w/ Oral Cont (02.19.24 @ 15:24) >    ACC: [id number] EXAM:  CT ABDOMEN AND PELVIS OC   ORDERED BY: RUDY THURMAN     PROCEDURE DATE:  02/19/2024          INTERPRETATION:  CLINICAL INFORMATION: Abdominal pain and fever; recent   surgery    COMPARISON: 2/11/2024    CONTRAST/COMPLICATIONS:  IV Contrast: NONE  Oral Contrast: Gastroview  Complications: None reported at time of study completion    PROCEDURE:  CT of the Abdomen and Pelvis was performed.  Sagittal and coronal reformats were performed.    FINDINGS:  LOWER CHEST: Basilar atelectasis.    Please note that evaluation of the abdominal organs and vascular   structures is limited by lack of intravenous contrast.    LIVER: Within normal limits.  BILE DUCTS: Normal caliber.  GALLBLADDER: Within normal limits.  SPLEEN: Within normal limits.  PANCREAS: Within normal limits.  ADRENALS: Within normal limits.  KIDNEYS/URETERS: No renal stones or hydronephrosis. Renal hypodensities,   possibly cysts.    BLADDER: Small amount of intravesical gas.  REPRODUCTIVE ORGANS: Hysterectomy.    BOWEL: Mildly dilated loops of small bowel. Appendix surgically absent.  PERITONEUM: Free fluid, extraluminal gas and extraluminal oral contrast   in the lower abdomen.  VESSELS: Atherosclerotic calcifications.  RETROPERITONEUM/LYMPH NODES: No lymphadenopathy.  ABDOMINAL WALL: Postsurgical changes.  BONES: Degenerative changes.    IMPRESSION:  Mildly dilated loops of small bowel, possibly postsurgical ileus.  Extraluminal oral contrast in the lower abdomen compatible with bowel   perforation.  Pneumoperitoneum.    Findings were discussed with Dr. Thurman by telephone on 2/19/2024 at 1554   hours.    --- End of Report ---            JEANIE AZUL MD; Attending Radiologist  This document has been electronically signed. Feb 19 2024  3:55PM    < end of copied text >

## 2024-02-20 ENCOUNTER — APPOINTMENT (OUTPATIENT)
Dept: INTERNAL MEDICINE | Facility: CLINIC | Age: 59
End: 2024-02-20

## 2024-02-20 LAB
ALBUMIN SERPL ELPH-MCNC: 1.7 G/DL — LOW (ref 3.5–5)
ALP SERPL-CCNC: 93 U/L — SIGNIFICANT CHANGE UP (ref 40–120)
ALT FLD-CCNC: 23 U/L DA — SIGNIFICANT CHANGE UP (ref 10–60)
ANION GAP SERPL CALC-SCNC: 5 MMOL/L — SIGNIFICANT CHANGE UP (ref 5–17)
ANION GAP SERPL CALC-SCNC: 6 MMOL/L — SIGNIFICANT CHANGE UP (ref 5–17)
ANION GAP SERPL CALC-SCNC: 8 MMOL/L — SIGNIFICANT CHANGE UP (ref 5–17)
ANISOCYTOSIS BLD QL: SLIGHT — SIGNIFICANT CHANGE UP
AST SERPL-CCNC: 21 U/L — SIGNIFICANT CHANGE UP (ref 10–40)
BASE EXCESS BLDV CALC-SCNC: -3.6 MMOL/L — SIGNIFICANT CHANGE UP
BASOPHILS # BLD AUTO: 0 K/UL — SIGNIFICANT CHANGE UP (ref 0–0.2)
BASOPHILS NFR BLD AUTO: 0 % — SIGNIFICANT CHANGE UP (ref 0–2)
BILIRUB SERPL-MCNC: 1.5 MG/DL — HIGH (ref 0.2–1.2)
BUN SERPL-MCNC: 15 MG/DL — SIGNIFICANT CHANGE UP (ref 7–18)
BUN SERPL-MCNC: 21 MG/DL — HIGH (ref 7–18)
BUN SERPL-MCNC: 21 MG/DL — HIGH (ref 7–18)
BURR CELLS BLD QL SMEAR: PRESENT — SIGNIFICANT CHANGE UP
CALCIUM SERPL-MCNC: 7.8 MG/DL — LOW (ref 8.4–10.5)
CALCIUM SERPL-MCNC: 8.1 MG/DL — LOW (ref 8.4–10.5)
CALCIUM SERPL-MCNC: 8.1 MG/DL — LOW (ref 8.4–10.5)
CHLORIDE SERPL-SCNC: 105 MMOL/L — SIGNIFICANT CHANGE UP (ref 96–108)
CHLORIDE SERPL-SCNC: 107 MMOL/L — SIGNIFICANT CHANGE UP (ref 96–108)
CHLORIDE SERPL-SCNC: 108 MMOL/L — SIGNIFICANT CHANGE UP (ref 96–108)
CO2 SERPL-SCNC: 20 MMOL/L — LOW (ref 22–31)
CO2 SERPL-SCNC: 20 MMOL/L — LOW (ref 22–31)
CO2 SERPL-SCNC: 22 MMOL/L — SIGNIFICANT CHANGE UP (ref 22–31)
CREAT SERPL-MCNC: 0.92 MG/DL — SIGNIFICANT CHANGE UP (ref 0.5–1.3)
CREAT SERPL-MCNC: 1.19 MG/DL — SIGNIFICANT CHANGE UP (ref 0.5–1.3)
CREAT SERPL-MCNC: 1.33 MG/DL — HIGH (ref 0.5–1.3)
CULTURE RESULTS: SIGNIFICANT CHANGE UP
EGFR: 46 ML/MIN/1.73M2 — LOW
EGFR: 53 ML/MIN/1.73M2 — LOW
EGFR: 72 ML/MIN/1.73M2 — SIGNIFICANT CHANGE UP
EOSINOPHIL # BLD AUTO: 0.25 K/UL — SIGNIFICANT CHANGE UP (ref 0–0.5)
EOSINOPHIL NFR BLD AUTO: 4 % — SIGNIFICANT CHANGE UP (ref 0–6)
GLUCOSE BLDC GLUCOMTR-MCNC: 232 MG/DL — HIGH (ref 70–99)
GLUCOSE SERPL-MCNC: 204 MG/DL — HIGH (ref 70–99)
GLUCOSE SERPL-MCNC: 223 MG/DL — HIGH (ref 70–99)
GLUCOSE SERPL-MCNC: 255 MG/DL — HIGH (ref 70–99)
HCO3 BLDV-SCNC: 23 MMOL/L — SIGNIFICANT CHANGE UP (ref 22–29)
HCT VFR BLD CALC: 25.4 % — LOW (ref 34.5–45)
HCT VFR BLD CALC: 26.5 % — LOW (ref 34.5–45)
HCT VFR BLD CALC: 29.6 % — LOW (ref 34.5–45)
HGB BLD-MCNC: 8.3 G/DL — LOW (ref 11.5–15.5)
HGB BLD-MCNC: 8.5 G/DL — LOW (ref 11.5–15.5)
HGB BLD-MCNC: 9.6 G/DL — LOW (ref 11.5–15.5)
HYPOCHROMIA BLD QL: SLIGHT — SIGNIFICANT CHANGE UP
LACTATE SERPL-SCNC: 1.2 MMOL/L — SIGNIFICANT CHANGE UP (ref 0.7–2)
LACTATE SERPL-SCNC: 2.2 MMOL/L — HIGH (ref 0.7–2)
LYMPHOCYTES # BLD AUTO: 1.45 K/UL — SIGNIFICANT CHANGE UP (ref 1–3.3)
LYMPHOCYTES # BLD AUTO: 23 % — SIGNIFICANT CHANGE UP (ref 13–44)
MAGNESIUM SERPL-MCNC: 1.2 MG/DL — LOW (ref 1.6–2.6)
MAGNESIUM SERPL-MCNC: 1.4 MG/DL — LOW (ref 1.6–2.6)
MANUAL SMEAR VERIFICATION: SIGNIFICANT CHANGE UP
MCHC RBC-ENTMCNC: 26.2 PG — LOW (ref 27–34)
MCHC RBC-ENTMCNC: 26.8 PG — LOW (ref 27–34)
MCHC RBC-ENTMCNC: 27 PG — SIGNIFICANT CHANGE UP (ref 27–34)
MCHC RBC-ENTMCNC: 32.1 GM/DL — SIGNIFICANT CHANGE UP (ref 32–36)
MCHC RBC-ENTMCNC: 32.4 GM/DL — SIGNIFICANT CHANGE UP (ref 32–36)
MCHC RBC-ENTMCNC: 32.7 GM/DL — SIGNIFICANT CHANGE UP (ref 32–36)
MCV RBC AUTO: 80.1 FL — SIGNIFICANT CHANGE UP (ref 80–100)
MCV RBC AUTO: 83.4 FL — SIGNIFICANT CHANGE UP (ref 80–100)
MCV RBC AUTO: 83.6 FL — SIGNIFICANT CHANGE UP (ref 80–100)
MONOCYTES # BLD AUTO: 0.32 K/UL — SIGNIFICANT CHANGE UP (ref 0–0.9)
MONOCYTES NFR BLD AUTO: 5 % — SIGNIFICANT CHANGE UP (ref 2–14)
NEUTROPHILS # BLD AUTO: 4.28 K/UL — SIGNIFICANT CHANGE UP (ref 1.8–7.4)
NEUTROPHILS NFR BLD AUTO: 67 % — SIGNIFICANT CHANGE UP (ref 43–77)
NEUTS BAND # BLD: 1 % — SIGNIFICANT CHANGE UP (ref 0–8)
NRBC # BLD: 0 /100 WBCS — SIGNIFICANT CHANGE UP (ref 0–0)
PCO2 BLDV: 47 MMHG — HIGH (ref 39–42)
PH BLDV: 7.3 — LOW (ref 7.32–7.43)
PHOSPHATE SERPL-MCNC: 3.5 MG/DL — SIGNIFICANT CHANGE UP (ref 2.5–4.5)
PHOSPHATE SERPL-MCNC: 3.6 MG/DL — SIGNIFICANT CHANGE UP (ref 2.5–4.5)
PLAT MORPH BLD: NORMAL — SIGNIFICANT CHANGE UP
PLATELET # BLD AUTO: 585 K/UL — HIGH (ref 150–400)
PLATELET # BLD AUTO: 596 K/UL — HIGH (ref 150–400)
PLATELET # BLD AUTO: 707 K/UL — HIGH (ref 150–400)
PLATELET COUNT - ESTIMATE: ABNORMAL
PO2 BLDV: 27 MMHG — SIGNIFICANT CHANGE UP
POIKILOCYTOSIS BLD QL AUTO: SLIGHT — SIGNIFICANT CHANGE UP
POTASSIUM SERPL-MCNC: 4 MMOL/L — SIGNIFICANT CHANGE UP (ref 3.5–5.3)
POTASSIUM SERPL-MCNC: 4.3 MMOL/L — SIGNIFICANT CHANGE UP (ref 3.5–5.3)
POTASSIUM SERPL-MCNC: 4.6 MMOL/L — SIGNIFICANT CHANGE UP (ref 3.5–5.3)
POTASSIUM SERPL-SCNC: 4 MMOL/L — SIGNIFICANT CHANGE UP (ref 3.5–5.3)
POTASSIUM SERPL-SCNC: 4.3 MMOL/L — SIGNIFICANT CHANGE UP (ref 3.5–5.3)
POTASSIUM SERPL-SCNC: 4.6 MMOL/L — SIGNIFICANT CHANGE UP (ref 3.5–5.3)
PROT SERPL-MCNC: 5.4 G/DL — LOW (ref 6–8.3)
RBC # BLD: 3.17 M/UL — LOW (ref 3.8–5.2)
RBC # BLD: 3.17 M/UL — LOW (ref 3.8–5.2)
RBC # BLD: 3.55 M/UL — LOW (ref 3.8–5.2)
RBC # FLD: 14.3 % — SIGNIFICANT CHANGE UP (ref 10.3–14.5)
RBC # FLD: 14.3 % — SIGNIFICANT CHANGE UP (ref 10.3–14.5)
RBC # FLD: 14.4 % — SIGNIFICANT CHANGE UP (ref 10.3–14.5)
RBC BLD AUTO: ABNORMAL
SAO2 % BLDV: 34.2 % — SIGNIFICANT CHANGE UP
SODIUM SERPL-SCNC: 133 MMOL/L — LOW (ref 135–145)
SODIUM SERPL-SCNC: 134 MMOL/L — LOW (ref 135–145)
SODIUM SERPL-SCNC: 134 MMOL/L — LOW (ref 135–145)
SPECIMEN SOURCE: SIGNIFICANT CHANGE UP
WBC # BLD: 10.73 K/UL — HIGH (ref 3.8–10.5)
WBC # BLD: 6.3 K/UL — SIGNIFICANT CHANGE UP (ref 3.8–10.5)
WBC # BLD: 6.39 K/UL — SIGNIFICANT CHANGE UP (ref 3.8–10.5)
WBC # FLD AUTO: 10.73 K/UL — HIGH (ref 3.8–10.5)
WBC # FLD AUTO: 6.3 K/UL — SIGNIFICANT CHANGE UP (ref 3.8–10.5)
WBC # FLD AUTO: 6.39 K/UL — SIGNIFICANT CHANGE UP (ref 3.8–10.5)

## 2024-02-20 RX ORDER — BENZOCAINE AND MENTHOL 5; 1 G/100ML; G/100ML
1 LIQUID ORAL
Refills: 0 | Status: DISCONTINUED | OUTPATIENT
Start: 2024-02-20 | End: 2024-02-21

## 2024-02-20 RX ORDER — SODIUM CHLORIDE 9 MG/ML
1000 INJECTION, SOLUTION INTRAVENOUS
Refills: 0 | Status: DISCONTINUED | OUTPATIENT
Start: 2024-02-20 | End: 2024-02-20

## 2024-02-20 RX ORDER — CHLORHEXIDINE GLUCONATE 213 G/1000ML
1 SOLUTION TOPICAL
Refills: 0 | Status: DISCONTINUED | OUTPATIENT
Start: 2024-02-20 | End: 2024-03-08

## 2024-02-20 RX ORDER — PHENYLEPHRINE HYDROCHLORIDE 10 MG/ML
0.1 INJECTION INTRAVENOUS
Qty: 40 | Refills: 0 | Status: DISCONTINUED | OUTPATIENT
Start: 2024-02-20 | End: 2024-02-20

## 2024-02-20 RX ORDER — FLUCONAZOLE 150 MG/1
800 TABLET ORAL ONCE
Refills: 0 | Status: COMPLETED | OUTPATIENT
Start: 2024-02-20 | End: 2024-02-20

## 2024-02-20 RX ORDER — SODIUM CHLORIDE 9 MG/ML
1000 INJECTION INTRAMUSCULAR; INTRAVENOUS; SUBCUTANEOUS
Refills: 0 | Status: DISCONTINUED | OUTPATIENT
Start: 2024-02-20 | End: 2024-02-20

## 2024-02-20 RX ORDER — MAGNESIUM SULFATE 500 MG/ML
1 VIAL (ML) INJECTION ONCE
Refills: 0 | Status: COMPLETED | OUTPATIENT
Start: 2024-02-20 | End: 2024-02-20

## 2024-02-20 RX ORDER — SODIUM CHLORIDE 9 MG/ML
1000 INJECTION, SOLUTION INTRAVENOUS ONCE
Refills: 0 | Status: COMPLETED | OUTPATIENT
Start: 2024-02-20 | End: 2024-02-20

## 2024-02-20 RX ORDER — MAGNESIUM SULFATE 500 MG/ML
2 VIAL (ML) INJECTION ONCE
Refills: 0 | Status: COMPLETED | OUTPATIENT
Start: 2024-02-20 | End: 2024-02-20

## 2024-02-20 RX ORDER — KETOROLAC TROMETHAMINE 30 MG/ML
15 SYRINGE (ML) INJECTION ONCE
Refills: 0 | Status: DISCONTINUED | OUTPATIENT
Start: 2024-02-20 | End: 2024-02-20

## 2024-02-20 RX ORDER — FLUCONAZOLE 150 MG/1
400 TABLET ORAL EVERY 24 HOURS
Refills: 0 | Status: DISCONTINUED | OUTPATIENT
Start: 2024-02-20 | End: 2024-02-21

## 2024-02-20 RX ORDER — HYDROMORPHONE HYDROCHLORIDE 2 MG/ML
1 INJECTION INTRAMUSCULAR; INTRAVENOUS; SUBCUTANEOUS EVERY 4 HOURS
Refills: 0 | Status: DISCONTINUED | OUTPATIENT
Start: 2024-02-20 | End: 2024-02-22

## 2024-02-20 RX ORDER — INSULIN GLARGINE 100 [IU]/ML
7 INJECTION, SOLUTION SUBCUTANEOUS AT BEDTIME
Refills: 0 | Status: DISCONTINUED | OUTPATIENT
Start: 2024-02-20 | End: 2024-02-26

## 2024-02-20 RX ORDER — HEPARIN SODIUM 5000 [USP'U]/ML
5000 INJECTION INTRAVENOUS; SUBCUTANEOUS EVERY 8 HOURS
Refills: 0 | Status: DISCONTINUED | OUTPATIENT
Start: 2024-02-20 | End: 2024-02-22

## 2024-02-20 RX ORDER — PANTOPRAZOLE SODIUM 20 MG/1
40 TABLET, DELAYED RELEASE ORAL ONCE
Refills: 0 | Status: COMPLETED | OUTPATIENT
Start: 2024-02-20 | End: 2024-02-20

## 2024-02-20 RX ORDER — SODIUM CHLORIDE 9 MG/ML
1000 INJECTION, SOLUTION INTRAVENOUS
Refills: 0 | Status: DISCONTINUED | OUTPATIENT
Start: 2024-02-20 | End: 2024-02-21

## 2024-02-20 RX ORDER — NOREPINEPHRINE BITARTRATE/D5W 8 MG/250ML
0.05 PLASTIC BAG, INJECTION (ML) INTRAVENOUS
Qty: 8 | Refills: 0 | Status: DISCONTINUED | OUTPATIENT
Start: 2024-02-20 | End: 2024-02-21

## 2024-02-20 RX ADMIN — HEPARIN SODIUM 5000 UNIT(S): 5000 INJECTION INTRAVENOUS; SUBCUTANEOUS at 14:14

## 2024-02-20 RX ADMIN — SODIUM CHLORIDE 4000 MILLILITER(S): 9 INJECTION, SOLUTION INTRAVENOUS at 22:11

## 2024-02-20 RX ADMIN — Medication 1000 MILLIGRAM(S): at 17:30

## 2024-02-20 RX ADMIN — PHENYLEPHRINE HYDROCHLORIDE 2.51 MICROGRAM(S)/KG/MIN: 10 INJECTION INTRAVENOUS at 07:08

## 2024-02-20 RX ADMIN — Medication 4: at 05:43

## 2024-02-20 RX ADMIN — SODIUM CHLORIDE 1000 MILLILITER(S): 9 INJECTION, SOLUTION INTRAVENOUS at 03:41

## 2024-02-20 RX ADMIN — INSULIN GLARGINE 7 UNIT(S): 100 INJECTION, SOLUTION SUBCUTANEOUS at 21:38

## 2024-02-20 RX ADMIN — Medication 4: at 17:45

## 2024-02-20 RX ADMIN — SODIUM CHLORIDE 1000 MILLILITER(S): 9 INJECTION, SOLUTION INTRAVENOUS at 05:19

## 2024-02-20 RX ADMIN — Medication 400 MILLIGRAM(S): at 05:44

## 2024-02-20 RX ADMIN — HYDROMORPHONE HYDROCHLORIDE 1 MILLIGRAM(S): 2 INJECTION INTRAMUSCULAR; INTRAVENOUS; SUBCUTANEOUS at 22:06

## 2024-02-20 RX ADMIN — SODIUM CHLORIDE 100 MILLILITER(S): 9 INJECTION, SOLUTION INTRAVENOUS at 03:42

## 2024-02-20 RX ADMIN — HYDROMORPHONE HYDROCHLORIDE 0.5 MILLIGRAM(S): 2 INJECTION INTRAMUSCULAR; INTRAVENOUS; SUBCUTANEOUS at 00:12

## 2024-02-20 RX ADMIN — Medication 1000 MILLIGRAM(S): at 00:38

## 2024-02-20 RX ADMIN — Medication 1000 MILLIGRAM(S): at 12:45

## 2024-02-20 RX ADMIN — PIPERACILLIN AND TAZOBACTAM 25 GRAM(S): 4; .5 INJECTION, POWDER, LYOPHILIZED, FOR SOLUTION INTRAVENOUS at 14:14

## 2024-02-20 RX ADMIN — HYDROMORPHONE HYDROCHLORIDE 1 MILLIGRAM(S): 2 INJECTION INTRAMUSCULAR; INTRAVENOUS; SUBCUTANEOUS at 21:37

## 2024-02-20 RX ADMIN — Medication 1000 MILLIGRAM(S): at 06:56

## 2024-02-20 RX ADMIN — Medication 400 MILLIGRAM(S): at 12:28

## 2024-02-20 RX ADMIN — BENZOCAINE AND MENTHOL 1 LOZENGE: 5; 1 LIQUID ORAL at 19:11

## 2024-02-20 RX ADMIN — Medication 400 MILLIGRAM(S): at 00:12

## 2024-02-20 RX ADMIN — Medication 25 GRAM(S): at 03:42

## 2024-02-20 RX ADMIN — Medication 400 MILLIGRAM(S): at 17:45

## 2024-02-20 RX ADMIN — HEPARIN SODIUM 5000 UNIT(S): 5000 INJECTION INTRAVENOUS; SUBCUTANEOUS at 21:41

## 2024-02-20 RX ADMIN — HYDROMORPHONE HYDROCHLORIDE 1 MILLIGRAM(S): 2 INJECTION INTRAMUSCULAR; INTRAVENOUS; SUBCUTANEOUS at 10:20

## 2024-02-20 RX ADMIN — HYDROMORPHONE HYDROCHLORIDE 1 MILLIGRAM(S): 2 INJECTION INTRAMUSCULAR; INTRAVENOUS; SUBCUTANEOUS at 18:00

## 2024-02-20 RX ADMIN — HYDROMORPHONE HYDROCHLORIDE 0.5 MILLIGRAM(S): 2 INJECTION INTRAMUSCULAR; INTRAVENOUS; SUBCUTANEOUS at 00:21

## 2024-02-20 RX ADMIN — SODIUM CHLORIDE 80 MILLILITER(S): 9 INJECTION, SOLUTION INTRAVENOUS at 09:29

## 2024-02-20 RX ADMIN — HYDROMORPHONE HYDROCHLORIDE 1 MILLIGRAM(S): 2 INJECTION INTRAMUSCULAR; INTRAVENOUS; SUBCUTANEOUS at 03:50

## 2024-02-20 RX ADMIN — HYDROMORPHONE HYDROCHLORIDE 0.5 MILLIGRAM(S): 2 INJECTION INTRAMUSCULAR; INTRAVENOUS; SUBCUTANEOUS at 00:39

## 2024-02-20 RX ADMIN — Medication 100 GRAM(S): at 08:00

## 2024-02-20 RX ADMIN — PIPERACILLIN AND TAZOBACTAM 25 GRAM(S): 4; .5 INJECTION, POWDER, LYOPHILIZED, FOR SOLUTION INTRAVENOUS at 03:46

## 2024-02-20 RX ADMIN — CHLORHEXIDINE GLUCONATE 1 APPLICATION(S): 213 SOLUTION TOPICAL at 05:49

## 2024-02-20 RX ADMIN — Medication 6.85 MICROGRAM(S)/KG/MIN: at 19:49

## 2024-02-20 RX ADMIN — Medication 4: at 13:01

## 2024-02-20 RX ADMIN — FLUCONAZOLE 100 MILLIGRAM(S): 150 TABLET ORAL at 19:53

## 2024-02-20 RX ADMIN — HYDROMORPHONE HYDROCHLORIDE 1 MILLIGRAM(S): 2 INJECTION INTRAMUSCULAR; INTRAVENOUS; SUBCUTANEOUS at 05:18

## 2024-02-20 RX ADMIN — HYDROMORPHONE HYDROCHLORIDE 0.5 MILLIGRAM(S): 2 INJECTION INTRAMUSCULAR; INTRAVENOUS; SUBCUTANEOUS at 00:24

## 2024-02-20 RX ADMIN — HYDROMORPHONE HYDROCHLORIDE 1 MILLIGRAM(S): 2 INJECTION INTRAMUSCULAR; INTRAVENOUS; SUBCUTANEOUS at 10:35

## 2024-02-20 RX ADMIN — BENZOCAINE AND MENTHOL 1 LOZENGE: 5; 1 LIQUID ORAL at 21:40

## 2024-02-20 RX ADMIN — Medication 5 MILLIGRAM(S): at 12:28

## 2024-02-20 RX ADMIN — Medication 100 GRAM(S): at 05:43

## 2024-02-20 RX ADMIN — HYDROMORPHONE HYDROCHLORIDE 1 MILLIGRAM(S): 2 INJECTION INTRAMUSCULAR; INTRAVENOUS; SUBCUTANEOUS at 17:47

## 2024-02-20 RX ADMIN — PIPERACILLIN AND TAZOBACTAM 25 GRAM(S): 4; .5 INJECTION, POWDER, LYOPHILIZED, FOR SOLUTION INTRAVENOUS at 21:38

## 2024-02-20 RX ADMIN — PANTOPRAZOLE SODIUM 40 MILLIGRAM(S): 20 TABLET, DELAYED RELEASE ORAL at 12:28

## 2024-02-20 NOTE — PROGRESS NOTE ADULT - ASSESSMENT
The patient is a 59y Female, recent admission s/p diagnostic laparoscopy, lysis of adhesions, conversion to laparotomy for adhesiolysis and incisional hernia repair without mesh 2/6/24, now s/p.Ex lap SBR and abdominal washout for small bowel perforation POD #0  VSS, on pressor    Plan:  - NPO  - Cont maintenance IVF  - NGT to LCWS  - Pain control as needed  - Continue NENO dressing   - Monitor drain output  - continue fowler to gravity  - monitor bowel function  - Incentive spirometer  - OOB and ambulate as tolerated  - DVT ppx

## 2024-02-20 NOTE — PROGRESS NOTE ADULT - SUBJECTIVE AND OBJECTIVE BOX
INTERVAL HPI/OVERNIGHT EVENTS:  Seen and examined at bedside.    PRESSORS: [  ] YES [ X ] NO  WHICH:    Antimicrobial:  piperacillin/tazobactam IVPB.- 3.375 Gram(s) IV Intermittent once  piperacillin/tazobactam IVPB.- 3.375 Gram(s) IV Intermittent once  piperacillin/tazobactam IVPB.. 3.375 Gram(s) IV Intermittent every 8 hours    Cardiovascular:  norepinephrine Infusion 0.05 MICROgram(s)/kG/Min IV Continuous <Continuous>    Pulmonary:    Hematalogic:    Other:  acetaminophen   IVPB .. 1000 milliGRAM(s) IV Intermittent every 6 hours  chlorhexidine 2% Cloths 1 Application(s) Topical <User Schedule>  dextrose 5% + lactated ringers. 1000 milliLiter(s) IV Continuous <Continuous>  HYDROmorphone  Injectable 1 milliGRAM(s) IV Push every 4 hours PRN  insulin glargine Injectable (LANTUS) 7 Unit(s) SubCutaneous at bedtime  insulin lispro (ADMELOG) corrective regimen sliding scale   SubCutaneous every 6 hours    acetaminophen   IVPB .. 1000 milliGRAM(s) IV Intermittent every 6 hours  chlorhexidine 2% Cloths 1 Application(s) Topical <User Schedule>  dextrose 5% + lactated ringers. 1000 milliLiter(s) IV Continuous <Continuous>  HYDROmorphone  Injectable 1 milliGRAM(s) IV Push every 4 hours PRN  insulin glargine Injectable (LANTUS) 7 Unit(s) SubCutaneous at bedtime  insulin lispro (ADMELOG) corrective regimen sliding scale   SubCutaneous every 6 hours  norepinephrine Infusion 0.05 MICROgram(s)/kG/Min IV Continuous <Continuous>  piperacillin/tazobactam IVPB.- 3.375 Gram(s) IV Intermittent once  piperacillin/tazobactam IVPB.- 3.375 Gram(s) IV Intermittent once  piperacillin/tazobactam IVPB.. 3.375 Gram(s) IV Intermittent every 8 hours    Drug Dosing Weight  Height (cm): 162.6 (19 Feb 2024 10:58)  Weight (kg): 73.1 (20 Feb 2024 03:45)  BMI (kg/m2): 27.6 (20 Feb 2024 03:45)  BSA (m2): 1.78 (20 Feb 2024 03:45)    CENTRAL LINE: [ ] YES [ ] NO  LOCATION:   DATE INSERTED:  REMOVE: [ ] YES [ ] NO  EXPLAIN:    HOWE: [ ] YES [ ] NO    DATE INSERTED:  REMOVE:  [ ] YES [ ] NO  EXPLAIN:    A-LINE:  [ ] YES [ ] NO  LOCATION:   DATE INSERTED:  REMOVE:  [ ] YES [ ] NO  EXPLAIN:    PMH -reviewed admission note, no change since admission  PAST MEDICAL & SURGICAL HISTORY:  Other specified diabetes mellitus without complication, without long-term current use of insulin      HTN (hypertension)      DM (diabetes mellitus)      HLD (hyperlipidemia)      H/O abdominal hysterectomy          ICU Vital Signs Last 24 Hrs  T(C): 36.3 (20 Feb 2024 03:30), Max: 37.5 (19 Feb 2024 10:58)  T(F): 97.3 (20 Feb 2024 03:30), Max: 99.5 (19 Feb 2024 10:58)  HR: 115 (20 Feb 2024 07:00) (90 - 138)  BP: 88/61 (20 Feb 2024 07:00) (82/55 - 143/80)  BP(mean): 70 (20 Feb 2024 07:00) (57 - 85)  ABP: --  ABP(mean): --  RR: 13 (20 Feb 2024 07:00) (9 - 22)  SpO2: 100% (20 Feb 2024 07:00) (97% - 100%)    O2 Parameters below as of 20 Feb 2024 03:30  Patient On (Oxygen Delivery Method): room air                  02-19 @ 07:01  -  02-20 @ 07:00  --------------------------------------------------------  IN: 3900 mL / OUT: 2565 mL / NET: 1335 mL            PHYSICAL EXAM:    GENERAL: NAD, well-groomed, well-developed  HEAD:  Atraumatic, Normocephalic  EYES: EOMI, PERRLA, conjunctiva and sclera clear  ENMT: No tonsillar erythema, exudates, or enlargement; Moist mucous membranes, Good dentition, No lesions  NECK: Supple, normal appearance, No JVD; Normal thyroid; Trachea midline  NERVOUS SYSTEM:  Alert & Oriented X3,  Motor Strength 5/5 B/L upper and lower extremities; DTRs 2+ intact and symmetric  CHEST/LUNG: No chest deformity; Normal percussion bilaterally; No rales, rhonchi, wheezing   HEART: Regular rate and rhythm; No murmurs, rubs, or gallops  ABDOMEN: Soft, Nontender, Nondistended; Bowel sounds present  EXTREMITIES:  2+ Peripheral Pulses, No clubbing, cyanosis, or edema  LYMPH: No lymphadenopathy noted  SKIN: No rashes or lesions;  Good capillary refill      LABS:  CBC Full  -  ( 20 Feb 2024 03:55 )  WBC Count : 6.30 K/uL  RBC Count : 3.17 M/uL  Hemoglobin : 8.5 g/dL  Hematocrit : 26.5 %  Platelet Count - Automated : 585 K/uL  Mean Cell Volume : 83.6 fl  Mean Cell Hemoglobin : 26.8 pg  Mean Cell Hemoglobin Concentration : 32.1 gm/dL  Auto Neutrophil # : 4.28 K/uL  Auto Lymphocyte # : 1.45 K/uL  Auto Monocyte # : 0.32 K/uL  Auto Eosinophil # : 0.25 K/uL  Auto Basophil # : 0.00 K/uL  Auto Neutrophil % : 67.0 %  Auto Lymphocyte % : 23.0 %  Auto Monocyte % : 5.0 %  Auto Eosinophil % : 4.0 %  Auto Basophil % : 0.0 %    02-20    134<L>  |  107  |  21<H>  ----------------------------<  223<H>  4.3   |  22  |  1.33<H>    Ca    7.8<L>      20 Feb 2024 03:55  Phos  3.5     02-20  Mg     1.4     02-20    TPro  5.4<L>  /  Alb  1.7<L>  /  TBili  1.5<H>  /  DBili  x   /  AST  21  /  ALT  23  /  AlkPhos  93  02-20    PT/INR - ( 19 Feb 2024 11:30 )   PT: 12.9 sec;   INR: 1.14 ratio         PTT - ( 19 Feb 2024 11:30 )  PTT:20.9 sec  Urinalysis Basic - ( 20 Feb 2024 03:55 )    Color: x / Appearance: x / SG: x / pH: x  Gluc: 223 mg/dL / Ketone: x  / Bili: x / Urobili: x   Blood: x / Protein: x / Nitrite: x   Leuk Esterase: x / RBC: x / WBC x   Sq Epi: x / Non Sq Epi: x / Bacteria: x          RADIOLOGY & ADDITIONAL STUDIES REVIEWED:  ***    [ ]GOALS OF CARE DISCUSSION WITH PATIENT/FAMILY/PROXY:    CRITICAL CARE TIME SPENT: 35 minutes INTERVAL HPI/OVERNIGHT EVENTS:  Seen and examined at bedside.    PRESSORS: [  ] YES [ X ] NO  WHICH:    Antimicrobial:  piperacillin/tazobactam IVPB.- 3.375 Gram(s) IV Intermittent once  piperacillin/tazobactam IVPB.- 3.375 Gram(s) IV Intermittent once  piperacillin/tazobactam IVPB.. 3.375 Gram(s) IV Intermittent every 8 hours    Cardiovascular:  norepinephrine Infusion 0.05 MICROgram(s)/kG/Min IV Continuous <Continuous>    Pulmonary:    Hematalogic:    Other:  acetaminophen   IVPB .. 1000 milliGRAM(s) IV Intermittent every 6 hours  chlorhexidine 2% Cloths 1 Application(s) Topical <User Schedule>  dextrose 5% + lactated ringers. 1000 milliLiter(s) IV Continuous <Continuous>  HYDROmorphone  Injectable 1 milliGRAM(s) IV Push every 4 hours PRN  insulin glargine Injectable (LANTUS) 7 Unit(s) SubCutaneous at bedtime  insulin lispro (ADMELOG) corrective regimen sliding scale   SubCutaneous every 6 hours    acetaminophen   IVPB .. 1000 milliGRAM(s) IV Intermittent every 6 hours  chlorhexidine 2% Cloths 1 Application(s) Topical <User Schedule>  dextrose 5% + lactated ringers. 1000 milliLiter(s) IV Continuous <Continuous>  HYDROmorphone  Injectable 1 milliGRAM(s) IV Push every 4 hours PRN  insulin glargine Injectable (LANTUS) 7 Unit(s) SubCutaneous at bedtime  insulin lispro (ADMELOG) corrective regimen sliding scale   SubCutaneous every 6 hours  norepinephrine Infusion 0.05 MICROgram(s)/kG/Min IV Continuous <Continuous>  piperacillin/tazobactam IVPB.- 3.375 Gram(s) IV Intermittent once  piperacillin/tazobactam IVPB.- 3.375 Gram(s) IV Intermittent once  piperacillin/tazobactam IVPB.. 3.375 Gram(s) IV Intermittent every 8 hours    Drug Dosing Weight  Height (cm): 162.6 (19 Feb 2024 10:58)  Weight (kg): 73.1 (20 Feb 2024 03:45)  BMI (kg/m2): 27.6 (20 Feb 2024 03:45)  BSA (m2): 1.78 (20 Feb 2024 03:45)    CENTRAL LINE: [ ] YES [ ] NO  LOCATION:   DATE INSERTED:  REMOVE: [ ] YES [ ] NO  EXPLAIN:    HOWE: [ ] YES [ ] NO    DATE INSERTED:  REMOVE:  [ ] YES [ ] NO  EXPLAIN:    A-LINE:  [ ] YES [ ] NO  LOCATION:   DATE INSERTED:  REMOVE:  [ ] YES [ ] NO  EXPLAIN:    PMH -reviewed admission note, no change since admission  PAST MEDICAL & SURGICAL HISTORY:  Other specified diabetes mellitus without complication, without long-term current use of insulin      HTN (hypertension)      DM (diabetes mellitus)      HLD (hyperlipidemia)      H/O abdominal hysterectomy          ICU Vital Signs Last 24 Hrs  T(C): 36.3 (20 Feb 2024 03:30), Max: 37.5 (19 Feb 2024 10:58)  T(F): 97.3 (20 Feb 2024 03:30), Max: 99.5 (19 Feb 2024 10:58)  HR: 115 (20 Feb 2024 07:00) (90 - 138)  BP: 88/61 (20 Feb 2024 07:00) (82/55 - 143/80)  BP(mean): 70 (20 Feb 2024 07:00) (57 - 85)  ABP: --  ABP(mean): --  RR: 13 (20 Feb 2024 07:00) (9 - 22)  SpO2: 100% (20 Feb 2024 07:00) (97% - 100%)    O2 Parameters below as of 20 Feb 2024 03:30  Patient On (Oxygen Delivery Method): room air                  02-19 @ 07:01  -  02-20 @ 07:00  --------------------------------------------------------  IN: 3900 mL / OUT: 2565 mL / NET: 1335 mL            PHYSICAL EXAM:    GENERAL: NAD  HEAD:  Atraumatic, Normocephalic  EYES: EOMI, PERRLA, conjunctiva and sclera clear  ENMT: No tonsillar erythema, exudates, or enlargement; Moist mucous membranes, Good dentition, No lesions  NECK: Supple, normal appearance, No JVD; Normal thyroid; Trachea midline  NERVOUS SYSTEM:  Alert & Oriented X3,    CHEST/LUNG: No chest deformity; No rales, rhonchi, wheezing   HEART: Regular rate and rhythm; No murmurs, rubs, or gallops  ABDOMEN: Soft, tender around surgical site, no erythema no bleeding, no distension, serosanguineous drainage from anastomosis drain  EXTREMITIES:  2+ Peripheral Pulses, No clubbing, cyanosis, or edema  LYMPH: No lymphadenopathy noted  SKIN: No rashes or lesions;  Good capillary refill      LABS:  CBC Full  -  ( 20 Feb 2024 03:55 )  WBC Count : 6.30 K/uL  RBC Count : 3.17 M/uL  Hemoglobin : 8.5 g/dL  Hematocrit : 26.5 %  Platelet Count - Automated : 585 K/uL  Mean Cell Volume : 83.6 fl  Mean Cell Hemoglobin : 26.8 pg  Mean Cell Hemoglobin Concentration : 32.1 gm/dL  Auto Neutrophil # : 4.28 K/uL  Auto Lymphocyte # : 1.45 K/uL  Auto Monocyte # : 0.32 K/uL  Auto Eosinophil # : 0.25 K/uL  Auto Basophil # : 0.00 K/uL  Auto Neutrophil % : 67.0 %  Auto Lymphocyte % : 23.0 %  Auto Monocyte % : 5.0 %  Auto Eosinophil % : 4.0 %  Auto Basophil % : 0.0 %    02-20    134<L>  |  107  |  21<H>  ----------------------------<  223<H>  4.3   |  22  |  1.33<H>    Ca    7.8<L>      20 Feb 2024 03:55  Phos  3.5     02-20  Mg     1.4     02-20    TPro  5.4<L>  /  Alb  1.7<L>  /  TBili  1.5<H>  /  DBili  x   /  AST  21  /  ALT  23  /  AlkPhos  93  02-20    PT/INR - ( 19 Feb 2024 11:30 )   PT: 12.9 sec;   INR: 1.14 ratio         PTT - ( 19 Feb 2024 11:30 )  PTT:20.9 sec  Urinalysis Basic - ( 20 Feb 2024 03:55 )    Color: x / Appearance: x / SG: x / pH: x  Gluc: 223 mg/dL / Ketone: x  / Bili: x / Urobili: x   Blood: x / Protein: x / Nitrite: x   Leuk Esterase: x / RBC: x / WBC x   Sq Epi: x / Non Sq Epi: x / Bacteria: x          RADIOLOGY & ADDITIONAL STUDIES REVIEWED:  ***    [ ]GOALS OF CARE DISCUSSION WITH PATIENT/FAMILY/PROXY:    CRITICAL CARE TIME SPENT: 35 minutes

## 2024-02-20 NOTE — PROGRESS NOTE ADULT - SUBJECTIVE AND OBJECTIVE BOX
INTERVAL HPI/OVERNIGHT EVENTS:  s/p ex-lap, abdominal washout, SBR overnight. Patient reports abdominal pain, denies N/V, fevers, chills. No flatus/bm   On levophed gtt, VSS    Vital Signs Last 24 Hrs  T(C): 36.3 (20 Feb 2024 03:30), Max: 37.5 (19 Feb 2024 10:58)  T(F): 97.3 (20 Feb 2024 03:30), Max: 99.5 (19 Feb 2024 10:58)  HR: 115 (20 Feb 2024 07:00) (90 - 138)  BP: 88/61 (20 Feb 2024 07:00) (82/55 - 143/80)  BP(mean): 70 (20 Feb 2024 07:00) (57 - 85)  RR: 13 (20 Feb 2024 07:00) (9 - 22)  SpO2: 100% (20 Feb 2024 07:00) (97% - 100%)    Parameters below as of 20 Feb 2024 03:30  Patient On (Oxygen Delivery Method): room air      I&O's Detail    19 Feb 2024 07:01  -  20 Feb 2024 07:00  --------------------------------------------------------  IN:    IV PiggyBack: 100 mL    Lactated Ringers: 2700 mL    Lactated Ringers: 100 mL    Lactated Ringers Bolus: 1000 mL  Total IN: 3900 mL    OUT:    Blood Loss (mL): 25 mL    Bulb (mL): 140 mL    Indwelling Catheter - Urethral (mL): 2400 mL    Nasogastric/Oral tube (mL): 0 mL    Phenylephrine: 0 mL  Total OUT: 2565 mL    Total NET: 1335 mL        piperacillin/tazobactam IVPB.- 3.375 Gram(s) IV Intermittent once  piperacillin/tazobactam IVPB.- 3.375 Gram(s) IV Intermittent once  piperacillin/tazobactam IVPB.. 3.375 Gram(s) IV Intermittent every 8 hours      Physical Exam:  General: AAOx3, No acute distress  HEENT: NC/AT, trachea midline  Respiratory: Nonlabored breathing, equal chest rise b/l   Skin: No jaundice, no icterus  Abdomen: soft, appropriately tender. NENO with good seal, abd drain with SS output   : Petersen  Extremities: non edematous, no calf pain bilaterally, venodynes in place and functioning     Lines/Drains/Tubes:     Drains/Tubes:     02-19-24 @ 07:01  -  02-20-24 @ 07:00  --------------------------------------------------------  IN: 3900 mL / OUT: 2565 mL / NET: 1335 mL        Labs:                        8.5    6.30  )-----------( 585      ( 20 Feb 2024 03:55 )             26.5     02-20    134<L>  |  107  |  21<H>  ----------------------------<  223<H>  4.3   |  22  |  1.33<H>    Ca    7.8<L>      20 Feb 2024 03:55  Phos  3.5     02-20  Mg     1.4     02-20    TPro  5.4<L>  /  Alb  1.7<L>  /  TBili  1.5<H>  /  DBili  x   /  AST  21  /  ALT  23  /  AlkPhos  93  02-20    PT/INR - ( 19 Feb 2024 11:30 )   PT: 12.9 sec;   INR: 1.14 ratio         PTT - ( 19 Feb 2024 11:30 )  PTT:20.9 sec    RADIOLOGY & ADDITIONAL STUDIES:

## 2024-02-20 NOTE — CONSULT NOTE ADULT - SUBJECTIVE AND OBJECTIVE BOX
Patient is a 59y old  Female who presents with a chief complaint of bowel perf (19 Feb 2024 23:31)      Initial HPI on admission:  HPI:  60 y/o female with pmhx of HTN, DM, to ER c/o decreased appetite over the last few days with severe vaginal pain/spasms. Pt admits to mild abdominal pain but states vaginal pain is worse. States abd pain has improved since last admission/discharge however was in severe pain earlier this morning and was instructed to come to the ED from the office. Pt has been passing flatus and having normal bowel movements at home. Pt denies nausea, vomiting, fever, chills, severe abd pain. No vaginal discharge.     As per chart, pt saw a GYN recenlty but no dx related to this pain. (pt is post hysterectemy many years ago, pt still has ovaries)  Pt is s/p diagnostic laparoscopy, lysis of adhesions, conversion to laparotomy for adhesiolysis and incisional hernia repair without mesh 2/6/24/ Pt did well postoperatively and was d/c 2/14/2024.      (19 Feb 2024 16:45)      Brief Hospital Course: Pt found to have pneumoperitoneum and evidence of small bowel perforation on CT imaging. Pt was admitted to surgery service and taken to OR for ex-lap with bowel resection. ICU consulted for post-operative hypotension with concern for septic shock.      PAST MEDICAL & SURGICAL HISTORY:  Other specified diabetes mellitus without complication, without long-term current use of insulin      HTN (hypertension)      DM (diabetes mellitus)      HLD (hyperlipidemia)      H/O abdominal hysterectomy            FAMILY HISTORY:  :       SOCIAL HX:  Denies smoking, EtOH or recreational drug use    ROS:  See HPI     Allergies    No Known Allergies    Intolerances          PHYSICAL EXAM    Vital Signs Last 24 Hrs  T(C): 36.6 (20 Feb 2024 00:06), Max: 37.5 (19 Feb 2024 10:58)  T(F): 97.9 (20 Feb 2024 00:06), Max: 99.5 (19 Feb 2024 10:58)  HR: 123 (20 Feb 2024 01:45) (90 - 138)  BP: 88/63 (20 Feb 2024 01:45) (82/55 - 143/80)  BP(mean): 72 (20 Feb 2024 01:45) (57 - 85)  ABP: --  ABP(mean): --  RR: 13 (20 Feb 2024 01:45) (12 - 22)  SpO2: 100% (20 Feb 2024 01:45) (97% - 100%)    O2 Parameters below as of 20 Feb 2024 01:45  Patient On (Oxygen Delivery Method): nasal cannula  O2 Flow (L/min): 2      GENERAL: patient appears well, NAD, pleasant  HEAD: normocephalic, atraumatic  EYES: sclera clear, no exudates  ENMT: oropharynx clear without erythema, no exudates, moist mucous membranes  NECK: supple, soft, no thyromegaly noted  LUNGS: clear to auscultation bilaterally, no wheezing, rhonchi or rales appreciated  HEART: S1/S2, regular rate and rhythm, no murmurs noted, no lower extremity edema  GASTROINTESTINAL: abdomen is soft, nondistended, nontender, normoactive bowel sounds, no palpable masses  INTEGUMENT: good skin turgor, no lesions noted  MUSCULOSKELETAL: no clubbing or cyanosis, no obvious deformity  NEUROLOGIC: AAO x3, CNII-XII intact, no obvious sensorimotor deficits noted       02-19-24 @ 07:01  -  02-20-24 @ 02:04  --------------------------------------------------------  IN:    Lactated Ringers: 2700 mL  Total IN: 2700 mL    OUT:    Blood Loss (mL): 25 mL    Bulb (mL): 40 mL    Indwelling Catheter - Urethral (mL): 1500 mL  Total OUT: 1565 mL    Total NET: 1135 mL          LABS:                          9.6    6.39  )-----------( 707      ( 20 Feb 2024 00:15 )             29.6                                               02-20    134<L>  |  108  |  21<H>  ----------------------------<  204<H>  4.0   |  20<L>  |  1.19    Ca    8.1<L>      20 Feb 2024 00:15  Phos  3.6     02-20  Mg     1.2     02-20    TPro  8.4<H>  /  Alb  2.7<L>  /  TBili  0.6  /  DBili  x   /  AST  22  /  ALT  29  /  AlkPhos  138<H>  02-19      PT/INR - ( 19 Feb 2024 11:30 )   PT: 12.9 sec;   INR: 1.14 ratio         PTT - ( 19 Feb 2024 11:30 )  PTT:20.9 sec                                       Urinalysis Basic - ( 20 Feb 2024 00:15 )    Color: x / Appearance: x / SG: x / pH: x  Gluc: 204 mg/dL / Ketone: x  / Bili: x / Urobili: x   Blood: x / Protein: x / Nitrite: x   Leuk Esterase: x / RBC: x / WBC x   Sq Epi: x / Non Sq Epi: x / Bacteria: x                                                  LIVER FUNCTIONS - ( 19 Feb 2024 11:30 )  Alb: 2.7 g/dL / Pro: 8.4 g/dL / ALK PHOS: 138 U/L / ALT: 29 U/L DA / AST: 22 U/L / GGT: x                                                  MEDICATIONS  (STANDING):  acetaminophen   IVPB .. 1000 milliGRAM(s) IV Intermittent every 6 hours  dextrose 5% + sodium chloride 0.9%. 1000 milliLiter(s) (110 mL/Hr) IV Continuous <Continuous>  dextrose 5%. 1000 milliLiter(s) (50 mL/Hr) IV Continuous <Continuous>  dextrose 5%. 1000 milliLiter(s) (100 mL/Hr) IV Continuous <Continuous>  dextrose 50% Injectable 25 Gram(s) IV Push once  dextrose 50% Injectable 25 Gram(s) IV Push once  dextrose 50% Injectable 12.5 Gram(s) IV Push once  glucagon  Injectable 1 milliGRAM(s) IntraMuscular once  heparin   Injectable 5000 Unit(s) SubCutaneous every 8 hours  insulin lispro (ADMELOG) corrective regimen sliding scale   SubCutaneous every 6 hours  piperacillin/tazobactam IVPB.- 3.375 Gram(s) IV Intermittent once  piperacillin/tazobactam IVPB.- 3.375 Gram(s) IV Intermittent once  piperacillin/tazobactam IVPB.- 3.375 Gram(s) IV Intermittent once  piperacillin/tazobactam IVPB.. 3.375 Gram(s) IV Intermittent every 8 hours  vancomycin  IVPB      vancomycin  IVPB 1000 milliGRAM(s) IV Intermittent every 12 hours    MEDICATIONS  (PRN):  dextrose Oral Gel 15 Gram(s) Oral once PRN Blood Glucose LESS THAN 70 milliGRAM(s)/deciliter  HYDROmorphone  Injectable 0.5 milliGRAM(s) IV Push every 10 minutes PRN Moderate Pain (4 - 6)  HYDROmorphone  Injectable 0.5 milliGRAM(s) IV Push every 4 hours PRN Severe Pain (7 - 10)  ondansetron Injectable 4 milliGRAM(s) IV Push once PRN Nausea and/or Vomiting        IMAGING:     ACC: [id number] EXAM:  CT ABDOMEN AND PELVIS OC   ORDERED BY: RUDY THURMAN     PROCEDURE DATE:  02/19/2024          INTERPRETATION:  CLINICAL INFORMATION: Abdominal pain and fever; recent   surgery    COMPARISON: 2/11/2024    CONTRAST/COMPLICATIONS:  IV Contrast: NONE  Oral Contrast: Gastroview  Complications: None reported at time of study completion    PROCEDURE:  CT of the Abdomen and Pelvis was performed.  Sagittal and coronal reformats were performed.    FINDINGS:  LOWER CHEST: Basilar atelectasis.    Please note that evaluation of the abdominal organs and vascular   structures is limited by lack of intravenous contrast.    LIVER: Within normal limits.  BILE DUCTS: Normal caliber.  GALLBLADDER: Within normal limits.  SPLEEN: Within normal limits.  PANCREAS: Within normal limits.  ADRENALS: Within normal limits.  KIDNEYS/URETERS: No renal stones or hydronephrosis. Renal hypodensities,   possibly cysts.    BLADDER: Small amount of intravesical gas.  REPRODUCTIVE ORGANS: Hysterectomy.    BOWEL: Mildly dilated loops of small bowel. Appendix surgically absent.  PERITONEUM: Free fluid, extraluminal gas and extraluminal oral contrast   in the lower abdomen.  VESSELS: Atherosclerotic calcifications.  RETROPERITONEUM/LYMPH NODES: No lymphadenopathy.  ABDOMINAL WALL: Postsurgical changes.  BONES: Degenerative changes.    IMPRESSION:  Mildly dilated loops of small bowel, possibly postsurgical ileus.  Extraluminal oral contrast in the lower abdomen compatible with bowel   perforation.  Pneumoperitoneum.    Findings were discussed with Dr. Thurman by telephone on 2/19/2024 at 1554   hours.    --- End of Report ---        JEANIE AZUL MD; Attending Radiologist  This document has been electronically signed. Feb 19 2024  3:55PM Patient is a 59y old  Female who presents with a chief complaint of bowel perf (19 Feb 2024 23:31)      Initial HPI on admission:  HPI:  60 y/o female with pmhx of HTN, DM, to ER c/o decreased appetite over the last few days with severe vaginal pain/spasms. Pt admits to mild abdominal pain but states vaginal pain is worse. States abd pain has improved since last admission/discharge however was in severe pain earlier this morning and was instructed to come to the ED from the office. Pt has been passing flatus and having normal bowel movements at home. Pt denies nausea, vomiting, fever, chills, severe abd pain. No vaginal discharge.     As per chart, pt saw a GYN recenlty but no dx related to this pain. (pt is post hysterectemy many years ago, pt still has ovaries)  Pt is s/p diagnostic laparoscopy, lysis of adhesions, conversion to laparotomy for adhesiolysis and incisional hernia repair without mesh 2/6/24/ Pt did well postoperatively and was d/c 2/14/2024.      (19 Feb 2024 16:45)      Brief Hospital Course: Pt found to have pneumoperitoneum and evidence of small bowel perforation on CT imaging. Pt was admitted to surgery service and taken to OR for ex-lap with bowel resection. ICU consulted for post-operative hypotension with concern for septic shock.      PAST MEDICAL & SURGICAL HISTORY:  Other specified diabetes mellitus without complication, without long-term current use of insulin      HTN (hypertension)      DM (diabetes mellitus)      HLD (hyperlipidemia)      H/O abdominal hysterectomy            FAMILY HISTORY:  :       SOCIAL HX:  Denies smoking, EtOH or recreational drug use    ROS:  See HPI     Allergies    No Known Allergies    Intolerances          PHYSICAL EXAM    Vital Signs Last 24 Hrs  T(C): 36.6 (20 Feb 2024 00:06), Max: 37.5 (19 Feb 2024 10:58)  T(F): 97.9 (20 Feb 2024 00:06), Max: 99.5 (19 Feb 2024 10:58)  HR: 123 (20 Feb 2024 01:45) (90 - 138)  BP: 88/63 (20 Feb 2024 01:45) (82/55 - 143/80)  BP(mean): 72 (20 Feb 2024 01:45) (57 - 85)  ABP: --  ABP(mean): --  RR: 13 (20 Feb 2024 01:45) (12 - 22)  SpO2: 100% (20 Feb 2024 01:45) (97% - 100%)    O2 Parameters below as of 20 Feb 2024 01:45  Patient On (Oxygen Delivery Method): nasal cannula  O2 Flow (L/min): 2      GENERAL: middle-aged female, appears in mild pain, pleasant  HEAD: normocephalic, atraumatic  EYES: sclera clear, no exudates  ENMT: oropharynx clear without erythema, no exudates, moist mucous membranes  NECK: supple, soft, no thyromegaly noted  LUNGS: clear to auscultation bilaterally, no wheezing, rhonchi or rales appreciated  HEART: S1/S2, +tachycardic, no murmurs noted, no lower extremity edema  GASTROINTESTINAL: +post-surgical abdomen with ZEKE drain in RLQ and midline wound vac, +mild distension  INTEGUMENT: good skin turgor, no lesions noted  MUSCULOSKELETAL: no clubbing or cyanosis, no obvious deformity  NEUROLOGIC: AAO x3, CNII-XII intact, no obvious sensorimotor deficits noted       02-19-24 @ 07:01  -  02-20-24 @ 02:04  --------------------------------------------------------  IN:    Lactated Ringers: 2700 mL  Total IN: 2700 mL    OUT:    Blood Loss (mL): 25 mL    Bulb (mL): 40 mL    Indwelling Catheter - Urethral (mL): 1500 mL  Total OUT: 1565 mL    Total NET: 1135 mL          LABS:                          9.6    6.39  )-----------( 707      ( 20 Feb 2024 00:15 )             29.6                                               02-20    134<L>  |  108  |  21<H>  ----------------------------<  204<H>  4.0   |  20<L>  |  1.19    Ca    8.1<L>      20 Feb 2024 00:15  Phos  3.6     02-20  Mg     1.2     02-20    TPro  8.4<H>  /  Alb  2.7<L>  /  TBili  0.6  /  DBili  x   /  AST  22  /  ALT  29  /  AlkPhos  138<H>  02-19      PT/INR - ( 19 Feb 2024 11:30 )   PT: 12.9 sec;   INR: 1.14 ratio         PTT - ( 19 Feb 2024 11:30 )  PTT:20.9 sec                                       Urinalysis Basic - ( 20 Feb 2024 00:15 )    Color: x / Appearance: x / SG: x / pH: x  Gluc: 204 mg/dL / Ketone: x  / Bili: x / Urobili: x   Blood: x / Protein: x / Nitrite: x   Leuk Esterase: x / RBC: x / WBC x   Sq Epi: x / Non Sq Epi: x / Bacteria: x                                                  LIVER FUNCTIONS - ( 19 Feb 2024 11:30 )  Alb: 2.7 g/dL / Pro: 8.4 g/dL / ALK PHOS: 138 U/L / ALT: 29 U/L DA / AST: 22 U/L / GGT: x                                                  MEDICATIONS  (STANDING):  acetaminophen   IVPB .. 1000 milliGRAM(s) IV Intermittent every 6 hours  dextrose 5% + sodium chloride 0.9%. 1000 milliLiter(s) (110 mL/Hr) IV Continuous <Continuous>  dextrose 5%. 1000 milliLiter(s) (50 mL/Hr) IV Continuous <Continuous>  dextrose 5%. 1000 milliLiter(s) (100 mL/Hr) IV Continuous <Continuous>  dextrose 50% Injectable 25 Gram(s) IV Push once  dextrose 50% Injectable 25 Gram(s) IV Push once  dextrose 50% Injectable 12.5 Gram(s) IV Push once  glucagon  Injectable 1 milliGRAM(s) IntraMuscular once  heparin   Injectable 5000 Unit(s) SubCutaneous every 8 hours  insulin lispro (ADMELOG) corrective regimen sliding scale   SubCutaneous every 6 hours  piperacillin/tazobactam IVPB.- 3.375 Gram(s) IV Intermittent once  piperacillin/tazobactam IVPB.- 3.375 Gram(s) IV Intermittent once  piperacillin/tazobactam IVPB.- 3.375 Gram(s) IV Intermittent once  piperacillin/tazobactam IVPB.. 3.375 Gram(s) IV Intermittent every 8 hours  vancomycin  IVPB      vancomycin  IVPB 1000 milliGRAM(s) IV Intermittent every 12 hours    MEDICATIONS  (PRN):  dextrose Oral Gel 15 Gram(s) Oral once PRN Blood Glucose LESS THAN 70 milliGRAM(s)/deciliter  HYDROmorphone  Injectable 0.5 milliGRAM(s) IV Push every 10 minutes PRN Moderate Pain (4 - 6)  HYDROmorphone  Injectable 0.5 milliGRAM(s) IV Push every 4 hours PRN Severe Pain (7 - 10)  ondansetron Injectable 4 milliGRAM(s) IV Push once PRN Nausea and/or Vomiting        IMAGING:     ACC: [id number] EXAM:  CT ABDOMEN AND PELVIS OC   ORDERED BY: RUDY THURMAN     PROCEDURE DATE:  02/19/2024          INTERPRETATION:  CLINICAL INFORMATION: Abdominal pain and fever; recent   surgery    COMPARISON: 2/11/2024    CONTRAST/COMPLICATIONS:  IV Contrast: NONE  Oral Contrast: Gastroview  Complications: None reported at time of study completion    PROCEDURE:  CT of the Abdomen and Pelvis was performed.  Sagittal and coronal reformats were performed.    FINDINGS:  LOWER CHEST: Basilar atelectasis.    Please note that evaluation of the abdominal organs and vascular   structures is limited by lack of intravenous contrast.    LIVER: Within normal limits.  BILE DUCTS: Normal caliber.  GALLBLADDER: Within normal limits.  SPLEEN: Within normal limits.  PANCREAS: Within normal limits.  ADRENALS: Within normal limits.  KIDNEYS/URETERS: No renal stones or hydronephrosis. Renal hypodensities,   possibly cysts.    BLADDER: Small amount of intravesical gas.  REPRODUCTIVE ORGANS: Hysterectomy.    BOWEL: Mildly dilated loops of small bowel. Appendix surgically absent.  PERITONEUM: Free fluid, extraluminal gas and extraluminal oral contrast   in the lower abdomen.  VESSELS: Atherosclerotic calcifications.  RETROPERITONEUM/LYMPH NODES: No lymphadenopathy.  ABDOMINAL WALL: Postsurgical changes.  BONES: Degenerative changes.    IMPRESSION:  Mildly dilated loops of small bowel, possibly postsurgical ileus.  Extraluminal oral contrast in the lower abdomen compatible with bowel   perforation.  Pneumoperitoneum.    Findings were discussed with Dr. Thurman by telephone on 2/19/2024 at 1554   hours.    --- End of Report ---        JEANIE AZUL MD; Attending Radiologist  This document has been electronically signed. Feb 19 2024  3:55PM

## 2024-02-20 NOTE — PROGRESS NOTE ADULT - SUBJECTIVE AND OBJECTIVE BOX
POST-OPERATIVE NOTE    Subjective:   59y Female s/p Ex lap SBR and abdominal washout POD #0 .. Pain is well controlled. Denies any other complaints. .     Vital Signs Last 24 Hrs  T(C): 36.3 (20 Feb 2024 03:30), Max: 37.5 (19 Feb 2024 10:58)  T(F): 97.3 (20 Feb 2024 03:30), Max: 99.5 (19 Feb 2024 10:58)  HR: 116 (20 Feb 2024 05:00) (90 - 138)  BP: 94/65 (20 Feb 2024 05:00) (82/55 - 143/80)  BP(mean): 76 (20 Feb 2024 05:00) (57 - 85)  RR: 15 (20 Feb 2024 05:00) (9 - 22)  SpO2: 100% (20 Feb 2024 05:00) (97% - 100%)    Parameters below as of 20 Feb 2024 03:30  Patient On (Oxygen Delivery Method): room air      I&O's Detail    19 Feb 2024 07:01  -  20 Feb 2024 06:48  --------------------------------------------------------  IN:    Lactated Ringers: 2700 mL    Lactated Ringers: 100 mL    Lactated Ringers Bolus: 1000 mL  Total IN: 3800 mL    OUT:    Blood Loss (mL): 25 mL    Bulb (mL): 40 mL    Indwelling Catheter - Urethral (mL): 1500 mL    Phenylephrine: 0 mL  Total OUT: 1565 mL    Total NET: 2235 mL          Physical Exam:  General: NAD, comfortable  Pulmonary: Nonlabored breathing, no respiratory distress  Cardiovascular: NSR, S1, S2  Abdominal: soft, NT/ND, dressing c/d/i  Extremities: no edema, no calf tenderness, distal pulses are palpable     LABS:                        8.5    x     )-----------( 585      ( 20 Feb 2024 03:55 )             26.5     02-20    134<L>  |  107  |  21<H>  ----------------------------<  223<H>  4.3   |  22  |  1.33<H>    Ca    7.8<L>      20 Feb 2024 03:55  Phos  3.5     02-20  Mg     1.4     02-20    TPro  5.4<L>  /  Alb  1.7<L>  /  TBili  1.5<H>  /  DBili  x   /  AST  21  /  ALT  23  /  AlkPhos  93  02-20    LIVER FUNCTIONS - ( 20 Feb 2024 03:55 )  Alb: 1.7 g/dL / Pro: 5.4 g/dL / ALK PHOS: 93 U/L / ALT: 23 U/L DA / AST: 21 U/L / GGT: x             MEDICATIONS  (STANDING):  acetaminophen   IVPB .. 1000 milliGRAM(s) IV Intermittent every 6 hours  chlorhexidine 2% Cloths 1 Application(s) Topical <User Schedule>  dextrose 5% + lactated ringers. 1000 milliLiter(s) (110 mL/Hr) IV Continuous <Continuous>  insulin glargine Injectable (LANTUS) 7 Unit(s) SubCutaneous at bedtime  insulin lispro (ADMELOG) corrective regimen sliding scale   SubCutaneous every 6 hours  phenylephrine    Infusion 0.1 MICROgram(s)/kG/Min (2.51 mL/Hr) IV Continuous <Continuous>  piperacillin/tazobactam IVPB.- 3.375 Gram(s) IV Intermittent once  piperacillin/tazobactam IVPB.- 3.375 Gram(s) IV Intermittent once  piperacillin/tazobactam IVPB.. 3.375 Gram(s) IV Intermittent every 8 hours    MEDICATIONS  (PRN):  HYDROmorphone  Injectable 1 milliGRAM(s) IV Push every 4 hours PRN Severe Pain (7 - 10)      Assessment:  The patient is a 59y Female who is s/p.Ex lap SBR and abdominal washout POD #0  Stable     Plan:  -Cont maintenance IVF  - Pain control as needed  -Resume diet, Adv diet as tolerated  -Dressing change prn  -Incentive spirometer  - OOB and ambulating as tolerated  - F/u AM labs  - DVT ppx

## 2024-02-20 NOTE — PATIENT PROFILE ADULT - FALL HARM RISK - UNIVERSAL INTERVENTIONS
Bed in lowest position, wheels locked, appropriate side rails in place/Call bell, personal items and telephone in reach/Instruct patient to call for assistance before getting out of bed or chair/Non-slip footwear when patient is out of bed/[place] to call system/Physically safe environment - no spills, clutter or unnecessary equipment/Purposeful Proactive Rounding/Room/bathroom lighting operational, light cord in reach

## 2024-02-20 NOTE — PROGRESS NOTE ADULT - ASSESSMENT
58 y/o female with bowel perforation, pneumoperitoneum     Plan  - admit to Dr. Lopez   - add on for dx lap, poss ex lap, poss SBR today   - NPO + IVF  - NGT  - IV abx  - pain / nausea control prn   - T&S pending  - consent pending  59F with PMHx of HTN HLD, DM p/w decreased appetite of few days duration and vaginal pain/spasms. Pt is s/p recent diagnostic laparoscopy, lysis of adhesions, conversion to laparotomy for adhesiolysis and incisional hernia repair without mesh 2/6/24/ Pt did well postoperatively and was d/c 2/14/2024. Pt found to have pneumoperitoneum and evidence of small bowel perforation on CT imaging as above. Pt was admitted to surgery service and taken to OR for ex-lap with bowel resection. Pt admitted to ICU from PACU for hypotension with concern for septic shock.    MEDREC OBTAINED FROM [facility],  TEAM TO CONFIRM HOME MEDS    #Septic shock   #Small bowel perforation   #hyponatremia   #DEION    #HTN   #HLD   #DM      PLAN:    _________CNS___________  no acute issues     _________CVS___________  #septic shock   -pt p/w hypotension, tachycardia, WBC 17k   -2/2 intraabdominal abscess, small-bowel perforation  -c/w zosyn, will hold on vanc for now  -c/w standing IVF and pressor support  -f/u blood and tissue/abscess cx     #HTN  -on valsartan-HCTZ at home (per surescripts)  -hold meds in setting of shock    #HLD  -on statin per surescripts   -hold while NPO      _________ RESP__________  no acute issues   Incentive spirometery    __________GI____________  #Small bowel perforation  -s/p OR 2/19 for ex-lap and bowel resection  - NGT to low intermmitent suction  - monitor drain output   - pain control with multi-modal analgesia  abdominal examination benign    Diet-advance as tolerated, started on clears 2/20  ________ RENAL__________  #DEION   -likely pre-renal 2/2 hypovolemia and sepsis as above   -resolving with IVF  -monitor Cr   -avoid nephrotoxins  f/u UA and Urine lytes    _________MSK___________  no acute issues   OOBTC    __________ID____________  #Septic shock  plan as above     _________ENDO__________  #Diabetes mellitus  -on lantus 15u in AM, metformin and ozempic at home per surscripts  -c/w lantus 7u hd and SSI q6 while NPO     _______HEME/ONC_______  #thrombocytopenia  -PLT 700s, likely reactive in setting of sepsis, post-op  -monitor CBC  downtrending    #Anemia   -HGB 9.6  -minimal EBL in OR per surgery  -no signs of active bleeding  -monitor CBC  Hb stable    _________SKIN____________  drains as above   Phentolamine for extravasated levophed  ________Prophylaxis_______  #DVT  Heparin subcut  ________GOC/ DISPO_______  FULL CODE   ICU

## 2024-02-20 NOTE — CONSULT NOTE ADULT - ASSESSMENT
59F with PMHx of HTN HLD, DM p/w decreased appetite of few days duration and vaginal pain/spasms. Pt is s/p recent diagnostic laparoscopy, lysis of adhesions, conversion to laparotomy for adhesiolysis and incisional hernia repair without mesh 2/6/24/ Pt did well postoperatively and was d/c 2/14/2024. Pt found to have pneumoperitoneum and evidence of small bowel perforation on CT imaging as above. Pt was admitted to surgery service and taken to OR for ex-lap with bowel resection. Medicine consulted for hyponatremia and medical co-management.     #Septic shock   #Small bowel perforation   #hyponatremia   #DEION    #HTN   #HLD   #DM    PLAN:    _________CNS___________  no acute issues     _________CVS___________  #septic shock     _________ RESP__________  no acute issues     __________GI____________  #Small bowel perforation  -s/p OR 2/19 for ex-lap and bowel resection   -pain control       ________ RENAL__________  #hyponatremia  -hypovolemic hyponatremia  -Na 129 (corrected for hyperglycemia)  -c/w maintenance IVF with BMPq6   -avoid overcorrection (Goal 6-8meq in 24 hours)     #DEION   -likely pre-renal 2/2 hypovlemia and sepsis as above   -resolving with IVF  -monitor Cr   -avoid nephrotoxins    _________MSK___________  no acute issues     __________ID____________  #Septic shock  -pt p/w hypotension, tachycardia, WBC 17k   -2/2 intraabdominal infection in setting of small-bowel perforation  -c/w broad spectrum abx and maintenance IVF  -f/u blood cx     _________ENDO__________  #Diabetes mellitus  -on lantus 15u in AM, metformin and ozempic at home   -c/w lantus 7u and SSI q6 while NPO     _______HEME/ONC_______  #anemia    _________SKIN____________  drains as above     ________Prophylaxis_______  #DVT  SCDs in setting of post-op     #GI  PPI    ________GOC/ DISPO_______  FULL CODE   ICU       59F with PMHx of HTN HLD, DM p/w decreased appetite of few days duration and vaginal pain/spasms. Pt is s/p recent diagnostic laparoscopy, lysis of adhesions, conversion to laparotomy for adhesiolysis and incisional hernia repair without mesh 2/6/24/ Pt did well postoperatively and was d/c 2/14/2024. Pt found to have pneumoperitoneum and evidence of small bowel perforation on CT imaging as above. Pt was admitted to surgery service and taken to OR for ex-lap with bowel resection. Pt admitted to ICU from PACU for hypotension with concern for septic shock.    #Septic shock   #Small bowel perforation   #hyponatremia   #DEION    #HTN   #HLD   #DM    PLAN:    _________CNS___________  no acute issues     _________CVS___________  #septic shock     _________ RESP__________  no acute issues     __________GI____________  #Small bowel perforation  -s/p OR 2/19 for ex-lap and bowel resection   -pain control       ________ RENAL__________  #hyponatremia  -hypovolemic hyponatremia  -Na 129 (corrected for hyperglycemia)  -c/w maintenance IVF with BMPq6   -avoid overcorrection (Goal 6-8meq in 24 hours)     #DEION   -likely pre-renal 2/2 hypovlemia and sepsis as above   -resolving with IVF  -monitor Cr   -avoid nephrotoxins    _________MSK___________  no acute issues     __________ID____________  #Septic shock  -pt p/w hypotension, tachycardia, WBC 17k   -2/2 intraabdominal infection in setting of small-bowel perforation  -c/w broad spectrum abx and maintenance IVF  -f/u blood cx     _________ENDO__________  #Diabetes mellitus  -on lantus 15u in AM, metformin and ozempic at home   -c/w lantus 7u and SSI q6 while NPO     _______HEME/ONC_______  #anemia    _________SKIN____________  drains as above     ________Prophylaxis_______  #DVT  SCDs in setting of post-op     #GI  PPI    ________GOC/ DISPO_______  FULL CODE   ICU       59F with PMHx of HTN HLD, DM p/w decreased appetite of few days duration and vaginal pain/spasms. Pt is s/p recent diagnostic laparoscopy, lysis of adhesions, conversion to laparotomy for adhesiolysis and incisional hernia repair without mesh 2/6/24/ Pt did well postoperatively and was d/c 2/14/2024. Pt found to have pneumoperitoneum and evidence of small bowel perforation on CT imaging as above. Pt was admitted to surgery service and taken to OR for ex-lap with bowel resection. Pt admitted to ICU from PACU for hypotension with concern for septic shock.    MEDREC OBTAINED FROM [facility]  TEAM TO CONFIRM HOME MEDS    #Septic shock   #Small bowel perforation   #hyponatremia   #DEION    #HTN   #HLD   #DM      PLAN:    _________CNS___________  no acute issues     _________CVS___________  #septic shock   -pt p/w hypotension, tachycardia, WBC 17k   -2/2 intraabdominal abscess, small-bowel perforation  -c/w zosyn, will hold on vanc for now  -c/w standing IVF and pressor support  -f/u blood and tissue/abscess cx     #HTN  -on valsartan-HCTZ at home (per surescripts)  -hold meds in setting of shock    #HLD  -on statin per surescripts   -hold while NPO      _________ RESP__________  no acute issues     __________GI____________  #Small bowel perforation  -s/p OR 2/19 for ex-lap and bowel resection  - NGT to low intermmitent suction  - monitor drain output   - pain control with multi-modal analgesia    ________ RENAL__________  #DEION   -likely pre-renal 2/2 hypovolemia and sepsis as above   -resolving with IVF  -monitor Cr   -avoid nephrotoxins    _________MSK___________  no acute issues     __________ID____________  #Septic shock  plan as above     _________ENDO__________  #Diabetes mellitus  -on lantus 15u in AM, metformin and ozempic at home per surscripts  -c/w lantus 7u hd and SSI q6 while NPO     _______HEME/ONC_______  #thrombocytopenia   -PLT 700s, likely reactive in setting of sepsis, post-op  -monitor CBC    #Anemia   -HGB 9.6  -minimal EBL in OR per surgery  -no signs of active bleeding  -monitor CBC    _________SKIN____________  drains as above     ________Prophylaxis_______  #DVT  SCDs in setting of post-op     ________GOC/ DISPO_______  FULL CODE   ICU

## 2024-02-21 LAB
-  AMOXICILLIN/CLAVULANIC ACID: SIGNIFICANT CHANGE UP
-  AMPICILLIN/SULBACTAM: SIGNIFICANT CHANGE UP
-  AMPICILLIN: SIGNIFICANT CHANGE UP
-  AZTREONAM: SIGNIFICANT CHANGE UP
-  CEFAZOLIN: SIGNIFICANT CHANGE UP
-  CEFEPIME: SIGNIFICANT CHANGE UP
-  CEFOXITIN: SIGNIFICANT CHANGE UP
-  CEFTRIAXONE: SIGNIFICANT CHANGE UP
-  CIPROFLOXACIN: SIGNIFICANT CHANGE UP
-  ERTAPENEM: SIGNIFICANT CHANGE UP
-  GENTAMICIN: SIGNIFICANT CHANGE UP
-  IMIPENEM: SIGNIFICANT CHANGE UP
-  LEVOFLOXACIN: SIGNIFICANT CHANGE UP
-  MEROPENEM: SIGNIFICANT CHANGE UP
-  PIPERACILLIN/TAZOBACTAM: SIGNIFICANT CHANGE UP
-  TOBRAMYCIN: SIGNIFICANT CHANGE UP
-  TRIMETHOPRIM/SULFAMETHOXAZOLE: SIGNIFICANT CHANGE UP
ALBUMIN SERPL ELPH-MCNC: 1.4 G/DL — LOW (ref 3.5–5)
ALP SERPL-CCNC: 78 U/L — SIGNIFICANT CHANGE UP (ref 40–120)
ALT FLD-CCNC: 33 U/L DA — SIGNIFICANT CHANGE UP (ref 10–60)
ANION GAP SERPL CALC-SCNC: 6 MMOL/L — SIGNIFICANT CHANGE UP (ref 5–17)
APPEARANCE UR: CLEAR — SIGNIFICANT CHANGE UP
AST SERPL-CCNC: 32 U/L — SIGNIFICANT CHANGE UP (ref 10–40)
BACTERIA # UR AUTO: ABNORMAL /HPF
BASOPHILS # BLD AUTO: 0.06 K/UL — SIGNIFICANT CHANGE UP (ref 0–0.2)
BASOPHILS NFR BLD AUTO: 0.5 % — SIGNIFICANT CHANGE UP (ref 0–2)
BILIRUB SERPL-MCNC: 0.6 MG/DL — SIGNIFICANT CHANGE UP (ref 0.2–1.2)
BILIRUB UR-MCNC: NEGATIVE — SIGNIFICANT CHANGE UP
BUN SERPL-MCNC: 12 MG/DL — SIGNIFICANT CHANGE UP (ref 7–18)
CALCIUM SERPL-MCNC: 7.8 MG/DL — LOW (ref 8.4–10.5)
CHLORIDE SERPL-SCNC: 107 MMOL/L — SIGNIFICANT CHANGE UP (ref 96–108)
CO2 SERPL-SCNC: 23 MMOL/L — SIGNIFICANT CHANGE UP (ref 22–31)
COLOR SPEC: YELLOW — SIGNIFICANT CHANGE UP
COMMENT - URINE: SIGNIFICANT CHANGE UP
CREAT ?TM UR-MCNC: 58 MG/DL — SIGNIFICANT CHANGE UP
CREAT SERPL-MCNC: 0.93 MG/DL — SIGNIFICANT CHANGE UP (ref 0.5–1.3)
DIFF PNL FLD: ABNORMAL
EGFR: 71 ML/MIN/1.73M2 — SIGNIFICANT CHANGE UP
EOSINOPHIL # BLD AUTO: 0.29 K/UL — SIGNIFICANT CHANGE UP (ref 0–0.5)
EOSINOPHIL NFR BLD AUTO: 2.3 % — SIGNIFICANT CHANGE UP (ref 0–6)
EPI CELLS # UR: PRESENT
GLUCOSE SERPL-MCNC: 396 MG/DL — HIGH (ref 70–99)
GLUCOSE UR QL: NEGATIVE MG/DL — SIGNIFICANT CHANGE UP
GRAN CASTS # UR COMP ASSIST: PRESENT
HCT VFR BLD CALC: 20.7 % — CRITICAL LOW (ref 34.5–45)
HCT VFR BLD CALC: 23.5 % — LOW (ref 34.5–45)
HGB BLD-MCNC: 6.8 G/DL — CRITICAL LOW (ref 11.5–15.5)
HGB BLD-MCNC: 7.6 G/DL — LOW (ref 11.5–15.5)
HYALINE CASTS # UR AUTO: PRESENT
IMM GRANULOCYTES NFR BLD AUTO: 1.7 % — HIGH (ref 0–0.9)
KETONES UR-MCNC: NEGATIVE MG/DL — SIGNIFICANT CHANGE UP
LEUKOCYTE ESTERASE UR-ACNC: NEGATIVE — SIGNIFICANT CHANGE UP
LYMPHOCYTES # BLD AUTO: 1.75 K/UL — SIGNIFICANT CHANGE UP (ref 1–3.3)
LYMPHOCYTES # BLD AUTO: 14.1 % — SIGNIFICANT CHANGE UP (ref 13–44)
MAGNESIUM SERPL-MCNC: 2.2 MG/DL — SIGNIFICANT CHANGE UP (ref 1.6–2.6)
MCHC RBC-ENTMCNC: 26.5 PG — LOW (ref 27–34)
MCHC RBC-ENTMCNC: 26.9 PG — LOW (ref 27–34)
MCHC RBC-ENTMCNC: 32.3 GM/DL — SIGNIFICANT CHANGE UP (ref 32–36)
MCHC RBC-ENTMCNC: 32.9 GM/DL — SIGNIFICANT CHANGE UP (ref 32–36)
MCV RBC AUTO: 81.8 FL — SIGNIFICANT CHANGE UP (ref 80–100)
MCV RBC AUTO: 81.9 FL — SIGNIFICANT CHANGE UP (ref 80–100)
METHOD TYPE: SIGNIFICANT CHANGE UP
MONOCYTES # BLD AUTO: 1.1 K/UL — HIGH (ref 0–0.9)
MONOCYTES NFR BLD AUTO: 8.9 % — SIGNIFICANT CHANGE UP (ref 2–14)
NEUTROPHILS # BLD AUTO: 9 K/UL — HIGH (ref 1.8–7.4)
NEUTROPHILS NFR BLD AUTO: 72.5 % — SIGNIFICANT CHANGE UP (ref 43–77)
NITRITE UR-MCNC: NEGATIVE — SIGNIFICANT CHANGE UP
NRBC # BLD: 0 /100 WBCS — SIGNIFICANT CHANGE UP (ref 0–0)
NRBC # BLD: 0 /100 WBCS — SIGNIFICANT CHANGE UP (ref 0–0)
PH UR: 5 — SIGNIFICANT CHANGE UP (ref 5–8)
PHOSPHATE SERPL-MCNC: 3 MG/DL — SIGNIFICANT CHANGE UP (ref 2.5–4.5)
PLATELET # BLD AUTO: 481 K/UL — HIGH (ref 150–400)
PLATELET # BLD AUTO: 601 K/UL — HIGH (ref 150–400)
POTASSIUM SERPL-MCNC: 3.8 MMOL/L — SIGNIFICANT CHANGE UP (ref 3.5–5.3)
POTASSIUM SERPL-SCNC: 3.8 MMOL/L — SIGNIFICANT CHANGE UP (ref 3.5–5.3)
PROT SERPL-MCNC: 5.1 G/DL — LOW (ref 6–8.3)
PROT UR-MCNC: NEGATIVE MG/DL — SIGNIFICANT CHANGE UP
RBC # BLD: 2.53 M/UL — LOW (ref 3.8–5.2)
RBC # BLD: 2.87 M/UL — LOW (ref 3.8–5.2)
RBC # FLD: 14.4 % — SIGNIFICANT CHANGE UP (ref 10.3–14.5)
RBC # FLD: 14.7 % — HIGH (ref 10.3–14.5)
RBC CASTS # UR COMP ASSIST: 3 /HPF — SIGNIFICANT CHANGE UP (ref 0–4)
SODIUM SERPL-SCNC: 136 MMOL/L — SIGNIFICANT CHANGE UP (ref 135–145)
SODIUM UR-SCNC: 48 MMOL/L — SIGNIFICANT CHANGE UP
SP GR SPEC: 1.02 — SIGNIFICANT CHANGE UP (ref 1–1.03)
UROBILINOGEN FLD QL: 0.2 MG/DL — SIGNIFICANT CHANGE UP (ref 0.2–1)
UUN UR-MCNC: 309 MG/DL — SIGNIFICANT CHANGE UP
WBC # BLD: 12.41 K/UL — HIGH (ref 3.8–10.5)
WBC # BLD: 13.08 K/UL — HIGH (ref 3.8–10.5)
WBC # FLD AUTO: 12.41 K/UL — HIGH (ref 3.8–10.5)
WBC # FLD AUTO: 13.08 K/UL — HIGH (ref 3.8–10.5)
WBC UR QL: 0 /HPF — SIGNIFICANT CHANGE UP (ref 0–5)

## 2024-02-21 PROCEDURE — 99223 1ST HOSP IP/OBS HIGH 75: CPT

## 2024-02-21 RX ORDER — HYDROMORPHONE HYDROCHLORIDE 2 MG/ML
1 INJECTION INTRAMUSCULAR; INTRAVENOUS; SUBCUTANEOUS ONCE
Refills: 0 | Status: DISCONTINUED | OUTPATIENT
Start: 2024-02-21 | End: 2024-02-21

## 2024-02-21 RX ORDER — SODIUM CHLORIDE 9 MG/ML
1000 INJECTION, SOLUTION INTRAVENOUS
Refills: 0 | Status: DISCONTINUED | OUTPATIENT
Start: 2024-02-21 | End: 2024-02-22

## 2024-02-21 RX ORDER — FLUCONAZOLE 150 MG/1
400 TABLET ORAL EVERY 24 HOURS
Refills: 0 | Status: DISCONTINUED | OUTPATIENT
Start: 2024-02-21 | End: 2024-02-21

## 2024-02-21 RX ORDER — BENZOCAINE AND MENTHOL 5; 1 G/100ML; G/100ML
1 LIQUID ORAL
Refills: 0 | Status: DISCONTINUED | OUTPATIENT
Start: 2024-02-21 | End: 2024-03-08

## 2024-02-21 RX ORDER — SODIUM CHLORIDE 9 MG/ML
1000 INJECTION, SOLUTION INTRAVENOUS
Refills: 0 | Status: DISCONTINUED | OUTPATIENT
Start: 2024-02-21 | End: 2024-02-21

## 2024-02-21 RX ADMIN — HYDROMORPHONE HYDROCHLORIDE 1 MILLIGRAM(S): 2 INJECTION INTRAMUSCULAR; INTRAVENOUS; SUBCUTANEOUS at 13:37

## 2024-02-21 RX ADMIN — SODIUM CHLORIDE 70 MILLILITER(S): 9 INJECTION, SOLUTION INTRAVENOUS at 12:38

## 2024-02-21 RX ADMIN — Medication 15 MILLIGRAM(S): at 00:06

## 2024-02-21 RX ADMIN — HEPARIN SODIUM 5000 UNIT(S): 5000 INJECTION INTRAVENOUS; SUBCUTANEOUS at 05:57

## 2024-02-21 RX ADMIN — CHLORHEXIDINE GLUCONATE 1 APPLICATION(S): 213 SOLUTION TOPICAL at 05:58

## 2024-02-21 RX ADMIN — HYDROMORPHONE HYDROCHLORIDE 1 MILLIGRAM(S): 2 INJECTION INTRAMUSCULAR; INTRAVENOUS; SUBCUTANEOUS at 20:30

## 2024-02-21 RX ADMIN — HYDROMORPHONE HYDROCHLORIDE 1 MILLIGRAM(S): 2 INJECTION INTRAMUSCULAR; INTRAVENOUS; SUBCUTANEOUS at 16:19

## 2024-02-21 RX ADMIN — Medication 6.85 MICROGRAM(S)/KG/MIN: at 06:39

## 2024-02-21 RX ADMIN — SODIUM CHLORIDE 75 MILLILITER(S): 9 INJECTION, SOLUTION INTRAVENOUS at 20:19

## 2024-02-21 RX ADMIN — HYDROMORPHONE HYDROCHLORIDE 1 MILLIGRAM(S): 2 INJECTION INTRAMUSCULAR; INTRAVENOUS; SUBCUTANEOUS at 19:59

## 2024-02-21 RX ADMIN — SODIUM CHLORIDE 50 MILLILITER(S): 9 INJECTION, SOLUTION INTRAVENOUS at 03:06

## 2024-02-21 RX ADMIN — Medication 2: at 23:25

## 2024-02-21 RX ADMIN — Medication 15 MILLIGRAM(S): at 00:36

## 2024-02-21 RX ADMIN — PIPERACILLIN AND TAZOBACTAM 25 GRAM(S): 4; .5 INJECTION, POWDER, LYOPHILIZED, FOR SOLUTION INTRAVENOUS at 05:57

## 2024-02-21 RX ADMIN — HYDROMORPHONE HYDROCHLORIDE 1 MILLIGRAM(S): 2 INJECTION INTRAMUSCULAR; INTRAVENOUS; SUBCUTANEOUS at 16:47

## 2024-02-21 RX ADMIN — HEPARIN SODIUM 5000 UNIT(S): 5000 INJECTION INTRAVENOUS; SUBCUTANEOUS at 13:39

## 2024-02-21 RX ADMIN — Medication 4: at 05:57

## 2024-02-21 RX ADMIN — HYDROMORPHONE HYDROCHLORIDE 1 MILLIGRAM(S): 2 INJECTION INTRAMUSCULAR; INTRAVENOUS; SUBCUTANEOUS at 13:45

## 2024-02-21 RX ADMIN — HEPARIN SODIUM 5000 UNIT(S): 5000 INJECTION INTRAVENOUS; SUBCUTANEOUS at 22:47

## 2024-02-21 RX ADMIN — BENZOCAINE AND MENTHOL 1 LOZENGE: 5; 1 LIQUID ORAL at 00:07

## 2024-02-21 RX ADMIN — INSULIN GLARGINE 7 UNIT(S): 100 INJECTION, SOLUTION SUBCUTANEOUS at 22:46

## 2024-02-21 RX ADMIN — PIPERACILLIN AND TAZOBACTAM 25 GRAM(S): 4; .5 INJECTION, POWDER, LYOPHILIZED, FOR SOLUTION INTRAVENOUS at 22:47

## 2024-02-21 RX ADMIN — BENZOCAINE AND MENTHOL 1 LOZENGE: 5; 1 LIQUID ORAL at 16:07

## 2024-02-21 RX ADMIN — Medication 2: at 00:07

## 2024-02-21 RX ADMIN — PIPERACILLIN AND TAZOBACTAM 25 GRAM(S): 4; .5 INJECTION, POWDER, LYOPHILIZED, FOR SOLUTION INTRAVENOUS at 13:38

## 2024-02-21 RX ADMIN — BENZOCAINE AND MENTHOL 1 LOZENGE: 5; 1 LIQUID ORAL at 18:34

## 2024-02-21 NOTE — DIETITIAN INITIAL EVALUATION ADULT - PERTINENT MEDS FT
MEDICATIONS  (STANDING):  benzocaine/menthol Lozenge 1 Lozenge Oral every 2 hours  chlorhexidine 2% Cloths 1 Application(s) Topical <User Schedule>  heparin   Injectable 5000 Unit(s) SubCutaneous every 8 hours  insulin glargine Injectable (LANTUS) 7 Unit(s) SubCutaneous at bedtime  insulin lispro (ADMELOG) corrective regimen sliding scale   SubCutaneous every 6 hours  lactated ringers. 1000 milliLiter(s) (70 mL/Hr) IV Continuous <Continuous>  piperacillin/tazobactam IVPB.. 3.375 Gram(s) IV Intermittent every 8 hours    MEDICATIONS  (PRN):  HYDROmorphone  Injectable 1 milliGRAM(s) IV Push every 4 hours PRN Severe Pain (7 - 10)

## 2024-02-21 NOTE — DIETITIAN INITIAL EVALUATION ADULT - REASON INDICATOR FOR ASSESSMENT
MST 2 or greater. LOS (ICU), Recent admission (sx floor) prolonged poor oral intake. Discussed w/ Sx PMD 2/20 PM, requesting PN.

## 2024-02-21 NOTE — DIETITIAN INITIAL EVALUATION ADULT - PARENTERAL
Consider: 1500 kcals PN: 500 kcals lipids (50 gm), 400 kcals protein (100 gm) 600 kcals CHO (176 gm)

## 2024-02-21 NOTE — PROGRESS NOTE ADULT - SUBJECTIVE AND OBJECTIVE BOX
INTERVAL HPI/OVERNIGHT EVENTS:  Seen and examined at bedside.    PRESSORS: [  ] YES [ X ] NO  WHICH:    Antimicrobial:  piperacillin/tazobactam IVPB.. 3.375 Gram(s) IV Intermittent every 8 hours    Cardiovascular:  norepinephrine Infusion 0.05 MICROgram(s)/kG/Min IV Continuous <Continuous>    Pulmonary:    Hematalogic:  heparin   Injectable 5000 Unit(s) SubCutaneous every 8 hours    Other:  benzocaine/menthol Lozenge 1 Lozenge Oral every 2 hours  chlorhexidine 2% Cloths 1 Application(s) Topical <User Schedule>  dextrose 5% + lactated ringers. 1000 milliLiter(s) IV Continuous <Continuous>  HYDROmorphone  Injectable 1 milliGRAM(s) IV Push every 4 hours PRN  insulin glargine Injectable (LANTUS) 7 Unit(s) SubCutaneous at bedtime  insulin lispro (ADMELOG) corrective regimen sliding scale   SubCutaneous every 6 hours    benzocaine/menthol Lozenge 1 Lozenge Oral every 2 hours  chlorhexidine 2% Cloths 1 Application(s) Topical <User Schedule>  dextrose 5% + lactated ringers. 1000 milliLiter(s) IV Continuous <Continuous>  heparin   Injectable 5000 Unit(s) SubCutaneous every 8 hours  HYDROmorphone  Injectable 1 milliGRAM(s) IV Push every 4 hours PRN  insulin glargine Injectable (LANTUS) 7 Unit(s) SubCutaneous at bedtime  insulin lispro (ADMELOG) corrective regimen sliding scale   SubCutaneous every 6 hours  norepinephrine Infusion 0.05 MICROgram(s)/kG/Min IV Continuous <Continuous>  piperacillin/tazobactam IVPB.. 3.375 Gram(s) IV Intermittent every 8 hours    Drug Dosing Weight  Height (cm): 162.6 (2024 10:58)  Weight (kg): 73.1 (2024 03:45)  BMI (kg/m2): 27.6 (2024 03:45)  BSA (m2): 1.78 (2024 03:45)    CENTRAL LINE: [ ] YES [ ] NO  LOCATION:   DATE INSERTED:  REMOVE: [ ] YES [ ] NO  EXPLAIN:    HOWE: [ ] YES [ ] NO    DATE INSERTED:  REMOVE:  [ ] YES [ ] NO  EXPLAIN:    A-LINE:  [ ] YES [ ] NO  LOCATION:   DATE INSERTED:  REMOVE:  [ ] YES [ ] NO  EXPLAIN:    PMH -reviewed admission note, no change since admission  PAST MEDICAL & SURGICAL HISTORY:  Other specified diabetes mellitus without complication, without long-term current use of insulin      HTN (hypertension)      DM (diabetes mellitus)      HLD (hyperlipidemia)      H/O abdominal hysterectomy          ICU Vital Signs Last 24 Hrs  T(C): 37.3 (2024 05:00), Max: 37.3 (2024 05:00)  T(F): 99.2 (2024 05:00), Max: 99.2 (2024 05:00)  HR: 106 (2024 08:00) (84 - 141)  BP: 113/64 (2024 08:00) (68/46 - 139/75)  BP(mean): 79 (2024 08:00) (52 - 95)  ABP: --  ABP(mean): --  RR: 17 (2024 08:00) (11 - 25)  SpO2: 97% (2024 08:00) (96% - 100%)    O2 Parameters below as of 2024 04:00  Patient On (Oxygen Delivery Method): nasal cannula  O2 Flow (L/min): 2                -20 @ 07:01  -  02- @ 07:00  --------------------------------------------------------  IN: 2445.4 mL / OUT: 2000 mL / NET: 445.4 mL            PHYSICAL EXAM:    GENERAL: NAD, well-groomed, well-developed  HEAD:  Atraumatic, Normocephalic  EYES: EOMI, PERRLA, conjunctiva and sclera clear  ENMT: No tonsillar erythema, exudates, or enlargement; Moist mucous membranes, Good dentition, No lesions  NECK: Supple, normal appearance, No JVD; Normal thyroid; Trachea midline  NERVOUS SYSTEM:  Alert & Oriented X3,  Motor Strength 5/5 B/L upper and lower extremities; DTRs 2+ intact and symmetric  CHEST/LUNG: No chest deformity; Normal percussion bilaterally; No rales, rhonchi, wheezing   HEART: Regular rate and rhythm; No murmurs, rubs, or gallops  ABDOMEN: Soft, Nontender, Nondistended; Bowel sounds present  EXTREMITIES:  2+ Peripheral Pulses, No clubbing, cyanosis, or edema  LYMPH: No lymphadenopathy noted  SKIN: No rashes or lesions;  Good capillary refill      LABS:  CBC Full  -  ( 2024 04:14 )  WBC Count : 13.08 K/uL  RBC Count : 2.53 M/uL  Hemoglobin : 6.8 g/dL  Hematocrit : 20.7 %  Platelet Count - Automated : 601 K/uL  Mean Cell Volume : 81.8 fl  Mean Cell Hemoglobin : 26.9 pg  Mean Cell Hemoglobin Concentration : 32.9 gm/dL  Auto Neutrophil # : x  Auto Lymphocyte # : x  Auto Monocyte # : x  Auto Eosinophil # : x  Auto Basophil # : x  Auto Neutrophil % : x  Auto Lymphocyte % : x  Auto Monocyte % : x  Auto Eosinophil % : x  Auto Basophil % : x        136  |  107  |  12  ----------------------------<  396<H>  3.8   |  23  |  0.93    Ca    7.8<L>      2024 04:14  Phos  3.0       Mg     2.2         TPro  5.1<L>  /  Alb  1.4<L>  /  TBili  0.6  /  DBili  x   /  AST  32  /  ALT  33  /  AlkPhos  78      PT/INR - ( 2024 11:30 )   PT: 12.9 sec;   INR: 1.14 ratio         PTT - ( 2024 11:30 )  PTT:20.9 sec  Urinalysis Basic - ( 2024 04:15 )    Color: Yellow / Appearance: Clear / S.016 / pH: x  Gluc: x / Ketone: Negative mg/dL  / Bili: Negative / Urobili: 0.2 mg/dL   Blood: x / Protein: Negative mg/dL / Nitrite: Negative   Leuk Esterase: Negative / RBC: 3 /HPF / WBC 0 /HPF   Sq Epi: x / Non Sq Epi: x / Bacteria: Few /HPF      Culture Results:   Rare Escherichia coli  Few Alpha hemolytic strep "Susceptibilities not performed" ( @ 23:20)  Culture Results:   >=3 organisms. Probable collection contamination. ( @ 11:53)  Culture Results:   No growth at 24 hours ( @ 11:45)  Culture Results:   No growth at 24 hours ( @ 11:30)      RADIOLOGY & ADDITIONAL STUDIES REVIEWED:  ***    [ ]GOALS OF CARE DISCUSSION WITH PATIENT/FAMILY/PROXY:    CRITICAL CARE TIME SPENT: 35 minutes INTERVAL HPI/OVERNIGHT EVENTS:  Seen and examined at bedside.    PRESSORS: [  ] YES [ X ] NO  WHICH:    Antimicrobial:  piperacillin/tazobactam IVPB.. 3.375 Gram(s) IV Intermittent every 8 hours    Cardiovascular:  norepinephrine Infusion 0.05 MICROgram(s)/kG/Min IV Continuous <Continuous>    Pulmonary:    Hematalogic:  heparin   Injectable 5000 Unit(s) SubCutaneous every 8 hours    Other:  benzocaine/menthol Lozenge 1 Lozenge Oral every 2 hours  chlorhexidine 2% Cloths 1 Application(s) Topical <User Schedule>  dextrose 5% + lactated ringers. 1000 milliLiter(s) IV Continuous <Continuous>  HYDROmorphone  Injectable 1 milliGRAM(s) IV Push every 4 hours PRN  insulin glargine Injectable (LANTUS) 7 Unit(s) SubCutaneous at bedtime  insulin lispro (ADMELOG) corrective regimen sliding scale   SubCutaneous every 6 hours    benzocaine/menthol Lozenge 1 Lozenge Oral every 2 hours  chlorhexidine 2% Cloths 1 Application(s) Topical <User Schedule>  dextrose 5% + lactated ringers. 1000 milliLiter(s) IV Continuous <Continuous>  heparin   Injectable 5000 Unit(s) SubCutaneous every 8 hours  HYDROmorphone  Injectable 1 milliGRAM(s) IV Push every 4 hours PRN  insulin glargine Injectable (LANTUS) 7 Unit(s) SubCutaneous at bedtime  insulin lispro (ADMELOG) corrective regimen sliding scale   SubCutaneous every 6 hours  norepinephrine Infusion 0.05 MICROgram(s)/kG/Min IV Continuous <Continuous>  piperacillin/tazobactam IVPB.. 3.375 Gram(s) IV Intermittent every 8 hours    Drug Dosing Weight  Height (cm): 162.6 (2024 10:58)  Weight (kg): 73.1 (2024 03:45)  BMI (kg/m2): 27.6 (2024 03:45)  BSA (m2): 1.78 (2024 03:45)    CENTRAL LINE: [ ] YES [ ] NO  LOCATION:   DATE INSERTED:  REMOVE: [ ] YES [ ] NO  EXPLAIN:    HOWE: [ ] YES [ ] NO    DATE INSERTED:  REMOVE:  [ ] YES [ ] NO  EXPLAIN:    A-LINE:  [ ] YES [ ] NO  LOCATION:   DATE INSERTED:  REMOVE:  [ ] YES [ ] NO  EXPLAIN:    PMH -reviewed admission note, no change since admission  PAST MEDICAL & SURGICAL HISTORY:  Other specified diabetes mellitus without complication, without long-term current use of insulin      HTN (hypertension)      DM (diabetes mellitus)      HLD (hyperlipidemia)      H/O abdominal hysterectomy          ICU Vital Signs Last 24 Hrs  T(C): 37.3 (2024 05:00), Max: 37.3 (2024 05:00)  T(F): 99.2 (2024 05:00), Max: 99.2 (2024 05:00)  HR: 106 (2024 08:00) (84 - 141)  BP: 113/64 (2024 08:00) (68/46 - 139/75)  BP(mean): 79 (2024 08:00) (52 - 95)  ABP: --  ABP(mean): --  RR: 17 (2024 08:00) (11 - 25)  SpO2: 97% (2024 08:00) (96% - 100%)    O2 Parameters below as of 2024 04:00  Patient On (Oxygen Delivery Method): nasal cannula  O2 Flow (L/min): 2                -20 @ 07:01  -  02- @ 07:00  --------------------------------------------------------  IN: 2445.4 mL / OUT: 2000 mL / NET: 445.4 mL            PHYSICAL EXAM:    GENERAL: patient was uncomfortable on examination  HEAD:  Atraumatic, Normocephalic  EYES: EOMI, PERRLA, conjunctiva and sclera clear  ENMT: No tonsillar erythema, exudates, or enlargement; Moist mucous membranes, Good dentition, No lesions  NECK: Supple, normal appearance, No JVD; Normal thyroid; Trachea midline  NERVOUS SYSTEM:  Alert & Oriented X3  CHEST/LUNG: No chest deformity; No rales, rhonchi, wheezing   HEART: Regular rate and rhythm; No murmurs, rubs, or gallops  ABDOMEN: Soft, Nontender, Nondistended; Bowel sounds present  EXTREMITIES:  2+ Peripheral Pulses, No clubbing, cyanosis, or edema  LYMPH: No lymphadenopathy noted  SKIN: No rashes or lesions;  Good capillary refill      LABS:  CBC Full  -  ( 2024 04:14 )  WBC Count : 13.08 K/uL  RBC Count : 2.53 M/uL  Hemoglobin : 6.8 g/dL  Hematocrit : 20.7 %  Platelet Count - Automated : 601 K/uL  Mean Cell Volume : 81.8 fl  Mean Cell Hemoglobin : 26.9 pg  Mean Cell Hemoglobin Concentration : 32.9 gm/dL  Auto Neutrophil # : x  Auto Lymphocyte # : x  Auto Monocyte # : x  Auto Eosinophil # : x  Auto Basophil # : x  Auto Neutrophil % : x  Auto Lymphocyte % : x  Auto Monocyte % : x  Auto Eosinophil % : x  Auto Basophil % : x        136  |  107  |  12  ----------------------------<  396<H>  3.8   |  23  |  0.93    Ca    7.8<L>      2024 04:14  Phos  3.0       Mg     2.2         TPro  5.1<L>  /  Alb  1.4<L>  /  TBili  0.6  /  DBili  x   /  AST  32  /  ALT  33  /  AlkPhos  78      PT/INR - ( 2024 11:30 )   PT: 12.9 sec;   INR: 1.14 ratio         PTT - ( 2024 11:30 )  PTT:20.9 sec  Urinalysis Basic - ( 2024 04:15 )    Color: Yellow / Appearance: Clear / S.016 / pH: x  Gluc: x / Ketone: Negative mg/dL  / Bili: Negative / Urobili: 0.2 mg/dL   Blood: x / Protein: Negative mg/dL / Nitrite: Negative   Leuk Esterase: Negative / RBC: 3 /HPF / WBC 0 /HPF   Sq Epi: x / Non Sq Epi: x / Bacteria: Few /HPF      Culture Results:   Rare Escherichia coli  Few Alpha hemolytic strep "Susceptibilities not performed" ( @ 23:20)  Culture Results:   >=3 organisms. Probable collection contamination. ( @ 11:53)  Culture Results:   No growth at 24 hours ( @ 11:45)  Culture Results:   No growth at 24 hours ( @ 11:30)      RADIOLOGY & ADDITIONAL STUDIES REVIEWED:  ***    [ ]GOALS OF CARE DISCUSSION WITH PATIENT/FAMILY/PROXY:    CRITICAL CARE TIME SPENT: 35 minutes

## 2024-02-21 NOTE — PROGRESS NOTE ADULT - SUBJECTIVE AND OBJECTIVE BOX
INTERVAL HPI/OVERNIGHT EVENTS:    POD1 s/p ex-lap, abdominal washout, SBR . Patient reports abdominal pain, denies N/V, fevers, chills. No flatus/bm   Off levophed gtt, VSS. ngt with 200cc/24hrs. fowler in place with adequate UOP. ZEKE with serous output 100cc/24hrs.    Vital Signs Last 24 Hrs  T(C): 37.3 (21 Feb 2024 05:00), Max: 37.3 (21 Feb 2024 05:00)  T(F): 99.2 (21 Feb 2024 05:00), Max: 99.2 (21 Feb 2024 05:00)  HR: 106 (21 Feb 2024 08:00) (84 - 141)  BP: 113/64 (21 Feb 2024 08:00) (68/46 - 139/75)  BP(mean): 79 (21 Feb 2024 08:00) (52 - 95)  RR: 17 (21 Feb 2024 08:00) (11 - 25)  SpO2: 97% (21 Feb 2024 08:00) (96% - 100%)      I&O's Detail    20 Feb 2024 07:01  -  21 Feb 2024 07:00  --------------------------------------------------------  IN:    dextrose 5% + lactated ringers: 350 mL    IV PiggyBack: 300 mL    IV PiggyBack: 400 mL    Lactated Ringers: 960 mL    Norepinephrine: 235.4 mL    Oral Fluid: 200 mL  Total IN: 2445.4 mL    OUT:    Bulb (mL): 105 mL    Indwelling Catheter - Urethral (mL): 1695 mL    Nasogastric/Oral tube (mL): 200 mL  Total OUT: 2000 mL    Total NET: 445.4 mL      MEDICATIONS  (STANDING):  benzocaine/menthol Lozenge 1 Lozenge Oral every 2 hours  chlorhexidine 2% Cloths 1 Application(s) Topical <User Schedule>  dextrose 5% + lactated ringers. 1000 milliLiter(s) (50 mL/Hr) IV Continuous <Continuous>  heparin   Injectable 5000 Unit(s) SubCutaneous every 8 hours  insulin glargine Injectable (LANTUS) 7 Unit(s) SubCutaneous at bedtime  insulin lispro (ADMELOG) corrective regimen sliding scale   SubCutaneous every 6 hours  norepinephrine Infusion 0.05 MICROgram(s)/kG/Min (6.85 mL/Hr) IV Continuous <Continuous>  piperacillin/tazobactam IVPB.. 3.375 Gram(s) IV Intermittent every 8 hours    MEDICATIONS  (PRN):  HYDROmorphone  Injectable 1 milliGRAM(s) IV Push every 4 hours PRN Severe Pain (7 - 10)    Physical Exam:  General: AAOx3, No acute distress  HEENT: NC/AT, trachea midline  Respiratory: Nonlabored breathing, equal chest rise b/l, NC  Skin: No jaundice, no icterus  Abdomen: soft, appropriately tender. NENO with good seal, abd drain with SS output   : Fowler  Extremities: non edematous, no calf pain bilaterally, venodynes in place and functioning       CBC:            6.8    13.08 )-----------( 601      ( 02-21-24 @ 04:14 )             20.7         Chem:         ( 02-21-24 @ 04:14 )    136  |  107  |  12  ----------------------------<  396<H>  3.8   |  23  |  0.93        Liver Functions: ( 02-21-24 @ 04:14 )  Alb: 1.4 g/dL / Pro: 5.1 g/dL / ALK PHOS: 78 U/L / ALT: 33 U/L DA / AST: 32 U/L / GGT: x

## 2024-02-21 NOTE — CONSULT NOTE ADULT - SUBJECTIVE AND OBJECTIVE BOX
HPI/Course in Hospital:  58 y/o female with pmhx of HTN, DM, to ER c/o decreased appetite over the last few days with severe vaginal pain/spasms. Pt admits to mild abdominal pain but states vaginal pain is worse. States abd pain has improved since last admission/discharge however was in severe pain earlier this morning and was instructed to come to the ED from the office. Pt has been passing flatus and having normal bowel movements at home. Pt denies nausea, vomiting, fever, chills, severe abd pain. No vaginal discharge. As per chart, pt saw a GYN recenlty but no dx related to this pain. (pt is post hysterectemy many years ago, pt still has ovaries). Pt is s/p diagnostic laparoscopy, lysis of adhesions, conversion to laparotomy for adhesiolysis and incisional hernia repair without mesh 24/ Pt did well postoperatively and was d/c 2024 until readmission for severe abd pain. Found to have pneumonoperitoneum due andrea bowel perforation. Underwent ex lap with small bowel resection. In the ICU for management of sepsis.           PAST MEDICAL & SURGICAL HISTORY:  Other specified diabetes mellitus without complication, without long-term current use of insulin      HTN (hypertension)      DM (diabetes mellitus)      HLD (hyperlipidemia)      H/O abdominal hysterectomy          MEDS:  benzocaine/menthol Lozenge 1 Lozenge Oral every 2 hours  chlorhexidine 2% Cloths 1 Application(s) Topical <User Schedule>  fluconAZOLE IVPB 400 milliGRAM(s) IV Intermittent every 24 hours  heparin   Injectable 5000 Unit(s) SubCutaneous every 8 hours  HYDROmorphone  Injectable 1 milliGRAM(s) IV Push every 4 hours PRN  insulin glargine Injectable (LANTUS) 7 Unit(s) SubCutaneous at bedtime  insulin lispro (ADMELOG) corrective regimen sliding scale   SubCutaneous every 6 hours  lactated ringers. 1000 milliLiter(s) IV Continuous <Continuous>  piperacillin/tazobactam IVPB.. 3.375 Gram(s) IV Intermittent every 8 hours (D2)      ALLERGIES  No Known Allergies      SOCIAL HISTORY:    FAMILY HISTORY:      ROS:    General:	    Skin:  	  HEENT:    Respiratory and Thorax:  	  Cardiovascular:	    Abdomen:	    Genitourinary:	    Musculoskeletal:	    Neurological:	    Psychiatric:	    Hematology/Lymphatics:	    Endocrine:	    PHYSICAL EXAM:    Vital Signs Last 24 Hrs  T(C): 37.3 (2024 05:00), Max: 37.3 (2024 05:00)  T(F): 99.2 (2024 05:00), Max: 99.2 (2024 05:00)  HR: 108 (2024 09:15) (84 - 141)  BP: 123/69 (2024 09:15) (68/46 - 139/75)  BP(mean): 85 (2024 09:15) (52 - 95)  RR: 23 (2024 09:15) (11 - 23)  SpO2: 97% (2024 09:00) (96% - 100%)    Parameters below as of 2024 04:00  Patient On (Oxygen Delivery Method): nasal cannula  O2 Flow (L/min): 2        Gen:    HEENT:    Neck:    Lymph Nodes:    Breasts:    Back:    Chest/Thorax:    Cardiovascular:    Gastrointestinal:    Genitourinary:    Rectal:    Extremities:    Vascular:    Neurological:    Skin:    Psychiatric:    LABS/DIAGNOSTIC TESTS:                          7.6    12.41 )-----------( 481      ( 2024 09:20 )             23.5     WBC Count: 12.41 K/uL ( @ 09:20)  WBC Count: 13.08 K/uL ( @ 04:14)  WBC Count: 10.73 K/uL ( @ 15:50)  WBC Count: 6.30 K/uL ( @ 03:55)  WBC Count: 6.39 K/uL ( @ 00:15)  WBC Count: 17.00 K/uL ( @ 11:30)          136  |  107  |  12  ----------------------------<  396<H>  3.8   |  23  |  0.93    Ca    7.8<L>      2024 04:14  Phos  3.0       Mg     2.2         TPro  5.1<L>  /  Alb  1.4<L>  /  TBili  0.6  /  DBili  x   /  AST  32  /  ALT  33  /  AlkPhos  78        Urinalysis Basic - ( 2024 04:15 )    Color: Yellow / Appearance: Clear / S.016 / pH: x  Gluc: x / Ketone: Negative mg/dL  / Bili: Negative / Urobili: 0.2 mg/dL   Blood: x / Protein: Negative mg/dL / Nitrite: Negative   Leuk Esterase: Negative / RBC: 3 /HPF / WBC 0 /HPF   Sq Epi: x / Non Sq Epi: x / Bacteria: Few /HPF        LIVER FUNCTIONS - ( 2024 04:14 )  Alb: 1.4 g/dL / Pro: 5.1 g/dL / ALK PHOS: 78 U/L / ALT: 33 U/L DA / AST: 32 U/L / GGT: x             PT/INR - ( 2024 11:30 )   PT: 12.9 sec;   INR: 1.14 ratio         PTT - ( 2024 11:30 )  PTT:20.9 sec    LACTATE:Lactate, Blood: 1.2 mmol/L ( @ 15:50)          CULTURES:     Culture - Abscess with Gram Stain (collected 24 @ 23:20)  Source: .Abscess abdominal abcess  Preliminary Report (24 @ 22:16):    Rare Escherichia coli    Few Alpha hemolytic strep "Susceptibilities not performed"    Culture - Urine (collected 24 @ 11:53)  Source: Clean Catch Clean Catch (Midstream)  Final Report (24 @ 15:54):    >=3 organisms. Probable collection contamination.    Culture - Blood (collected 24 @ 11:45)  Source: .Blood Blood-Peripheral  Preliminary Report (24 @ 18:02):    No growth at 24 hours    Culture - Blood (collected 24 @ 11:30)  Source: .Blood Blood-Peripheral  Preliminary Report (24 @ 18:02):    No growth at 24 hours        RADIOLOGY  < from: CT Abdomen and Pelvis w/ Oral Cont (24 @ 15:24) >    ACC: [id number] EXAM:  CT ABDOMEN AND PELVIS OC   ORDERED BY: RUDY THURMAN     PROCEDURE DATE:  2024          INTERPRETATION:  CLINICAL INFORMATION: Abdominal pain and fever; recent   surgery    COMPARISON: 2024    CONTRAST/COMPLICATIONS:  IV Contrast: NONE  Oral Contrast: Gastroview  Complications: None reported at time of study completion    PROCEDURE:  CT of the Abdomen and Pelvis was performed.  Sagittal and coronal reformats were performed.    FINDINGS:  LOWER CHEST: Basilar atelectasis.    Please note that evaluation of the abdominal organs and vascular   structures is limited by lack of intravenous contrast.    LIVER: Within normal limits.  BILE DUCTS: Normal caliber.  GALLBLADDER: Within normal limits.  SPLEEN: Within normal limits.  PANCREAS: Within normal limits.  ADRENALS: Within normal limits.  KIDNEYS/URETERS: No renal stones or hydronephrosis. Renal hypodensities,   possibly cysts.    BLADDER: Small amount of intravesical gas.  REPRODUCTIVE ORGANS: Hysterectomy.    BOWEL: Mildly dilated loops of small bowel. Appendix surgically absent.  PERITONEUM: Free fluid, extraluminal gas and extraluminal oral contrast   in the lower abdomen.  VESSELS: Atherosclerotic calcifications.  RETROPERITONEUM/LYMPH NODES: No lymphadenopathy.  ABDOMINAL WALL: Postsurgical changes.  BONES: Degenerative changes.    IMPRESSION:  Mildly dilated loops of small bowel, possibly postsurgical ileus.  Extraluminal oral contrast in the lower abdomen compatible with bowel   perforation.  Pneumoperitoneum.    Findings were discussed with Dr. Thurman by telephone on 2024 at 1554   hours.    < end of copied text >                 HPI/Course in Hospital:  60 y/o female with pmhx of HTN, DM, to ER c/o decreased appetite over the last few days with severe vaginal pain/spasms. Pt admits to mild abdominal pain but states vaginal pain is worse. States abd pain has improved since last admission/discharge however was in severe pain earlier this morning and was instructed to come to the ED from the office. Pt has been passing flatus and having normal bowel movements at home. Pt denies nausea, vomiting, fever, chills, severe abd pain. No vaginal discharge. As per chart, pt saw a GYN recenlty but no dx related to this pain. (pt is post hysterectemy many years ago, pt still has ovaries). Pt is s/p diagnostic laparoscopy, lysis of adhesions, conversion to laparotomy for adhesiolysis and incisional hernia repair without mesh 24/ Pt did well postoperatively and was d/c 2024 until readmission for severe abd pain. Found to have pneumonoperitoneum due andrea bowel perforation. Underwent ex lap with small bowel resection. In the ICU for management of sepsis.    At the time of my consult, pt only c/o the presence of the NG tube and soreness around abd incision site. Wants to get out of bed. Says she's having flatus but no bm yet.        PAST MEDICAL & SURGICAL HISTORY:  Other specified diabetes mellitus without complication, without long-term current use of insulin      HTN (hypertension)      DM (diabetes mellitus)      HLD (hyperlipidemia)      H/O abdominal hysterectomy          MEDS:  benzocaine/menthol Lozenge 1 Lozenge Oral every 2 hours  chlorhexidine 2% Cloths 1 Application(s) Topical <User Schedule>  fluconAZOLE IVPB 400 milliGRAM(s) IV Intermittent every 24 hours  heparin   Injectable 5000 Unit(s) SubCutaneous every 8 hours  HYDROmorphone  Injectable 1 milliGRAM(s) IV Push every 4 hours PRN  insulin glargine Injectable (LANTUS) 7 Unit(s) SubCutaneous at bedtime  insulin lispro (ADMELOG) corrective regimen sliding scale   SubCutaneous every 6 hours  lactated ringers. 1000 milliLiter(s) IV Continuous <Continuous>  piperacillin/tazobactam IVPB.. 3.375 Gram(s) IV Intermittent every 8 hours (D2)      ALLERGIES  No Known Allergies      SOCIAL HISTORY: emigrated from [place] about 30 y ago; lives with ; no cigs, occ ETOH; works as home health aide    FAMILY HISTORY: not relevant to pt's clinical presentation      ROS:    General:	c/o discomfort from the NG tube     HEENT: no complaints    Respiratory and Thorax: no complaints  	  Cardiovascular:	no complaints    GI: see HPI; only c/o discomfort with the NG tube and soreness around abd incision site    Genitourinary:	+ fowler    Musculoskeletal:	 says her arms are swollen    Neurological:	no complaints    Endocrine:	+ DM      PHYSICAL EXAM:    Vital Signs Last 24 Hrs  T(C): 37.3 (2024 05:00), Max: 37.3 (2024 05:00)  T(F): 99.2 (2024 05:00), Max: 99.2 (2024 05:00)  HR: 108 (2024 09:15) (84 - 141)  BP: 123/69 (2024 09:15) (68/46 - 139/75)  BP(mean): 85 (2024 09:15) (52 - 95)  RR: 23 (2024 09:15) (11 - 23)  SpO2: 97% (2024 09:00) (96% - 100%)    Parameters below as of 2024 04:00  Patient On (Oxygen Delivery Method): nasal cannula  O2 Flow (L/min): 2        Gen: obese female in NAD    HEENT: + NG tube in place; conj clear    Neck: supple    Lymph Nodes: cd not appreciate any enlarged submand, cervical or supraclav LNs    Chest/Thorax: clear to auscultation ant.     Cardiovascular: S1S2 reg; no m, g, r    ABD: midline, vertical incision site with overlying dressing; + drain in place; BS active; soft; + tenderness around incision site    Genitourinary: + fowler    Extremities: 1+ swelling UE bilat    Neurological: A+Ox3    Skin: no rashes noted    Psychiatric: affect appropriate      LABS/DIAGNOSTIC TESTS:                        7.6    12.41 )-----------( 481      ( 2024 09:20 )             23.5     WBC Count: 12.41 K/uL ( @ 09:20)  WBC Count: 13.08 K/uL ( @ 04:14)  WBC Count: 10.73 K/uL ( @ 15:50)  WBC Count: 6.30 K/uL ( @ 03:55)  WBC Count: 6.39 K/uL ( @ 00:15)  WBC Count: 17.00 K/uL ( @ 11:30)          136  |  107  |  12  ----------------------------<  396<H>  3.8   |  23  |  0.93    Ca    7.8<L>      2024 04:14  Phos  3.0       Mg     2.2         TPro  5.1<L>  /  Alb  1.4<L>  /  TBili  0.6  /  DBili  x   /  AST  32  /  ALT  33  /  AlkPhos  78        Urinalysis Basic - ( 2024 04:15 )    Color: Yellow / Appearance: Clear / S.016 / pH: x  Gluc: x / Ketone: Negative mg/dL  / Bili: Negative / Urobili: 0.2 mg/dL   Blood: x / Protein: Negative mg/dL / Nitrite: Negative   Leuk Esterase: Negative / RBC: 3 /HPF / WBC 0 /HPF   Sq Epi: x / Non Sq Epi: x / Bacteria: Few /HPF        LIVER FUNCTIONS - ( 2024 04:14 )  Alb: 1.4 g/dL / Pro: 5.1 g/dL / ALK PHOS: 78 U/L / ALT: 33 U/L DA / AST: 32 U/L / GGT: x             PT/INR - ( 2024 11:30 )   PT: 12.9 sec;   INR: 1.14 ratio         PTT - ( 2024 11:30 )  PTT:20.9 sec    LACTATE:Lactate, Blood: 1.2 mmol/L ( @ 15:50)          CULTURES:     Culture - Abscess with Gram Stain (collected 24 @ 23:20)  Source: .Abscess abdominal abcess  Preliminary Report (24 @ 22:16):    Rare Escherichia coli    Few Alpha hemolytic strep "Susceptibilities not performed"    Culture - Urine (collected 24 @ 11:53)  Source: Clean Catch Clean Catch (Midstream)  Final Report (24 @ 15:54):    >=3 organisms. Probable collection contamination.    Culture - Blood (collected 24 @ 11:45)  Source: .Blood Blood-Peripheral  Preliminary Report (24 @ 18:02):    No growth at 24 hours    Culture - Blood (collected 24 @ 11:30)  Source: .Blood Blood-Peripheral  Preliminary Report (24 @ 18:02):    No growth at 24 hours        RADIOLOGY  < from: CT Abdomen and Pelvis w/ Oral Cont (24 @ 15:24) >    ACC: [id number] EXAM:  CT ABDOMEN AND PELVIS OC   ORDERED BY: RUDY COBB     PROCEDURE DATE:  2024          INTERPRETATION:  CLINICAL INFORMATION: Abdominal pain and fever; recent   surgery    COMPARISON: 2024    CONTRAST/COMPLICATIONS:  IV Contrast: NONE  Oral Contrast: Gastroview  Complications: None reported at time of study completion    PROCEDURE:  CT of the Abdomen and Pelvis was performed.  Sagittal and coronal reformats were performed.    FINDINGS:  LOWER CHEST: Basilar atelectasis.    Please note that evaluation of the abdominal organs and vascular   structures is limited by lack of intravenous contrast.    LIVER: Within normal limits.  BILE DUCTS: Normal caliber.  GALLBLADDER: Within normal limits.  SPLEEN: Within normal limits.  PANCREAS: Within normal limits.  ADRENALS: Within normal limits.  KIDNEYS/URETERS: No renal stones or hydronephrosis. Renal hypodensities,   possibly cysts.    BLADDER: Small amount of intravesical gas.  REPRODUCTIVE ORGANS: Hysterectomy.    BOWEL: Mildly dilated loops of small bowel. Appendix surgically absent.  PERITONEUM: Free fluid, extraluminal gas and extraluminal oral contrast   in the lower abdomen.  VESSELS: Atherosclerotic calcifications.  RETROPERITONEUM/LYMPH NODES: No lymphadenopathy.  ABDOMINAL WALL: Postsurgical changes.  BONES: Degenerative changes.    IMPRESSION:  Mildly dilated loops of small bowel, possibly postsurgical ileus.  Extraluminal oral contrast in the lower abdomen compatible with bowel   perforation.  Pneumoperitoneum.    Findings were discussed with Dr. Cobb by telephone on 2024 at 1554   hours.    < end of copied text >

## 2024-02-21 NOTE — CONSULT NOTE ADULT - ASSESSMENT
58 y/o female with pmhx of HTN, DM admitted for severe abd pain. Pt is s/p diagnostic laparoscopy, lysis of adhesions, conversion to laparotomy for adhesiolysis and incisional hernia repair without mesh 2/6/24/ Pt did well postoperatively and was d/c 2/14/2024 until readmission for severe abd pain on 2/19. Found to have pneumonoperitoneum due to bowel perforation. Underwent ex lap with small bowel resection. Admitted to the ICU for management of septic shock. BCs neg to date. Cx obtained during ex lap growing Ecoli and alpha strep. Pt overall feeling better.    #Septic shock due to bowel perforation-- concern for polymicrobial infection, including anaerobes.   --cont. piperacillin/tazobactam  --d/c fluconazole  --f/u CBC    # DEION/ATN-- likely due to sepsis with improvement in renal fn noted    #DM-- management as per ICU team    Chart reviewed, including notes/labs/cultures/imaging; pt examined at the bedside; discussed Dx/management of sepsis due to bowel perf with the ICU team, including DR. Barclay, with coordination of ID care. Spoke with pt as well to address her care and concerns.

## 2024-02-21 NOTE — DIETITIAN INITIAL EVALUATION ADULT - NSFNSGIIOFT_GEN_A_CORE
02-20-24 @ 07:01  -  02-21-24 @ 07:00  --------------------------------------------------------  OUT:    Nasogastric/Oral tube (mL): 200 mL  Total OUT: 200 mL    Total NET: -200 mL

## 2024-02-21 NOTE — PROGRESS NOTE ADULT - ASSESSMENT
59F with PMHx of HTN HLD, DM p/w decreased appetite of few days duration and vaginal pain/spasms. Pt is s/p recent diagnostic laparoscopy, lysis of adhesions, conversion to laparotomy for adhesiolysis and incisional hernia repair without mesh 2/6/24/ Pt did well postoperatively and was d/c 2/14/2024. Pt found to have pneumoperitoneum and evidence of small bowel perforation on CT imaging as above. Pt was admitted to surgery service and taken to OR for ex-lap with bowel resection. Pt admitted to ICU from PACU for hypotension with concern for septic shock.    MEDREC OBTAINED FROM [facility],  TEAM TO CONFIRM HOME MEDS    #Septic shock   #Small bowel perforation   #hyponatremia   #DEION    #HTN   #HLD   #DM      PLAN:    _________CNS___________  no acute issues     _________CVS___________  #septic shock   -pt p/w hypotension, tachycardia, WBC 17k   -2/2 intraabdominal abscess, small-bowel perforation  -c/w zosyn, will hold on vanc for now  -c/w standing IVF and pressor support  -f/u blood and tissue/abscess cx     #HTN  -on valsartan-HCTZ at home (per surescripts)  -hold meds in setting of shock    #HLD  -on statin per surescripts   -hold while NPO      _________ RESP__________  no acute issues   Incentive spirometery    __________GI____________  #Small bowel perforation  -s/p OR 2/19 for ex-lap and bowel resection  - NGT to low intermmitent suction  - monitor drain output   - pain control with multi-modal analgesia  abdominal examination benign    Diet-advance as tolerated, started on clears 2/20  ________ RENAL__________  #DEION   -likely pre-renal 2/2 hypovolemia and sepsis as above   -resolving with IVF  -monitor Cr   -avoid nephrotoxins  f/u UA and Urine lytes    _________MSK___________  no acute issues   OOBTC    __________ID____________  #Septic shock  plan as above     _________ENDO__________  #Diabetes mellitus  -on lantus 15u in AM, metformin and ozempic at home per surscripts  -c/w lantus 7u hd and SSI q6 while NPO     _______HEME/ONC_______  #thrombocytopenia  -PLT 700s, likely reactive in setting of sepsis, post-op  -monitor CBC  downtrending    #Anemia   -HGB 9.6  -minimal EBL in OR per surgery  -no signs of active bleeding  -monitor CBC  Hb stable    _________SKIN____________  drains as above   Phentolamine for extravasated levophed  ________Prophylaxis_______  #DVT  Heparin subcut  ________GOC/ DISPO_______  FULL CODE   ICU       59F with PMHx of HTN HLD, DM p/w decreased appetite of few days duration and vaginal pain/spasms. Pt is s/p recent diagnostic laparoscopy, lysis of adhesions, conversion to laparotomy for adhesiolysis and incisional hernia repair without mesh 2/6/24/ Pt did well postoperatively and was d/c 2/14/2024. Pt found to have pneumoperitoneum and evidence of small bowel perforation on CT imaging as above. Pt was admitted to surgery service and taken to OR for ex-lap with bowel resection. Pt admitted to ICU from PACU for hypotension with concern for septic shock.    MEDREC OBTAINED FROM [facility],  TEAM TO CONFIRM HOME MEDS    #Septic shock   #Small bowel perforation   #hyponatremia   #DEION    #HTN   #HLD   #DM      PLAN:    _________CNS___________  no acute issues     _________CVS___________  #septic shock   -pt p/w hypotension, tachycardia, WBC 17k   Off pressors normotensive    #HTN  -on valsartan-HCTZ at home (per surescripts)  -hold meds in setting of shock    #HLD  -on statin per surescripts   -hold while NPO      _________ RESP__________  no acute issues   Incentive spirometery    __________GI____________  #Small bowel perforation  -s/p OR 2/19 for ex-lap and bowel resection  D/c NGT  - monitor drain output   - pain control with multi-modal analgesia  abdominal examination benign    NPO  ________ RENAL__________  #ATN  -likely pre-renal 2/2 hypovolemia and sepsis as above   - granular casts in UA  -resolving with IVF  -monitor Cr   -avoid nephrotoxins      _________MSK___________  no acute issues   OOBTC    __________ID____________  #Sepsis  -2/2 intraabdominal abscess, small-bowel perforation  -c/w zosyn  Blood and urine Cx NGTD  Abscess culture- Ecoli     _________ENDO__________  #Diabetes mellitus  -on lantus 15u in AM, metformin and ozempic at home per surscripts  -c/w lantus 7u hd and SSI q6 while NPO     _______HEME/ONC_______  #thrombocytopenia  -PLT 700s, likely reactive in setting of sepsis, post-op  -monitor CBC  downtrending    #Anemia   -HGB 9.6  -minimal EBL in OR per surgery  -no signs of active bleeding  -trend CBC  8.3>7.6    _________SKIN____________  drains as above   ________Prophylaxis_______  #DVT  Heparin subcut  ________GOC/ DISPO_______  FULL CODE   ICU

## 2024-02-21 NOTE — DIETITIAN INITIAL EVALUATION ADULT - PERTINENT LABORATORY DATA
02-21    136  |  107  |  12  ----------------------------<  396<H>  3.8   |  23  |  0.93    Ca    7.8<L>      21 Feb 2024 04:14  Phos  3.0     02-21  Mg     2.2     02-21    TPro  5.1<L>  /  Alb  1.4<L>  /  TBili  0.6  /  DBili  x   /  AST  32  /  ALT  33  /  AlkPhos  78  02-21  POCT Blood Glucose.: 177 mg/dL (02-21-24 @ 13:42)  A1C with Estimated Average Glucose Result: 8.4 % (02-06-24 @ 06:45)

## 2024-02-21 NOTE — PROGRESS NOTE ADULT - ATTENDING COMMENTS
59F with PMHx of HTN HLD, DM p/w decreased appetite of few days duration and vaginal pain/spasms. Pt is s/p recent diagnostic laparoscopy, lysis of adhesions, conversion to laparotomy for adhesiolysis and incisional hernia repair without mesh 2/6/24/ Pt did well postoperatively and was d/c 2/14/2024. Pt found to have pneumoperitoneum and evidence of small bowel perforation on CT imaging as above. Pt was admitted to surgery service and taken to OR for ex-lap with bowel resection. Pt admitted to ICU from PACU for hypotension with concern for septic shock.    Assessment  #Septic shock   #Small bowel perforation   #Peritonitis  #hyponatremia   #DEION likely ATN   #HLD   #DM type 2    Plan:  - Off vasopressors   - IV fluid hydration  - Broad spectrum antibiotics  - Follow up cultures   - ID consulted   - Hemodynamic monitoring  - Void trial  - D/c NGT  - Monitor for bowel function  - Check ZEKE drain output  - NPO for now until ok with surgery to start diet  - Surgery follow up for wound care  - Pain control  - Incentive spirometry   - OOB to chair   - Monitor glucose while NPO   - Slight drop in hgb, likely dilutional  - DVT prophylaxis  - Transfer out of ICU
59F with PMHx of HTN HLD, DM p/w decreased appetite of few days duration and vaginal pain/spasms. Pt is s/p recent diagnostic laparoscopy, lysis of adhesions, conversion to laparotomy for adhesiolysis and incisional hernia repair without mesh 2/6/24/ Pt did well postoperatively and was d/c 2/14/2024. Pt found to have pneumoperitoneum and evidence of small bowel perforation on CT imaging as above. Pt was admitted to surgery service and taken to OR for ex-lap with bowel resection. Pt admitted to ICU from PACU for hypotension with concern for septic shock.    Assessment  #Septic shock   #Small bowel perforation   #Peritonitis  #hyponatremia   #DEION likely ATN   #HLD   #DM type 2    Plan:  - Vasopressor support to maintain MAP > 65  - IV fluid hydration  - Broad spectrum antibiotics  - Follow up cultures   - Hemodynamic monitoring  - Monitor urine output  - Cont fowler for now  - Monitor for bowel function  - Check ZEKE drain output  - NPO for now   - NGT to suction  - Surgery follow up for wound care  - Pain control  - Incentive spirometry   - OOB to chair   - Monitor glucose while NPO   - DVT prophylaxis  - Cont. ICU care

## 2024-02-21 NOTE — DIETITIAN INITIAL EVALUATION ADULT - MALNUTRITION
PCM (moderate) related to altered GI fx/ structure ( w/ 2x recent GI surgeries) as evidenced by prolonged inadequate oral intake since 2/6 (>2 weeks w/ several periods NPO/ clears) w/ trace edema as noted above.

## 2024-02-21 NOTE — CONSULT NOTE ADULT - SUBJECTIVE AND OBJECTIVE BOX
[facility]- METABOLIC SUPPORT SERVICE CONSULTATION NOTE    Patient is a 59y Female with HTN, DM, recently admitted -24 s/p diagnostic laparoscopy, lysis of adhesions, conversion to laparotomy for adhesiolysis and incisional hernia repair without mesh on , presents now with abdominal pain. Pt found to have small bowel perforation with pneumoperitoneum. s/p OR  for ex-lap with bowel resection, found to have adhesions with intrabdominal abscess. Pt transferred to ICU for septic shock 2/ peritonitis, hypotension requiring brief pressors, with DEION and hyponatremia. DEION and hyponatremia resolved.   Nephrology consulted for TPN for prolonged NPO status.     Pt with h/o hysterectomy. Pt c/o abd pain at incision site. +subjective wt loss 5-6 lbs over 12 days. Pt last ate on   but only tea with 2 spoons of oatmeal.     PAST MEDICAL & SURGICAL HISTORY:  Other specified diabetes mellitus without complication, without long-term current use of insulin      HTN (hypertension)      DM (diabetes mellitus)      HLD (hyperlipidemia)      H/O abdominal hysterectomy        No Known Allergies    Home Medications Reviewed  Hospital Medications:   MEDICATIONS  (STANDING):  benzocaine/menthol Lozenge 1 Lozenge Oral every 2 hours  chlorhexidine 2% Cloths 1 Application(s) Topical <User Schedule>  heparin   Injectable 5000 Unit(s) SubCutaneous every 8 hours  insulin glargine Injectable (LANTUS) 7 Unit(s) SubCutaneous at bedtime  insulin lispro (ADMELOG) corrective regimen sliding scale   SubCutaneous every 6 hours  lactated ringers. 1000 milliLiter(s) (70 mL/Hr) IV Continuous <Continuous>  piperacillin/tazobactam IVPB.. 3.375 Gram(s) IV Intermittent every 8 hours    SOCIAL HISTORY:  Denies ETOh, Smoking, or drug use  FAMILY HISTORY:    REVIEW OF SYSTEMS:  CONSTITUTIONAL: No weakness, fevers or chills  RESPIRATORY: No cough, or shortness of breath  CARDIOVASCULAR: No chest pain or palpitations.  GASTROINTESTINAL: + abdominal pain. No nausea, or vomiting, No BM or flatulence.   GENITOURINARY: No dysuria  or hematuria  NEUROLOGICAL: No numbness or weakness  SKIN: No rashes  VASCULAR: No bilateral lower extremity edema.   All other review of systems is negative unless indicated above.    VITALS:  T(F): 97.5 (24 @ 12:00), Max: 99.2 (24 @ 05:00)  HR: 108 (24 @ 16:00)  BP: 92/80 (24 @ 16:00)  RR: 24 (24 @ 16:00)  SpO2: 98% (24 @ 15:00)  Wt(kg): --  Height (cm): 162.5 (02-21 @ 15:21), 162.6 (:18), 162.6 (:59)  Weight (kg): 73.1 ( @ 03:45), 70.3 (:18), 70.3 (:59)  BMI (kg/m2): 27.7 ( 15:21), 27.6 ( 03:45), 26.6 (:18), 26.6 (:59)  BSA (m2): 1.78 (02-21 @ 15:21), 1.78 ( @ 03:45), 1.76 (:18), 1.76 (:59)     07:01  -   @ 07:00  --------------------------------------------------------  IN: 2445.4 mL / OUT: 2000 mL / NET: 445.4 mL     07:01  -   @ 16:48  --------------------------------------------------------  IN: 660 mL / OUT: 600 mL / NET: 60 mL      PHYSICAL EXAM:  Constitutional: NAD,   HEENT: anicteric sclera, +NGT  Neck: No JVD  Respiratory: CTAB, no wheezes, rales or rhonchi  Cardiovascular: S1, S2, RRR  Gastrointestinal: +midline incision covered  +NGT  Extremities: trace peripheral edema b/l +RUE edema  Neurological: A/O x 3, no focal deficits  Psychiatric: Normal mood, normal affect  : +fowler.   Skin: No rashes  Vascular Access: no access for TPN    LABS:      136  |  107  |  12  ----------------------------<  396<H>  3.8   |  23  |  0.93    Ca    7.8<L>      2024 04:14  Phos  3.0       Mg     2.2         TPro  5.1<L>  /  Alb  1.4<L>  /  TBili  0.6  /  DBili      /  AST  32  /  ALT  33  /  AlkPhos  78      Creatinine Trend: 0.93 <--, 0.92 <--, 1.33 <--, 1.19 <--, 1.99 <--                        7.6    12.41 )-----------( 481      ( 2024 09:20 )             23.5     Urine Studies:  Urinalysis Basic - ( 2024 04:15 )    Color: Yellow / Appearance: Clear / S.016 / pH:   Gluc:  / Ketone: Negative mg/dL  / Bili: Negative / Urobili: 0.2 mg/dL   Blood:  / Protein: Negative mg/dL / Nitrite: Negative   Leuk Esterase: Negative / RBC: 3 /HPF / WBC 0 /HPF   Sq Epi:  / Non Sq Epi:  / Bacteria: Few /HPF      Sodium, Random Urine: 48 mmol/L ( @ 04:15)  Creatinine, Random Urine: 58 mg/dL ( @ 04:15)    RADIOLOGY & ADDITIONAL STUDIES:      < from: CT Abdomen and Pelvis w/ Oral Cont (24 @ 15:24) >    ACC: [id number] EXAM:  CT ABDOMEN AND PELVIS OC   ORDERED BY: RUDY COBB     PROCEDURE DATE:  2024        < end of copied text >  < from: CT Abdomen and Pelvis w/ Oral Cont (24 @ 15:24) >  KIDNEYS/URETERS: No renal stones or hydronephrosis. Renal hypodensities,   possibly cysts.    < end of copied text >  < from: CT Abdomen and Pelvis w/ Oral Cont (24 @ 15:24) >  BOWEL: Mildly dilated loops of small bowel. Appendix surgically absent.    < end of copied text >  < from: CT Abdomen and Pelvis w/ Oral Cont (24 @ 15:24) >  IMPRESSION:  Mildly dilated loops of small bowel, possibly postsurgical ileus.  Extraluminal oral contrast in the lower abdomen compatible with bowel   perforation.  Pneumoperitoneum.    Findings were discussed with Dr. Cobb by telephone on 2024 at 1554   hours.    --- End of Report ---        < end of copied text >

## 2024-02-21 NOTE — PROGRESS NOTE ADULT - ASSESSMENT
The patient is a 59y Female, recent admission s/p diagnostic laparoscopy, lysis of adhesions, conversion to laparotomy for adhesiolysis and incisional hernia repair without mesh 2/6/24, now s/p.Ex lap SBR and abdominal washout for small bowel perforation POD #1  VSS, on pressor    Plan:  - NPO  - Cont maintenance IVF  - NGT - may d/c  - Pain control as needed  - Continue NENO dressing   - Monitor drain output  - continue fowler to gravity - may d/c  - monitor bowel function  - Incentive spirometer  - OOB and ambulate as tolerated  - DVT ppx    case to be d/w Dr. Lopez   The patient is a 59y Female, recent admission s/p diagnostic laparoscopy, lysis of adhesions, conversion to laparotomy for adhesiolysis and incisional hernia repair without mesh 2/6/24, now s/p.Ex lap SBR and abdominal washout for small bowel perforation POD #1, off pressor    Plan:  - NPO  - Cont maintenance IVF  - NGT - may d/c  - Pain control as needed  - Continue NENO dressing   - Monitor drain output  - continue fowler to gravity - may d/c  - monitor bowel function  - Incentive spirometer  - OOB and ambulate as tolerated  - DVT ppx    case to be d/w Dr. Lopez

## 2024-02-21 NOTE — CHART NOTE - NSCHARTNOTEFT_GEN_A_CORE
Discussed with patient the necessity of blood transfusion, that Hb was 6.8. She refused blood transfusion. She was not able ot give a reason, said that "so many things have been going on" but was not able to say otherwise why she did not want blood. Attempt was made to explain the clinical significance of blood transfusion,s he continued to refuse. She denied any Syn[employer] or philosophical objection to blood transfusion. So obvious signs of bbleeding on physical exam. RN at bedside witnessed conversation. Will endorse to day team.

## 2024-02-21 NOTE — DIETITIAN INITIAL EVALUATION ADULT - OTHER INFO
Pt is s/p diagnostic laparoscopy, lysis of adhesions, conversion to laparotomy for adhesiolysis and incisional hernia repair without mesh 2/6/24/ Pt did well postoperatively and was d/c 2/14/2024. (Pt had limited intake for most of this hospitalization, eventually advanced to low fiber (pt had been taking Ensure Plant Based PO supplement BID (last few days of previous stay). Now NPO w/ NGT (uncomfortable w/ NGT)Discussed w/ RADHA MS, pt approved for PN. PICC line access by IR tomorrow. Pt is a D/G to sx floor.       Pt is s/p diagnostic laparoscopy, lysis of adhesions, conversion to laparotomy for adhesiolysis and incisional hernia repair without mesh 2/6/24/ Pt did well postoperatively and was d/c 2/14/2024. (Pt had limited intake for most of this hospitalization, eventually advanced to low fiber (pt had been taking Ensure Plant Based PO supplement BID (last few days of previous stay). Pt s/p exploratory laparotomy with bowel resection 19-Feb-2024 23 due to perforated small intestine. Now NPO w/ NGT (uncomfortable w/ NGT). Discussed w/ RADHA MS, pt approved for PN. PICC line access by IR tomorrow. Pt is a D/G to sx floor.

## 2024-02-21 NOTE — DIETITIAN INITIAL EVALUATION ADULT - ADD RECOMMEND
PCM (moderate). PN via PICC (start 1/2 dose). May consider adding insulin to PN as needed. MD to monitor. RD available.

## 2024-02-21 NOTE — CHART NOTE - NSCHARTNOTEFT_GEN_A_CORE
59F with PMHx of HTN HLD, DM p/w decreased appetite of few days duration and vaginal pain/spasms. Pt is s/p recent diagnostic laparoscopy, lysis of adhesions, conversion to laparotomy for adhesiolysis and incisional hernia repair without mesh 2/6/24/ Pt did well postoperatively and was d/c 2/14/2024. Pt found to have pneumoperitoneum and evidence of small bowel perforation on CT imaging as above. Pt was admitted to surgery service and taken to OR for ex-lap with bowel resection. Pt admitted to ICU from PACU for hypotension with concern for septic shock. Patient started on zosyn and was on phenylepherine. Phenylepherine was switched to norepinephrine. Patient was given 1L NS and 3 L LR. Patients abdominal exam remained benign, anastamosis drain was draining minimal serosanguenous fluid. Patient weaned off pressors, now normotensive. ID Dr Flanagan on board. 2/21 bowel sounds returning. OG tube and fowler removed. Pt will require TPN, Nephro Dr. Garcia on board, she will require PICC line. Stable for DG to surgery.    To follow:  PICC line in the a.m. from IR    Signed out to:  Attending: Dr. Lopez aware  ACP: PA _____ 59F with PMHx of HTN HLD, DM p/w decreased appetite of few days duration and vaginal pain/spasms. Pt is s/p recent diagnostic laparoscopy, lysis of adhesions, conversion to laparotomy for adhesiolysis and incisional hernia repair without mesh 2/6/24/ Pt did well postoperatively and was d/c 2/14/2024. Pt found to have pneumoperitoneum and evidence of small bowel perforation on CT imaging as above. Pt was admitted to surgery service and taken to OR for ex-lap with bowel resection. Pt admitted to ICU from PACU for hypotension with concern for septic shock. Patient started on zosyn and was on phenylepherine. Phenylepherine was switched to norepinephrine. Patient was given 1L NS and 3 L LR. Patients abdominal exam remained benign, anastamosis drain was draining minimal serosanguenous fluid. Patient weaned off pressors, now normotensive. ID Dr Flanagan on board. 2/21 bowel sounds returning. OG tube and fowler removed. Pt will require TPN, Nephro Dr. Garcia on board, she will require PICC line. Stable for DG to surgery.    To follow:  PICC line in the a.m. from IR    Signed out to:  Attending: Dr. Lopez aware  ACP: JEFFRY Castro

## 2024-02-21 NOTE — CONSULT NOTE ADULT - ASSESSMENT
Patient is a 59y Female with HTN, DM, recently admitted 2/5-2/14/24 s/p diagnostic laparoscopy, lysis of adhesions, conversion to laparotomy for adhesiolysis and incisional hernia repair without mesh on 2/6, presents now with abdominal pain. Pt found to have small bowel perforation with pneumoperitoneum. s/p OR 2/19 for ex-lap with bowel resection, found to have adhesions with intrabdominal abscess. Pt transferred to ICU for septic shock 2/2 peritonitis, hypotension requiring brief pressors, with DEION and hyponatremia. DEION and hyponatremia resolved.   Nephrology consulted for TPN for prolonged NPO status.     1. Moderate PCM- albumin 1.4 with trace edema. Subjective wt loss. Agree with initiating TPN. Pt for PICC line placement by IR on 2/22. Pt on LR IVF; monitor FS.  Will give TPN with lipids @ 1L (1/2 dose on first day; 2/22), Will d/c IVF when TPN is initiated.   Check FS 15 min and 1hr after starting TPN and then q6h thereafter. hgbA1C 8.4 on 2/6/24.   Check triglycerides. Check CMP/Mg/Phos/ Ionized calcium in am.   Daily weights. Strict I/O. Will hold TPN if pt spikes fever and check BCX x2      2. DEION - likely ATN due to Septic shock. Now resolved. Strict I/Os. Avoid nephrotoxins/ NSAIDs/ RCA. Monitor BMP.    3. Septic shock- 2/2 peritonitis/ small bowel perforation. Pt on Zosyn. BCx 2/19 NG.  Plan as per ICU/ ID/ Surgery    4. Hypotension- BP low normal. Now off pressors. Plan as per ICU.     TPN orders:    [facility]  Dean Shipley M.D.  Jaylon Kang D.O.  Debbi Garcia M.D.  MD Laura Kwon, MSN, ANP-C    Telephone: [phone]  Facsimile: [phone] [place] [address], [address]  [place] [address]   Patient is a 59y Female with HTN, DM, recently admitted 2/5-2/14/24 s/p diagnostic laparoscopy, lysis of adhesions, conversion to laparotomy for adhesiolysis and incisional hernia repair without mesh on 2/6, presents now with abdominal pain. Pt found to have small bowel perforation with pneumoperitoneum. s/p OR 2/19 for ex-lap with bowel resection, found to have adhesions with intrabdominal abscess. Pt transferred to ICU for septic shock 2/2 peritonitis, hypotension requiring brief pressors, with DEION and hyponatremia. DEION and hyponatremia resolved.   Nephrology consulted for TPN for prolonged NPO status.     1. Moderate PCM- albumin 1.4 with trace edema. Subjective wt loss. Agree with initiating TPN. Pt for PICC line placement by IR on 2/22. Pt on LR IVF; monitor FS.  Will give TPN with lipids @ 1L (1/2 dose on first day; 2/22), Will d/c IVF when TPN is initiated.   Check FS 15 min and 1hr after starting TPN and then q6h thereafter. hgbA1C 8.4 on 2/6/24.   Check triglycerides. Check CMP/Mg/Phos/ Ionized calcium in am.   Daily weights. Strict I/O. Will hold TPN if pt spikes fever and check BCX x2      2. DEION - likely ATN due to Septic shock. Now resolved. Strict I/Os. Avoid nephrotoxins/ NSAIDs/ RCA. Monitor BMP.    3. Septic shock- 2/2 peritonitis/ small bowel perforation. Pt on Zosyn. BCx 2/19 NG.  Plan as per ICU/ ID/ Surgery    4. Hypotension- BP low normal. Now off pressors. Plan as per ICU.     TPN orders:    60 kg  Total Kcal 1500  Lipid 50 g =500 kcal  AA 100g = 400 kcal  Dextrose 176g - 600 kcal    [facility]  Dean Shipley M.D.  Jaylon Kang D.O.  Debbi Garcia M.D.  MD Laura Kwon, MSN, ANP-C    Telephone: [phone]  Facsimile: [phone]    [address] [place] [address], [address]  [place] [address]

## 2024-02-22 LAB
ALBUMIN SERPL ELPH-MCNC: 1.6 G/DL — LOW (ref 3.5–5)
ALP SERPL-CCNC: 92 U/L — SIGNIFICANT CHANGE UP (ref 40–120)
ALT FLD-CCNC: 28 U/L DA — SIGNIFICANT CHANGE UP (ref 10–60)
ANION GAP SERPL CALC-SCNC: 5 MMOL/L — SIGNIFICANT CHANGE UP (ref 5–17)
ANISOCYTOSIS BLD QL: SLIGHT — SIGNIFICANT CHANGE UP
AST SERPL-CCNC: 22 U/L — SIGNIFICANT CHANGE UP (ref 10–40)
BASOPHILS # BLD AUTO: 0 K/UL — SIGNIFICANT CHANGE UP (ref 0–0.2)
BASOPHILS NFR BLD AUTO: 0 % — SIGNIFICANT CHANGE UP (ref 0–2)
BILIRUB SERPL-MCNC: 0.5 MG/DL — SIGNIFICANT CHANGE UP (ref 0.2–1.2)
BLD GP AB SCN SERPL QL: SIGNIFICANT CHANGE UP
BUN SERPL-MCNC: 7 MG/DL — SIGNIFICANT CHANGE UP (ref 7–18)
CALCIUM SERPL-MCNC: 8.3 MG/DL — LOW (ref 8.4–10.5)
CHLORIDE SERPL-SCNC: 108 MMOL/L — SIGNIFICANT CHANGE UP (ref 96–108)
CO2 SERPL-SCNC: 27 MMOL/L — SIGNIFICANT CHANGE UP (ref 22–31)
CREAT SERPL-MCNC: 0.7 MG/DL — SIGNIFICANT CHANGE UP (ref 0.5–1.3)
EGFR: 100 ML/MIN/1.73M2 — SIGNIFICANT CHANGE UP
EOSINOPHIL # BLD AUTO: 0.14 K/UL — SIGNIFICANT CHANGE UP (ref 0–0.5)
EOSINOPHIL NFR BLD AUTO: 1 % — SIGNIFICANT CHANGE UP (ref 0–6)
GLUCOSE SERPL-MCNC: 137 MG/DL — HIGH (ref 70–99)
HCT VFR BLD CALC: 20.4 % — CRITICAL LOW (ref 34.5–45)
HCT VFR BLD CALC: 20.8 % — CRITICAL LOW (ref 34.5–45)
HGB BLD-MCNC: 6.6 G/DL — CRITICAL LOW (ref 11.5–15.5)
HGB BLD-MCNC: 6.8 G/DL — CRITICAL LOW (ref 11.5–15.5)
HYPOCHROMIA BLD QL: SLIGHT — SIGNIFICANT CHANGE UP
LYMPHOCYTES # BLD AUTO: 25 % — SIGNIFICANT CHANGE UP (ref 13–44)
LYMPHOCYTES # BLD AUTO: 3.59 K/UL — HIGH (ref 1–3.3)
MAGNESIUM SERPL-MCNC: 1.9 MG/DL — SIGNIFICANT CHANGE UP (ref 1.6–2.6)
MANUAL SMEAR VERIFICATION: SIGNIFICANT CHANGE UP
MCHC RBC-ENTMCNC: 26.9 PG — LOW (ref 27–34)
MCHC RBC-ENTMCNC: 27.3 PG — SIGNIFICANT CHANGE UP (ref 27–34)
MCHC RBC-ENTMCNC: 32.4 GM/DL — SIGNIFICANT CHANGE UP (ref 32–36)
MCHC RBC-ENTMCNC: 32.7 GM/DL — SIGNIFICANT CHANGE UP (ref 32–36)
MCV RBC AUTO: 82.2 FL — SIGNIFICANT CHANGE UP (ref 80–100)
MCV RBC AUTO: 84.3 FL — SIGNIFICANT CHANGE UP (ref 80–100)
MICROCYTES BLD QL: SLIGHT — SIGNIFICANT CHANGE UP
MONOCYTES # BLD AUTO: 0.57 K/UL — SIGNIFICANT CHANGE UP (ref 0–0.9)
MONOCYTES NFR BLD AUTO: 4 % — SIGNIFICANT CHANGE UP (ref 2–14)
NEUTROPHILS # BLD AUTO: 10.05 K/UL — HIGH (ref 1.8–7.4)
NEUTROPHILS NFR BLD AUTO: 70 % — SIGNIFICANT CHANGE UP (ref 43–77)
NRBC # BLD: 0 /100 WBCS — SIGNIFICANT CHANGE UP (ref 0–0)
NRBC # BLD: 0 /100 WBCS — SIGNIFICANT CHANGE UP (ref 0–0)
PHOSPHATE SERPL-MCNC: 2.5 MG/DL — SIGNIFICANT CHANGE UP (ref 2.5–4.5)
PLAT MORPH BLD: NORMAL — SIGNIFICANT CHANGE UP
PLATELET # BLD AUTO: 506 K/UL — HIGH (ref 150–400)
PLATELET # BLD AUTO: 577 K/UL — HIGH (ref 150–400)
PLATELET COUNT - ESTIMATE: ABNORMAL
POTASSIUM SERPL-MCNC: 3.2 MMOL/L — LOW (ref 3.5–5.3)
POTASSIUM SERPL-SCNC: 3.2 MMOL/L — LOW (ref 3.5–5.3)
PROT SERPL-MCNC: 6 G/DL — SIGNIFICANT CHANGE UP (ref 6–8.3)
RBC # BLD: 2.42 M/UL — LOW (ref 3.8–5.2)
RBC # BLD: 2.53 M/UL — LOW (ref 3.8–5.2)
RBC # FLD: 14.6 % — HIGH (ref 10.3–14.5)
RBC # FLD: 15 % — HIGH (ref 10.3–14.5)
RBC BLD AUTO: ABNORMAL
SODIUM SERPL-SCNC: 140 MMOL/L — SIGNIFICANT CHANGE UP (ref 135–145)
TRIGL SERPL-MCNC: 149 MG/DL — SIGNIFICANT CHANGE UP
WBC # BLD: 12.09 K/UL — HIGH (ref 3.8–10.5)
WBC # BLD: 14.35 K/UL — HIGH (ref 3.8–10.5)
WBC # FLD AUTO: 12.09 K/UL — HIGH (ref 3.8–10.5)
WBC # FLD AUTO: 14.35 K/UL — HIGH (ref 3.8–10.5)

## 2024-02-22 PROCEDURE — 36573 INSJ PICC RS&I 5 YR+: CPT | Mod: RT

## 2024-02-22 PROCEDURE — 93010 ELECTROCARDIOGRAM REPORT: CPT

## 2024-02-22 PROCEDURE — 36573 INSJ PICC RS&I 5 YR+: CPT

## 2024-02-22 PROCEDURE — 99232 SBSQ HOSP IP/OBS MODERATE 35: CPT

## 2024-02-22 RX ORDER — SODIUM CHLORIDE 9 MG/ML
1000 INJECTION, SOLUTION INTRAVENOUS
Refills: 0 | Status: DISCONTINUED | OUTPATIENT
Start: 2024-02-22 | End: 2024-02-23

## 2024-02-22 RX ORDER — HEPARIN SODIUM 5000 [USP'U]/ML
5000 INJECTION INTRAVENOUS; SUBCUTANEOUS EVERY 8 HOURS
Refills: 0 | Status: DISCONTINUED | OUTPATIENT
Start: 2024-02-22 | End: 2024-02-22

## 2024-02-22 RX ORDER — POTASSIUM CHLORIDE 20 MEQ
10 PACKET (EA) ORAL
Refills: 0 | Status: COMPLETED | OUTPATIENT
Start: 2024-02-22 | End: 2024-02-22

## 2024-02-22 RX ORDER — CHLORHEXIDINE GLUCONATE 213 G/1000ML
1 SOLUTION TOPICAL DAILY
Refills: 0 | Status: DISCONTINUED | OUTPATIENT
Start: 2024-02-23 | End: 2024-03-08

## 2024-02-22 RX ORDER — SODIUM CHLORIDE 9 MG/ML
1000 INJECTION, SOLUTION INTRAVENOUS
Refills: 0 | Status: DISCONTINUED | OUTPATIENT
Start: 2024-02-22 | End: 2024-02-22

## 2024-02-22 RX ORDER — FLUCONAZOLE 150 MG/1
400 TABLET ORAL EVERY 24 HOURS
Refills: 0 | Status: DISCONTINUED | OUTPATIENT
Start: 2024-02-22 | End: 2024-02-27

## 2024-02-22 RX ORDER — SODIUM CHLORIDE 9 MG/ML
10 INJECTION INTRAMUSCULAR; INTRAVENOUS; SUBCUTANEOUS
Refills: 0 | Status: DISCONTINUED | OUTPATIENT
Start: 2024-02-22 | End: 2024-03-08

## 2024-02-22 RX ORDER — ACETAMINOPHEN 500 MG
1000 TABLET ORAL ONCE
Refills: 0 | Status: COMPLETED | OUTPATIENT
Start: 2024-02-22 | End: 2024-02-22

## 2024-02-22 RX ORDER — HYDROMORPHONE HYDROCHLORIDE 2 MG/ML
0.5 INJECTION INTRAMUSCULAR; INTRAVENOUS; SUBCUTANEOUS ONCE
Refills: 0 | Status: DISCONTINUED | OUTPATIENT
Start: 2024-02-22 | End: 2024-02-22

## 2024-02-22 RX ORDER — ONDANSETRON 8 MG/1
4 TABLET, FILM COATED ORAL EVERY 6 HOURS
Refills: 0 | Status: DISCONTINUED | OUTPATIENT
Start: 2024-02-22 | End: 2024-03-08

## 2024-02-22 RX ORDER — HYDROMORPHONE HYDROCHLORIDE 2 MG/ML
0.5 INJECTION INTRAMUSCULAR; INTRAVENOUS; SUBCUTANEOUS EVERY 4 HOURS
Refills: 0 | Status: DISCONTINUED | OUTPATIENT
Start: 2024-02-22 | End: 2024-02-29

## 2024-02-22 RX ADMIN — Medication 1000 MILLIGRAM(S): at 21:22

## 2024-02-22 RX ADMIN — Medication 100 MILLIEQUIVALENT(S): at 16:10

## 2024-02-22 RX ADMIN — Medication 400 MILLIGRAM(S): at 20:59

## 2024-02-22 RX ADMIN — HYDROMORPHONE HYDROCHLORIDE 1 MILLIGRAM(S): 2 INJECTION INTRAMUSCULAR; INTRAVENOUS; SUBCUTANEOUS at 01:12

## 2024-02-22 RX ADMIN — HEPARIN SODIUM 5000 UNIT(S): 5000 INJECTION INTRAVENOUS; SUBCUTANEOUS at 05:14

## 2024-02-22 RX ADMIN — HYDROMORPHONE HYDROCHLORIDE 0.5 MILLIGRAM(S): 2 INJECTION INTRAMUSCULAR; INTRAVENOUS; SUBCUTANEOUS at 12:24

## 2024-02-22 RX ADMIN — HYDROMORPHONE HYDROCHLORIDE 0.5 MILLIGRAM(S): 2 INJECTION INTRAMUSCULAR; INTRAVENOUS; SUBCUTANEOUS at 18:46

## 2024-02-22 RX ADMIN — HYDROMORPHONE HYDROCHLORIDE 0.5 MILLIGRAM(S): 2 INJECTION INTRAMUSCULAR; INTRAVENOUS; SUBCUTANEOUS at 09:00

## 2024-02-22 RX ADMIN — HYDROMORPHONE HYDROCHLORIDE 0.5 MILLIGRAM(S): 2 INJECTION INTRAMUSCULAR; INTRAVENOUS; SUBCUTANEOUS at 12:39

## 2024-02-22 RX ADMIN — FLUCONAZOLE 100 MILLIGRAM(S): 150 TABLET ORAL at 18:47

## 2024-02-22 RX ADMIN — Medication 4: at 06:12

## 2024-02-22 RX ADMIN — Medication 100 MILLIEQUIVALENT(S): at 12:01

## 2024-02-22 RX ADMIN — HYDROMORPHONE HYDROCHLORIDE 1 MILLIGRAM(S): 2 INJECTION INTRAMUSCULAR; INTRAVENOUS; SUBCUTANEOUS at 00:35

## 2024-02-22 RX ADMIN — CHLORHEXIDINE GLUCONATE 1 APPLICATION(S): 213 SOLUTION TOPICAL at 05:14

## 2024-02-22 RX ADMIN — PIPERACILLIN AND TAZOBACTAM 25 GRAM(S): 4; .5 INJECTION, POWDER, LYOPHILIZED, FOR SOLUTION INTRAVENOUS at 16:11

## 2024-02-22 RX ADMIN — Medication 2: at 23:56

## 2024-02-22 RX ADMIN — PIPERACILLIN AND TAZOBACTAM 25 GRAM(S): 4; .5 INJECTION, POWDER, LYOPHILIZED, FOR SOLUTION INTRAVENOUS at 05:14

## 2024-02-22 RX ADMIN — HYDROMORPHONE HYDROCHLORIDE 0.5 MILLIGRAM(S): 2 INJECTION INTRAMUSCULAR; INTRAVENOUS; SUBCUTANEOUS at 08:45

## 2024-02-22 RX ADMIN — Medication 100 MILLIEQUIVALENT(S): at 14:53

## 2024-02-22 RX ADMIN — INSULIN GLARGINE 7 UNIT(S): 100 INJECTION, SOLUTION SUBCUTANEOUS at 21:13

## 2024-02-22 NOTE — PROCEDURE NOTE - ADDITIONAL PROCEDURE DETAILS
Catheter may remain in place up to 29 days.
28 cm  4Fr  PICC inserted via the right basilic vein.  Sterile dressing applied.  Tip location SVC/ RA Junction. This has been placed for TPN administration only.

## 2024-02-22 NOTE — PROGRESS NOTE ADULT - ASSESSMENT
60 y/o female with pmhx of HTN, DM admitted for severe abd pain. Pt is s/p diagnostic laparoscopy, lysis of adhesions, conversion to laparotomy for adhesiolysis and incisional hernia repair without mesh 2/6/24/ Pt did well postoperatively and was d/c 2/14/2024 until readmission for severe abd pain on 2/19. Found to have pneumonoperitoneum due to bowel perforation. Underwent ex lap with small bowel resection and anastomosis. Admitted to the ICU for management of septic shock, which has resolved. BCs neg. Cx obtained during ex lap growing E.coli and S. anginosus. Of concern is purulent drainage noted in bulb and rising WBC, suggestive of abscess formation. If WBC cont. to rise, wd obtain repeat CT abd.    #Septic shock due to bowel perforation (see above)-- pip/tazo should provide appropriate coverage for polymicrobial infection, including anaerobes. However, now concerned about development of abscess(es).  --cont. piperacillin/tazobactam  --d/c fluconazole  --f/u CBC  --consider CT abd    # DEION/ATN-- likely due to sepsis with improvement in renal fn noted    #DM-- management as per ICU team    Chart reviewed, including additional notes/labs/cultures/imaging; pt examined at the bedside; discussed Dx/management of sepsis due to bowel perf with Dr. Lopez, and concerns about developing abscess (es).

## 2024-02-22 NOTE — CHART NOTE - NSCHARTNOTEFT_GEN_A_CORE
By to see pt. Seen with JEFFRY Castro. Repeat hgb/hct 6.6/20.4. Normotensive, still slightly tachycardic. Pt feeling tired, report by nursing staff slept most of day. JEFFRY Castro spoke with pt earlier and she is now agreeable to blood transfusion.   Abd- soft,  minimal tenderness, no guarding no rebound. ZEKE serosanguinous, slightly murky    Plan- transfuse 1 unit PRBC         hold IVF during blood transfusion

## 2024-02-22 NOTE — CHART NOTE - NSCHARTNOTEFT_GEN_A_CORE
Pt downgraded from ICU overnight. Tachycardic up to 120's. Pt with H/H 6.8/20.8. Tachycardia likely from hypovolemia. Extensive conversation had between attending and patient, pt continues to refuse blood transfusion, risks/benefits/alternatives discussed. Plan to repeat CBC 16:00. Pt placed on telemetry/continuous pulse ox. Will continue to closely monitor, ivf, serial cbcs, ekg, strict I's and O's, PICC line for TPN f/u nephro Pt downgraded from ICU overnight. Tachycardic up to 120's. Pt with H/H 6.8/20.8. Tachycardia likely from hypovolemia. Extensive conversation had between attending and patient, pt continues to refuse blood transfusion, risks/benefits/alternatives discussed. Plan to repeat CBC 16:00. Pt placed on telemetry/continuous pulse ox. Will continue to closely monitor, ivf, serial cbcs, ekg, strict I's and O's, vitals q4, PICC line for TPN f/u nephro Pt downgraded from ICU overnight. Tachycardic up to 120's. Pt with H/H 6.8/20.8. Tachycardia likely from hypovolemia. Extensive conversation had between attending and patient, pt continues to refuse blood transfusion, risks/benefits/alternatives discussed. Plan to repeat CBC 16:00. Pt placed on telemetry/continuous pulse ox. Will continue to closely monitor, ivf, serial cbcs, ekg, strict I's and O's, vitals q4, PICC line for TPN f/u nephro          Attending addendum-  Pt seen and examined. Less tachycardic now to 90's -110. Pt was to go down for PICC line today for TPN,  consented to procedure and seen by IR prior to procedure on floor. Pt then refused right before procedure.  I spoke with pt at length and explained the purpose of the IV nutrition was to provide her with nutrients intravenously since she was still unable to take po while awaiting bowel function. I also informed her labs were showing signs of low protein and malnutrition. She reports she did pass gas today. She then agreed to proceed with PICC line placement for TPN.

## 2024-02-22 NOTE — PHYSICAL THERAPY INITIAL EVALUATION ADULT - PERTINENT HX OF CURRENT PROBLEM, REHAB EVAL
60 y/o female with pmhx of HTN, DM, Reported to ER with c/o decreased appetite few days before her admissions and severe vaginal pain/spasms. Pt lives in private home with  and son.

## 2024-02-22 NOTE — PROGRESS NOTE ADULT - ASSESSMENT
59 y Female, recent admission s/p diagnostic laparoscopy, lysis of adhesions, conversion to laparotomy for adhesiolysis and incisional hernia repair without mesh 2/6/24, now s/p.Ex lap SBR and abdominal washout for small bowel perforation 2/19 POD #3     tachycardic     f/u AM labs  npo, ivf  Pain control as needed  local wound care   Monitor drain output  monitor bowel function  incentive spirometer  OOB and ambulate as tolerated  DVT ppx  f/u ID   f/u medicine   discussed with dr. magdaleno

## 2024-02-22 NOTE — PROGRESS NOTE ADULT - SUBJECTIVE AND OBJECTIVE BOX
Subjective/Objective:      MEDS  acetaminophen   IVPB .. 1000 milliGRAM(s) IV Intermittent once PRN  benzocaine/menthol Lozenge 1 Lozenge Oral every 2 hours PRN  chlorhexidine 2% Cloths 1 Application(s) Topical <User Schedule>  dextrose 5% + lactated ringers. 1000 milliLiter(s) IV Continuous <Continuous>  fluconAZOLE IVPB 400 milliGRAM(s) IV Intermittent every 24 hours  HYDROmorphone  Injectable 0.5 milliGRAM(s) IV Push every 4 hours PRN  insulin glargine Injectable (LANTUS) 7 Unit(s) SubCutaneous at bedtime  insulin lispro (ADMELOG) corrective regimen sliding scale   SubCutaneous every 6 hours  ondansetron Injectable 4 milliGRAM(s) IV Push every 6 hours PRN  piperacillin/tazobactam IVPB.. 3.375 Gram(s) IV Intermittent every 8 hours (D3)      PHYSICAL EXAM:    Vital Signs Last 24 Hrs  T(C): 37.3 (22 Feb 2024 05:00), Max: 37.6 (22 Feb 2024 01:34)  T(F): 99.1 (22 Feb 2024 05:00), Max: 99.7 (22 Feb 2024 01:34)  HR: 101 (22 Feb 2024 05:00) (101 - 120)  BP: 136/83 (22 Feb 2024 05:00) (85/53 - 136/83)  BP(mean): 78 (22 Feb 2024 01:00) (63 - 88)  RR: 17 (22 Feb 2024 05:00) (12 - 26)  SpO2: 94% (22 Feb 2024 05:00) (93% - 99%)    Parameters below as of 22 Feb 2024 05:00  Patient On (Oxygen Delivery Method): room air        GEN:    HEENT:    CHEST/Respiratory:    Cardiovascular:    Abdomen:    Genitourinary:    Extremities:     Neurological:    Skin:      LABS/DIAGNOSTIC TESTS                            7.6    12.41 )-----------( 481      ( 21 Feb 2024 09:20 )             23.5       WBC Count: 12.41 K/uL (02-21 @ 09:20)  WBC Count: 13.08 K/uL (02-21 @ 04:14)  WBC Count: 10.73 K/uL (02-20 @ 15:50)  WBC Count: 6.30 K/uL (02-20 @ 03:55)  WBC Count: 6.39 K/uL (02-20 @ 00:15)  WBC Count: 17.00 K/uL (02-19 @ 11:30)      02-21    136  |  107  |  12  ----------------------------<  396<H>  3.8   |  23  |  0.93    Ca    7.8<L>      21 Feb 2024 04:14  Phos  3.0     02-21  Mg     2.2     02-21    TPro  5.1<L>  /  Alb  1.4<L>  /  TBili  0.6  /  DBili  x   /  AST  32  /  ALT  33  /  AlkPhos  78  02-21          CULTURES      Culture - Abscess with Gram Stain (collected 02-19-24 @ 23:20)  Source: .Abscess abdominal abcess  Preliminary Report (02-21-24 @ 17:43):    Rare Escherichia coli    Few Alpha hemolytic strep "Susceptibilities not performed"    Numerous Streptococcus anginosus "Susceptibilities not performed"  Organism: Escherichia coli (02-21-24 @ 17:43)  Organism: Escherichia coli (02-21-24 @ 17:43)      Method Type: CINDY      -  Amoxicillin/Clavulanic Acid: S <=8/4      -  Ampicillin: R >16 These ampicillin results predict results for amoxicillin      -  Ampicillin/Sulbactam: I 16/8      -  Aztreonam: S <=4      -  Cefazolin: S <=2      -  Cefepime: S <=2      -  Cefoxitin: S <=8      -  Ceftriaxone: S <=1      -  Ciprofloxacin: R >2      -  Ertapenem: S <=0.5      -  Gentamicin: S <=2      -  Imipenem: S <=1      -  Levofloxacin: R >4      -  Meropenem: S <=1      -  Piperacillin/Tazobactam: S <=8      -  Tobramycin: S <=2      -  Trimethoprim/Sulfamethoxazole: R >2/38    Culture - Urine (collected 02-19-24 @ 11:53)  Source: Clean Catch Clean Catch (Midstream)  Final Report (02-20-24 @ 15:54):    >=3 organisms. Probable collection contamination.    Culture - Blood (collected 02-19-24 @ 11:45)  Source: .Blood Blood-Peripheral  Preliminary Report (02-21-24 @ 18:01):    No growth at 48 Hours    Culture - Blood (collected 02-19-24 @ 11:30)  Source: .Blood Blood-Peripheral  Preliminary Report (02-21-24 @ 18:01):    No growth at 48 Hours          RADIOLOGY               Subjective/Objective: Pt c/o pain around operative site. Passing gas but no bm      MEDS  acetaminophen   IVPB .. 1000 milliGRAM(s) IV Intermittent once PRN  benzocaine/menthol Lozenge 1 Lozenge Oral every 2 hours PRN  chlorhexidine 2% Cloths 1 Application(s) Topical <User Schedule>  dextrose 5% + lactated ringers. 1000 milliLiter(s) IV Continuous <Continuous>  fluconAZOLE IVPB 400 milliGRAM(s) IV Intermittent every 24 hours  HYDROmorphone  Injectable 0.5 milliGRAM(s) IV Push every 4 hours PRN  insulin glargine Injectable (LANTUS) 7 Unit(s) SubCutaneous at bedtime  insulin lispro (ADMELOG) corrective regimen sliding scale   SubCutaneous every 6 hours  ondansetron Injectable 4 milliGRAM(s) IV Push every 6 hours PRN  piperacillin/tazobactam IVPB.. 3.375 Gram(s) IV Intermittent every 8 hours (D3)      PHYSICAL EXAM:    Vital Signs Last 24 Hrs  T(C): 37.3 (22 Feb 2024 05:00), Max: 37.6 (22 Feb 2024 01:34)  T(F): 99.1 (22 Feb 2024 05:00), Max: 99.7 (22 Feb 2024 01:34)  HR: 101 (22 Feb 2024 05:00) (101 - 120)  BP: 136/83 (22 Feb 2024 05:00) (85/53 - 136/83)  BP(mean): 78 (22 Feb 2024 01:00) (63 - 88)  RR: 17 (22 Feb 2024 05:00) (12 - 26)  SpO2: 94% (22 Feb 2024 05:00) (93% - 99%)    Parameters below as of 22 Feb 2024 05:00  Patient On (Oxygen Delivery Method): room air      Gen: obese female in NAD    HEENT: NC/AT; conj clear    Neck: supple    Lymph Nodes: cd not appreciate any enlarged submand, cervical or supraclav LNs    Chest/Thorax: clear to auscultation ant.     Cardiovascular: S1S2 reg; no m, g, r    ABD: midline, vertical incision site with small amount of purulent drainage from distal portion of incision; + purulent drainage in drainage bulb; BS active; soft; + tenderness around incision site    Extremities: 1+ swelling UE bilat    Neurological: A+Ox3    Skin: no rashes noted    Psychiatric: affect appropriate            LABS/DIAGNOSTIC TESTS                            7.6    12.41 )-----------( 481      ( 21 Feb 2024 09:20 )             23.5     WBC 14.35 2/22  WBC Count: 12.41 K/uL (02-21 @ 09:20)  WBC Count: 13.08 K/uL (02-21 @ 04:14)  WBC Count: 10.73 K/uL (02-20 @ 15:50)  WBC Count: 6.30 K/uL (02-20 @ 03:55)  WBC Count: 6.39 K/uL (02-20 @ 00:15)  WBC Count: 17.00 K/uL (02-19 @ 11:30)      02-21    136  |  107  |  12  ----------------------------<  396<H>  3.8   |  23  |  0.93    Ca    7.8<L>      21 Feb 2024 04:14  Phos  3.0     02-21  Mg     2.2     02-21    TPro  5.1<L>  /  Alb  1.4<L>  /  TBili  0.6  /  DBili  x   /  AST  32  /  ALT  33  /  AlkPhos  78  02-21          CULTURES      Culture - Abscess with Gram Stain (collected 02-19-24 @ 23:20)  Source: .Abscess abdominal abcess  Preliminary Report (02-21-24 @ 17:43):    Rare Escherichia coli    Few Alpha hemolytic strep "Susceptibilities not performed"    Numerous Streptococcus anginosus "Susceptibilities not performed"  Organism: Escherichia coli (02-21-24 @ 17:43)  Organism: Escherichia coli (02-21-24 @ 17:43)      Method Type: CINDY      -  Amoxicillin/Clavulanic Acid: S <=8/4      -  Ampicillin: R >16 These ampicillin results predict results for amoxicillin      -  Ampicillin/Sulbactam: I 16/8      -  Aztreonam: S <=4      -  Cefazolin: S <=2      -  Cefepime: S <=2      -  Cefoxitin: S <=8      -  Ceftriaxone: S <=1      -  Ciprofloxacin: R >2      -  Ertapenem: S <=0.5      -  Gentamicin: S <=2      -  Imipenem: S <=1      -  Levofloxacin: R >4      -  Meropenem: S <=1      -  Piperacillin/Tazobactam: S <=8      -  Tobramycin: S <=2      -  Trimethoprim/Sulfamethoxazole: R >2/38    Culture - Urine (collected 02-19-24 @ 11:53)  Source: Clean Catch Clean Catch (Midstream)  Final Report (02-20-24 @ 15:54):    >=3 organisms. Probable collection contamination.    Culture - Blood (collected 02-19-24 @ 11:45)  Source: .Blood Blood-Peripheral  Preliminary Report (02-21-24 @ 18:01):    No growth at 48 Hours    Culture - Blood (collected 02-19-24 @ 11:30)  Source: .Blood Blood-Peripheral  Preliminary Report (02-21-24 @ 18:01):    No growth at 48 Hours          RADIOLOGY

## 2024-02-22 NOTE — PROGRESS NOTE ADULT - ASSESSMENT
Patient is a 59y Female with HTN, DM, recently admitted 2/5-2/14/24 s/p diagnostic laparoscopy, lysis of adhesions, conversion to laparotomy for adhesiolysis and incisional hernia repair without mesh on 2/6, presents now with abdominal pain. Pt found to have small bowel perforation with pneumoperitoneum. s/p OR 2/19 for ex-lap with bowel resection, found to have adhesions with intrabdominal abscess. Pt transferred to ICU for septic shock 2/2 peritonitis, hypotension requiring brief pressors, with DEION and hyponatremia. DEION and hyponatremia resolved.   Nephrology consulted for TPN for prolonged NPO status.     1. Moderate PCM- albumin 1.4 with trace edema. Subjective wt loss. Agree with initiating TPN. s/p IR RUE PICC line placed after 1pm; unable to place TPN orders in time. c/w D5 LR IVF for now.  Will give TPN with lipids @ 1L (1/2 dose on first day; 2/23), Will d/c IVF when TPN is initiated.   Check FS 15 min and 1hr after starting TPN and then q6h thereafter. hgbA1C 8.4 on 2/6/24.  on 2/22   Check CMP/Mg/Phos/ Ionized calcium in am.   Daily weights. Strict I/O. Will hold TPN if pt spikes fever and check BCX x2    2. DEION - likely ATN due to Septic shock. Now resolved. Strict I/Os. Avoid nephrotoxins/ NSAIDs/ RCA. Monitor BMP.    3. Septic shock- 2/2 peritonitis/ small bowel perforation. Pt on Zosyn. BCx 2/19 NG.  Plan as per ID/ Surgery    4. Hypotension- BP improved. Pt off pressors. Monitor BP  Hypokalemia- pt give KCl 10meq IV x3.      TPN orders:    60 kg  Total Kcal 1500  Lipid 50 g =500 kcal  AA 100g = 400 kcal  Dextrose 176g - 600 kcal    [facility] NEPHROLOGY  Dean Shipley M.D.  Jaylon Kang D.O.  Debbi Garcia M.D.  MD Laura Kwon, MSN, ANP-C    Telephone: [phone]  Facsimile: [phone]    [address] [place] [address], [address]  [place] [id number]

## 2024-02-22 NOTE — CHART NOTE - NSCHARTNOTEFT_GEN_A_CORE
Assessment:   Patient is a 59y old  Female who presents with a chief complaint of bowel perf (22 Feb 2024 10:03). Saw pt in AM , went for PICC. While there declined PICC, had concerns. Discussed w/ Sx PAs, RADHA MD, Sdina PMD, RN. Pt returned to floor. Then informed by Fadumo PMD that pt agreeing for PICC. Hover procedure not able to be done in time for PN orders to be in, despite 1 hr extension by Pharmacy. Thus, PICC is in and PN For tomorrow order with start at 10 PM. TG WNL. Of note: w/ pt's subjective report of weight loss 5-6 # over 12 days +(Pt NPO day #4) would reclassify as at least moderate PCM, suspected  severe PCM      Factors impacting intake: [ ] none [ ] nausea  [ ] vomiting [ ] diarrhea [ ] constipation  [ ]chewing problems [ ] swallowing issues  [x ] other: altered GI fx/ structure    Diet Prescription: Diet, NPO (02-20-24 @ 16:44)        Pertinent Medications: MEDICATIONS  (STANDING):  chlorhexidine 2% Cloths 1 Application(s) Topical <User Schedule>  dextrose 5% + lactated ringers. 1000 milliLiter(s) (110 mL/Hr) IV Continuous <Continuous>  fluconAZOLE IVPB 400 milliGRAM(s) IV Intermittent every 24 hours  insulin glargine Injectable (LANTUS) 7 Unit(s) SubCutaneous at bedtime  insulin lispro (ADMELOG) corrective regimen sliding scale   SubCutaneous every 6 hours  piperacillin/tazobactam IVPB.. 3.375 Gram(s) IV Intermittent every 8 hours  potassium chloride  10 mEq/100 mL IVPB 10 milliEquivalent(s) IV Intermittent every 1 hour    MEDICATIONS  (PRN):  acetaminophen   IVPB .. 1000 milliGRAM(s) IV Intermittent once PRN Temp greater or equal to 38C (100.4F), Mild Pain (1 - 3)  benzocaine/menthol Lozenge 1 Lozenge Oral every 2 hours PRN Sore Throat  HYDROmorphone  Injectable 0.5 milliGRAM(s) IV Push every 4 hours PRN Moderate Pain (4 - 6)  ondansetron Injectable 4 milliGRAM(s) IV Push every 6 hours PRN Nausea and/or Vomiting  sodium chloride 0.9% lock flush 10 milliLiter(s) IV Push every 1 hour PRN Pre/post blood products, medications, blood draw, and to maintain line patency    Pertinent Labs: 02-22 Na140 mmol/L Glu 137 mg/dL<H> K+ 3.2 mmol/L<L> Cr  0.70 mg/dL BUN 7 mg/dL 02-22 Phos 2.5 mg/dL 02-22 Alb 1.6 g/dL<L> 02-22 Chol --    LDL --    HDL --    Trig 149 mg/dL     CAPILLARY BLOOD GLUCOSE      POCT Blood Glucose.: 119 mg/dL (22 Feb 2024 11:30)  POCT Blood Glucose.: 205 mg/dL (22 Feb 2024 05:52)  POCT Blood Glucose.: 163 mg/dL (21 Feb 2024 22:41)  POCT Blood Glucose.: 162 mg/dL (21 Feb 2024 17:32)        Estimated Needs:   [x ] no change since previous assessment  [ ] recalculated:       Previous Nutrition Diagnosis:   [ ] Altered GI function  [ ]Inadequate Oral Intake [ ] Swallowing Difficulty   [ ] Altered nutrition related labs [ ] Increased Nutrient Needs [ ] Overweight/Obesity   [ ] Unintended Weight Loss [ ] Food & Nutrition Related Knowledge Deficit [x ] Malnutrition (moderate PCM)  [ ] Other:     Nutrition Diagnosis is [x ]PCM (at least moderate/ suspected sever PCM., due to altered GI fx w/ 2x GI sx, decreased to poor oral intake (NPO day #4), +subjective wt loss 5-6 lbs over 12 days ~3.3%)     Interventions:   Recommend  [ ] Change Diet To:  [ ] Nutrition Supplement  [x ] Nutrition Support: See 2/ 21 for PN calculations  [ ] Other:     Monitoring and Evaluation:    [ x ] Tolerance to diet prescription [ x ] weights [ x ] labs[ x ] follow up per protocol  [ ] other:

## 2024-02-22 NOTE — PHYSICAL THERAPY INITIAL EVALUATION ADULT - ACTIVE RANGE OF MOTION EXAMINATION, REHAB EVAL
julia. upper extremity Active ROM was WNL (within normal limits)/bilateral lower extremity Active ROM was WNL (within normal limits)

## 2024-02-22 NOTE — PROCEDURE NOTE - NSPROCDETAILS_GEN_ALL_CORE
location identified, draped/prepped, sterile technique used/blood seen on insertion/dressing applied/flushes easily/secured in place/sterile technique, catheter placed
location identified, draped/prepped, sterile technique used/sterile technique, catheter placed

## 2024-02-22 NOTE — PROGRESS NOTE ADULT - SUBJECTIVE AND OBJECTIVE BOX
INTERVAL HPI/OVERNIGHT EVENTS:  seen and examined   Pt resting comfortably  npo, nausea, no vomiting  continued incisional pain  no flatus no bm       MEDICATIONS  (STANDING):  chlorhexidine 2% Cloths 1 Application(s) Topical <User Schedule>  dextrose 5% + lactated ringers. 1000 milliLiter(s) (110 mL/Hr) IV Continuous <Continuous>  heparin   Injectable 5000 Unit(s) SubCutaneous every 8 hours  insulin glargine Injectable (LANTUS) 7 Unit(s) SubCutaneous at bedtime  insulin lispro (ADMELOG) corrective regimen sliding scale   SubCutaneous every 6 hours  piperacillin/tazobactam IVPB.. 3.375 Gram(s) IV Intermittent every 8 hours    MEDICATIONS  (PRN):  benzocaine/menthol Lozenge 1 Lozenge Oral every 2 hours PRN Sore Throat  HYDROmorphone  Injectable 0.5 milliGRAM(s) IV Push every 4 hours PRN Moderate Pain (4 - 6)  ondansetron Injectable 4 milliGRAM(s) IV Push every 6 hours PRN Nausea and/or Vomiting      Vital Signs Last 24 Hrs  T(C): 37.3 (22 Feb 2024 05:00), Max: 37.6 (22 Feb 2024 01:34)  T(F): 99.1 (22 Feb 2024 05:00), Max: 99.7 (22 Feb 2024 01:34)  HR: 101 (22 Feb 2024 05:00) (100 - 120)  BP: 136/83 (22 Feb 2024 05:00) (85/53 - 136/83)  BP(mean): 78 (22 Feb 2024 01:00) (63 - 88)  RR: 17 (22 Feb 2024 05:00) (12 - 26)  SpO2: 94% (22 Feb 2024 05:00) (93% - 99%)    Parameters below as of 22 Feb 2024 05:00  Patient On (Oxygen Delivery Method): room air        Physical:  General: NAD.  Respirations: Unlabored  Abdomen: Soft nondistended, appropriate incisional tenderness, shruthi soiled at inferior aspect, without adequate seal, No rebound, no guarding, no peritonitis     I&O's Detail    21 Feb 2024 07:01  -  22 Feb 2024 07:00  --------------------------------------------------------  IN:    dextrose 5% + lactated ringers: 150 mL    dextrose 5% + lactated ringers: 900 mL    IV PiggyBack: 100 mL    Lactated Ringers: 700 mL  Total IN: 1850 mL    OUT:    Bulb (mL): 30 mL    Voided (mL): 1100 mL  Total OUT: 1130 mL    Total NET: 720 mL          LABS:                        7.6    12.41 )-----------( 481      ( 21 Feb 2024 09:20 )             23.5             02-21    136  |  107  |  12  ----------------------------<  396<H>  3.8   |  23  |  0.93    Ca    7.8<L>      21 Feb 2024 04:14  Phos  3.0     02-21  Mg     2.2     02-21    TPro  5.1<L>  /  Alb  1.4<L>  /  TBili  0.6  /  DBili  x   /  AST  32  /  ALT  33  /  AlkPhos  78  02-21

## 2024-02-22 NOTE — PROGRESS NOTE ADULT - SUBJECTIVE AND OBJECTIVE BOX
60 yo female brought to  for PICC placement for TPN.  Procedure discussed as well as the risks and benefits.  At first the patient agreed to the procedure, but then prior to going in to the room she had many concerns including the risk of infection and her already elevated glucose levels.  The patient then stated she does not want to have the procedure at this time.  Transport was initiated and the patient went back to her room.

## 2024-02-22 NOTE — PROGRESS NOTE ADULT - SUBJECTIVE AND OBJECTIVE BOX
[facility] NEPHROLOGY- METABOLIC SUPPORT PROGRESS NOTE    Patient is a 59y Female with HTN, DM, recently admitted 2/5-2/14/24 s/p diagnostic laparoscopy, lysis of adhesions, conversion to laparotomy for adhesiolysis and incisional hernia repair without mesh on 2/6, presents now with abdominal pain. Pt found to have small bowel perforation with pneumoperitoneum. s/p OR 2/19 for ex-lap with bowel resection, found to have adhesions with intrabdominal abscess. Pt transferred to ICU for septic shock 2/2 peritonitis, hypotension requiring brief pressors, with DEION and hyponatremia. DEION and hyponatremia resolved.   Nephrology consulted for TPN for prolonged NPO status.   2/22: s/p IR RUE PICC placement    Hospital Medications: Medications reviewed.  REVIEW OF SYSTEMS:  CONSTITUTIONAL: No fevers or chills  RESPIRATORY: No shortness of breath  CARDIOVASCULAR: No chest pain.  GASTROINTESTINAL: No nausea, or vomiting, No BM +flatulence +abdominal pain.   VASCULAR: No bilateral lower extremity edema.     VITALS:  T(F): 98.7 (02-22-24 @ 11:57), Max: 99.7 (02-22-24 @ 01:34)  HR: 96 (02-22-24 @ 16:07)  BP: 147/84 (02-22-24 @ 16:07)  RR: 18 (02-22-24 @ 11:57)  SpO2: 92% (02-22-24 @ 16:07)  Wt(kg): --  Height (cm): 162.5 (02-21 @ 15:21), 162.6 (02-05 @ 07:18), 162.6 (02-04 @ 14:59)  Weight (kg): 73.1 (02-20 @ 03:45), 70.3 (02-05 @ 07:18), 70.3 (02-04 @ 14:59)  BMI (kg/m2): 27.7 (02-21 @ 15:21), 27.6 (02-20 @ 03:45), 26.6 (02-05 @ 07:18), 26.6 (02-04 @ 14:59)  BSA (m2): 1.78 (02-21 @ 15:21), 1.78 (02-20 @ 03:45), 1.76 (02-05 @ 07:18), 1.76 (02-04 @ 14:59)    02-21 @ 07:01  -  02-22 @ 07:00  --------------------------------------------------------  IN: 1850 mL / OUT: 1130 mL / NET: 720 mL    02-22 @ 07:01  -  02-22 @ 16:42  --------------------------------------------------------  IN: 0 mL / OUT: 10 mL / NET: -10 mL      PHYSICAL EXAM:  Constitutional: NAD,   HEENT: anicteric sclera,   Respiratory: CTAB, no wheezes, rales or rhonchi  Cardiovascular: S1, S2, RRR  Gastrointestinal: +midline incision covered  +NGT  Extremities: minimal peripheral edema b/l +RUE edema improving  Vascular Access: RUE single lumen PICC- saved for  TPN    LABS:  02-22    140  |  108  |  7   ----------------------------<  137<H>  3.2<L>   |  27  |  0.70    Ca    8.3<L>      22 Feb 2024 10:36  Phos  2.5     02-22  Mg     1.9     02-22    TPro  6.0  /  Alb  1.6<L>  /  TBili  0.5  /  DBili      /  AST  22  /  ALT  28  /  AlkPhos  92  02-22    Creatinine Trend: 0.70 <--, 0.93 <--, 0.92 <--, 1.33 <--, 1.19 <--, 1.99 <--                        6.8    14.35 )-----------( 577      ( 22 Feb 2024 10:36 )             20.8     Urine Studies:  Urinalysis Basic - ( 22 Feb 2024 10:36 )    Color:  / Appearance:  / SG:  / pH:   Gluc: 137 mg/dL / Ketone:   / Bili:  / Urobili:    Blood:  / Protein:  / Nitrite:    Leuk Esterase:  / RBC:  / WBC    Sq Epi:  / Non Sq Epi:  / Bacteria:       Sodium, Random Urine: 48 mmol/L (02-21 @ 04:15)  Creatinine, Random Urine: 58 mg/dL (02-21 @ 04:15)    RADIOLOGY & ADDITIONAL STUDIES:

## 2024-02-23 LAB
ALBUMIN SERPL ELPH-MCNC: 1.5 G/DL — LOW (ref 3.5–5)
ALP SERPL-CCNC: 95 U/L — SIGNIFICANT CHANGE UP (ref 40–120)
ALT FLD-CCNC: 23 U/L DA — SIGNIFICANT CHANGE UP (ref 10–60)
ANION GAP SERPL CALC-SCNC: 5 MMOL/L — SIGNIFICANT CHANGE UP (ref 5–17)
AST SERPL-CCNC: 19 U/L — SIGNIFICANT CHANGE UP (ref 10–40)
BILIRUB SERPL-MCNC: 0.6 MG/DL — SIGNIFICANT CHANGE UP (ref 0.2–1.2)
BUN SERPL-MCNC: 5 MG/DL — LOW (ref 7–18)
CALCIUM SERPL-MCNC: 8 MG/DL — LOW (ref 8.4–10.5)
CHLORIDE SERPL-SCNC: 108 MMOL/L — SIGNIFICANT CHANGE UP (ref 96–108)
CO2 SERPL-SCNC: 26 MMOL/L — SIGNIFICANT CHANGE UP (ref 22–31)
CREAT SERPL-MCNC: 0.74 MG/DL — SIGNIFICANT CHANGE UP (ref 0.5–1.3)
EGFR: 94 ML/MIN/1.73M2 — SIGNIFICANT CHANGE UP
GLUCOSE SERPL-MCNC: 477 MG/DL — CRITICAL HIGH (ref 70–99)
HCT VFR BLD CALC: 19.9 % — CRITICAL LOW (ref 34.5–45)
HCT VFR BLD CALC: 25.7 % — LOW (ref 34.5–45)
HGB BLD-MCNC: 6.5 G/DL — CRITICAL LOW (ref 11.5–15.5)
HGB BLD-MCNC: 8.5 G/DL — LOW (ref 11.5–15.5)
MAGNESIUM SERPL-MCNC: 1.4 MG/DL — LOW (ref 1.6–2.6)
MCHC RBC-ENTMCNC: 27.5 PG — SIGNIFICANT CHANGE UP (ref 27–34)
MCHC RBC-ENTMCNC: 28 PG — SIGNIFICANT CHANGE UP (ref 27–34)
MCHC RBC-ENTMCNC: 32.7 GM/DL — SIGNIFICANT CHANGE UP (ref 32–36)
MCHC RBC-ENTMCNC: 33.1 GM/DL — SIGNIFICANT CHANGE UP (ref 32–36)
MCV RBC AUTO: 84.3 FL — SIGNIFICANT CHANGE UP (ref 80–100)
MCV RBC AUTO: 84.5 FL — SIGNIFICANT CHANGE UP (ref 80–100)
NRBC # BLD: 0 /100 WBCS — SIGNIFICANT CHANGE UP (ref 0–0)
NRBC # BLD: 0 /100 WBCS — SIGNIFICANT CHANGE UP (ref 0–0)
PHOSPHATE SERPL-MCNC: 2.9 MG/DL — SIGNIFICANT CHANGE UP (ref 2.5–4.5)
PLATELET # BLD AUTO: 491 K/UL — HIGH (ref 150–400)
PLATELET # BLD AUTO: 535 K/UL — HIGH (ref 150–400)
POTASSIUM SERPL-MCNC: 3.1 MMOL/L — LOW (ref 3.5–5.3)
POTASSIUM SERPL-SCNC: 3.1 MMOL/L — LOW (ref 3.5–5.3)
PROT SERPL-MCNC: 5.4 G/DL — LOW (ref 6–8.3)
RBC # BLD: 2.36 M/UL — LOW (ref 3.8–5.2)
RBC # BLD: 3.04 M/UL — LOW (ref 3.8–5.2)
RBC # FLD: 14.9 % — HIGH (ref 10.3–14.5)
RBC # FLD: 15.2 % — HIGH (ref 10.3–14.5)
SODIUM SERPL-SCNC: 139 MMOL/L — SIGNIFICANT CHANGE UP (ref 135–145)
SURGICAL PATHOLOGY STUDY: SIGNIFICANT CHANGE UP
WBC # BLD: 10.7 K/UL — HIGH (ref 3.8–10.5)
WBC # BLD: 12.36 K/UL — HIGH (ref 3.8–10.5)
WBC # FLD AUTO: 10.7 K/UL — HIGH (ref 3.8–10.5)
WBC # FLD AUTO: 12.36 K/UL — HIGH (ref 3.8–10.5)

## 2024-02-23 PROCEDURE — 99232 SBSQ HOSP IP/OBS MODERATE 35: CPT

## 2024-02-23 RX ORDER — SODIUM CHLORIDE 9 MG/ML
1000 INJECTION, SOLUTION INTRAVENOUS
Refills: 0 | Status: DISCONTINUED | OUTPATIENT
Start: 2024-02-23 | End: 2024-03-08

## 2024-02-23 RX ORDER — INSULIN LISPRO 100/ML
VIAL (ML) SUBCUTANEOUS EVERY 6 HOURS
Refills: 0 | Status: DISCONTINUED | OUTPATIENT
Start: 2024-02-23 | End: 2024-03-06

## 2024-02-23 RX ORDER — FUROSEMIDE 40 MG
20 TABLET ORAL ONCE
Refills: 0 | Status: COMPLETED | OUTPATIENT
Start: 2024-02-23 | End: 2024-02-23

## 2024-02-23 RX ORDER — DEXTROSE 50 % IN WATER 50 %
15 SYRINGE (ML) INTRAVENOUS ONCE
Refills: 0 | Status: DISCONTINUED | OUTPATIENT
Start: 2024-02-23 | End: 2024-03-08

## 2024-02-23 RX ORDER — DEXTROSE 50 % IN WATER 50 %
12.5 SYRINGE (ML) INTRAVENOUS ONCE
Refills: 0 | Status: DISCONTINUED | OUTPATIENT
Start: 2024-02-23 | End: 2024-03-08

## 2024-02-23 RX ORDER — GLUCAGON INJECTION, SOLUTION 0.5 MG/.1ML
1 INJECTION, SOLUTION SUBCUTANEOUS ONCE
Refills: 0 | Status: DISCONTINUED | OUTPATIENT
Start: 2024-02-23 | End: 2024-03-08

## 2024-02-23 RX ORDER — MAGNESIUM SULFATE 500 MG/ML
1 VIAL (ML) INJECTION ONCE
Refills: 0 | Status: COMPLETED | OUTPATIENT
Start: 2024-02-23 | End: 2024-02-23

## 2024-02-23 RX ORDER — DEXTROSE 50 % IN WATER 50 %
25 SYRINGE (ML) INTRAVENOUS ONCE
Refills: 0 | Status: DISCONTINUED | OUTPATIENT
Start: 2024-02-23 | End: 2024-03-08

## 2024-02-23 RX ORDER — POTASSIUM CHLORIDE 20 MEQ
10 PACKET (EA) ORAL
Refills: 0 | Status: DISCONTINUED | OUTPATIENT
Start: 2024-02-23 | End: 2024-02-23

## 2024-02-23 RX ORDER — MAGNESIUM SULFATE 500 MG/ML
1 VIAL (ML) INJECTION ONCE
Refills: 0 | Status: DISCONTINUED | OUTPATIENT
Start: 2024-02-23 | End: 2024-02-23

## 2024-02-23 RX ORDER — I.V. FAT EMULSION 20 G/100ML
0.34 EMULSION INTRAVENOUS
Qty: 25 | Refills: 0 | Status: DISCONTINUED | OUTPATIENT
Start: 2024-02-23 | End: 2024-02-23

## 2024-02-23 RX ORDER — ELECTROLYTE SOLUTION,INJ
1 VIAL (ML) INTRAVENOUS
Refills: 0 | Status: DISCONTINUED | OUTPATIENT
Start: 2024-02-23 | End: 2024-02-23

## 2024-02-23 RX ADMIN — CHLORHEXIDINE GLUCONATE 1 APPLICATION(S): 213 SOLUTION TOPICAL at 05:20

## 2024-02-23 RX ADMIN — HYDROMORPHONE HYDROCHLORIDE 0.5 MILLIGRAM(S): 2 INJECTION INTRAMUSCULAR; INTRAVENOUS; SUBCUTANEOUS at 11:42

## 2024-02-23 RX ADMIN — Medication 100 GRAM(S): at 09:12

## 2024-02-23 RX ADMIN — I.V. FAT EMULSION 5.21 GM/KG/DAY: 20 EMULSION INTRAVENOUS at 22:39

## 2024-02-23 RX ADMIN — Medication 100 MILLIEQUIVALENT(S): at 14:06

## 2024-02-23 RX ADMIN — HYDROMORPHONE HYDROCHLORIDE 0.5 MILLIGRAM(S): 2 INJECTION INTRAMUSCULAR; INTRAVENOUS; SUBCUTANEOUS at 20:12

## 2024-02-23 RX ADMIN — Medication 100 MILLIEQUIVALENT(S): at 16:36

## 2024-02-23 RX ADMIN — HYDROMORPHONE HYDROCHLORIDE 0.5 MILLIGRAM(S): 2 INJECTION INTRAMUSCULAR; INTRAVENOUS; SUBCUTANEOUS at 05:20

## 2024-02-23 RX ADMIN — HYDROMORPHONE HYDROCHLORIDE 0.5 MILLIGRAM(S): 2 INJECTION INTRAMUSCULAR; INTRAVENOUS; SUBCUTANEOUS at 19:57

## 2024-02-23 RX ADMIN — PIPERACILLIN AND TAZOBACTAM 25 GRAM(S): 4; .5 INJECTION, POWDER, LYOPHILIZED, FOR SOLUTION INTRAVENOUS at 18:06

## 2024-02-23 RX ADMIN — HYDROMORPHONE HYDROCHLORIDE 0.5 MILLIGRAM(S): 2 INJECTION INTRAMUSCULAR; INTRAVENOUS; SUBCUTANEOUS at 10:38

## 2024-02-23 RX ADMIN — Medication 20 MILLIGRAM(S): at 06:43

## 2024-02-23 RX ADMIN — PIPERACILLIN AND TAZOBACTAM 25 GRAM(S): 4; .5 INJECTION, POWDER, LYOPHILIZED, FOR SOLUTION INTRAVENOUS at 02:45

## 2024-02-23 RX ADMIN — FLUCONAZOLE 100 MILLIGRAM(S): 150 TABLET ORAL at 18:07

## 2024-02-23 RX ADMIN — PIPERACILLIN AND TAZOBACTAM 25 GRAM(S): 4; .5 INJECTION, POWDER, LYOPHILIZED, FOR SOLUTION INTRAVENOUS at 10:44

## 2024-02-23 RX ADMIN — HYDROMORPHONE HYDROCHLORIDE 0.5 MILLIGRAM(S): 2 INJECTION INTRAMUSCULAR; INTRAVENOUS; SUBCUTANEOUS at 05:40

## 2024-02-23 RX ADMIN — CHLORHEXIDINE GLUCONATE 1 APPLICATION(S): 213 SOLUTION TOPICAL at 14:06

## 2024-02-23 RX ADMIN — INSULIN GLARGINE 7 UNIT(S): 100 INJECTION, SOLUTION SUBCUTANEOUS at 21:49

## 2024-02-23 RX ADMIN — Medication 1 EACH: at 22:35

## 2024-02-23 NOTE — PROGRESS NOTE ADULT - SUBJECTIVE AND OBJECTIVE BOX
[facility] NEPHROLOGY- METABOLIC SUPPORT PROGRESS NOTE    Patient is a 59y Female with HTN, DM, recently admitted 2/5-2/14/24 s/p diagnostic laparoscopy, lysis of adhesions, conversion to laparotomy for adhesiolysis and incisional hernia repair without mesh on 2/6, presents now with abdominal pain. Pt found to have small bowel perforation with pneumoperitoneum. s/p OR 2/19 for ex-lap with bowel resection, found to have adhesions with intrabdominal abscess. Pt transferred to ICU for septic shock 2/2 peritonitis, hypotension requiring brief pressors, with DEION and hyponatremia. DEION and hyponatremia resolved.   Nephrology consulted for TPN for prolonged NPO status.   2/22: s/p IR RUE PICC placement  2/23: Pt started on CLD    Hospital Medications: Medications reviewed.  REVIEW OF SYSTEMS:  CONSTITUTIONAL: No fevers or chills  RESPIRATORY: No shortness of breath  CARDIOVASCULAR: No chest pain.  GASTROINTESTINAL: No nausea, or vomiting, + 4 loose BMs +abdominal pain.   VASCULAR: No bilateral lower extremity edema.     VITALS:  T(F): 98.9 (02-23-24 @ 14:15), Max: 100.6 (02-22-24 @ 21:14)  HR: 82 (02-23-24 @ 14:15)  BP: 146/89 (02-23-24 @ 14:15)  RR: 18 (02-23-24 @ 14:15)  SpO2: 93% (02-23-24 @ 14:15)  Wt(kg): --    02-22 @ 07:01  -  02-23 @ 07:00  --------------------------------------------------------  IN: 1080 mL / OUT: 960 mL / NET: 120 mL    02-23 @ 07:01  -  02-23 @ 16:40  --------------------------------------------------------  IN: 0 mL / OUT: 620 mL / NET: -620 mL      PHYSICAL EXAM:  Constitutional: NAD,   HEENT: anicteric sclera,   Respiratory: CTAB, no wheezes, rales or rhonchi  Cardiovascular: S1, S2, RRR  Gastrointestinal: +midline incision covered    Extremities: minimal peripheral edema b/l +RUE edema improving  Vascular Access: RUE single lumen PICC- saved for  TPN    LABS:  02-23    139  |  108  |  5<L>  ----------------------------<  477<HH>  3.1<L>   |  26  |  0.74    Ca    8.0<L>      23 Feb 2024 05:41  Phos  2.9     02-23  Mg     1.4     02-23    TPro  5.4<L>  /  Alb  1.5<L>  /  TBili  0.6  /  DBili      /  AST  19  /  ALT  23  /  AlkPhos  95  02-23    Creatinine Trend: 0.74 <--, 0.70 <--, 0.93 <--, 0.92 <--, 1.33 <--, 1.19 <--, 1.99 <--                        8.5    12.36 )-----------( 535      ( 23 Feb 2024 12:50 )             25.7     Urine Studies:  Urinalysis Basic - ( 23 Feb 2024 05:41 )    Color:  / Appearance:  / SG:  / pH:   Gluc: 477 mg/dL / Ketone:   / Bili:  / Urobili:    Blood:  / Protein:  / Nitrite:    Leuk Esterase:  / RBC:  / WBC    Sq Epi:  / Non Sq Epi:  / Bacteria:       Sodium, Random Urine: 48 mmol/L (02-21 @ 04:15)  Creatinine, Random Urine: 58 mg/dL (02-21 @ 04:15)

## 2024-02-23 NOTE — PROGRESS NOTE ADULT - ASSESSMENT
60 y/o female with pmhx of HTN, DM admitted for severe abd pain. Pt is s/p diagnostic laparoscopy, lysis of adhesions, conversion to laparotomy for adhesiolysis and incisional hernia repair without mesh 2/6/24/ Pt did well postoperatively and was d/c 2/14/2024 until readmission for severe abd pain on 2/19. Found to have pneumonoperitoneum due to bowel perforation. Underwent ex lap with small bowel resection and anastomosis. Admitted to the ICU for management of septic shock, which has resolved. BCs neg. Cx obtained during ex lap growing E.coli and S. anginosus. Of concern is purulent drainage noted in bulb and rising WBC, suggestive of fluid collection/abscess formation.     #Septic shock due to bowel perforation (see above)-- pip/tazo should provide appropriate coverage for polymicrobial infection, including anaerobes. However, now concerned about development of abscess(es).  --cont. piperacillin/tazobactam  --d/c fluconazole  --f/u CBC  --CT abd    # DEION/ATN-- likely due to sepsis with improvement in renal fn noted    #DM-- management as per ICU team    Chart reviewed, including additional notes/labs/cultures/imaging; pt examined at the bedside; discussed DX/management of peritoneal infection, including concern for developing abscess, with the patient and with Dr. Lopez.

## 2024-02-23 NOTE — PROGRESS NOTE ADULT - ASSESSMENT
Patient is a 59y Female with HTN, DM, recently admitted 2/5-2/14/24 s/p diagnostic laparoscopy, lysis of adhesions, conversion to laparotomy for adhesiolysis and incisional hernia repair without mesh on 2/6, presents now with abdominal pain. Pt found to have small bowel perforation with pneumoperitoneum. s/p OR 2/19 for ex-lap with bowel resection, found to have adhesions with intrabdominal abscess. Pt transferred to ICU for septic shock 2/2 peritonitis, hypotension requiring brief pressors, with DEION and hyponatremia. DEION and hyponatremia resolved.   Nephrology consulted for TPN for prolonged NPO status.     1. Moderate PCM- albumin 1.4 with trace edema. Subjective wt loss. Agree with initiating TPN. s/p IR RUE PICC line placed on 2/22 (after 1pm). Pt on D5 LR IVF for now and d/c once on TPN  Will give TPN with lipids @ 1L (1/2 dose starting tonight), Will d/c IVF when TPN is initiated.   Check FS 15 min and 1hr after starting TPN and then q6h thereafter. hgbA1C 8.4 on 2/6/24.  on 2/22   Check CMP/Mg/Phos/ Ionized calcium in am.   Daily weights. Strict I/O. Will hold TPN if pt spikes fever and check BCX x2    2. DEION - likely ATN due to Septic shock. Now resolved. Strict I/Os. Avoid nephrotoxins/ NSAIDs/ RCA. Monitor BMP.    3. Septic shock- 2/2 peritonitis/ small bowel perforation. Pt on Zosyn. Also on Diflucan for ppx . BCx 2/19 NG.  Plan as per ID/ Surgery    4. Hypotension- BP improved. Pt off pressors. Monitor BP  Hypokalemia- pt given KCl 10meq IV x3.  Will give KCl in TPN.     TPN orders:    60 kg  Total Kcal 1500  Lipid 50 g =500 kcal  AA 100g = 400 kcal  Dextrose 176g - 600 kcal    [facility] NEPHROLOGY  Dean Shipley M.D.  Jaylon Kang D.O.  Debbi Garcia M.D.  MD Laura Kwon, MSN, ANP-C    Telephone: [phone]  Facsimile: [phone]    [address] [place] [address], [address]  [place] [address]

## 2024-02-23 NOTE — CHART NOTE - NSCHARTNOTEFT_GEN_A_CORE
Assessment:   Patient is a 59y old  Female who presents with a chief complaint of bowel perf (2024 10:11).  S/p PICC  PM for PN. 1/2 dose /starting PN orders in, to start tonight. Pt weighed (w/. PT present) on standing scale by RD at 163# (pt reports she was 155# on admission. Pt started on clear liquids (diabetic), took juice (4oz). Taking slowly.      Factors impacting intake: [ ] none [ ] nausea  [ ] vomiting [ ] diarrhea [ ] constipation  [ ]chewing problems [ ] swallowing issues  [x ] other: altered GI fx/ structure    Diet Prescription: Diet, Clear Liquid:   Consistent Carbohydrate {Evening Snacks} (24 @ 13:50)        Daily     Daily Weight in k (2024 13:00)  Weight in k.1 (2024 05:11)        Pertinent Medications: MEDICATIONS  (STANDING):  chlorhexidine 2% Cloths 1 Application(s) Topical <User Schedule>  chlorhexidine 2% Cloths 1 Application(s) Topical daily  dextrose 5% + lactated ringers. 1000 milliLiter(s) (60 mL/Hr) IV Continuous <Continuous>  dextrose 5%. 1000 milliLiter(s) (50 mL/Hr) IV Continuous <Continuous>  dextrose 5%. 1000 milliLiter(s) (100 mL/Hr) IV Continuous <Continuous>  dextrose 50% Injectable 25 Gram(s) IV Push once  dextrose 50% Injectable 12.5 Gram(s) IV Push once  dextrose 50% Injectable 25 Gram(s) IV Push once  fluconAZOLE IVPB 400 milliGRAM(s) IV Intermittent every 24 hours  glucagon  Injectable 1 milliGRAM(s) IntraMuscular once  insulin glargine Injectable (LANTUS) 7 Unit(s) SubCutaneous at bedtime  insulin lispro (ADMELOG) corrective regimen sliding scale   SubCutaneous every 6 hours  lipid, fat emulsion (Fish Oil and Plant Based) 20% Infusion 0.342 Gm/kG/Day (5.21 mL/Hr) IV Continuous <Continuous>  magnesium sulfate  IVPB 1 Gram(s) IV Intermittent once  Parenteral Nutrition - Adult 1 Each (42 mL/Hr) TPN Continuous <Continuous>  piperacillin/tazobactam IVPB.. 3.375 Gram(s) IV Intermittent every 8 hours  potassium chloride  10 mEq/100 mL IVPB 10 milliEquivalent(s) IV Intermittent every 1 hour    MEDICATIONS  (PRN):  benzocaine/menthol Lozenge 1 Lozenge Oral every 2 hours PRN Sore Throat  dextrose Oral Gel 15 Gram(s) Oral once PRN Blood Glucose LESS THAN 70 milliGRAM(s)/deciliter  HYDROmorphone  Injectable 0.5 milliGRAM(s) IV Push every 4 hours PRN Moderate Pain (4 - 6)  ondansetron Injectable 4 milliGRAM(s) IV Push every 6 hours PRN Nausea and/or Vomiting  sodium chloride 0.9% lock flush 10 milliLiter(s) IV Push every 1 hour PRN Pre/post blood products, medications, blood draw, and to maintain line patency    Pertinent Labs:  Na139 mmol/L Glu 477 mg/dL<HH> K+ 3.1 mmol/L<L> Cr  0.74 mg/dL BUN 5 mg/dL<L>  Phos 2.9 mg/dL  Alb 1.5 g/dL<L>  Chol --    LDL --    HDL --    Trig 149 mg/dL     CAPILLARY BLOOD GLUCOSE      POCT Blood Glucose.: 105 mg/dL (2024 13:05)  POCT Blood Glucose.: 121 mg/dL (2024 07:01)  POCT Blood Glucose.: 108 mg/dL (2024 05:06)  POCT Blood Glucose.: 154 mg/dL (2024 23:41)  POCT Blood Glucose.: 137 mg/dL (2024 18:24)        Estimated Needs:   [x ] no change since previous assessment  [ ] recalculated:       Previous Nutrition Diagnosis:   [ ] Altered GI function  [ ]Inadequate Oral Intake [ ] Swallowing Difficulty   [ ] Altered nutrition related labs [ ] Increased Nutrient Needs [ ] Overweight/Obesity   [ ] Unintended Weight Loss [ ] Food & Nutrition Related Knowledge Deficit [x ] Malnutrition At least moderate)  [ ] Other:     Nutrition Diagnosis is [x ] ongoing  [ ] resolved [ ] not applicable         Interventions:   Recommend  [ ] Change Diet To:  [ ] Nutrition Supplement  [x ] Nutrition Support: TPN/ lipids. Orders per RADHA MD  [x ] Other: MD to monitor. RD available.     Monitoring and Evaluation:   [x ] PO intake [ x ] Tolerance to diet prescription [ x ] weights [ x ] labs[ x ] follow up per protocol  [ ] other:

## 2024-02-23 NOTE — PROGRESS NOTE ADULT - SUBJECTIVE AND OBJECTIVE BOX
Subjective/Objective:      MEDS  benzocaine/menthol Lozenge 1 Lozenge Oral every 2 hours PRN  chlorhexidine 2% Cloths 1 Application(s) Topical daily  chlorhexidine 2% Cloths 1 Application(s) Topical <User Schedule>  dextrose 5% + lactated ringers. 1000 milliLiter(s) IV Continuous <Continuous>  dextrose 5%. 1000 milliLiter(s) IV Continuous <Continuous>  dextrose 5%. 1000 milliLiter(s) IV Continuous <Continuous>  dextrose 50% Injectable 25 Gram(s) IV Push once  dextrose 50% Injectable 12.5 Gram(s) IV Push once  dextrose 50% Injectable 25 Gram(s) IV Push once  dextrose Oral Gel 15 Gram(s) Oral once PRN  fluconAZOLE IVPB 400 milliGRAM(s) IV Intermittent every 24 hours  glucagon  Injectable 1 milliGRAM(s) IntraMuscular once  HYDROmorphone  Injectable 0.5 milliGRAM(s) IV Push every 4 hours PRN  insulin glargine Injectable (LANTUS) 7 Unit(s) SubCutaneous at bedtime  insulin lispro (ADMELOG) corrective regimen sliding scale   SubCutaneous every 6 hours  lipid, fat emulsion (Fish Oil and Plant Based) 20% Infusion 0.342 Gm/kG/Day IV Continuous <Continuous>  ondansetron Injectable 4 milliGRAM(s) IV Push every 6 hours PRN  Parenteral Nutrition - Adult 1 Each TPN Continuous <Continuous>  piperacillin/tazobactam IVPB.. 3.375 Gram(s) IV Intermittent every 8 hours (D4)  sodium chloride 0.9% lock flush 10 milliLiter(s) IV Push every 1 hour PRN      PHYSICAL EXAM:    Vital Signs Last 24 Hrs  T(C): 36.8 (23 Feb 2024 07:05), Max: 38.1 (22 Feb 2024 21:14)  T(F): 98.2 (23 Feb 2024 07:05), Max: 100.6 (22 Feb 2024 21:14)  HR: 82 (23 Feb 2024 07:05) (77 - 100)  BP: 126/77 (23 Feb 2024 07:05) (122/70 - 147/84)  BP(mean): 94 (23 Feb 2024 07:05) (91 - 94)  RR: 18 (23 Feb 2024 07:05) (17 - 18)  SpO2: 94% (23 Feb 2024 07:05) (92% - 99%)    Parameters below as of 23 Feb 2024 07:05  Patient On (Oxygen Delivery Method): room air        GEN:    HEENT:    CHEST/Respiratory:    Cardiovascular:    Abdomen:    Genitourinary:    Extremities:     Neurological:    Skin:      LABS/DIAGNOSTIC TESTS                            6.5    10.70 )-----------( 491      ( 23 Feb 2024 05:41 )             19.9       WBC Count: 10.70 K/uL (02-23 @ 05:41)  WBC Count: 12.09 K/uL (02-22 @ 16:53)  WBC Count: 14.35 K/uL (02-22 @ 10:36)  WBC Count: 12.41 K/uL (02-21 @ 09:20)  WBC Count: 13.08 K/uL (02-21 @ 04:14)  WBC Count: 10.73 K/uL (02-20 @ 15:50)      02-23    139  |  108  |  5<L>  ----------------------------<  477<HH>  3.1<L>   |  26  |  0.74    Ca    8.0<L>      23 Feb 2024 05:41  Phos  2.9     02-23  Mg     1.4     02-23    TPro  5.4<L>  /  Alb  1.5<L>  /  TBili  0.6  /  DBili  x   /  AST  19  /  ALT  23  /  AlkPhos  95  02-23      Urinalysis Basic - ( 23 Feb 2024 05:41 )    Color: x / Appearance: x / SG: x / pH: x  Gluc: 477 mg/dL / Ketone: x  / Bili: x / Urobili: x   Blood: x / Protein: x / Nitrite: x   Leuk Esterase: x / RBC: x / WBC x   Sq Epi: x / Non Sq Epi: x / Bacteria: x            LACTATE:      CULTURES      Culture - Abscess with Gram Stain (collected 02-19-24 @ 23:20)  Source: .Abscess abdominal abcess  Preliminary Report (02-21-24 @ 17:43):    Rare Escherichia coli    Few Alpha hemolytic strep "Susceptibilities not performed"    Numerous Streptococcus anginosus "Susceptibilities not performed"  Organism: Escherichia coli (02-21-24 @ 17:43)  Organism: Escherichia coli (02-21-24 @ 17:43)      Method Type: CINDY      -  Amoxicillin/Clavulanic Acid: S <=8/4      -  Ampicillin: R >16 These ampicillin results predict results for amoxicillin      -  Ampicillin/Sulbactam: I 16/8      -  Aztreonam: S <=4      -  Cefazolin: S <=2      -  Cefepime: S <=2      -  Cefoxitin: S <=8      -  Ceftriaxone: S <=1      -  Ciprofloxacin: R >2      -  Ertapenem: S <=0.5      -  Gentamicin: S <=2      -  Imipenem: S <=1      -  Levofloxacin: R >4      -  Meropenem: S <=1      -  Piperacillin/Tazobactam: S <=8      -  Tobramycin: S <=2      -  Trimethoprim/Sulfamethoxazole: R >2/38    Culture - Urine (collected 02-19-24 @ 11:53)  Source: Clean Catch Clean Catch (Midstream)  Final Report (02-20-24 @ 15:54):    >=3 organisms. Probable collection contamination.    Culture - Blood (collected 02-19-24 @ 11:45)  Source: .Blood Blood-Peripheral  Preliminary Report (02-22-24 @ 18:01):    No growth at 72 Hours    Culture - Blood (collected 02-19-24 @ 11:30)  Source: .Blood Blood-Peripheral  Preliminary Report (02-22-24 @ 18:01):    No growth at 72 Hours          RADIOLOGY               Subjective/Objective: Pt with bm last night and today. Has less pain around incision site.      MEDS  benzocaine/menthol Lozenge 1 Lozenge Oral every 2 hours PRN  chlorhexidine 2% Cloths 1 Application(s) Topical daily  chlorhexidine 2% Cloths 1 Application(s) Topical <User Schedule>  dextrose 5% + lactated ringers. 1000 milliLiter(s) IV Continuous <Continuous>  dextrose 5%. 1000 milliLiter(s) IV Continuous <Continuous>  dextrose 5%. 1000 milliLiter(s) IV Continuous <Continuous>  dextrose 50% Injectable 25 Gram(s) IV Push once  dextrose 50% Injectable 12.5 Gram(s) IV Push once  dextrose 50% Injectable 25 Gram(s) IV Push once  dextrose Oral Gel 15 Gram(s) Oral once PRN  fluconAZOLE IVPB 400 milliGRAM(s) IV Intermittent every 24 hours  glucagon  Injectable 1 milliGRAM(s) IntraMuscular once  HYDROmorphone  Injectable 0.5 milliGRAM(s) IV Push every 4 hours PRN  insulin glargine Injectable (LANTUS) 7 Unit(s) SubCutaneous at bedtime  insulin lispro (ADMELOG) corrective regimen sliding scale   SubCutaneous every 6 hours  lipid, fat emulsion (Fish Oil and Plant Based) 20% Infusion 0.342 Gm/kG/Day IV Continuous <Continuous>  ondansetron Injectable 4 milliGRAM(s) IV Push every 6 hours PRN  Parenteral Nutrition - Adult 1 Each TPN Continuous <Continuous>  piperacillin/tazobactam IVPB.. 3.375 Gram(s) IV Intermittent every 8 hours (D4)  sodium chloride 0.9% lock flush 10 milliLiter(s) IV Push every 1 hour PRN      PHYSICAL EXAM:    Vital Signs Last 24 Hrs  T(C): 36.8 (23 Feb 2024 07:05), Max: 38.1 (22 Feb 2024 21:14)  T(F): 98.2 (23 Feb 2024 07:05), Max: 100.6 (22 Feb 2024 21:14)  HR: 82 (23 Feb 2024 07:05) (77 - 100)  BP: 126/77 (23 Feb 2024 07:05) (122/70 - 147/84)  BP(mean): 94 (23 Feb 2024 07:05) (91 - 94)  RR: 18 (23 Feb 2024 07:05) (17 - 18)  SpO2: 94% (23 Feb 2024 07:05) (92% - 99%)    Parameters below as of 23 Feb 2024 07:05  Patient On (Oxygen Delivery Method): room air      GEN: obese female in NAD    HEENT: NC/AT; conj clear    Neck: supple    Chest/Thorax: clear to auscultation ant.     Cardiovascular: S1S2 reg; no m, g, r    ABD: midline, vertical incision site with dressing; + purulent drainage in drainage bulb; BS active; soft; + tenderness around incision site    Extremities: 1+ swelling UE bilat    Neurological: A+Ox3    Skin: no rashes noted    Psychiatric: affect appropriate            LABS/DIAGNOSTIC TESTS                          6.5    10.70 )-----------( 491      ( 23 Feb 2024 05:41 )             19.9       WBC Count: 10.70 K/uL (02-23 @ 05:41)  WBC Count: 12.09 K/uL (02-22 @ 16:53)  WBC Count: 14.35 K/uL (02-22 @ 10:36)  WBC Count: 12.41 K/uL (02-21 @ 09:20)  WBC Count: 13.08 K/uL (02-21 @ 04:14)  WBC Count: 10.73 K/uL (02-20 @ 15:50)      02-23    139  |  108  |  5<L>  ----------------------------<  477<HH>  3.1<L>   |  26  |  0.74    Ca    8.0<L>      23 Feb 2024 05:41  Phos  2.9     02-23  Mg     1.4     02-23    TPro  5.4<L>  /  Alb  1.5<L>  /  TBili  0.6  /  DBili  x   /  AST  19  /  ALT  23  /  AlkPhos  95  02-23          CULTURES      Culture - Abscess with Gram Stain (collected 02-19-24 @ 23:20)  Source: .Abscess abdominal abscess  Preliminary Report (02-21-24 @ 17:43):    Rare Escherichia coli    Few Alpha hemolytic strep "Susceptibilities not performed"    Numerous Streptococcus anginosus "Susceptibilities not performed"  Organism: Escherichia coli (02-21-24 @ 17:43)  Organism: Escherichia coli (02-21-24 @ 17:43)      Method Type: CINDY      -  Amoxicillin/Clavulanic Acid: S <=8/4      -  Ampicillin: R >16 These ampicillin results predict results for amoxicillin      -  Ampicillin/Sulbactam: I 16/8      -  Aztreonam: S <=4      -  Cefazolin: S <=2      -  Cefepime: S <=2      -  Cefoxitin: S <=8      -  Ceftriaxone: S <=1      -  Ciprofloxacin: R >2      -  Ertapenem: S <=0.5      -  Gentamicin: S <=2      -  Imipenem: S <=1      -  Levofloxacin: R >4      -  Meropenem: S <=1      -  Piperacillin/Tazobactam: S <=8      -  Tobramycin: S <=2      -  Trimethoprim/Sulfamethoxazole: R >2/38    Culture - Urine (collected 02-19-24 @ 11:53)  Source: Clean Catch Clean Catch (Midstream)  Final Report (02-20-24 @ 15:54):    >=3 organisms. Probable collection contamination.    Culture - Blood (collected 02-19-24 @ 11:45)  Source: .Blood Blood-Peripheral  Preliminary Report (02-22-24 @ 18:01):    No growth at 72 Hours    Culture - Blood (collected 02-19-24 @ 11:30)  Source: .Blood Blood-Peripheral  Preliminary Report (02-22-24 @ 18:01):    No growth at 72 Hours          RADIOLOGY

## 2024-02-23 NOTE — PROGRESS NOTE ADULT - SUBJECTIVE AND OBJECTIVE BOX
S/p exploratory laparoscopy with Lysis of adhesions and Small Bowel Resection secondary to 2/19  Patient seen and examined at bedside  Admits to flatus/BM. States Stool dark, but not black   Denies nausea/ vomiting.   PICC placed yesterday for initiation of TPN   S/p 1U PRBC overnight, second unit running at time of exam     Vital Signs Last 24 Hrs  T(F): 98.2 (02-23-24 @ 07:05), Max: 100.6 (02-22-24 @ 21:14)  HR: 82 (02-23-24 @ 07:05)  BP: 126/77 (02-23-24 @ 07:05)  RR: 18 (02-23-24 @ 07:05)  SpO2: 94% (02-23-24 @ 07:05)  POCT Blood Glucose.: 121 mg/dL (23 Feb 2024 07:01)    GENERAL: Alert, NAD  CHEST/LUNG: respirations nonlabored  ABDOMEN: midline dressing taken down with purulent staining to inferior dressing. Wound probed from inferior opening and superior opening with return of purulent drainage. Wound gently packed and covered with DSD. ZEKE with serosanguineous output: 10ml/24hours   EXTREMITIES:  no calf tenderness, No edema    I&O's Detail    22 Feb 2024 07:01  -  23 Feb 2024 07:00  --------------------------------------------------------  IN:    dextrose 5% + lactated ringers: 550 mL    dextrose 5% + lactated ringers: 180 mL    PRBCs (Packed Red Blood Cells): 350 mL  Total IN: 1080 mL    OUT:    Bulb (mL): 10 mL    Voided (mL): 950 mL  Total OUT: 960 mL    Total NET: 120 mL    LABS:                        6.5    10.70 )-----------( 491      ( 23 Feb 2024 05:41 )             19.9     02-23    139  |  108  |  5<L>  ----------------------------<  477<HH>  3.1<L>   |  26  |  0.74    Ca    8.0<L>      23 Feb 2024 05:41  Phos  2.9     02-23  Mg     1.4     02-23    TPro  5.4<L>  /  Alb  1.5<L>  /  TBili  0.6  /  DBili  x   /  AST  19  /  ALT  23  /  AlkPhos  95  02-23

## 2024-02-23 NOTE — PROGRESS NOTE ADULT - ASSESSMENT
59 year old female S/p exploratory laparoscopy with Lysis of adhesions and Small Bowel Resection secondary to 2/19  Anemia now s/p 1U PRBC, receiving second unit, hypokalemia, hypomagnesemia  PICC placed 2/22 for TPN  Now with + bowel function   Wound with purulent drainage     - continue blood transfusion, repeat CBC when complete  - monitor H/H and transfuse as needed  - TPN per nephrology, electrolyte repletion with PTN   - continue local wound care  - continue ZEKE monitor output  - monitor bowel function   - continue NPO  - will discuss with Dr. Lopez

## 2024-02-24 LAB
ALBUMIN SERPL ELPH-MCNC: 1.7 G/DL — LOW (ref 3.5–5)
ALP SERPL-CCNC: 172 U/L — HIGH (ref 40–120)
ALT FLD-CCNC: 23 U/L DA — SIGNIFICANT CHANGE UP (ref 10–60)
ANION GAP SERPL CALC-SCNC: 5 MMOL/L — SIGNIFICANT CHANGE UP (ref 5–17)
AST SERPL-CCNC: 19 U/L — SIGNIFICANT CHANGE UP (ref 10–40)
BASOPHILS # BLD AUTO: 0.05 K/UL — SIGNIFICANT CHANGE UP (ref 0–0.2)
BASOPHILS NFR BLD AUTO: 0.5 % — SIGNIFICANT CHANGE UP (ref 0–2)
BILIRUB SERPL-MCNC: 0.6 MG/DL — SIGNIFICANT CHANGE UP (ref 0.2–1.2)
BUN SERPL-MCNC: 6 MG/DL — LOW (ref 7–18)
CA-I BLD-SCNC: 5 MG/DL — SIGNIFICANT CHANGE UP (ref 4.5–5.6)
CA-I BLD-SCNC: 5 MG/DL — SIGNIFICANT CHANGE UP (ref 4.5–5.6)
CALCIUM SERPL-MCNC: 8.8 MG/DL — SIGNIFICANT CHANGE UP (ref 8.4–10.5)
CHLORIDE SERPL-SCNC: 104 MMOL/L — SIGNIFICANT CHANGE UP (ref 96–108)
CO2 SERPL-SCNC: 27 MMOL/L — SIGNIFICANT CHANGE UP (ref 22–31)
CREAT SERPL-MCNC: 0.7 MG/DL — SIGNIFICANT CHANGE UP (ref 0.5–1.3)
CULTURE RESULTS: ABNORMAL
CULTURE RESULTS: SIGNIFICANT CHANGE UP
CULTURE RESULTS: SIGNIFICANT CHANGE UP
EGFR: 100 ML/MIN/1.73M2 — SIGNIFICANT CHANGE UP
EOSINOPHIL # BLD AUTO: 0.09 K/UL — SIGNIFICANT CHANGE UP (ref 0–0.5)
EOSINOPHIL NFR BLD AUTO: 0.8 % — SIGNIFICANT CHANGE UP (ref 0–6)
GLUCOSE SERPL-MCNC: 185 MG/DL — HIGH (ref 70–99)
HCT VFR BLD CALC: 24.8 % — LOW (ref 34.5–45)
HGB BLD-MCNC: 8.4 G/DL — LOW (ref 11.5–15.5)
IMM GRANULOCYTES NFR BLD AUTO: 5.7 % — HIGH (ref 0–0.9)
LYMPHOCYTES # BLD AUTO: 1.88 K/UL — SIGNIFICANT CHANGE UP (ref 1–3.3)
LYMPHOCYTES # BLD AUTO: 17.8 % — SIGNIFICANT CHANGE UP (ref 13–44)
MAGNESIUM SERPL-MCNC: 1.7 MG/DL — SIGNIFICANT CHANGE UP (ref 1.6–2.6)
MCHC RBC-ENTMCNC: 27.7 PG — SIGNIFICANT CHANGE UP (ref 27–34)
MCHC RBC-ENTMCNC: 33.9 GM/DL — SIGNIFICANT CHANGE UP (ref 32–36)
MCV RBC AUTO: 81.8 FL — SIGNIFICANT CHANGE UP (ref 80–100)
MONOCYTES # BLD AUTO: 0.35 K/UL — SIGNIFICANT CHANGE UP (ref 0–0.9)
MONOCYTES NFR BLD AUTO: 3.3 % — SIGNIFICANT CHANGE UP (ref 2–14)
NEUTROPHILS # BLD AUTO: 7.62 K/UL — HIGH (ref 1.8–7.4)
NEUTROPHILS NFR BLD AUTO: 71.9 % — SIGNIFICANT CHANGE UP (ref 43–77)
NRBC # BLD: 0 /100 WBCS — SIGNIFICANT CHANGE UP (ref 0–0)
ORGANISM # SPEC MICROSCOPIC CNT: ABNORMAL
ORGANISM # SPEC MICROSCOPIC CNT: ABNORMAL
PHOSPHATE SERPL-MCNC: 2.9 MG/DL — SIGNIFICANT CHANGE UP (ref 2.5–4.5)
PLATELET # BLD AUTO: 442 K/UL — HIGH (ref 150–400)
POTASSIUM SERPL-MCNC: 3.4 MMOL/L — LOW (ref 3.5–5.3)
POTASSIUM SERPL-SCNC: 3.4 MMOL/L — LOW (ref 3.5–5.3)
PROT SERPL-MCNC: 6.4 G/DL — SIGNIFICANT CHANGE UP (ref 6–8.3)
RBC # BLD: 3.03 M/UL — LOW (ref 3.8–5.2)
RBC # FLD: 15.3 % — HIGH (ref 10.3–14.5)
SODIUM SERPL-SCNC: 136 MMOL/L — SIGNIFICANT CHANGE UP (ref 135–145)
SPECIMEN SOURCE: SIGNIFICANT CHANGE UP
WBC # BLD: 10.59 K/UL — HIGH (ref 3.8–10.5)
WBC # FLD AUTO: 10.59 K/UL — HIGH (ref 3.8–10.5)

## 2024-02-24 RX ORDER — ELECTROLYTE SOLUTION,INJ
1 VIAL (ML) INTRAVENOUS
Refills: 0 | Status: DISCONTINUED | OUTPATIENT
Start: 2024-02-24 | End: 2024-02-24

## 2024-02-24 RX ORDER — KETOROLAC TROMETHAMINE 30 MG/ML
15 SYRINGE (ML) INJECTION ONCE
Refills: 0 | Status: DISCONTINUED | OUTPATIENT
Start: 2024-02-24 | End: 2024-02-24

## 2024-02-24 RX ORDER — ACETAMINOPHEN 500 MG
1000 TABLET ORAL EVERY 8 HOURS
Refills: 0 | Status: COMPLETED | OUTPATIENT
Start: 2024-02-24 | End: 2024-02-26

## 2024-02-24 RX ORDER — I.V. FAT EMULSION 20 G/100ML
0.34 EMULSION INTRAVENOUS
Qty: 25 | Refills: 0 | Status: DISCONTINUED | OUTPATIENT
Start: 2024-02-24 | End: 2024-02-24

## 2024-02-24 RX ADMIN — Medication 15 MILLIGRAM(S): at 22:15

## 2024-02-24 RX ADMIN — I.V. FAT EMULSION 5.21 GM/KG/DAY: 20 EMULSION INTRAVENOUS at 22:41

## 2024-02-24 RX ADMIN — Medication 2: at 18:27

## 2024-02-24 RX ADMIN — HYDROMORPHONE HYDROCHLORIDE 0.5 MILLIGRAM(S): 2 INJECTION INTRAMUSCULAR; INTRAVENOUS; SUBCUTANEOUS at 10:14

## 2024-02-24 RX ADMIN — PIPERACILLIN AND TAZOBACTAM 25 GRAM(S): 4; .5 INJECTION, POWDER, LYOPHILIZED, FOR SOLUTION INTRAVENOUS at 02:17

## 2024-02-24 RX ADMIN — PIPERACILLIN AND TAZOBACTAM 25 GRAM(S): 4; .5 INJECTION, POWDER, LYOPHILIZED, FOR SOLUTION INTRAVENOUS at 10:15

## 2024-02-24 RX ADMIN — Medication 1: at 11:59

## 2024-02-24 RX ADMIN — CHLORHEXIDINE GLUCONATE 1 APPLICATION(S): 213 SOLUTION TOPICAL at 05:39

## 2024-02-24 RX ADMIN — Medication 1000 MILLIGRAM(S): at 17:40

## 2024-02-24 RX ADMIN — INSULIN GLARGINE 7 UNIT(S): 100 INJECTION, SOLUTION SUBCUTANEOUS at 22:15

## 2024-02-24 RX ADMIN — Medication 1: at 06:34

## 2024-02-24 RX ADMIN — HYDROMORPHONE HYDROCHLORIDE 0.5 MILLIGRAM(S): 2 INJECTION INTRAMUSCULAR; INTRAVENOUS; SUBCUTANEOUS at 02:07

## 2024-02-24 RX ADMIN — CHLORHEXIDINE GLUCONATE 1 APPLICATION(S): 213 SOLUTION TOPICAL at 11:48

## 2024-02-24 RX ADMIN — HYDROMORPHONE HYDROCHLORIDE 0.5 MILLIGRAM(S): 2 INJECTION INTRAMUSCULAR; INTRAVENOUS; SUBCUTANEOUS at 10:44

## 2024-02-24 RX ADMIN — Medication 15 MILLIGRAM(S): at 22:30

## 2024-02-24 RX ADMIN — PIPERACILLIN AND TAZOBACTAM 25 GRAM(S): 4; .5 INJECTION, POWDER, LYOPHILIZED, FOR SOLUTION INTRAVENOUS at 20:27

## 2024-02-24 RX ADMIN — FLUCONAZOLE 100 MILLIGRAM(S): 150 TABLET ORAL at 18:09

## 2024-02-24 RX ADMIN — Medication 400 MILLIGRAM(S): at 17:10

## 2024-02-24 RX ADMIN — HYDROMORPHONE HYDROCHLORIDE 0.5 MILLIGRAM(S): 2 INJECTION INTRAMUSCULAR; INTRAVENOUS; SUBCUTANEOUS at 01:52

## 2024-02-24 RX ADMIN — Medication 1 EACH: at 22:42

## 2024-02-24 NOTE — PROGRESS NOTE ADULT - ASSESSMENT
Patient is a 59y Female with HTN, DM, recently admitted 2/5-2/14/24 s/p diagnostic laparoscopy, lysis of adhesions, conversion to laparotomy for adhesiolysis and incisional hernia repair without mesh on 2/6, presents now with abdominal pain. Pt found to have small bowel perforation with pneumoperitoneum. s/p OR 2/19 for ex-lap with bowel resection, found to have adhesions with intrabdominal abscess. Pt transferred to ICU for septic shock 2/2 peritonitis, hypotension requiring brief pressors, with DEION and hyponatremia. DEION and hyponatremia resolved.   Nephrology consulted for TPN for prolonged NPO status.     1. Moderate PCM- albumin 1.4 with  edema. Subjective wt loss. Agree with initiating TPN. s/p IR RUE PICC line placed on 2/22 (after 1pm). Started on TPN on 2/23 @ 1L (1/2 dose starting tonight), Will increase to full dose on 2/25   Check FS q6h. hgbA1C 8.4 on 2/6/24.  on 2/22  Check CMP/Mg/Phos/ Ionized calcium in am.   Daily weights. Strict I/O. Will hold TPN if pt spikes fever and check BCX x2    2. DEION - likely ATN due to Septic shock. Now resolved. Strict I/Os. Avoid nephrotoxins/ NSAIDs/ RCA. Monitor BMP.    3. Septic shock- 2/2 peritonitis/ small bowel perforation. Pt on Zosyn. Also on Diflucan for ppx . BCx 2/19 NG.  Plan as per ID/ Surgery    4. Hypotension- BP improved. Pt off pressors. Monitor BP  Hypokalemia- Will increase KCL in TPN.     TPN orders:    60 kg  Total Kcal 1500  Lipid 50 g =500 kcal  AA 100g = 400 kcal  Dextrose 176g - 600 kcal    [facility] NEPHROLOGY  Dean Shipley M.D.  Jaylon Kang D.O.  Debbi Garcia M.D.  MD Laura Kwon, MSN, ANP-C    Telephone: [phone]  Facsimile: [phone]    [address] [place] [address], [address]  [place] [address]

## 2024-02-24 NOTE — PROGRESS NOTE ADULT - ASSESSMENT
59F s/p exploratory laparoscopy with lysis of adhesions and Small Bowel Resection secondary to 2/19  Anemia now s/p 1U PRBC, receiving second unit, hypokalemia, hypomagnesemia  PICC placed 2/22 for TPN  Now with + bowel function   Wound with purulent drainage   H/H stable    - full liquid  - CT abd/pelvis with IV contrast scan today  - TPN per nephrology, electrolyte repletion with PTN   - continue local wound care  - continue ZEKE monitor output  - monitor bowel function   - will discuss with Dr. Lopez

## 2024-02-24 NOTE — PROGRESS NOTE ADULT - SUBJECTIVE AND OBJECTIVE BOX
[facility] NEPHROLOGY- METABOLIC SUPPORT PROGRESS NOTE    Patient is a 59y Female with HTN, DM, recently admitted 2/5-2/14/24 s/p diagnostic laparoscopy, lysis of adhesions, conversion to laparotomy for adhesiolysis and incisional hernia repair without mesh on 2/6, presents now with abdominal pain. Pt found to have small bowel perforation with pneumoperitoneum. s/p OR 2/19 for ex-lap with bowel resection, found to have adhesions with intrabdominal abscess. Pt transferred to ICU for septic shock 2/2 peritonitis, hypotension requiring brief pressors, with DEION and hyponatremia. DEION and hyponatremia resolved.   Nephrology consulted for TPN for prolonged NPO status.   2/22: s/p IR RUE PICC placement  2/23: Pt started on CLD    Hospital Medications: Medications reviewed.  REVIEW OF SYSTEMS:  CONSTITUTIONAL: No fevers or chills  RESPIRATORY: No shortness of breath  CARDIOVASCULAR: No chest pain.  GASTROINTESTINAL: No nausea, or vomiting, no diarrhea, + ab pain  VASCULAR: No bilateral lower extremity edema.       VITALS:  T(F): 98.1 (02-24-24 @ 09:41), Max: 98.1 (02-24-24 @ 05:28)  HR: 76 (02-24-24 @ 09:41)  BP: 156/78 (02-24-24 @ 09:41)  RR: 18 (02-24-24 @ 09:41)  SpO2: 97% (02-24-24 @ 09:41)  Wt(kg): --    02-23 @ 07:01  -  02-24 @ 07:00  --------------------------------------------------------  IN: 0 mL / OUT: 1128 mL / NET: -1128 mL      PHYSICAL EXAM:  Constitutional: NAD,   HEENT: anicteric sclera,   Respiratory: CTAB, no wheezes, rales or rhonchi  Cardiovascular: S1, S2, RRR  Gastrointestinal: +midline incision covered    Extremities: minimal peripheral edema b/l +RUE edema improving  Vascular Access: RUE single lumen PICC- saved for  TPN        LABS:  02-24    136  |  104  |  6<L>  ----------------------------<  185<H>  3.4<L>   |  27  |  0.70    Ca    8.8      24 Feb 2024 06:47  Phos  2.9     02-24  Mg     1.7     02-24    TPro  6.4  /  Alb  1.7<L>  /  TBili  0.6  /  DBili      /  AST  19  /  ALT  23  /  AlkPhos  172<H>  02-24    Creatinine Trend: 0.70 <--, 0.74 <--, 0.70 <--, 0.93 <--, 0.92 <--, 1.33 <--, 1.19 <--, 1.99 <--                        8.4    10.59 )-----------( 442      ( 24 Feb 2024 06:47 )             24.8     Urine Studies:  Urinalysis Basic - ( 24 Feb 2024 06:47 )    Color:  / Appearance:  / SG:  / pH:   Gluc: 185 mg/dL / Ketone:   / Bili:  / Urobili:    Blood:  / Protein:  / Nitrite:    Leuk Esterase:  / RBC:  / WBC    Sq Epi:  / Non Sq Epi:  / Bacteria:       Sodium, Random Urine: 48 mmol/L (02-21 @ 04:15)  Creatinine, Random Urine: 58 mg/dL (02-21 @ 04:15)

## 2024-02-24 NOTE — CHART NOTE - NSCHARTNOTEFT_GEN_A_CORE
Assessment:   Patient is a 59y old  Female who presents with a chief complaint of bowel perf (2024 09:37). Pt visited in AM, PN infusing (1/2 dose)TPN/ lipids as ordered. Pt took a little of clears (apple juice, some vegetable broth, tea), diet was advanced to full liquids, obtained pt's preferences which were relayed to kitchen. PN (1/2 dose continued/ ordered earlier). Discussed w/ Sx PA, then RADHA HUMPHRIES, to consider full dose tomorrow (pt seen at lunch mostly only item consumed was yogurt (15 gm protein)), as pt w/ slow increase of intake.      Factors impacting intake: [ ] none [ ] nausea  [ ] vomiting [ ] diarrhea [ ] constipation  [ ]chewing problems [ ] swallowing issues  [x ] other: altered GI fx    Diet Prescription: Diet, Full Liquid:   Consistent Carbohydrate {No Snacks} (24 @ 09:46)        Daily     Daily Weight in k (2024 13:00)  Weight in k.1 (2024 05:11)      Pertinent Medications: MEDICATIONS  (STANDING):  chlorhexidine 2% Cloths 1 Application(s) Topical daily  chlorhexidine 2% Cloths 1 Application(s) Topical <User Schedule>  dextrose 5%. 1000 milliLiter(s) (50 mL/Hr) IV Continuous <Continuous>  dextrose 5%. 1000 milliLiter(s) (100 mL/Hr) IV Continuous <Continuous>  dextrose 50% Injectable 25 Gram(s) IV Push once  dextrose 50% Injectable 12.5 Gram(s) IV Push once  dextrose 50% Injectable 25 Gram(s) IV Push once  fluconAZOLE IVPB 400 milliGRAM(s) IV Intermittent every 24 hours  glucagon  Injectable 1 milliGRAM(s) IntraMuscular once  insulin glargine Injectable (LANTUS) 7 Unit(s) SubCutaneous at bedtime  insulin lispro (ADMELOG) corrective regimen sliding scale   SubCutaneous every 6 hours  lipid, fat emulsion (Fish Oil and Plant Based) 20% Infusion 0.342 Gm/kG/Day (5.21 mL/Hr) IV Continuous <Continuous>  lipid, fat emulsion (Fish Oil and Plant Based) 20% Infusion 0.342 Gm/kG/Day (5.21 mL/Hr) IV Continuous <Continuous>  Parenteral Nutrition - Adult 1 Each (42 mL/Hr) TPN Continuous <Continuous>  Parenteral Nutrition - Adult 1 Each (42 mL/Hr) TPN Continuous <Continuous>  piperacillin/tazobactam IVPB.. 3.375 Gram(s) IV Intermittent every 8 hours    MEDICATIONS  (PRN):  benzocaine/menthol Lozenge 1 Lozenge Oral every 2 hours PRN Sore Throat  dextrose Oral Gel 15 Gram(s) Oral once PRN Blood Glucose LESS THAN 70 milliGRAM(s)/deciliter  HYDROmorphone  Injectable 0.5 milliGRAM(s) IV Push every 4 hours PRN Moderate Pain (4 - 6)  ondansetron Injectable 4 milliGRAM(s) IV Push every 6 hours PRN Nausea and/or Vomiting  sodium chloride 0.9% lock flush 10 milliLiter(s) IV Push every 1 hour PRN Pre/post blood products, medications, blood draw, and to maintain line patency    Pertinent Labs:  Na136 mmol/L Glu 185 mg/dL<H> K+ 3.4 mmol/L<L> Cr  0.70 mg/dL BUN 6 mg/dL<L>  Phos 2.9 mg/dL  Alb 1.7 g/dL<L>  Chol --    LDL --    HDL --    Trig 149 mg/dL     CAPILLARY BLOOD GLUCOSE      POCT Blood Glucose.: 186 mg/dL (2024 11:51)  POCT Blood Glucose.: 175 mg/dL (2024 06:30)  POCT Blood Glucose.: 134 mg/dL (2024 00:05)  POCT Blood Glucose.: 120 mg/dL (2024 21:46)  POCT Blood Glucose.: 116 mg/dL (2024 17:24)        Estimated Needs:   [x ] no change since previous assessment  [ ] recalculated:       Previous Nutrition Diagnosis:   [ ] Altered GI function  [ ]Inadequate Oral Intake [ ] Swallowing Difficulty   [ ] Altered nutrition related labs [ ] Increased Nutrient Needs [ ] Overweight/Obesity   [ ] Unintended Weight Loss [ ] Food & Nutrition Related Knowledge Deficit [x ] Malnutrition (moderate)  [ ] Other:     Nutrition Diagnosis is [x ] ongoing  [ ] resolved [ ] not applicable           Interventions:   Recommend  [ ] Change Diet To:  [ ] Nutrition Supplement  [x ] Nutrition Support: per RADHA MD  [x ] Other: MD to monitor. RD available.     Monitoring and Evaluation:   [ ] PO intake [ x ] Tolerance to diet prescription [ x ] weights [ x ] labs[ x ] follow up per protocol  [ ] other:

## 2024-02-25 LAB
ALBUMIN SERPL ELPH-MCNC: 1.9 G/DL — LOW (ref 3.5–5)
ALP SERPL-CCNC: 158 U/L — HIGH (ref 40–120)
ALT FLD-CCNC: 20 U/L DA — SIGNIFICANT CHANGE UP (ref 10–60)
ANION GAP SERPL CALC-SCNC: 5 MMOL/L — SIGNIFICANT CHANGE UP (ref 5–17)
AST SERPL-CCNC: 16 U/L — SIGNIFICANT CHANGE UP (ref 10–40)
BASOPHILS # BLD AUTO: 0.04 K/UL — SIGNIFICANT CHANGE UP (ref 0–0.2)
BASOPHILS NFR BLD AUTO: 0.3 % — SIGNIFICANT CHANGE UP (ref 0–2)
BILIRUB SERPL-MCNC: 0.4 MG/DL — SIGNIFICANT CHANGE UP (ref 0.2–1.2)
BUN SERPL-MCNC: 4 MG/DL — LOW (ref 7–18)
C DIFF BY PCR RESULT: SIGNIFICANT CHANGE UP
CALCIUM SERPL-MCNC: 8.5 MG/DL — SIGNIFICANT CHANGE UP (ref 8.4–10.5)
CHLORIDE SERPL-SCNC: 105 MMOL/L — SIGNIFICANT CHANGE UP (ref 96–108)
CO2 SERPL-SCNC: 27 MMOL/L — SIGNIFICANT CHANGE UP (ref 22–31)
CREAT SERPL-MCNC: 0.64 MG/DL — SIGNIFICANT CHANGE UP (ref 0.5–1.3)
EGFR: 102 ML/MIN/1.73M2 — SIGNIFICANT CHANGE UP
EOSINOPHIL # BLD AUTO: 0.15 K/UL — SIGNIFICANT CHANGE UP (ref 0–0.5)
EOSINOPHIL NFR BLD AUTO: 1.2 % — SIGNIFICANT CHANGE UP (ref 0–6)
GLUCOSE BLDC GLUCOMTR-MCNC: 203 MG/DL — HIGH (ref 70–99)
GLUCOSE BLDC GLUCOMTR-MCNC: 206 MG/DL — HIGH (ref 70–99)
GLUCOSE SERPL-MCNC: 177 MG/DL — HIGH (ref 70–99)
HCT VFR BLD CALC: 26.6 % — LOW (ref 34.5–45)
HGB BLD-MCNC: 9.1 G/DL — LOW (ref 11.5–15.5)
IMM GRANULOCYTES NFR BLD AUTO: 4.8 % — HIGH (ref 0–0.9)
LYMPHOCYTES # BLD AUTO: 17.1 % — SIGNIFICANT CHANGE UP (ref 13–44)
LYMPHOCYTES # BLD AUTO: 2.19 K/UL — SIGNIFICANT CHANGE UP (ref 1–3.3)
MAGNESIUM SERPL-MCNC: 1.4 MG/DL — LOW (ref 1.6–2.6)
MCHC RBC-ENTMCNC: 28.2 PG — SIGNIFICANT CHANGE UP (ref 27–34)
MCHC RBC-ENTMCNC: 34.2 GM/DL — SIGNIFICANT CHANGE UP (ref 32–36)
MCV RBC AUTO: 82.4 FL — SIGNIFICANT CHANGE UP (ref 80–100)
MONOCYTES # BLD AUTO: 0.57 K/UL — SIGNIFICANT CHANGE UP (ref 0–0.9)
MONOCYTES NFR BLD AUTO: 4.5 % — SIGNIFICANT CHANGE UP (ref 2–14)
NEUTROPHILS # BLD AUTO: 9.23 K/UL — HIGH (ref 1.8–7.4)
NEUTROPHILS NFR BLD AUTO: 72.1 % — SIGNIFICANT CHANGE UP (ref 43–77)
NRBC # BLD: 0 /100 WBCS — SIGNIFICANT CHANGE UP (ref 0–0)
PHOSPHATE SERPL-MCNC: 2.5 MG/DL — SIGNIFICANT CHANGE UP (ref 2.5–4.5)
PLATELET # BLD AUTO: 484 K/UL — HIGH (ref 150–400)
POTASSIUM SERPL-MCNC: 3.4 MMOL/L — LOW (ref 3.5–5.3)
POTASSIUM SERPL-SCNC: 3.4 MMOL/L — LOW (ref 3.5–5.3)
PROT SERPL-MCNC: 6.6 G/DL — SIGNIFICANT CHANGE UP (ref 6–8.3)
RBC # BLD: 3.23 M/UL — LOW (ref 3.8–5.2)
RBC # FLD: 15.7 % — HIGH (ref 10.3–14.5)
SODIUM SERPL-SCNC: 137 MMOL/L — SIGNIFICANT CHANGE UP (ref 135–145)
WBC # BLD: 12.8 K/UL — HIGH (ref 3.8–10.5)
WBC # FLD AUTO: 12.8 K/UL — HIGH (ref 3.8–10.5)

## 2024-02-25 PROCEDURE — 99233 SBSQ HOSP IP/OBS HIGH 50: CPT

## 2024-02-25 PROCEDURE — 74177 CT ABD & PELVIS W/CONTRAST: CPT | Mod: 26

## 2024-02-25 RX ORDER — KETOROLAC TROMETHAMINE 30 MG/ML
15 SYRINGE (ML) INJECTION ONCE
Refills: 0 | Status: DISCONTINUED | OUTPATIENT
Start: 2024-02-25 | End: 2024-02-25

## 2024-02-25 RX ORDER — ELECTROLYTE SOLUTION,INJ
1 VIAL (ML) INTRAVENOUS
Refills: 0 | Status: DISCONTINUED | OUTPATIENT
Start: 2024-02-25 | End: 2024-02-25

## 2024-02-25 RX ORDER — LANOLIN ALCOHOL/MO/W.PET/CERES
3 CREAM (GRAM) TOPICAL ONCE
Refills: 0 | Status: COMPLETED | OUTPATIENT
Start: 2024-02-25 | End: 2024-02-25

## 2024-02-25 RX ORDER — POTASSIUM CHLORIDE 20 MEQ
20 PACKET (EA) ORAL ONCE
Refills: 0 | Status: COMPLETED | OUTPATIENT
Start: 2024-02-25 | End: 2024-02-25

## 2024-02-25 RX ORDER — I.V. FAT EMULSION 20 G/100ML
0.34 EMULSION INTRAVENOUS
Qty: 50 | Refills: 0 | Status: DISCONTINUED | OUTPATIENT
Start: 2024-02-25 | End: 2024-02-25

## 2024-02-25 RX ORDER — VANCOMYCIN HCL 1 G
125 VIAL (EA) INTRAVENOUS EVERY 6 HOURS
Refills: 0 | Status: DISCONTINUED | OUTPATIENT
Start: 2024-02-25 | End: 2024-02-27

## 2024-02-25 RX ORDER — VANCOMYCIN HCL 1 G
125 VIAL (EA) INTRAVENOUS EVERY 6 HOURS
Refills: 0 | Status: DISCONTINUED | OUTPATIENT
Start: 2024-02-25 | End: 2024-02-25

## 2024-02-25 RX ORDER — GABAPENTIN 400 MG/1
100 CAPSULE ORAL THREE TIMES A DAY
Refills: 0 | Status: DISCONTINUED | OUTPATIENT
Start: 2024-02-25 | End: 2024-03-08

## 2024-02-25 RX ADMIN — PIPERACILLIN AND TAZOBACTAM 25 GRAM(S): 4; .5 INJECTION, POWDER, LYOPHILIZED, FOR SOLUTION INTRAVENOUS at 03:37

## 2024-02-25 RX ADMIN — Medication 1: at 05:13

## 2024-02-25 RX ADMIN — Medication 2: at 23:47

## 2024-02-25 RX ADMIN — PIPERACILLIN AND TAZOBACTAM 25 GRAM(S): 4; .5 INJECTION, POWDER, LYOPHILIZED, FOR SOLUTION INTRAVENOUS at 11:55

## 2024-02-25 RX ADMIN — CHLORHEXIDINE GLUCONATE 1 APPLICATION(S): 213 SOLUTION TOPICAL at 12:11

## 2024-02-25 RX ADMIN — INSULIN GLARGINE 7 UNIT(S): 100 INJECTION, SOLUTION SUBCUTANEOUS at 23:07

## 2024-02-25 RX ADMIN — Medication 1000 MILLIGRAM(S): at 09:30

## 2024-02-25 RX ADMIN — Medication 83 EACH: at 22:33

## 2024-02-25 RX ADMIN — Medication 400 MILLIGRAM(S): at 19:06

## 2024-02-25 RX ADMIN — Medication 125 MILLIGRAM(S): at 18:02

## 2024-02-25 RX ADMIN — GABAPENTIN 100 MILLIGRAM(S): 400 CAPSULE ORAL at 15:14

## 2024-02-25 RX ADMIN — Medication 400 MILLIGRAM(S): at 09:07

## 2024-02-25 RX ADMIN — Medication 2: at 18:03

## 2024-02-25 RX ADMIN — Medication 20 MILLIEQUIVALENT(S): at 11:54

## 2024-02-25 RX ADMIN — PIPERACILLIN AND TAZOBACTAM 25 GRAM(S): 4; .5 INJECTION, POWDER, LYOPHILIZED, FOR SOLUTION INTRAVENOUS at 20:04

## 2024-02-25 RX ADMIN — Medication 3 MILLIGRAM(S): at 22:59

## 2024-02-25 RX ADMIN — FLUCONAZOLE 100 MILLIGRAM(S): 150 TABLET ORAL at 18:02

## 2024-02-25 RX ADMIN — I.V. FAT EMULSION 5.21 GM/KG/DAY: 20 EMULSION INTRAVENOUS at 22:31

## 2024-02-25 RX ADMIN — BENZOCAINE AND MENTHOL 1 LOZENGE: 5; 1 LIQUID ORAL at 22:27

## 2024-02-25 RX ADMIN — Medication 2: at 00:24

## 2024-02-25 RX ADMIN — Medication 2: at 12:11

## 2024-02-25 RX ADMIN — Medication 125 MILLIGRAM(S): at 23:48

## 2024-02-25 RX ADMIN — Medication 125 MILLIGRAM(S): at 13:57

## 2024-02-25 RX ADMIN — CHLORHEXIDINE GLUCONATE 1 APPLICATION(S): 213 SOLUTION TOPICAL at 05:16

## 2024-02-25 NOTE — PROGRESS NOTE ADULT - SUBJECTIVE AND OBJECTIVE BOX
Subjective/Objective:      MEDS  acetaminophen   IVPB .. 1000 milliGRAM(s) IV Intermittent every 8 hours PRN  benzocaine/menthol Lozenge 1 Lozenge Oral every 2 hours PRN  chlorhexidine 2% Cloths 1 Application(s) Topical <User Schedule>  chlorhexidine 2% Cloths 1 Application(s) Topical daily  dextrose 5%. 1000 milliLiter(s) IV Continuous <Continuous>  dextrose 5%. 1000 milliLiter(s) IV Continuous <Continuous>  dextrose 50% Injectable 25 Gram(s) IV Push once  dextrose 50% Injectable 12.5 Gram(s) IV Push once  dextrose 50% Injectable 25 Gram(s) IV Push once  dextrose Oral Gel 15 Gram(s) Oral once PRN  fluconAZOLE IVPB 400 milliGRAM(s) IV Intermittent every 24 hours  glucagon  Injectable 1 milliGRAM(s) IntraMuscular once  HYDROmorphone  Injectable 0.5 milliGRAM(s) IV Push every 4 hours PRN  insulin glargine Injectable (LANTUS) 7 Unit(s) SubCutaneous at bedtime  insulin lispro (ADMELOG) corrective regimen sliding scale   SubCutaneous every 6 hours  lipid, fat emulsion (Fish Oil and Plant Based) 20% Infusion 0.342 Gm/kG/Day IV Continuous <Continuous>  lipid, fat emulsion (Fish Oil and Plant Based) 20% Infusion 0.342 Gm/kG/Day IV Continuous <Continuous>  ondansetron Injectable 4 milliGRAM(s) IV Push every 6 hours PRN  Parenteral Nutrition - Adult 1 Each TPN Continuous <Continuous>  Parenteral Nutrition - Adult 1 Each TPN Continuous <Continuous>  piperacillin/tazobactam IVPB.. 3.375 Gram(s) IV Intermittent every 8 hours (D6)  potassium chloride    Tablet ER 20 milliEquivalent(s) Oral once  sodium chloride 0.9% lock flush 10 milliLiter(s) IV Push every 1 hour PRN      PHYSICAL EXAM:    Vital Signs Last 24 Hrs  T(C): 36.4 (25 Feb 2024 05:00), Max: 36.8 (24 Feb 2024 21:27)  T(F): 97.5 (25 Feb 2024 05:00), Max: 98.3 (24 Feb 2024 21:27)  HR: 79 (25 Feb 2024 05:00) (74 - 79)  BP: 146/79 (25 Feb 2024 05:00) (146/79 - 160/91)  BP(mean): --  RR: 18 (25 Feb 2024 05:00) (18 - 18)  SpO2: 99% (25 Feb 2024 05:00) (98% - 99%)    Parameters below as of 25 Feb 2024 05:00  Patient On (Oxygen Delivery Method): nasal cannula        GEN:    HEENT:    CHEST/Respiratory:    Cardiovascular:    Abdomen:    Genitourinary:    Extremities:     Neurological:    Skin:      LABS/DIAGNOSTIC TESTS                            9.1    12.80 )-----------( 484      ( 25 Feb 2024 06:53 )             26.6       WBC Count: 12.80 K/uL (02-25 @ 06:53)  WBC Count: 10.59 K/uL (02-24 @ 06:47)  WBC Count: 12.36 K/uL (02-23 @ 12:50)  WBC Count: 10.70 K/uL (02-23 @ 05:41)  WBC Count: 12.09 K/uL (02-22 @ 16:53)      02-25    137  |  105  |  4<L>  ----------------------------<  177<H>  3.4<L>   |  27  |  0.64    Ca    8.5      25 Feb 2024 06:53  Phos  2.5     02-25  Mg     1.4     02-25    TPro  6.6  /  Alb  1.9<L>  /  TBili  0.4  /  DBili  x   /  AST  16  /  ALT  20  /  AlkPhos  158<H>  02-25      CULTURES      Culture - Abscess with Gram Stain (collected 02-19-24 @ 23:20)  Source: .Abscess abdominal abcess  Final Report (02-24-24 @ 19:35):    Rare Escherichia coli    Few Alpha hemolytic strep "Susceptibilities not performed"    Numerous Streptococcus anginosus "Susceptibilities not performed"    Few Lactobacillus rhamnosus "Susceptibilities not performed"  Organism: Escherichia coli (02-24-24 @ 19:35)  Organism: Escherichia coli (02-24-24 @ 19:35)      -  Levofloxacin: R >4      -  Tobramycin: S <=2      -  Aztreonam: S <=4      -  Gentamicin: S <=2      -  Cefazolin: S <=2      -  Cefepime: S <=2      -  Piperacillin/Tazobactam: S <=8      -  Ciprofloxacin: R >2      -  Imipenem: S <=1      -  Ceftriaxone: S <=1      -  Ampicillin: R >16 These ampicillin results predict results for amoxicillin      Method Type: CINDY      -  Meropenem: S <=1      -  Ampicillin/Sulbactam: I 16/8      -  Cefoxitin: S <=8      -  Amoxicillin/Clavulanic Acid: S <=8/4      -  Trimethoprim/Sulfamethoxazole: R >2/38      -  Ertapenem: S <=0.5    Culture - Urine (collected 02-19-24 @ 11:53)  Source: Clean Catch Clean Catch (Midstream)  Final Report (02-20-24 @ 15:54):    >=3 organisms. Probable collection contamination.    Culture - Blood (collected 02-19-24 @ 11:45)  Source: .Blood Blood-Peripheral  Final Report (02-24-24 @ 18:01):    No growth at 5 days    Culture - Blood (collected 02-19-24 @ 11:30)  Source: .Blood Blood-Peripheral  Final Report (02-24-24 @ 18:01):    No growth at 5 days          RADIOLOGY               Subjective/Objective: Pt has had at least 20 watery, foul-smelling, and non-bloody bowel movements since 7p last night.       MEDS  acetaminophen   IVPB .. 1000 milliGRAM(s) IV Intermittent every 8 hours PRN  benzocaine/menthol Lozenge 1 Lozenge Oral every 2 hours PRN  chlorhexidine 2% Cloths 1 Application(s) Topical <User Schedule>  chlorhexidine 2% Cloths 1 Application(s) Topical daily  dextrose 5%. 1000 milliLiter(s) IV Continuous <Continuous>  dextrose 5%. 1000 milliLiter(s) IV Continuous <Continuous>  dextrose 50% Injectable 25 Gram(s) IV Push once  dextrose 50% Injectable 12.5 Gram(s) IV Push once  dextrose 50% Injectable 25 Gram(s) IV Push once  dextrose Oral Gel 15 Gram(s) Oral once PRN  fluconAZOLE IVPB 400 milliGRAM(s) IV Intermittent every 24 hours  glucagon  Injectable 1 milliGRAM(s) IntraMuscular once  HYDROmorphone  Injectable 0.5 milliGRAM(s) IV Push every 4 hours PRN  insulin glargine Injectable (LANTUS) 7 Unit(s) SubCutaneous at bedtime  insulin lispro (ADMELOG) corrective regimen sliding scale   SubCutaneous every 6 hours  lipid, fat emulsion (Fish Oil and Plant Based) 20% Infusion 0.342 Gm/kG/Day IV Continuous <Continuous>  lipid, fat emulsion (Fish Oil and Plant Based) 20% Infusion 0.342 Gm/kG/Day IV Continuous <Continuous>  ondansetron Injectable 4 milliGRAM(s) IV Push every 6 hours PRN  Parenteral Nutrition - Adult 1 Each TPN Continuous <Continuous>  Parenteral Nutrition - Adult 1 Each TPN Continuous <Continuous>  piperacillin/tazobactam IVPB.. 3.375 Gram(s) IV Intermittent every 8 hours (D6)  potassium chloride    Tablet ER 20 milliEquivalent(s) Oral once  sodium chloride 0.9% lock flush 10 milliLiter(s) IV Push every 1 hour PRN      PHYSICAL EXAM:    Vital Signs Last 24 Hrs  T(C): 36.4 (25 Feb 2024 05:00), Max: 36.8 (24 Feb 2024 21:27)  T(F): 97.5 (25 Feb 2024 05:00), Max: 98.3 (24 Feb 2024 21:27)  HR: 79 (25 Feb 2024 05:00) (74 - 79)  BP: 146/79 (25 Feb 2024 05:00) (146/79 - 160/91)  BP(mean): --  RR: 18 (25 Feb 2024 05:00) (18 - 18)  SpO2: 99% (25 Feb 2024 05:00) (98% - 99%)    Parameters below as of 25 Feb 2024 05:00  Patient On (Oxygen Delivery Method): nasal cannula      GEN: obese female in NAD    HEENT: NC/AT; conj clear    Chest/Thorax: clear to auscultation ant.     Cardiovascular: S1S2 reg; no m, g, r    ABD: midline, vertical incision site with dressing; + purulent drainage in drainage bulb; BS active; soft; + tenderness around incision site    Extremities: 1+ swelling UE bilat    Neurological: A+Ox3    Skin: no rashes noted    Psychiatric: affect appropriate        LABS/DIAGNOSTIC TESTS                            9.1    12.80 )-----------( 484      ( 25 Feb 2024 06:53 )             26.6       WBC Count: 12.80 K/uL (02-25 @ 06:53)  WBC Count: 10.59 K/uL (02-24 @ 06:47)  WBC Count: 12.36 K/uL (02-23 @ 12:50)  WBC Count: 10.70 K/uL (02-23 @ 05:41)  WBC Count: 12.09 K/uL (02-22 @ 16:53)      02-25    137  |  105  |  4<L>  ----------------------------<  177<H>  3.4<L>   |  27  |  0.64    Ca    8.5      25 Feb 2024 06:53  Phos  2.5     02-25  Mg     1.4     02-25    TPro  6.6  /  Alb  1.9<L>  /  TBili  0.4  /  DBili  x   /  AST  16  /  ALT  20  /  AlkPhos  158<H>  02-25      CULTURES      Culture - Abscess with Gram Stain (collected 02-19-24 @ 23:20)  Source: .Abscess abdominal abcess  Final Report (02-24-24 @ 19:35):    Rare Escherichia coli    Few Alpha hemolytic strep "Susceptibilities not performed"    Numerous Streptococcus anginosus "Susceptibilities not performed"    Few Lactobacillus rhamnosus "Susceptibilities not performed"  Organism: Escherichia coli (02-24-24 @ 19:35)  Organism: Escherichia coli (02-24-24 @ 19:35)      -  Levofloxacin: R >4      -  Tobramycin: S <=2      -  Aztreonam: S <=4      -  Gentamicin: S <=2      -  Cefazolin: S <=2      -  Cefepime: S <=2      -  Piperacillin/Tazobactam: S <=8      -  Ciprofloxacin: R >2      -  Imipenem: S <=1      -  Ceftriaxone: S <=1      -  Ampicillin: R >16 These ampicillin results predict results for amoxicillin      Method Type: CINDY      -  Meropenem: S <=1      -  Ampicillin/Sulbactam: I 16/8      -  Cefoxitin: S <=8      -  Amoxicillin/Clavulanic Acid: S <=8/4      -  Trimethoprim/Sulfamethoxazole: R >2/38      -  Ertapenem: S <=0.5    Culture - Urine (collected 02-19-24 @ 11:53)  Source: Clean Catch Clean Catch (Midstream)  Final Report (02-20-24 @ 15:54):    >=3 organisms. Probable collection contamination.    Culture - Blood (collected 02-19-24 @ 11:45)  Source: .Blood Blood-Peripheral  Final Report (02-24-24 @ 18:01):    No growth at 5 days    Culture - Blood (collected 02-19-24 @ 11:30)  Source: .Blood Blood-Peripheral  Final Report (02-24-24 @ 18:01):    No growth at 5 days          RADIOLOGY

## 2024-02-25 NOTE — PROGRESS NOTE ADULT - SUBJECTIVE AND OBJECTIVE BOX
[facility] NEPHROLOGY- METABOLIC SUPPORT PROGRESS NOTE    Patient is a 59y Female with HTN, DM, recently admitted 2/5-2/14/24 s/p diagnostic laparoscopy, lysis of adhesions, conversion to laparotomy for adhesiolysis and incisional hernia repair without mesh on 2/6, presents now with abdominal pain. Pt found to have small bowel perforation with pneumoperitoneum. s/p OR 2/19 for ex-lap with bowel resection, found to have adhesions with intrabdominal abscess. Pt transferred to ICU for septic shock 2/2 peritonitis, hypotension requiring brief pressors, with DEION and hyponatremia. DEION and hyponatremia resolved.   Nephrology consulted for TPN for prolonged NPO status.   2/22: s/p IR RUE PICC placement  2/23: Pt started on CLD    Hospital Medications: Medications reviewed.  REVIEW OF SYSTEMS:  CONSTITUTIONAL: No fevers or chills  RESPIRATORY: No shortness of breath  CARDIOVASCULAR: No chest pain.  GASTROINTESTINAL: No nausea, or vomiting, + diarrhea, + ab pain  VASCULAR: No bilateral lower extremity edema.       VITALS:  T(F): 97.5 (02-25-24 @ 05:00), Max: 98.3 (02-24-24 @ 21:27)  HR: 79 (02-25-24 @ 05:00)  BP: 146/79 (02-25-24 @ 05:00)  RR: 18 (02-25-24 @ 05:00)  SpO2: 99% (02-25-24 @ 05:00)  Wt(kg): --    02-24 @ 07:01  -  02-25 @ 07:00  --------------------------------------------------------  IN: 250 mL / OUT: 507 mL / NET: -257 mL        PHYSICAL EXAM:  Constitutional: NAD,   HEENT: anicteric sclera,   Respiratory: CTAB, no wheezes, rales or rhonchi  Cardiovascular: S1, S2, RRR  Gastrointestinal: +midline incision covered, + ZEKE drain  Extremities: minimal peripheral edema b/l +RUE edema improving  Vascular Access: RUE single lumen PICC- saved for  TPN        LABS:  02-25    137  |  105  |  4<L>  ----------------------------<  177<H>  3.4<L>   |  27  |  0.64    Ca    8.5      25 Feb 2024 06:53  Phos  2.5     02-25  Mg     1.4     02-25    TPro  6.6  /  Alb  1.9<L>  /  TBili  0.4  /  DBili      /  AST  16  /  ALT  20  /  AlkPhos  158<H>  02-25    Creatinine Trend: 0.64 <--, 0.70 <--, 0.74 <--, 0.70 <--, 0.93 <--, 0.92 <--, 1.33 <--, 1.19 <--, 1.99 <--                        9.1    12.80 )-----------( 484      ( 25 Feb 2024 06:53 )             26.6     Urine Studies:  Urinalysis Basic - ( 25 Feb 2024 06:53 )    Color:  / Appearance:  / SG:  / pH:   Gluc: 177 mg/dL / Ketone:   / Bili:  / Urobili:    Blood:  / Protein:  / Nitrite:    Leuk Esterase:  / RBC:  / WBC    Sq Epi:  / Non Sq Epi:  / Bacteria:       Sodium, Random Urine: 48 mmol/L (02-21 @ 04:15)  Creatinine, Random Urine: 58 mg/dL (02-21 @ 04:15)

## 2024-02-25 NOTE — PROGRESS NOTE ADULT - NSPROGADDITIONALINFOA_GEN_ALL_CORE
Pt seen and examined. Reports passing flatus. Still feels weak but better overall after PRBC transfusion.  Has been OOB to ambulate in room.  Abd- soft, min distended. No guarding no rebound. Incision dressing saturated, with murky serosang drainage inferiorly, wound packing changed. ZEKE serosanguinous, not as murky as prior  Trial clears. To start TPN given prolonged NPO status and Alb<2.0
Pt seen and examined. Family at bedside. Now having multiple loose green bowel movements. When first started having BMs were formed soft. Likely C dif.  specimen sent. Start po vanco per ID  CT scan not done called CT to expedite and was just completed. Reviewed images. Read not up yet, shows dilated SB and colon with colon wall inflammatory changes. F/u report  Pt anxious tearful regarding multiple BMs. Reports passing flatus when having BMs. Denies nausea or vomiting but belching a lot. Insists does not want NGT reinserted even if needed  WBC 12    Afeb vss  Abd- soft, slightly distended upper abd. Mild left sided pain, slight rebound. ZEKE drain output serosang, is not murky. Incision with murky serosang drainage  Consult IR regarding R pelvic collection  Cont po vanco  OOB to ambulate
Pt seen and examined. Less tachycardic now to 90's -110. Pt was to go down for PICC line today for TPN,  consented to procedure and seen by IR prior to procedure on floor. Pt then refused right before procedure.  I spoke with pt at length and explained the purpose of the IV nutrition was to provide her with nutrients intravenously since she was still unable to take po while awaiting bowel function. I also informed her labs were showing signs of low protein and malnutrition. She reports she did pass gas today. She then agreed to proceed with PICC line placement for TPN.    Also d/w pt her hgb/hct very low and recommended blood transfusion. At time of seeing pt repeat AM labs hgb/hct 6.8/20.8. Explained to her anemia could be contributing to her tachycardia and place additional strain on her heart and increase risk for MI, and if untreated and decreased further, possible death. She still refuses blood transfusion. Will keep pt on telemetry and O2.

## 2024-02-25 NOTE — PROGRESS NOTE ADULT - ASSESSMENT
59F s/p exploratory laparoscopy with lysis of adhesions and Small Bowel Resection secondary to 2/19  Anemia now s/p 1U PRBC, receiving second unit, hypokalemia, hypomagnesemia  PICC placed 2/22 for TPN  Now with several episodes of diarrhea  Wound with purulent drainage   H/H stable, WBC 12 (10)     - full liquid  - f/u CT abd/pelvis with IV contrast  - per hospital "diarrhea decision tree" pt meets criteria for C. diff testing   - TPN per nephrology, electrolyte repletion with PTN   - continue local wound care  - continue ZEKE monitor output  - monitor bowel function   - will discuss with Dr. Lopez 59F s/p exploratory laparoscopy with lysis of adhesions and Small Bowel Resection secondary to 2/19  PICC placed 2/22 for TPN  Now with several episodes of diarrhea  Wound with purulent drainage   H/H stable, WBC 12 (10)     - full liquid diet  - f/u CT abd/pelvis with IV contrast  - per hospital "diarrhea decision tree" pt meets criteria for C. diff testing   - TPN per nephrology, electrolyte repletion  - continue local wound care  - continue ZEKE monitor output  - monitor bowel function   - will discuss with Dr. Lopez

## 2024-02-25 NOTE — PROGRESS NOTE ADULT - ASSESSMENT
Patient is a 59y Female with HTN, DM, recently admitted 2/5-2/14/24 s/p diagnostic laparoscopy, lysis of adhesions, conversion to laparotomy for adhesiolysis and incisional hernia repair without mesh on 2/6, presents now with abdominal pain. Pt found to have small bowel perforation with pneumoperitoneum. s/p OR 2/19 for ex-lap with bowel resection, found to have adhesions with intrabdominal abscess. Pt transferred to ICU for septic shock 2/2 peritonitis, hypotension requiring brief pressors, with DEION and hyponatremia. DEION and hyponatremia resolved.   Nephrology consulted for TPN for prolonged NPO status.     1. Moderate PCM- albumin 1.4 with  edema. Subjective wt loss. Agree with initiating TPN. s/p IR RUE PICC line placed on 2/22 (after 1pm). Started on TPN on 2/23 @ 1L (1/2 dose starting tonight), Will increase to full dose today. Will increase KCL, Mg and Phos in TPN today.   Check FS q6h. hgbA1C 8.4 on 2/6/24.  on 2/22  Check CMP/Mg/Phos/ Ionized calcium in am.   Daily weights. Strict I/O. Will hold TPN if pt spikes fever and check BCX x2    2. DEION - likely ATN due to Septic shock. Now resolved. Strict I/Os. Avoid nephrotoxins/ NSAIDs/ RCA. Monitor BMP.    3. Septic shock- 2/2 peritonitis/ small bowel perforation. Pt on Zosyn. Also on Diflucan for ppx . BCx 2/19 NG.  Plan as per ID/ Surgery    4. Hypotension- BP improved. Pt off pressors. Monitor BP    TPN orders:    60 kg  Total Kcal 1500  Lipid 50 g =500 kcal  AA 100g = 400 kcal  Dextrose 176g - 600 kcal    [facility] NEPHROLOGY  Dean Shipley M.D.  Jaylon Kang D.O.  Debbi Garcia M.D.  MD Laura Kwon, MSN, ANP-C    Telephone: [phone]  Facsimile: [phone]    [address] [place] [address], [address]  [place] [address]

## 2024-02-25 NOTE — CHART NOTE - NSCHARTNOTEFT_GEN_A_CORE
Assessment:   Patient is a 59y old  Female who presents with a chief complaint of bowel perf (2024 10:39)Pt seen, reports 18 bouts of diarrhea since  PM. Now wants only water and apple juice (diluting). Discussed w/ RADHA MD, PN to full dose (to start tonight) (pt aware).       Factors impacting intake: [ ] none [ ] nausea  [ ] vomiting [ ] diarrhea [ ] constipation  [ ]chewing problems [ ] swallowing issues  [x ] other: altered GI fx    Diet Prescription: Diet, Full Liquid:   Consistent Carbohydrate {No Snacks} (24 @ 09:46)      Daily     Daily Weight in k (2024 13:00)  Weight in k.1 (2024 05:11)        Pertinent Medications: MEDICATIONS  (STANDING):  chlorhexidine 2% Cloths 1 Application(s) Topical daily  chlorhexidine 2% Cloths 1 Application(s) Topical <User Schedule>  dextrose 5%. 1000 milliLiter(s) (50 mL/Hr) IV Continuous <Continuous>  dextrose 5%. 1000 milliLiter(s) (100 mL/Hr) IV Continuous <Continuous>  dextrose 50% Injectable 25 Gram(s) IV Push once  dextrose 50% Injectable 12.5 Gram(s) IV Push once  dextrose 50% Injectable 25 Gram(s) IV Push once  fluconAZOLE IVPB 400 milliGRAM(s) IV Intermittent every 24 hours  glucagon  Injectable 1 milliGRAM(s) IntraMuscular once  insulin glargine Injectable (LANTUS) 7 Unit(s) SubCutaneous at bedtime  insulin lispro (ADMELOG) corrective regimen sliding scale   SubCutaneous every 6 hours  lipid, fat emulsion (Fish Oil and Plant Based) 20% Infusion 0.342 Gm/kG/Day (5.21 mL/Hr) IV Continuous <Continuous>  lipid, fat emulsion (Fish Oil and Plant Based) 20% Infusion 0.342 Gm/kG/Day (5.21 mL/Hr) IV Continuous <Continuous>  Parenteral Nutrition - Adult 1 Each (42 mL/Hr) TPN Continuous <Continuous>  Parenteral Nutrition - Adult 1 Each (83 mL/Hr) TPN Continuous <Continuous>  piperacillin/tazobactam IVPB.. 3.375 Gram(s) IV Intermittent every 8 hours  potassium chloride    Tablet ER 20 milliEquivalent(s) Oral once    MEDICATIONS  (PRN):  acetaminophen   IVPB .. 1000 milliGRAM(s) IV Intermittent every 8 hours PRN Mild Pain (1 - 3), Moderate Pain (4 - 6)  benzocaine/menthol Lozenge 1 Lozenge Oral every 2 hours PRN Sore Throat  dextrose Oral Gel 15 Gram(s) Oral once PRN Blood Glucose LESS THAN 70 milliGRAM(s)/deciliter  HYDROmorphone  Injectable 0.5 milliGRAM(s) IV Push every 4 hours PRN Moderate Pain (4 - 6)  ondansetron Injectable 4 milliGRAM(s) IV Push every 6 hours PRN Nausea and/or Vomiting  sodium chloride 0.9% lock flush 10 milliLiter(s) IV Push every 1 hour PRN Pre/post blood products, medications, blood draw, and to maintain line patency    Pertinent Labs:  Na137 mmol/L Glu 177 mg/dL<H> K+ 3.4 mmol/L<L> Cr  0.64 mg/dL BUN 4 mg/dL<L>  Phos 2.5 mg/dL  Alb 1.9 g/dL<L>  Chol --    LDL --    HDL --    Trig 149 mg/dL     CAPILLARY BLOOD GLUCOSE      POCT Blood Glucose.: 184 mg/dL (2024 05:09)  POCT Blood Glucose.: 204 mg/dL (2024 00:18)  POCT Blood Glucose.: 191 mg/dL (2024 21:59)  POCT Blood Glucose.: 217 mg/dL (2024 18:11)  POCT Blood Glucose.: 186 mg/dL (2024 11:51)        Estimated Needs:   [x ] no change since previous assessment  [ ] recalculated:       Previous Nutrition Diagnosis:   [ ] Altered GI function  [ ]Inadequate Oral Intake [ ] Swallowing Difficulty   [ ] Altered nutrition related labs [ ] Increased Nutrient Needs [ ] Overweight/Obesity   [ ] Unintended Weight Loss [ ] Food & Nutrition Related Knowledge Deficit [x ] Malnutrition (moderate/ at least moderate)  [ ] Other:     Nutrition Diagnosis is [x ] ongoing  [ ] resolved [ ] not applicable         Interventions:   Recommend  [ ] Change Diet To:  [ ] Nutrition Supplement  [x ] Nutrition Support; PN orders per RADHA MD  [x ] Other: MD to monitor. RD available.     Monitoring and Evaluation:   [x ] PO intake [ x ] Tolerance to diet prescription [ x ] weights [ x ] labs[ x ] follow up per protocol  [ ] other:

## 2024-02-25 NOTE — PROGRESS NOTE ADULT - SUBJECTIVE AND OBJECTIVE BOX
INTERVAL HPI/OVERNIGHT EVENTS:  AVSS. Patient seen and examined at bedside.  Patient reports 10 episodes of loose stools associated with crampy abd pain, denies N/V, fevers, chills.      Vital Signs Last 24 Hrs  T(C): 36.4 (25 Feb 2024 05:00), Max: 36.8 (24 Feb 2024 21:27)  T(F): 97.5 (25 Feb 2024 05:00), Max: 98.3 (24 Feb 2024 21:27)  HR: 79 (25 Feb 2024 05:00) (74 - 79)  BP: 146/79 (25 Feb 2024 05:00) (146/79 - 160/91)  BP(mean): --  RR: 18 (25 Feb 2024 05:00) (18 - 18)  SpO2: 99% (25 Feb 2024 05:00) (98% - 99%)    Parameters below as of 25 Feb 2024 05:00  Patient On (Oxygen Delivery Method): nasal cannula      I&O's Detail    24 Feb 2024 07:01  -  25 Feb 2024 07:00  --------------------------------------------------------  IN:    Oral Fluid: 250 mL  Total IN: 250 mL    OUT:    Bulb (mL): 7 mL    Voided (mL): 500 mL  Total OUT: 507 mL    Total NET: -257 mL        fluconAZOLE IVPB 400 milliGRAM(s) IV Intermittent every 24 hours  piperacillin/tazobactam IVPB.. 3.375 Gram(s) IV Intermittent every 8 hours      Physical Exam:  General: AAOx3, No acute distress  HEENT: NC/AT, trachea midline  Respiratory: Nonlabored breathing, equal chest rise b/l   Skin: No jaundice, no icterus  Abdomen: soft,  nondistended, midline incision packing removed, seropurulent drainage expressed from inferior aspect of wound, edges clean/dry no erythema, induration, fluctuance, packing replaced   Extremities: non edematous, no calf pain bilaterally  Lines/Drains/Tubes:     Drains/Tubes:     02-24-24 @ 07:01  -  02-25-24 @ 07:00  --------------------------------------------------------  IN: 250 mL / OUT: 507 mL / NET: -257 mL        Labs:                        9.1    12.80 )-----------( 484      ( 25 Feb 2024 06:53 )             26.6     02-25    137  |  105  |  4<L>  ----------------------------<  177<H>  3.4<L>   |  27  |  0.64    Ca    8.5      25 Feb 2024 06:53  Phos  2.5     02-25  Mg     1.4     02-25    TPro  6.6  /  Alb  1.9<L>  /  TBili  0.4  /  DBili  x   /  AST  16  /  ALT  20  /  AlkPhos  158<H>  02-25        RADIOLOGY & ADDITIONAL STUDIES:

## 2024-02-25 NOTE — PROGRESS NOTE ADULT - ASSESSMENT
60 y/o female with pmhx of HTN, DM admitted for severe abd pain. Pt is s/p diagnostic laparoscopy, lysis of adhesions, conversion to laparotomy for adhesiolysis and incisional hernia repair without mesh 2/6/24/ Pt did well postoperatively and was d/c 2/14/2024 until readmission for severe abd pain on 2/19. Found to have pneumonoperitoneum due to bowel perforation. Underwent ex lap with small bowel resection and anastomosis. Admitted to the ICU for management of septic shock, which has resolved. BCs neg. Cx obtained during ex lap growing E.coli and S. anginosus. Of concern is purulent drainage noted in bulb and rising WBC, suggestive of fluid collection/abscess formation. Also concerned about diarrhea. Pt remains afebrile but WBC rising.     #Septic shock due to bowel perforation (see above)-- pip/tazo should provide appropriate coverage for polymicrobial infection, including anaerobes. However, now concerned about development of abscess(es).  --cont. piperacillin/tazobactam  --d/c fluconazole  --f/u CBC  --CT abd    # DEION/ATN-- likely due to sepsis with improvement in renal fn noted    #DM-- management as per ICU team    #Diarrhea-  ??Cdif  --send stool for Cdif evaluation  --contact isolation pending stool results  --start vanco 250mg p.o. QID empirically pending stool results

## 2024-02-26 LAB
ALBUMIN SERPL ELPH-MCNC: 2 G/DL — LOW (ref 3.5–5)
ALP SERPL-CCNC: 166 U/L — HIGH (ref 40–120)
ALT FLD-CCNC: 21 U/L DA — SIGNIFICANT CHANGE UP (ref 10–60)
ANION GAP SERPL CALC-SCNC: 7 MMOL/L — SIGNIFICANT CHANGE UP (ref 5–17)
AST SERPL-CCNC: 21 U/L — SIGNIFICANT CHANGE UP (ref 10–40)
BASOPHILS # BLD AUTO: 0 K/UL — SIGNIFICANT CHANGE UP (ref 0–0.2)
BASOPHILS NFR BLD AUTO: 0 % — SIGNIFICANT CHANGE UP (ref 0–2)
BILIRUB SERPL-MCNC: 0.5 MG/DL — SIGNIFICANT CHANGE UP (ref 0.2–1.2)
BUN SERPL-MCNC: 6 MG/DL — LOW (ref 7–18)
CA-I BLD-SCNC: 4.8 MG/DL — SIGNIFICANT CHANGE UP (ref 4.5–5.6)
CA-I BLD-SCNC: 5 MG/DL — SIGNIFICANT CHANGE UP (ref 4.5–5.6)
CALCIUM SERPL-MCNC: 8.5 MG/DL — SIGNIFICANT CHANGE UP (ref 8.4–10.5)
CHLORIDE SERPL-SCNC: 103 MMOL/L — SIGNIFICANT CHANGE UP (ref 96–108)
CO2 SERPL-SCNC: 25 MMOL/L — SIGNIFICANT CHANGE UP (ref 22–31)
CREAT SERPL-MCNC: 0.68 MG/DL — SIGNIFICANT CHANGE UP (ref 0.5–1.3)
EGFR: 101 ML/MIN/1.73M2 — SIGNIFICANT CHANGE UP
EOSINOPHIL # BLD AUTO: 0.12 K/UL — SIGNIFICANT CHANGE UP (ref 0–0.5)
EOSINOPHIL NFR BLD AUTO: 1 % — SIGNIFICANT CHANGE UP (ref 0–6)
GLUCOSE BLDC GLUCOMTR-MCNC: 225 MG/DL — HIGH (ref 70–99)
GLUCOSE BLDC GLUCOMTR-MCNC: 236 MG/DL — HIGH (ref 70–99)
GLUCOSE BLDC GLUCOMTR-MCNC: 254 MG/DL — HIGH (ref 70–99)
GLUCOSE BLDC GLUCOMTR-MCNC: 264 MG/DL — HIGH (ref 70–99)
GLUCOSE SERPL-MCNC: 218 MG/DL — HIGH (ref 70–99)
HCT VFR BLD CALC: 26.5 % — LOW (ref 34.5–45)
HGB BLD-MCNC: 8.6 G/DL — LOW (ref 11.5–15.5)
LYMPHOCYTES # BLD AUTO: 2.57 K/UL — SIGNIFICANT CHANGE UP (ref 1–3.3)
LYMPHOCYTES # BLD AUTO: 21 % — SIGNIFICANT CHANGE UP (ref 13–44)
MAGNESIUM SERPL-MCNC: 1.6 MG/DL — SIGNIFICANT CHANGE UP (ref 1.6–2.6)
MANUAL SMEAR VERIFICATION: SIGNIFICANT CHANGE UP
MCHC RBC-ENTMCNC: 27.9 PG — SIGNIFICANT CHANGE UP (ref 27–34)
MCHC RBC-ENTMCNC: 32.5 GM/DL — SIGNIFICANT CHANGE UP (ref 32–36)
MCV RBC AUTO: 86 FL — SIGNIFICANT CHANGE UP (ref 80–100)
METAMYELOCYTES # FLD: 2 % — HIGH (ref 0–0)
MONOCYTES # BLD AUTO: 0.73 K/UL — SIGNIFICANT CHANGE UP (ref 0–0.9)
MONOCYTES NFR BLD AUTO: 6 % — SIGNIFICANT CHANGE UP (ref 2–14)
NEUTROPHILS # BLD AUTO: 8.57 K/UL — HIGH (ref 1.8–7.4)
NEUTROPHILS NFR BLD AUTO: 70 % — SIGNIFICANT CHANGE UP (ref 43–77)
NRBC # BLD: 0 /100 WBCS — SIGNIFICANT CHANGE UP (ref 0–0)
OVALOCYTES BLD QL SMEAR: SLIGHT — SIGNIFICANT CHANGE UP
PHOSPHATE SERPL-MCNC: 2.6 MG/DL — SIGNIFICANT CHANGE UP (ref 2.5–4.5)
PLAT MORPH BLD: NORMAL — SIGNIFICANT CHANGE UP
PLATELET # BLD AUTO: 439 K/UL — HIGH (ref 150–400)
PLATELET COUNT - ESTIMATE: ABNORMAL
POIKILOCYTOSIS BLD QL AUTO: SLIGHT — SIGNIFICANT CHANGE UP
POTASSIUM SERPL-MCNC: 3.6 MMOL/L — SIGNIFICANT CHANGE UP (ref 3.5–5.3)
POTASSIUM SERPL-SCNC: 3.6 MMOL/L — SIGNIFICANT CHANGE UP (ref 3.5–5.3)
PROT SERPL-MCNC: 6.9 G/DL — SIGNIFICANT CHANGE UP (ref 6–8.3)
RBC # BLD: 3.08 M/UL — LOW (ref 3.8–5.2)
RBC # FLD: 16.1 % — HIGH (ref 10.3–14.5)
RBC BLD AUTO: ABNORMAL
SODIUM SERPL-SCNC: 135 MMOL/L — SIGNIFICANT CHANGE UP (ref 135–145)
TOXIC GRANULES BLD QL SMEAR: PRESENT — SIGNIFICANT CHANGE UP
WBC # BLD: 12.24 K/UL — HIGH (ref 3.8–10.5)
WBC # FLD AUTO: 12.24 K/UL — HIGH (ref 3.8–10.5)

## 2024-02-26 PROCEDURE — 99232 SBSQ HOSP IP/OBS MODERATE 35: CPT

## 2024-02-26 RX ORDER — LIDOCAINE 4 G/100G
2 CREAM TOPICAL EVERY 24 HOURS
Refills: 0 | Status: DISCONTINUED | OUTPATIENT
Start: 2024-02-26 | End: 2024-03-08

## 2024-02-26 RX ORDER — KETOROLAC TROMETHAMINE 30 MG/ML
15 SYRINGE (ML) INJECTION ONCE
Refills: 0 | Status: DISCONTINUED | OUTPATIENT
Start: 2024-02-26 | End: 2024-02-26

## 2024-02-26 RX ORDER — PIPERACILLIN AND TAZOBACTAM 4; .5 G/20ML; G/20ML
3.38 INJECTION, POWDER, LYOPHILIZED, FOR SOLUTION INTRAVENOUS EVERY 8 HOURS
Refills: 0 | Status: DISCONTINUED | OUTPATIENT
Start: 2024-02-26 | End: 2024-03-07

## 2024-02-26 RX ORDER — ELECTROLYTE SOLUTION,INJ
1 VIAL (ML) INTRAVENOUS
Refills: 0 | Status: DISCONTINUED | OUTPATIENT
Start: 2024-02-26 | End: 2024-02-26

## 2024-02-26 RX ORDER — I.V. FAT EMULSION 20 G/100ML
0.34 EMULSION INTRAVENOUS
Qty: 50 | Refills: 0 | Status: DISCONTINUED | OUTPATIENT
Start: 2024-02-26 | End: 2024-02-26

## 2024-02-26 RX ORDER — INSULIN GLARGINE 100 [IU]/ML
10 INJECTION, SOLUTION SUBCUTANEOUS AT BEDTIME
Refills: 0 | Status: DISCONTINUED | OUTPATIENT
Start: 2024-02-26 | End: 2024-03-06

## 2024-02-26 RX ADMIN — GABAPENTIN 100 MILLIGRAM(S): 400 CAPSULE ORAL at 21:34

## 2024-02-26 RX ADMIN — Medication 15 MILLIGRAM(S): at 12:26

## 2024-02-26 RX ADMIN — Medication 3: at 11:50

## 2024-02-26 RX ADMIN — CHLORHEXIDINE GLUCONATE 1 APPLICATION(S): 213 SOLUTION TOPICAL at 11:51

## 2024-02-26 RX ADMIN — CHLORHEXIDINE GLUCONATE 1 APPLICATION(S): 213 SOLUTION TOPICAL at 06:03

## 2024-02-26 RX ADMIN — GABAPENTIN 100 MILLIGRAM(S): 400 CAPSULE ORAL at 14:29

## 2024-02-26 RX ADMIN — HYDROMORPHONE HYDROCHLORIDE 0.5 MILLIGRAM(S): 2 INJECTION INTRAMUSCULAR; INTRAVENOUS; SUBCUTANEOUS at 11:50

## 2024-02-26 RX ADMIN — Medication 125 MILLIGRAM(S): at 06:02

## 2024-02-26 RX ADMIN — Medication 400 MILLIGRAM(S): at 03:27

## 2024-02-26 RX ADMIN — Medication 3: at 18:56

## 2024-02-26 RX ADMIN — FLUCONAZOLE 100 MILLIGRAM(S): 150 TABLET ORAL at 18:54

## 2024-02-26 RX ADMIN — Medication 83 EACH: at 22:30

## 2024-02-26 RX ADMIN — Medication 2: at 06:00

## 2024-02-26 RX ADMIN — LIDOCAINE 2 PATCH: 4 CREAM TOPICAL at 19:44

## 2024-02-26 RX ADMIN — PIPERACILLIN AND TAZOBACTAM 25 GRAM(S): 4; .5 INJECTION, POWDER, LYOPHILIZED, FOR SOLUTION INTRAVENOUS at 14:29

## 2024-02-26 RX ADMIN — Medication 125 MILLIGRAM(S): at 11:50

## 2024-02-26 RX ADMIN — PIPERACILLIN AND TAZOBACTAM 25 GRAM(S): 4; .5 INJECTION, POWDER, LYOPHILIZED, FOR SOLUTION INTRAVENOUS at 21:34

## 2024-02-26 RX ADMIN — LIDOCAINE 2 PATCH: 4 CREAM TOPICAL at 08:07

## 2024-02-26 RX ADMIN — PIPERACILLIN AND TAZOBACTAM 25 GRAM(S): 4; .5 INJECTION, POWDER, LYOPHILIZED, FOR SOLUTION INTRAVENOUS at 04:13

## 2024-02-26 RX ADMIN — Medication 15 MILLIGRAM(S): at 13:00

## 2024-02-26 RX ADMIN — GABAPENTIN 100 MILLIGRAM(S): 400 CAPSULE ORAL at 05:59

## 2024-02-26 RX ADMIN — HYDROMORPHONE HYDROCHLORIDE 0.5 MILLIGRAM(S): 2 INJECTION INTRAMUSCULAR; INTRAVENOUS; SUBCUTANEOUS at 05:59

## 2024-02-26 RX ADMIN — I.V. FAT EMULSION 5.21 GM/KG/DAY: 20 EMULSION INTRAVENOUS at 22:30

## 2024-02-26 RX ADMIN — HYDROMORPHONE HYDROCHLORIDE 0.5 MILLIGRAM(S): 2 INJECTION INTRAMUSCULAR; INTRAVENOUS; SUBCUTANEOUS at 07:29

## 2024-02-26 RX ADMIN — Medication 1000 MILLIGRAM(S): at 03:57

## 2024-02-26 RX ADMIN — INSULIN GLARGINE 10 UNIT(S): 100 INJECTION, SOLUTION SUBCUTANEOUS at 21:34

## 2024-02-26 RX ADMIN — Medication 125 MILLIGRAM(S): at 18:56

## 2024-02-26 RX ADMIN — LIDOCAINE 2 PATCH: 4 CREAM TOPICAL at 06:01

## 2024-02-26 RX ADMIN — HYDROMORPHONE HYDROCHLORIDE 0.5 MILLIGRAM(S): 2 INJECTION INTRAMUSCULAR; INTRAVENOUS; SUBCUTANEOUS at 12:15

## 2024-02-26 NOTE — PROGRESS NOTE ADULT - SUBJECTIVE AND OBJECTIVE BOX
INTERVAL HPI/OVERNIGHT EVENTS:  Pt resting comfortably. No acute complaints. Admits to lower abd pain. Denies nausea, vomiting, fever, chills. States diarrhea has resolved.     MEDICATIONS  (STANDING):  chlorhexidine 2% Cloths 1 Application(s) Topical <User Schedule>  chlorhexidine 2% Cloths 1 Application(s) Topical daily  dextrose 5%. 1000 milliLiter(s) (50 mL/Hr) IV Continuous <Continuous>  dextrose 5%. 1000 milliLiter(s) (100 mL/Hr) IV Continuous <Continuous>  dextrose 50% Injectable 25 Gram(s) IV Push once  dextrose 50% Injectable 12.5 Gram(s) IV Push once  dextrose 50% Injectable 25 Gram(s) IV Push once  fluconAZOLE IVPB 400 milliGRAM(s) IV Intermittent every 24 hours  gabapentin 100 milliGRAM(s) Oral three times a day  glucagon  Injectable 1 milliGRAM(s) IntraMuscular once  insulin glargine Injectable (LANTUS) 7 Unit(s) SubCutaneous at bedtime  insulin lispro (ADMELOG) corrective regimen sliding scale   SubCutaneous every 6 hours  lidocaine   4% Patch 2 Patch Transdermal every 24 hours  lipid, fat emulsion (Fish Oil and Plant Based) 20% Infusion 0.342 Gm/kG/Day (5.21 mL/Hr) IV Continuous <Continuous>  Parenteral Nutrition - Adult 1 Each (83 mL/Hr) TPN Continuous <Continuous>  piperacillin/tazobactam IVPB.. 3.375 Gram(s) IV Intermittent every 8 hours  vancomycin    Solution 125 milliGRAM(s) Oral every 6 hours    MEDICATIONS  (PRN):  benzocaine/menthol Lozenge 1 Lozenge Oral every 2 hours PRN Sore Throat  dextrose Oral Gel 15 Gram(s) Oral once PRN Blood Glucose LESS THAN 70 milliGRAM(s)/deciliter  HYDROmorphone  Injectable 0.5 milliGRAM(s) IV Push every 4 hours PRN Moderate Pain (4 - 6)  ondansetron Injectable 4 milliGRAM(s) IV Push every 6 hours PRN Nausea and/or Vomiting  sodium chloride 0.9% lock flush 10 milliLiter(s) IV Push every 1 hour PRN Pre/post blood products, medications, blood draw, and to maintain line patency      Vital Signs Last 24 Hrs  T(C): 36.8 (26 Feb 2024 05:00), Max: 37 (25 Feb 2024 20:38)  T(F): 98.2 (26 Feb 2024 05:00), Max: 98.6 (25 Feb 2024 20:38)  HR: 77 (26 Feb 2024 05:00) (77 - 78)  BP: 178/95 (26 Feb 2024 05:00) (160/97 - 178/95)  BP(mean): --  RR: 18 (26 Feb 2024 05:00) (18 - 18)  SpO2: 97% (26 Feb 2024 05:00) (96% - 99%)    Parameters below as of 25 Feb 2024 20:38  Patient On (Oxygen Delivery Method): room air        Physical:  General: A&Ox3. NAD.  Resp: Unlabored breathing. Equal chest rise bilaterally.   Abdomen: Soft nondistended, nontender to palpation diffusely. midline dressing saturated at distal end. taken down. Midline incision well approximated, nonerythematous. Opened w/ packing at proximal and distal end. Distal end with minimal purulent drainage. No pockets of purulence noted while probed. Repacked. ZEKE w/ SS output (70cc in 12 hrs).   UE: R PICC line in place. Dressing clean, dry, intact.     I&O's Detail    25 Feb 2024 07:01  -  26 Feb 2024 07:00  --------------------------------------------------------  IN:  Total IN: 0 mL    OUT:    Bulb (mL): 20 mL    Voided (mL): 700 mL  Total OUT: 720 mL    Total NET: -720 mL          LABS:                        8.6    12.24 )-----------( 439      ( 26 Feb 2024 05:30 )             26.5             02-26    135  |  103  |  6<L>  ----------------------------<  218<H>  3.6   |  25  |  0.68    Ca    8.5      26 Feb 2024 05:30  Phos  2.6     02-26  Mg     1.6     02-26    TPro  6.9  /  Alb  2.0<L>  /  TBili  0.5  /  DBili  x   /  AST  21  /  ALT  21  /  AlkPhos  166<H>  02-26      59y.o. Female

## 2024-02-26 NOTE — CONSULT NOTE ADULT - ASSESSMENT
60 y/o female with pmhx of HTN, DM, to ER c/o decreased appetite over the last few days with severe vaginal pain/spasms.Admitted with perforated gut.   Pt is s/p diagnostic laparoscopy, lysis of adhesions, conversion to laparotomy for adhesiolysis and incisional hernia repair without mesh 2/6/24/ Pt did well postoperatively and was d/c 2/14/2024.   Found to have uncont dm- pt known to me as out pt- on basaglar 15 and metformin. Now TPN with hyperglycemia.    Dm- insulin dependent as out pt  now hyperglycemic on TPN  rec adding low dose insulin to the bag Vs lantus 6 units qhs  fsg q6h  a1c-8.4  cont iv abx- per ID 60 y/o female with pmhx of HTN, DM, to ER c/o decreased appetite over the last few days with severe vaginal pain/spasms.Admitted with perforated gut.   Pt is s/p diagnostic laparoscopy, lysis of adhesions, conversion to laparotomy for adhesiolysis and incisional hernia repair without mesh 2/6/24/ Pt did well postoperatively and was d/c 2/14/2024.   Found to have uncont dm- pt known to me as out pt- on basaglar 15 and metformin. Now TPN with hyperglycemia.    Dm- insulin dependent as out pt  now hyperglycemic on TPN  inc lantus to 10units - hold when off of tpn  rec adding low dose insulin to the bag if uncontrolled   fsg q6h  a1c-8.4  cont iv abx- per ID

## 2024-02-26 NOTE — PROGRESS NOTE ADULT - ASSESSMENT
Patient is a 59y Female with HTN, DM, recently admitted 2/5-2/14/24 s/p diagnostic laparoscopy, lysis of adhesions, conversion to laparotomy for adhesiolysis and incisional hernia repair without mesh on 2/6, presents now with abdominal pain. Pt found to have small bowel perforation with pneumoperitoneum. s/p OR 2/19 for ex-lap with bowel resection, found to have adhesions with intrabdominal abscess. Pt transferred to ICU for septic shock 2/2 peritonitis, hypotension requiring brief pressors, with DEION and hyponatremia. DEION and hyponatremia resolved.   Nephrology consulted for TPN for prolonged NPO status.     1. Severe PCM- albumin 1.4, edema with wt loss. Pt initiated on TPN 2/23. s/p IR RUE PICC line placed on 2/22 (after 1pm).  c/w TPN with lipids @ 2L. Will increase electrolytes accordingly.   Check FS q6h. Will add 10u of insulin to TPN bad. hgbA1C 8.4 on 2/6/24.  on 2/22  Check CMP/Mg/Phos/ Ionized calcium in am. Check TG in am  Daily weights. Strict I/O. Will hold TPN if pt spikes fever and check BCX x2    2. DEION - likely ATN due to Septic shock. DEION resolved. Strict I/Os. Avoid nephrotoxins/ NSAIDs/ RCA. Monitor BMP.    3. Septic shock- 2/2 peritonitis/ small bowel perforation. Pt on Zosyn. Also on Diflucan for ppx . BCx 2/19 NG.  Plan as per ID/ Surgery. Cdiff neg.     4. Hypotension- resolved. Now with elevated BP. consider Losartan 25mg PO daily. Monitor BP    TPN orders:    60 kg  Total Kcal 1500  Lipid 50 g =500 kcal  AA 100g = 400 kcal  Dextrose 176g - 600 kcal    [facility] NEPHROLOGY  Dean Shipley M.D.  Jaylon Kang D.O.  Debbi Garcia M.D.  MD Laura Kwon, MSN, ANP-C    Telephone: [phone]  Facsimile: [phone]    [address] [place] [address], #[facility]  [place] [address]

## 2024-02-26 NOTE — CONSULT NOTE ADULT - SUBJECTIVE AND OBJECTIVE BOX
Patient is a 59y old  Female who presents with a chief complaint of bowel perf (26 Feb 2024 09:57)      HPI:  58 y/o female with pmhx of HTN, DM, to ER c/o decreased appetite over the last few days with severe vaginal pain/spasms. Pt admits to mild abdominal pain but states vaginal pain is worse. States abd pain has improved since last admission/discharge however was in severe pain earlier this morning and was instructed to come to the ED from the office. Pt has been passing flatus and having normal bowel movements at home. Pt denies nausea, vomiting, fever, chills, severe abd pain. No vaginal discharge.     As per chart, pt saw a GYN recenlty but no dx related to this pain. (pt is post hysterectemy many years ago, pt still has ovaries)  Pt is s/p diagnostic laparoscopy, lysis of adhesions, conversion to laparotomy for adhesiolysis and incisional hernia repair without mesh 2/6/24/ Pt did well postoperatively and was d/c 2/14/2024.   Found to have uncont dm- pt known to me as out pt- on basaglar 15 and metformin. Now TPN with hyperglycemia.        PAST MEDICAL & SURGICAL HISTORY:  Other specified diabetes mellitus without complication, without long-term current use of insulin      HTN (hypertension)      DM (diabetes mellitus)      HLD (hyperlipidemia)      H/O abdominal hysterectomy             MEDICATIONS  (STANDING):  chlorhexidine 2% Cloths 1 Application(s) Topical daily  chlorhexidine 2% Cloths 1 Application(s) Topical <User Schedule>  dextrose 5%. 1000 milliLiter(s) (100 mL/Hr) IV Continuous <Continuous>  dextrose 5%. 1000 milliLiter(s) (50 mL/Hr) IV Continuous <Continuous>  dextrose 50% Injectable 25 Gram(s) IV Push once  dextrose 50% Injectable 12.5 Gram(s) IV Push once  dextrose 50% Injectable 25 Gram(s) IV Push once  fluconAZOLE IVPB 400 milliGRAM(s) IV Intermittent every 24 hours  gabapentin 100 milliGRAM(s) Oral three times a day  glucagon  Injectable 1 milliGRAM(s) IntraMuscular once  insulin glargine Injectable (LANTUS) 7 Unit(s) SubCutaneous at bedtime  insulin lispro (ADMELOG) corrective regimen sliding scale   SubCutaneous every 6 hours  lidocaine   4% Patch 2 Patch Transdermal every 24 hours  lipid, fat emulsion (Fish Oil and Plant Based) 20% Infusion 0.342 Gm/kG/Day (5.21 mL/Hr) IV Continuous <Continuous>  Parenteral Nutrition - Adult 1 Each (83 mL/Hr) TPN Continuous <Continuous>  piperacillin/tazobactam IVPB.. 3.375 Gram(s) IV Intermittent every 8 hours  vancomycin    Solution 125 milliGRAM(s) Oral every 6 hours    MEDICATIONS  (PRN):  benzocaine/menthol Lozenge 1 Lozenge Oral every 2 hours PRN Sore Throat  dextrose Oral Gel 15 Gram(s) Oral once PRN Blood Glucose LESS THAN 70 milliGRAM(s)/deciliter  HYDROmorphone  Injectable 0.5 milliGRAM(s) IV Push every 4 hours PRN Moderate Pain (4 - 6)  ondansetron Injectable 4 milliGRAM(s) IV Push every 6 hours PRN Nausea and/or Vomiting  sodium chloride 0.9% lock flush 10 milliLiter(s) IV Push every 1 hour PRN Pre/post blood products, medications, blood draw, and to maintain line patency      FAMILY HISTORY:      SOCIAL HISTORY:      REVIEW OF SYSTEMS:  CONSTITUTIONAL: No fever, weight loss, or fatigue  EYES: No eye pain, visual disturbances, or discharge  ENT:  No difficulty hearing, tinnitus, vertigo; No sinus or throat pain  NECK: No pain or stiffness  RESPIRATORY: No cough, wheezing, chills or hemoptysis; No Shortness of Breath  CARDIOVASCULAR: No chest pain, palpitations, passing out, dizziness, or leg swelling  GASTROINTESTINAL: No abdominal or epigastric pain. No nausea, vomiting, or hematemesis; No diarrhea or constipation. No melena or hematochezia.  GENITOURINARY: No dysuria, frequency, hematuria, or incontinence  NEUROLOGICAL: No headaches, memory loss, loss of strength, numbness, or tremors  SKIN: No itching, burning, rashes, or lesions   LYMPH Nodes: No enlarged glands  ENDOCRINE: No heat or cold intolerance; No hair loss  MUSCULOSKELETAL: No joint pain or swelling; No muscle, back, or extremity pain  PSYCHIATRIC: No depression, anxiety, mood swings, or difficulty sleeping  HEME/LYMPH: No easy bruising, or bleeding gums  ALLERGY AND IMMUNOLOGIC: No hives or eczema	        Vital Signs Last 24 Hrs  T(C): 36.8 (26 Feb 2024 05:00), Max: 37 (25 Feb 2024 20:38)  T(F): 98.2 (26 Feb 2024 05:00), Max: 98.6 (25 Feb 2024 20:38)  HR: 77 (26 Feb 2024 05:00) (77 - 78)  BP: 178/95 (26 Feb 2024 05:00) (160/97 - 178/95)  BP(mean): --  RR: 18 (26 Feb 2024 05:00) (18 - 18)  SpO2: 97% (26 Feb 2024 05:00) (96% - 99%)    Parameters below as of 25 Feb 2024 20:38  Patient On (Oxygen Delivery Method): room air          Constitutional:      HEENT: nad    Neck:  No JVD, bruits or thyromegaly    Respiratory:  Clear without rales or rhonchi    Cardiovascular:  RR without murmur, rub or gallop.    Gastrointestinal: Soft without hepatosplenomegaly.    Extremities: without cyanosis, clubbing or edema.    Neurological:  Oriented   x  3    . No gross sensory or motor defects.        LABS:                        8.6    12.24 )-----------( 439      ( 26 Feb 2024 05:30 )             26.5     02-26    135  |  103  |  6<L>  ----------------------------<  218<H>  3.6   |  25  |  0.68    Ca    8.5      26 Feb 2024 05:30  Phos  2.6     02-26  Mg     1.6     02-26    TPro  6.9  /  Alb  2.0<L>  /  TBili  0.5  /  DBili  x   /  AST  21  /  ALT  21  /  AlkPhos  166<H>  02-26          Urinalysis Basic - ( 26 Feb 2024 05:30 )    Color: x / Appearance: x / SG: x / pH: x  Gluc: 218 mg/dL / Ketone: x  / Bili: x / Urobili: x   Blood: x / Protein: x / Nitrite: x   Leuk Esterase: x / RBC: x / WBC x   Sq Epi: x / Non Sq Epi: x / Bacteria: x      CAPILLARY BLOOD GLUCOSE      POCT Blood Glucose.: 236 mg/dL (26 Feb 2024 05:37)  POCT Blood Glucose.: 203 mg/dL (25 Feb 2024 23:06)  POCT Blood Glucose.: 206 mg/dL (25 Feb 2024 17:21)  POCT Blood Glucose.: 229 mg/dL (25 Feb 2024 11:52)      RADIOLOGY & ADDITIONAL STUDIES:

## 2024-02-26 NOTE — DIETITIAN NUTRITION RISK NOTIFICATION - TREATMENT: THE FOLLOWING DIET HAS BEEN RECOMMENDED
Diet, Minced and Moist:   Consistent Carbohydrate {Evening Snacks}  Fiber/Residue Restricted  Lactose Restricted (Milk Sugar Intoler.) (02-26-24 @ 12:08) [Active]      Parenteral Nutrition - Adult 1 Each (83 mL/Hr) TPN Continuous <Continuous>, 02-26-24 @ 22:00, 22:00, Stop order after: 1 Days  Parenteral Nutrition - Adult 1 Each (83 mL/Hr) TPN Continuous <Continuous>, 02-25-24 @ 22:00, , Stop order after: 1 Days

## 2024-02-26 NOTE — PROGRESS NOTE ADULT - SUBJECTIVE AND OBJECTIVE BOX
[facility] NEPHROLOGY- METABOLIC SUPPORT PROGRESS NOTE    Patient is a 59y Female with HTN, DM, recently admitted 2/5-2/14/24 s/p diagnostic laparoscopy, lysis of adhesions, conversion to laparotomy for adhesiolysis and incisional hernia repair without mesh on 2/6, presents now with abdominal pain. Pt found to have small bowel perforation with pneumoperitoneum. s/p OR 2/19 for ex-lap with bowel resection, found to have adhesions with intrabdominal abscess. Pt transferred to ICU for septic shock 2/2 peritonitis, hypotension requiring brief pressors, with DEION and hyponatremia. DEION and hyponatremia resolved.   Nephrology consulted for TPN for prolonged NPO status.   2/22: s/p IR RUE PICC placement  2/23: Pt started on CLD  2/26: Advanced to full liquid diet    Hospital Medications: Medications reviewed.  REVIEW OF SYSTEMS:  CONSTITUTIONAL: No fevers or chills  RESPIRATORY: No shortness of breath  CARDIOVASCULAR: No chest pain.  GASTROINTESTINAL: No nausea, or vomiting, + diarrhea, + ab pain with crampts  VASCULAR: No bilateral lower extremity edema.       VITALS:  T(F): 98.2 (02-26-24 @ 05:00), Max: 98.6 (02-25-24 @ 20:38)  HR: 83 (02-26-24 @ 11:30)  BP: 171/91 (02-26-24 @ 11:30)  RR: 18 (02-26-24 @ 05:00)  SpO2: 96% (02-26-24 @ 11:30)  Wt(kg): --    02-25 @ 07:01  -  02-26 @ 07:00  --------------------------------------------------------  IN: 0 mL / OUT: 720 mL / NET: -720 mL      PHYSICAL EXAM:  Constitutional: NAD,   HEENT: anicteric sclera,   Respiratory: CTAB, no wheezes, rales or rhonchi  Cardiovascular: S1, S2, RRR  Gastrointestinal: +midline incision covered, + ZEKE drain  Extremities: minimal peripheral edema b/l   Vascular Access: RUE single lumen PICC- saved for  TPN        LABS:  02-26    135  |  103  |  6<L>  ----------------------------<  218<H>  3.6   |  25  |  0.68    Ca    8.5      26 Feb 2024 05:30  Phos  2.6     02-26  Mg     1.6     02-26    TPro  6.9  /  Alb  2.0<L>  /  TBili  0.5  /  DBili      /  AST  21  /  ALT  21  /  AlkPhos  166<H>  02-26    Creatinine Trend: 0.68 <--, 0.64 <--, 0.70 <--, 0.74 <--, 0.70 <--, 0.93 <--, 0.92 <--, 1.33 <--, 1.19 <--                        8.6    12.24 )-----------( 439      ( 26 Feb 2024 05:30 )             26.5     Urine Studies:  Urinalysis Basic - ( 26 Feb 2024 05:30 )    Color:  / Appearance:  / SG:  / pH:   Gluc: 218 mg/dL / Ketone:   / Bili:  / Urobili:    Blood:  / Protein:  / Nitrite:    Leuk Esterase:  / RBC:  / WBC    Sq Epi:  / Non Sq Epi:  / Bacteria:       Sodium, Random Urine: 48 mmol/L (02-21 @ 04:15)  Creatinine, Random Urine: 58 mg/dL (02-21 @ 04:15)

## 2024-02-26 NOTE — CHART NOTE - NSCHARTNOTEFT_GEN_A_CORE
Assessment:   Patient is a 59y old  Female who presents with a chief complaint of bowel perf (2024 10:05). Pt on full dose PN. Endo request for insulin in PN. Discussed w/ RADHA MD. Pt much improved re: diarrhea. Pt visited by kitchen for food preferences (diet below). RD weighed pt on standing scale @143#. Discussed w/ Sx PMD      Factors impacting intake: [ ] none [ ] nausea  [ ] vomiting [ ] diarrhea [ ] constipation  [ ]chewing problems [ ] swallowing issues  [x ] other: altered GI fx    Diet Prescription: Diet, Full Liquid:   Consistent Carbohydrate {No Snacks} (24 @ 09:33)        Daily   Weight  65 kg ( AM)  Daily Weight in k (2024 13:00)  Weight in k.1 (2024 05:11)    % Weight Change: 9 kg (19.8 #)/ 3 days     Pertinent Medications: MEDICATIONS  (STANDING):  chlorhexidine 2% Cloths 1 Application(s) Topical daily  chlorhexidine 2% Cloths 1 Application(s) Topical <User Schedule>  dextrose 5%. 1000 milliLiter(s) (50 mL/Hr) IV Continuous <Continuous>  dextrose 5%. 1000 milliLiter(s) (100 mL/Hr) IV Continuous <Continuous>  dextrose 50% Injectable 25 Gram(s) IV Push once  dextrose 50% Injectable 12.5 Gram(s) IV Push once  dextrose 50% Injectable 25 Gram(s) IV Push once  fluconAZOLE IVPB 400 milliGRAM(s) IV Intermittent every 24 hours  gabapentin 100 milliGRAM(s) Oral three times a day  glucagon  Injectable 1 milliGRAM(s) IntraMuscular once  insulin glargine Injectable (LANTUS) 10 Unit(s) SubCutaneous at bedtime  insulin lispro (ADMELOG) corrective regimen sliding scale   SubCutaneous every 6 hours  lidocaine   4% Patch 2 Patch Transdermal every 24 hours  lipid, fat emulsion (Fish Oil and Plant Based) 20% Infusion 0.342 Gm/kG/Day (5.21 mL/Hr) IV Continuous <Continuous>  lipid, fat emulsion (Fish Oil and Plant Based) 20% Infusion 0.342 Gm/kG/Day (5.21 mL/Hr) IV Continuous <Continuous>  Parenteral Nutrition - Adult 1 Each (83 mL/Hr) TPN Continuous <Continuous>  Parenteral Nutrition - Adult 1 Each (83 mL/Hr) TPN Continuous <Continuous>  piperacillin/tazobactam IVPB.. 3.375 Gram(s) IV Intermittent every 8 hours  vancomycin    Solution 125 milliGRAM(s) Oral every 6 hours    MEDICATIONS  (PRN):  benzocaine/menthol Lozenge 1 Lozenge Oral every 2 hours PRN Sore Throat  dextrose Oral Gel 15 Gram(s) Oral once PRN Blood Glucose LESS THAN 70 milliGRAM(s)/deciliter  HYDROmorphone  Injectable 0.5 milliGRAM(s) IV Push every 4 hours PRN Moderate Pain (4 - 6)  ondansetron Injectable 4 milliGRAM(s) IV Push every 6 hours PRN Nausea and/or Vomiting  sodium chloride 0.9% lock flush 10 milliLiter(s) IV Push every 1 hour PRN Pre/post blood products, medications, blood draw, and to maintain line patency    Pertinent Labs:  Na135 mmol/L Glu 218 mg/dL<H> K+ 3.6 mmol/L Cr  0.68 mg/dL BUN 6 mg/dL<L>  Phos 2.6 mg/dL  Alb 2.0 g/dL<L>  Chol --    LDL --    HDL --    Trig 149 mg/dL     CAPILLARY BLOOD GLUCOSE      POCT Blood Glucose.: 254 mg/dL (2024 11:19)  POCT Blood Glucose.: 236 mg/dL (2024 05:37)  POCT Blood Glucose.: 203 mg/dL (2024 23:06)  POCT Blood Glucose.: 206 mg/dL (2024 17:21)  POCT Blood Glucose.: 229 mg/dL (2024 11:52)        Estimated Needs:   [x ] no change since previous assessment  [ ] recalculated:       Previous Nutrition Diagnosis:   [ ] Altered GI function  [ ]Inadequate Oral Intake [ ] Swallowing Difficulty   [ ] Altered nutrition related labs [ ] Increased Nutrient Needs [ ] Overweight/Obesity   [ ] Unintended Weight Loss [ ] Food & Nutrition Related Knowledge Deficit [x ] Malnutrition (at least moderate)  [ ] Other:     Nutrition Diagnosis is [x ] PCM (severe) related to altered GI fx as evidenced by w/ poor PO >2 weeks including from recent previous admission, now w/ confirmed weight loss.          Interventions:   Recommend  [ ] Change Diet To:  [ ] Nutrition Supplement  [x ] Nutrition Support: PN orders per RADHA MD  [x ] Other: MD to monitor. RD available.     Monitoring and Evaluation:   [x ] PO intake [ x ] Tolerance to diet prescription [ x ] weights [ x ] labs[ x ] follow up per protocol  [ ] other: Assessment:   Patient is a 59y old  Female who presents with a chief complaint of bowel perf (2024 10:05). Pt on full dose PN. Endo request for insulin in PN. Discussed w/ RADHA MD. Pt much improved re: diarrhea. Pt visited by kitchen for food preferences (diet below). RD weighed pt on standing scale @143#. Discussed w/ Sx PMD. She advancind diet to solids      Factors impacting intake: [ ] none [ ] nausea  [ ] vomiting [ ] diarrhea [ ] constipation  [ ]chewing problems [ ] swallowing issues  [x ] other: altered GI fx    Diet Prescription: Diet, Full Liquid:   Consistent Carbohydrate {No Snacks} (24 @ 09:33)        Daily   Weight  65 kg ( AM)  Daily Weight in k (2024 13:00)  Weight in k.1 (2024 05:11)    % Weight Change: 9 kg (19.8 #)/ 3 days     Pertinent Medications: MEDICATIONS  (STANDING):  chlorhexidine 2% Cloths 1 Application(s) Topical daily  chlorhexidine 2% Cloths 1 Application(s) Topical <User Schedule>  dextrose 5%. 1000 milliLiter(s) (50 mL/Hr) IV Continuous <Continuous>  dextrose 5%. 1000 milliLiter(s) (100 mL/Hr) IV Continuous <Continuous>  dextrose 50% Injectable 25 Gram(s) IV Push once  dextrose 50% Injectable 12.5 Gram(s) IV Push once  dextrose 50% Injectable 25 Gram(s) IV Push once  fluconAZOLE IVPB 400 milliGRAM(s) IV Intermittent every 24 hours  gabapentin 100 milliGRAM(s) Oral three times a day  glucagon  Injectable 1 milliGRAM(s) IntraMuscular once  insulin glargine Injectable (LANTUS) 10 Unit(s) SubCutaneous at bedtime  insulin lispro (ADMELOG) corrective regimen sliding scale   SubCutaneous every 6 hours  lidocaine   4% Patch 2 Patch Transdermal every 24 hours  lipid, fat emulsion (Fish Oil and Plant Based) 20% Infusion 0.342 Gm/kG/Day (5.21 mL/Hr) IV Continuous <Continuous>  lipid, fat emulsion (Fish Oil and Plant Based) 20% Infusion 0.342 Gm/kG/Day (5.21 mL/Hr) IV Continuous <Continuous>  Parenteral Nutrition - Adult 1 Each (83 mL/Hr) TPN Continuous <Continuous>  Parenteral Nutrition - Adult 1 Each (83 mL/Hr) TPN Continuous <Continuous>  piperacillin/tazobactam IVPB.. 3.375 Gram(s) IV Intermittent every 8 hours  vancomycin    Solution 125 milliGRAM(s) Oral every 6 hours    MEDICATIONS  (PRN):  benzocaine/menthol Lozenge 1 Lozenge Oral every 2 hours PRN Sore Throat  dextrose Oral Gel 15 Gram(s) Oral once PRN Blood Glucose LESS THAN 70 milliGRAM(s)/deciliter  HYDROmorphone  Injectable 0.5 milliGRAM(s) IV Push every 4 hours PRN Moderate Pain (4 - 6)  ondansetron Injectable 4 milliGRAM(s) IV Push every 6 hours PRN Nausea and/or Vomiting  sodium chloride 0.9% lock flush 10 milliLiter(s) IV Push every 1 hour PRN Pre/post blood products, medications, blood draw, and to maintain line patency    Pertinent Labs:  Na135 mmol/L Glu 218 mg/dL<H> K+ 3.6 mmol/L Cr  0.68 mg/dL BUN 6 mg/dL<L>  Phos 2.6 mg/dL  Alb 2.0 g/dL<L>  Chol --    LDL --    HDL --    Trig 149 mg/dL     CAPILLARY BLOOD GLUCOSE      POCT Blood Glucose.: 254 mg/dL (2024 11:19)  POCT Blood Glucose.: 236 mg/dL (2024 05:37)  POCT Blood Glucose.: 203 mg/dL (2024 23:06)  POCT Blood Glucose.: 206 mg/dL (2024 17:21)  POCT Blood Glucose.: 229 mg/dL (2024 11:52)        Estimated Needs:   [x ] no change since previous assessment  [ ] recalculated:       Previous Nutrition Diagnosis:   [ ] Altered GI function  [ ]Inadequate Oral Intake [ ] Swallowing Difficulty   [ ] Altered nutrition related labs [ ] Increased Nutrient Needs [ ] Overweight/Obesity   [ ] Unintended Weight Loss [ ] Food & Nutrition Related Knowledge Deficit [x ] Malnutrition (at least moderate)  [ ] Other:     Nutrition Diagnosis is [x ] PCM (severe) related to altered GI fx as evidenced by w/ poor PO >2 weeks including from recent previous admission, now w/ confirmed weight loss.          Interventions:   Recommend  [ ] Change Diet To:  [ ] Nutrition Supplement  [x ] Nutrition Support: PN orders per RADHA MD  [x ] Other: MD to monitor. RD available.     Monitoring and Evaluation:   [x ] PO intake [ x ] Tolerance to diet prescription [ x ] weights [ x ] labs[ x ] follow up per protocol  [ ] other:

## 2024-02-26 NOTE — PROGRESS NOTE ADULT - ASSESSMENT
59F s/p exploratory laparoscopy with lysis of adhesions and Small Bowel Resection secondary to 2/19  PICC placed 2/22 for TPN  Wound with purulent drainage, improving  diarrhea resolved  H/H stable, WBC 12.24    - NPO  - IR consult  - TPN per nephrology, electrolyte repletion  - continue local wound care  - continue ZEKE monitor output

## 2024-02-26 NOTE — PROGRESS NOTE ADULT - SUBJECTIVE AND OBJECTIVE BOX
Subjective/Objective:      MEDS  benzocaine/menthol Lozenge 1 Lozenge Oral every 2 hours PRN  chlorhexidine 2% Cloths 1 Application(s) Topical daily  chlorhexidine 2% Cloths 1 Application(s) Topical <User Schedule>  dextrose 5%. 1000 milliLiter(s) IV Continuous <Continuous>  dextrose 5%. 1000 milliLiter(s) IV Continuous <Continuous>  dextrose 50% Injectable 25 Gram(s) IV Push once  dextrose 50% Injectable 12.5 Gram(s) IV Push once  dextrose 50% Injectable 25 Gram(s) IV Push once  dextrose Oral Gel 15 Gram(s) Oral once PRN  fluconAZOLE IVPB 400 milliGRAM(s) IV Intermittent every 24 hours  gabapentin 100 milliGRAM(s) Oral three times a day  glucagon  Injectable 1 milliGRAM(s) IntraMuscular once  HYDROmorphone  Injectable 0.5 milliGRAM(s) IV Push every 4 hours PRN  insulin glargine Injectable (LANTUS) 7 Unit(s) SubCutaneous at bedtime  insulin lispro (ADMELOG) corrective regimen sliding scale   SubCutaneous every 6 hours  lidocaine   4% Patch 2 Patch Transdermal every 24 hours  lipid, fat emulsion (Fish Oil and Plant Based) 20% Infusion 0.342 Gm/kG/Day IV Continuous <Continuous>  ondansetron Injectable 4 milliGRAM(s) IV Push every 6 hours PRN  Parenteral Nutrition - Adult 1 Each TPN Continuous <Continuous>  piperacillin/tazobactam IVPB.. 3.375 Gram(s) IV Intermittent every 8 hours (D7)  sodium chloride 0.9% lock flush 10 milliLiter(s) IV Push every 1 hour PRN  vancomycin    Solution 125 milliGRAM(s) Oral every 6 hours      PHYSICAL EXAM:    Vital Signs Last 24 Hrs  T(C): 36.8 (26 Feb 2024 05:00), Max: 37 (25 Feb 2024 20:38)  T(F): 98.2 (26 Feb 2024 05:00), Max: 98.6 (25 Feb 2024 20:38)  HR: 77 (26 Feb 2024 05:00) (77 - 78)  BP: 178/95 (26 Feb 2024 05:00) (160/97 - 178/95)  BP(mean): --  RR: 18 (26 Feb 2024 05:00) (18 - 18)  SpO2: 97% (26 Feb 2024 05:00) (96% - 99%)    Parameters below as of 25 Feb 2024 20:38  Patient On (Oxygen Delivery Method): room air        GEN:    HEENT:    CHEST/Respiratory:    Cardiovascular:    Abdomen:    Genitourinary:    Extremities:     Neurological:    Skin:      LABS/DIAGNOSTIC TESTS                            8.6    12.24 )-----------( 439      ( 26 Feb 2024 05:30 )             26.5       WBC Count: 12.24 K/uL (02-26 @ 05:30)  WBC Count: 12.80 K/uL (02-25 @ 06:53)  WBC Count: 10.59 K/uL (02-24 @ 06:47)  WBC Count: 12.36 K/uL (02-23 @ 12:50)      02-26    135  |  103  |  6<L>  ----------------------------<  218<H>  3.6   |  25  |  0.68    Ca    8.5      26 Feb 2024 05:30  Phos  2.6     02-26  Mg     1.6     02-26    TPro  6.9  /  Alb  2.0<L>  /  TBili  0.5  /  DBili  x   /  AST  21  /  ALT  21  /  AlkPhos  166<H>  02-26      Urinalysis Basic - ( 26 Feb 2024 05:30 )    Color: x / Appearance: x / SG: x / pH: x  Gluc: 218 mg/dL / Ketone: x  / Bili: x / Urobili: x   Blood: x / Protein: x / Nitrite: x   Leuk Esterase: x / RBC: x / WBC x   Sq Epi: x / Non Sq Epi: x / Bacteria: x            LACTATE:      CULTURES      Culture - Abscess with Gram Stain (collected 02-19-24 @ 23:20)  Source: .Abscess abdominal abcess  Final Report (02-24-24 @ 19:35):    Rare Escherichia coli    Few Alpha hemolytic strep "Susceptibilities not performed"    Numerous Streptococcus anginosus "Susceptibilities not performed"    Few Lactobacillus rhamnosus "Susceptibilities not performed"  Organism: Escherichia coli (02-24-24 @ 19:35)  Organism: Escherichia coli (02-24-24 @ 19:35)      Method Type: CINDY      -  Amoxicillin/Clavulanic Acid: S <=8/4      -  Ampicillin: R >16 These ampicillin results predict results for amoxicillin      -  Ampicillin/Sulbactam: I 16/8      -  Aztreonam: S <=4      -  Cefazolin: S <=2      -  Cefepime: S <=2      -  Cefoxitin: S <=8      -  Ceftriaxone: S <=1      -  Ciprofloxacin: R >2      -  Ertapenem: S <=0.5      -  Gentamicin: S <=2      -  Imipenem: S <=1      -  Levofloxacin: R >4      -  Meropenem: S <=1      -  Piperacillin/Tazobactam: S <=8      -  Tobramycin: S <=2      -  Trimethoprim/Sulfamethoxazole: R >2/38    Culture - Urine (collected 02-19-24 @ 11:53)  Source: Clean Catch Clean Catch (Midstream)  Final Report (02-20-24 @ 15:54):    >=3 organisms. Probable collection contamination.    Culture - Blood (collected 02-19-24 @ 11:45)  Source: .Blood Blood-Peripheral  Final Report (02-24-24 @ 18:01):    No growth at 5 days    Culture - Blood (collected 02-19-24 @ 11:30)  Source: .Blood Blood-Peripheral  Final Report (02-24-24 @ 18:01):    No growth at 5 days          RADIOLOGY  < from: CT Abdomen and Pelvis w/ IV Cont (02.25.24 @ 13:25) >    ACC: [id number] EXAM:  CT ABDOMEN AND PELVIS IC   ORDERED BY:  CHRISTOPH ALVARENGA     PROCEDURE DATE:  02/25/2024          INTERPRETATION:  CLINICAL INFORMATION: Reevaluate bowel perforation.   Status post exploratory laparotomy with lysis of adhesions and small   bowel resection on 2/19/2024.    COMPARISON: CT abdomen and pelvis 2/19/2024.    CONTRAST/COMPLICATIONS:  IV Contrast: Omnipaque 350  90 cc administered   10 cc discarded  Oral Contrast: NONE  Complications: None reported at time of study completion    PROCEDURE:  CT of the Abdomen and Pelvis was performed.  Sagittal and coronal reformats were performed.    FINDINGS:  LOWER CHEST: Left lower lobe atelectasis with new patchy hypoenhancing   consolidative opacity in the medial lowerlobes bilaterally, question   infection superimposed on atelectasis versus atelectasis.    LIVER: Normal morphology. A subcentimeter left lobe hypodensity that is   too small to characterize.  BILE DUCTS: Normal caliber.  GALLBLADDER: Within normal limits.  SPLEEN: Within normal limits.  PANCREAS: Within normal limits.  ADRENALS: Within normal limits.  KIDNEYS/URETERS: A small left upper pole cyst and an additional   subcentimeter hypodensity in the left lower pole that is too small to   characterize. Mild bilateral hydroureteronephrosis to the level of the   inflammatory change in the pelvis, likely reactive.    BLADDER: Within normal limits.  REPRODUCTIVE ORGANS: Hysterectomy.    BOWEL/PERITONEUM: Interval small bowel resection with distal small bowel   anastomosis in the anterior right lower quadrant. In the right   hemipelvis, abutting the posterior wall of a distal small bowel loop   proximal to the anastomosis, there is an 8.6 x 4.2 x 2.3 cm peripherally   enhancing fluid collection. The bowel upstream of the collection is fluid   dilated with wall thickening, favoring ileus due to the adjacent   collection or in the setting of recent surgery. A right lower quadrant   approach drainage catheter traverses the lower pelvis, but courses   anterior to the collection and terminates in the left hemipelvis. Small   loculated fluid at the anterior midline deep to the abdominal wall   incision with a few foci of air, postsurgical. Trace ascites.    VESSELS: Atherosclerotic changes.  RETROPERITONEUM/LYMPH NODES: No lymphadenopathy.  ABDOMINAL WALL: Anterior midline incision with trace fluid and air,   consistent with recent surgery.  BONES: Degenerative changes.    IMPRESSION:  Interval small bowel resection with a right lower quadrant small bowel   anastomosis. In the right hemipelvis, there is a rim-enhancing 8.6 cm   fluid collection abutting a distal small bowel loop proximal to the   anastomosis. The small bowel further upstream demonstrates wall   thickening and distention favoring ileus due to the collection or in the   setting of recent surgery. Obstruction is less likely. A right lower   quadrant approach drainage catheter courses anterior to and is separate   from the collection.    Trace loculated free fluid with foci of gas at the anterior midline just   deep to the abdominal wall incision, consistent with recent surgery.    Left lower lobe atelectasis with new patchy hypoenhancing consolidative   opacity in the medial lower lobes, question infection superimposed on   atelectasis versus atelectasis. Correlate clinically.                       Subjective/Objective: Pt's frequency of bm's unchanged but says most recent stool from the AM is better formed; says pain around incision site is better      MEDS  benzocaine/menthol Lozenge 1 Lozenge Oral every 2 hours PRN  chlorhexidine 2% Cloths 1 Application(s) Topical daily  chlorhexidine 2% Cloths 1 Application(s) Topical <User Schedule>  dextrose 5%. 1000 milliLiter(s) IV Continuous <Continuous>  dextrose 5%. 1000 milliLiter(s) IV Continuous <Continuous>  dextrose 50% Injectable 25 Gram(s) IV Push once  dextrose 50% Injectable 12.5 Gram(s) IV Push once  dextrose 50% Injectable 25 Gram(s) IV Push once  dextrose Oral Gel 15 Gram(s) Oral once PRN  fluconAZOLE IVPB 400 milliGRAM(s) IV Intermittent every 24 hours  gabapentin 100 milliGRAM(s) Oral three times a day  glucagon  Injectable 1 milliGRAM(s) IntraMuscular once  HYDROmorphone  Injectable 0.5 milliGRAM(s) IV Push every 4 hours PRN  insulin glargine Injectable (LANTUS) 7 Unit(s) SubCutaneous at bedtime  insulin lispro (ADMELOG) corrective regimen sliding scale   SubCutaneous every 6 hours  lidocaine   4% Patch 2 Patch Transdermal every 24 hours  lipid, fat emulsion (Fish Oil and Plant Based) 20% Infusion 0.342 Gm/kG/Day IV Continuous <Continuous>  ondansetron Injectable 4 milliGRAM(s) IV Push every 6 hours PRN  Parenteral Nutrition - Adult 1 Each TPN Continuous <Continuous>  piperacillin/tazobactam IVPB.. 3.375 Gram(s) IV Intermittent every 8 hours (D7)  sodium chloride 0.9% lock flush 10 milliLiter(s) IV Push every 1 hour PRN  vancomycin    Solution 125 milliGRAM(s) Oral every 6 hours      PHYSICAL EXAM:    Vital Signs Last 24 Hrs  T(C): 36.8 (26 Feb 2024 05:00), Max: 37 (25 Feb 2024 20:38)  T(F): 98.2 (26 Feb 2024 05:00), Max: 98.6 (25 Feb 2024 20:38)  HR: 77 (26 Feb 2024 05:00) (77 - 78)  BP: 178/95 (26 Feb 2024 05:00) (160/97 - 178/95)  BP(mean): --  RR: 18 (26 Feb 2024 05:00) (18 - 18)  SpO2: 97% (26 Feb 2024 05:00) (96% - 99%)    Parameters below as of 25 Feb 2024 20:38  Patient On (Oxygen Delivery Method): room air      GEN: obese female in NAD    HEENT: NC/AT; conj clear    Chest/Thorax: clear to auscultation ant.     Cardiovascular: S1S2 reg; no m, g, r    ABD: midline, vertical incision site with dressing; + serosanguinous drainage in drainage bulb; BS active; soft; + mild tenderness around incision site    Neurological: A+Ox3    Skin: no rashes noted    Extrem: R arm medial aspect with PICC    Psychiatric: affect appropriate        LABS/DIAGNOSTIC TESTS                          8.6    12.24 )-----------( 439      ( 26 Feb 2024 05:30 )             26.5       WBC Count: 12.24 K/uL (02-26 @ 05:30)  WBC Count: 12.80 K/uL (02-25 @ 06:53)  WBC Count: 10.59 K/uL (02-24 @ 06:47)  WBC Count: 12.36 K/uL (02-23 @ 12:50)      02-26    135  |  103  |  6<L>  ----------------------------<  218<H>  3.6   |  25  |  0.68  Clostridium difficile Toxin by PCR (02.25.24 @ 13:00)   C Diff by PCR Result: NotDetec:  Ca    8.5      26 Feb 2024 05:30  Phos  2.6     02-26  Mg     1.6     02-26    TPro  6.9  /  Alb  2.0<L>  /  TBili  0.5  /  DBili  x   /  AST  21  /  ALT  21  /  AlkPhos  166<H>  02-26          CULTURES      Culture - Abscess with Gram Stain (collected 02-19-24 @ 23:20)  Source: .Abscess abdominal abcess  Final Report (02-24-24 @ 19:35):    Rare Escherichia coli    Few Alpha hemolytic strep "Susceptibilities not performed"    Numerous Streptococcus anginosus "Susceptibilities not performed"    Few Lactobacillus rhamnosus "Susceptibilities not performed"  Organism: Escherichia coli (02-24-24 @ 19:35)  Organism: Escherichia coli (02-24-24 @ 19:35)      Method Type: CINDY      -  Amoxicillin/Clavulanic Acid: S <=8/4      -  Ampicillin: R >16 These ampicillin results predict results for amoxicillin      -  Ampicillin/Sulbactam: I 16/8      -  Aztreonam: S <=4      -  Cefazolin: S <=2      -  Cefepime: S <=2      -  Cefoxitin: S <=8      -  Ceftriaxone: S <=1      -  Ciprofloxacin: R >2      -  Ertapenem: S <=0.5      -  Gentamicin: S <=2      -  Imipenem: S <=1      -  Levofloxacin: R >4      -  Meropenem: S <=1      -  Piperacillin/Tazobactam: S <=8      -  Tobramycin: S <=2      -  Trimethoprim/Sulfamethoxazole: R >2/38    Culture - Urine (collected 02-19-24 @ 11:53)  Source: Clean Catch Clean Catch (Midstream)  Final Report (02-20-24 @ 15:54):    >=3 organisms. Probable collection contamination.    Culture - Blood (collected 02-19-24 @ 11:45)  Source: .Blood Blood-Peripheral  Final Report (02-24-24 @ 18:01):    No growth at 5 days    Culture - Blood (collected 02-19-24 @ 11:30)  Source: .Blood Blood-Peripheral  Final Report (02-24-24 @ 18:01):    No growth at 5 days    RADIOLOGY:  < from: CT Abdomen and Pelvis w/ IV Cont (02.25.24 @ 13:25) >    ACC: [id number] EXAM:  CT ABDOMEN AND PELVIS IC   ORDERED BY:  CHRISTOPH ALVARENGA     PROCEDURE DATE:  02/25/2024          INTERPRETATION:  CLINICAL INFORMATION: Reevaluate bowel perforation.   Status post exploratory laparotomy with lysis of adhesions and small   bowel resection on 2/19/2024.    COMPARISON: CT abdomen and pelvis 2/19/2024.    CONTRAST/COMPLICATIONS:  IV Contrast: Omnipaque 350  90 cc administered   10 cc discarded  Oral Contrast: NONE  Complications: None reported at time of study completion    PROCEDURE:  CT of the Abdomen and Pelvis was performed.  Sagittal and coronal reformats were performed.    FINDINGS:    LOWER CHEST: Left lower lobe atelectasis with new patchy hypoenhancing   consolidative opacity in the medial lowerlobes bilaterally, question   infection superimposed on atelectasis versus atelectasis.    LIVER: Normal morphology. A subcentimeter left lobe hypodensity that is   too small to characterize.  BILE DUCTS: Normal caliber.  GALLBLADDER: Within normal limits.  SPLEEN: Within normal limits.  PANCREAS: Within normal limits.  ADRENALS: Within normal limits.  KIDNEYS/URETERS: A small left upper pole cyst and an additional   subcentimeter hypodensity in the left lower pole that is too small to   characterize. Mild bilateral hydroureteronephrosis to the level of the   inflammatory change in the pelvis, likely reactive.    BLADDER: Within normal limits.  REPRODUCTIVE ORGANS: Hysterectomy.    BOWEL/PERITONEUM: Interval small bowel resection with distal small bowel   anastomosis in the anterior right lower quadrant. In the right   hemipelvis, abutting the posterior wall of a distal small bowel loop   proximal to the anastomosis, there is an 8.6 x 4.2 x 2.3 cm peripherally   enhancing fluid collection. The bowel upstream of the collection is fluid   dilated with wall thickening, favoring ileus due to the adjacent   collection or in the setting of recent surgery. A right lower quadrant   approach drainage catheter traverses the lower pelvis, but courses   anterior to the collection and terminates in the left hemipelvis. Small   loculated fluid at the anterior midline deep to the abdominal wall   incision with a few foci of air, postsurgical. Trace ascites.    VESSELS: Atherosclerotic changes.  RETROPERITONEUM/LYMPH NODES: No lymphadenopathy.  ABDOMINAL WALL: Anterior midline incision with trace fluid and air,   consistent with recent surgery.  BONES: Degenerative changes.    IMPRESSION:  Interval small bowel resection with a right lower quadrant small bowel   anastomosis. In the right hemipelvis, there is a rim-enhancing 8.6 cm   fluid collection abutting a distal small bowel loop proximal to the   anastomosis. The small bowel further upstream demonstrates wall   thickening and distention favoring ileus due to the collection or in the   setting of recent surgery. Obstruction is less likely. A right lower   quadrant approach drainage catheter courses anterior to and is separate   from the collection.    Trace loculated free fluid with foci of gas at the anterior midline just   deep to the abdominal wall incision, consistent with recent surgery.    Left lower lobe atelectasis with new patchy hypoenhancing consolidative   opacity in the medial lower lobes, question infection superimposed on   atelectasis versus atelectasis. Correlate clinically.      < end of copied text >

## 2024-02-26 NOTE — PROGRESS NOTE ADULT - ASSESSMENT
60 y/o female with pmhx of HTN, DM admitted for severe abd pain. Pt is s/p diagnostic laparoscopy, lysis of adhesions, conversion to laparotomy for adhesiolysis and incisional hernia repair without mesh 2/6/24/ Pt did well postoperatively and was d/c 2/14/2024 until readmission for severe abd pain on 2/19. Found to have pneumonoperitoneum due to bowel perforation. Underwent ex lap with small bowel resection and anastomosis. Admitted to the ICU for management of septic shock, which has resolved. BCs neg. Cx obtained during ex lap growing E.coli and S. anginosus. Pt had CT abd/pelvis 2/25 which reveals rim-enhancing 8.6 cm fluid collection abutting a distal small bowel loop proximal to the anastomosis. Also concerned about diarrhea. Cdif PCR negative. However, spoke with Dr. Hamilton to run Ag specific test for Cdif, as pt says her stool is more formed since starting vanco orally.     #Septic shock due to bowel perforation (see above)-- pip/tazo should provide appropriate coverage for polymicrobial infection, including anaerobes. However, pt with intra-abd abscess noted on CT abd/pelvis.   --cont. piperacillin/tazobactam  --d/c fluconazole  --f/u CBC  --IR intervention for drainage of abscess    # DEION/ATN-- likely due to sepsis with improvement in renal fn noted    #DM-- management as per ICU team    #Diarrhea-  ??Cdif  --f/u repeat stool that will be evaluated using a newer Ag specific test  --contact isolation pending additional stool results  --cont. vanco 125mg p.o. QID empirically for now      Chart reviewed, including additional notes/labs/new CT imaging; pt re-evaluated at the bedside; discussed Dx/management of perf with abscess formation noted with the pt and with Marugerite Lopez and Meggan.

## 2024-02-27 LAB
ALBUMIN SERPL ELPH-MCNC: 2.2 G/DL — LOW (ref 3.5–5)
ALP SERPL-CCNC: 175 U/L — HIGH (ref 40–120)
ALT FLD-CCNC: 21 U/L DA — SIGNIFICANT CHANGE UP (ref 10–60)
ANION GAP SERPL CALC-SCNC: 7 MMOL/L — SIGNIFICANT CHANGE UP (ref 5–17)
AST SERPL-CCNC: 17 U/L — SIGNIFICANT CHANGE UP (ref 10–40)
BASOPHILS # BLD AUTO: 0.04 K/UL — SIGNIFICANT CHANGE UP (ref 0–0.2)
BASOPHILS NFR BLD AUTO: 0.3 % — SIGNIFICANT CHANGE UP (ref 0–2)
BILIRUB SERPL-MCNC: 0.4 MG/DL — SIGNIFICANT CHANGE UP (ref 0.2–1.2)
BUN SERPL-MCNC: 10 MG/DL — SIGNIFICANT CHANGE UP (ref 7–18)
CA-I BLD-SCNC: 5.1 MG/DL — SIGNIFICANT CHANGE UP (ref 4.5–5.6)
CALCIUM SERPL-MCNC: 8.9 MG/DL — SIGNIFICANT CHANGE UP (ref 8.4–10.5)
CHLORIDE SERPL-SCNC: 105 MMOL/L — SIGNIFICANT CHANGE UP (ref 96–108)
CO2 SERPL-SCNC: 25 MMOL/L — SIGNIFICANT CHANGE UP (ref 22–31)
CREAT SERPL-MCNC: 0.72 MG/DL — SIGNIFICANT CHANGE UP (ref 0.5–1.3)
EGFR: 97 ML/MIN/1.73M2 — SIGNIFICANT CHANGE UP
EOSINOPHIL # BLD AUTO: 0.26 K/UL — SIGNIFICANT CHANGE UP (ref 0–0.5)
EOSINOPHIL NFR BLD AUTO: 2.2 % — SIGNIFICANT CHANGE UP (ref 0–6)
GLUCOSE BLDC GLUCOMTR-MCNC: 174 MG/DL — HIGH (ref 70–99)
GLUCOSE BLDC GLUCOMTR-MCNC: 178 MG/DL — HIGH (ref 70–99)
GLUCOSE BLDC GLUCOMTR-MCNC: 212 MG/DL — HIGH (ref 70–99)
GLUCOSE BLDC GLUCOMTR-MCNC: 233 MG/DL — HIGH (ref 70–99)
GLUCOSE BLDC GLUCOMTR-MCNC: 241 MG/DL — HIGH (ref 70–99)
GLUCOSE SERPL-MCNC: 216 MG/DL — HIGH (ref 70–99)
HCT VFR BLD CALC: 27 % — LOW (ref 34.5–45)
HGB BLD-MCNC: 9 G/DL — LOW (ref 11.5–15.5)
IMM GRANULOCYTES NFR BLD AUTO: 2.1 % — HIGH (ref 0–0.9)
LYMPHOCYTES # BLD AUTO: 19.9 % — SIGNIFICANT CHANGE UP (ref 13–44)
LYMPHOCYTES # BLD AUTO: 2.33 K/UL — SIGNIFICANT CHANGE UP (ref 1–3.3)
MAGNESIUM SERPL-MCNC: 1.7 MG/DL — SIGNIFICANT CHANGE UP (ref 1.6–2.6)
MCHC RBC-ENTMCNC: 27.9 PG — SIGNIFICANT CHANGE UP (ref 27–34)
MCHC RBC-ENTMCNC: 33.3 GM/DL — SIGNIFICANT CHANGE UP (ref 32–36)
MCV RBC AUTO: 83.6 FL — SIGNIFICANT CHANGE UP (ref 80–100)
MONOCYTES # BLD AUTO: 0.58 K/UL — SIGNIFICANT CHANGE UP (ref 0–0.9)
MONOCYTES NFR BLD AUTO: 5 % — SIGNIFICANT CHANGE UP (ref 2–14)
NEUTROPHILS # BLD AUTO: 8.23 K/UL — HIGH (ref 1.8–7.4)
NEUTROPHILS NFR BLD AUTO: 70.5 % — SIGNIFICANT CHANGE UP (ref 43–77)
NRBC # BLD: 0 /100 WBCS — SIGNIFICANT CHANGE UP (ref 0–0)
PHOSPHATE SERPL-MCNC: 2.6 MG/DL — SIGNIFICANT CHANGE UP (ref 2.5–4.5)
PLATELET # BLD AUTO: 447 K/UL — HIGH (ref 150–400)
POTASSIUM SERPL-MCNC: 4.2 MMOL/L — SIGNIFICANT CHANGE UP (ref 3.5–5.3)
POTASSIUM SERPL-SCNC: 4.2 MMOL/L — SIGNIFICANT CHANGE UP (ref 3.5–5.3)
PROT SERPL-MCNC: 7.7 G/DL — SIGNIFICANT CHANGE UP (ref 6–8.3)
RBC # BLD: 3.23 M/UL — LOW (ref 3.8–5.2)
RBC # FLD: 16.5 % — HIGH (ref 10.3–14.5)
SODIUM SERPL-SCNC: 137 MMOL/L — SIGNIFICANT CHANGE UP (ref 135–145)
TRIGL SERPL-MCNC: 174 MG/DL — HIGH
WBC # BLD: 11.68 K/UL — HIGH (ref 3.8–10.5)
WBC # FLD AUTO: 11.68 K/UL — HIGH (ref 3.8–10.5)

## 2024-02-27 PROCEDURE — 99232 SBSQ HOSP IP/OBS MODERATE 35: CPT

## 2024-02-27 RX ORDER — I.V. FAT EMULSION 20 G/100ML
0.34 EMULSION INTRAVENOUS
Qty: 50 | Refills: 0 | Status: DISCONTINUED | OUTPATIENT
Start: 2024-02-27 | End: 2024-02-27

## 2024-02-27 RX ORDER — ACETAMINOPHEN 500 MG
1000 TABLET ORAL EVERY 6 HOURS
Refills: 0 | Status: COMPLETED | OUTPATIENT
Start: 2024-02-27 | End: 2024-02-28

## 2024-02-27 RX ORDER — ELECTROLYTE SOLUTION,INJ
1 VIAL (ML) INTRAVENOUS
Refills: 0 | Status: DISCONTINUED | OUTPATIENT
Start: 2024-02-27 | End: 2024-02-27

## 2024-02-27 RX ADMIN — Medication 125 MILLIGRAM(S): at 00:14

## 2024-02-27 RX ADMIN — Medication 1000 MILLIGRAM(S): at 13:00

## 2024-02-27 RX ADMIN — Medication 400 MILLIGRAM(S): at 12:35

## 2024-02-27 RX ADMIN — Medication 1000 MILLIGRAM(S): at 19:35

## 2024-02-27 RX ADMIN — Medication 2: at 12:58

## 2024-02-27 RX ADMIN — PIPERACILLIN AND TAZOBACTAM 25 GRAM(S): 4; .5 INJECTION, POWDER, LYOPHILIZED, FOR SOLUTION INTRAVENOUS at 14:49

## 2024-02-27 RX ADMIN — Medication 83 EACH: at 22:15

## 2024-02-27 RX ADMIN — CHLORHEXIDINE GLUCONATE 1 APPLICATION(S): 213 SOLUTION TOPICAL at 12:59

## 2024-02-27 RX ADMIN — LIDOCAINE 2 PATCH: 4 CREAM TOPICAL at 05:45

## 2024-02-27 RX ADMIN — Medication 1: at 23:59

## 2024-02-27 RX ADMIN — GABAPENTIN 100 MILLIGRAM(S): 400 CAPSULE ORAL at 15:28

## 2024-02-27 RX ADMIN — Medication 400 MILLIGRAM(S): at 03:00

## 2024-02-27 RX ADMIN — Medication 400 MILLIGRAM(S): at 18:53

## 2024-02-27 RX ADMIN — INSULIN GLARGINE 10 UNIT(S): 100 INJECTION, SOLUTION SUBCUTANEOUS at 22:26

## 2024-02-27 RX ADMIN — HYDROMORPHONE HYDROCHLORIDE 0.5 MILLIGRAM(S): 2 INJECTION INTRAMUSCULAR; INTRAVENOUS; SUBCUTANEOUS at 23:30

## 2024-02-27 RX ADMIN — I.V. FAT EMULSION 5.21 GM/KG/DAY: 20 EMULSION INTRAVENOUS at 22:27

## 2024-02-27 RX ADMIN — Medication 125 MILLIGRAM(S): at 12:35

## 2024-02-27 RX ADMIN — GABAPENTIN 100 MILLIGRAM(S): 400 CAPSULE ORAL at 22:26

## 2024-02-27 RX ADMIN — PIPERACILLIN AND TAZOBACTAM 25 GRAM(S): 4; .5 INJECTION, POWDER, LYOPHILIZED, FOR SOLUTION INTRAVENOUS at 05:45

## 2024-02-27 RX ADMIN — Medication 1000 MILLIGRAM(S): at 03:15

## 2024-02-27 RX ADMIN — GABAPENTIN 100 MILLIGRAM(S): 400 CAPSULE ORAL at 05:45

## 2024-02-27 RX ADMIN — Medication 2: at 00:14

## 2024-02-27 RX ADMIN — CHLORHEXIDINE GLUCONATE 1 APPLICATION(S): 213 SOLUTION TOPICAL at 05:44

## 2024-02-27 RX ADMIN — Medication 2: at 18:53

## 2024-02-27 RX ADMIN — Medication 125 MILLIGRAM(S): at 05:46

## 2024-02-27 RX ADMIN — HYDROMORPHONE HYDROCHLORIDE 0.5 MILLIGRAM(S): 2 INJECTION INTRAMUSCULAR; INTRAVENOUS; SUBCUTANEOUS at 22:26

## 2024-02-27 RX ADMIN — HYDROMORPHONE HYDROCHLORIDE 0.5 MILLIGRAM(S): 2 INJECTION INTRAMUSCULAR; INTRAVENOUS; SUBCUTANEOUS at 08:39

## 2024-02-27 RX ADMIN — LIDOCAINE 2 PATCH: 4 CREAM TOPICAL at 19:01

## 2024-02-27 RX ADMIN — Medication 2: at 05:44

## 2024-02-27 RX ADMIN — PIPERACILLIN AND TAZOBACTAM 25 GRAM(S): 4; .5 INJECTION, POWDER, LYOPHILIZED, FOR SOLUTION INTRAVENOUS at 22:26

## 2024-02-27 NOTE — PROGRESS NOTE ADULT - ASSESSMENT
Patient is a 59y Female with HTN, DM, recently admitted 2/5-2/14/24 s/p diagnostic laparoscopy, lysis of adhesions, conversion to laparotomy for adhesiolysis and incisional hernia repair without mesh on 2/6, presents now with abdominal pain. Pt found to have small bowel perforation with pneumoperitoneum. s/p OR 2/19 for ex-lap with bowel resection, found to have adhesions with intrabdominal abscess. Pt transferred to ICU for septic shock 2/2 peritonitis, hypotension requiring brief pressors, with DEION and hyponatremia. DEION and hyponatremia resolved.   Nephrology consulted for TPN for prolonged NPO status.     1. Severe PCM- albumin 1.4, edema with wt loss. Pt initiated on TPN 2/23. s/p IR RUE PICC line placed on 2/22 (after 1pm).  c/w TPN with lipids @ 2L. Will increase electrolytes accordingly.   Check FS q6h. Will increase to 15u of insulin in TPN bag. hgbA1C 8.4 on 2/6/24.  on 2/22 --> 174 2/27  Check CMP/Mg/Phos/ Ionized calcium in am.   Daily weights. Strict I/O. Will hold TPN if pt spikes fever and check BCX x2    2. DEION - likely ATN due to Septic shock. DEION resolved. Strict I/Os. Avoid nephrotoxins/ NSAIDs/ RCA. Monitor BMP.    3. Septic shock- 2/2 peritonitis/ small bowel perforation. Pt on Zosyn. Also on Diflucan for ppx . BCx 2/19 NG.  Plan as per ID/ Surgery. Cdiff neg. However on Vanco PO for high suspicion.     4. Hypotension- resolved. Now with elevated BP. Recc Losartan 25mg PO daily. Monitor BP    TPN orders:    60 kg  Total Kcal 1500  Lipid 50 g =500 kcal  AA 100g = 400 kcal  Dextrose 176g - 600 kcal    [facility] NEPHROLOGY  Dean Shipley M.D.  Jaylon Kang D.O.  Debbi Garcia M.D.  MD Laura Kwon, MSN, ANP-C    Telephone: [phone]  Facsimile: [phone]    [facility] [place] [address], [address]  [place] [address]

## 2024-02-27 NOTE — PROGRESS NOTE ADULT - ASSESSMENT
58 y/o female with pmhx of HTN, DM admitted for severe abd pain. Pt is s/p diagnostic laparoscopy, lysis of adhesions, conversion to laparotomy for adhesiolysis and incisional hernia repair without mesh 2/6/24/ Pt did well postoperatively and was d/c 2/14/2024 until readmission for severe abd pain on 2/19. Found to have pneumonoperitoneum due to bowel perforation. Underwent ex lap with small bowel resection and anastomosis. Admitted to the ICU for management of septic shock, which has resolved. BCs neg. Cx obtained during ex lap growing E.coli, lactobacillus, and S. anginosus. Pt had CT abd/pelvis 2/25 which revealed rim-enhancing 8.6 cm fluid collection abutting a distal small bowel loop proximal to the anastomosis. Also concerned about diarrhea. Cdif PCR negative. However, spoke with Dr. Hamilton to run Ag specific test for Cdif, as pt says her stool cont. to be more formed since starting vanco orally. WBC coming down. As per discussion with IR yesterday (Dr. Stockton), he doesn't see a window through which to insert a drain into the abscess. Pt will need several wks of AB to Rx the abscess. Pt also willing to have outpt ID f/u with me ([address] [place], [place] [address]; [phone]).    #Septic shock due to bowel perforation (see above)-- pip/tazo should provide appropriate coverage for polymicrobial infection, including anaerobes. However, pt with intra-abd abscess noted on CT abd/pelvis.   --cont. piperacillin/tazobactam  --d/c fluconazole  --repeat CT abd as per surgery    # DEION/ATN-- likely due to sepsis and has resolved    #DM-- management as per ICU team    #Diarrhea-  ??Cdif  --send repeat stool sample that will be evaluated using a newer Ag specific test (add DR. Hamilton's name to the order)  --contact isolation pending additional stool results  --cont. vanco 125mg p.o. QID empirically for now      Chart reviewed, including additional notes/labs; pt re-evaluated at the bedside; discussed Dx/management of perf with abscess formation noted with the pt and with Dr. Lopez. Pt says she wants to f/u with me as an outpt following hospital discharge. Dr. Craven will assume ID inpt care beginning tomorrow.

## 2024-02-27 NOTE — PROGRESS NOTE ADULT - SUBJECTIVE AND OBJECTIVE BOX
Interval Events:  pt in nad  tolerating liquids    Allergies    No Known Allergies    Intolerances      Endocrine/Metabolic Medications:  dextrose 50% Injectable 25 Gram(s) IV Push once  dextrose 50% Injectable 12.5 Gram(s) IV Push once  dextrose 50% Injectable 25 Gram(s) IV Push once  dextrose Oral Gel 15 Gram(s) Oral once PRN  glucagon  Injectable 1 milliGRAM(s) IntraMuscular once  insulin glargine Injectable (LANTUS) 10 Unit(s) SubCutaneous at bedtime  insulin lispro (ADMELOG) corrective regimen sliding scale   SubCutaneous every 6 hours      Vital Signs Last 24 Hrs  T(C): 37 (27 Feb 2024 05:52), Max: 37 (27 Feb 2024 05:52)  T(F): 98.6 (27 Feb 2024 05:52), Max: 98.6 (27 Feb 2024 05:52)  HR: 80 (27 Feb 2024 05:52) (80 - 83)  BP: 155/79 (27 Feb 2024 05:52) (155/79 - 171/91)  BP(mean): 104 (27 Feb 2024 05:52) (104 - 104)  RR: 18 (27 Feb 2024 05:52) (16 - 18)  SpO2: 98% (27 Feb 2024 05:52) (96% - 98%)    Parameters below as of 27 Feb 2024 05:52  Patient On (Oxygen Delivery Method): room air          PHYSICAL EXAM  All physical exam findings normal, except those marked:  General:	Alert, active, cooperative, NAD, well hydrated  .		[] Abnormal:  Neck		Normal: supple, no cervical adenopathy, no palpable thyroid  .		[] Abnormal:  Cardiovascular	Normal: regular rate, normal S1, S2, no murmurs  .		[] Abnormal:  Respiratory	Normal: no chest wall deformity, normal respiratory pattern, CTA B/L  .		[] Abnormal:  Abdominal	Normal: soft, ND, NT, bowel sounds present, no masses, no organomegaly  .		[] Abnormal:  		Normal normal genitalia, testes descended, circumcised/uncircumcised  .		Velma stage:			Breast velma:  .		Menstrual history:  .		[] Abnormal:  Extremities	Normal: FROM x4  .		[] Abnormal:  Skin		Normal: intact and not indurated, no rash, no acanthosis nigricans  .		[] Abnormal:  Neurologic	Normal: grossly intact  .		[] Abnormal:    LABS                        9.0    11.68 )-----------( 447      ( 27 Feb 2024 05:26 )             27.0                               137    |  105    |  10                  Calcium: 8.9   / iCa: x      (02-27 @ 05:26)    ----------------------------<  216       Magnesium: 1.7                              4.2     |  25     |  0.72             Phosphorous: 2.6      TPro  7.7    /  Alb  2.2    /  TBili  0.4    /  DBili  x      /  AST  17     /  ALT  21     /  AlkPhos  175    27 Feb 2024 05:26    CAPILLARY BLOOD GLUCOSE      POCT Blood Glucose.: 212 mg/dL (27 Feb 2024 05:26)  POCT Blood Glucose.: 225 mg/dL (26 Feb 2024 23:50)  POCT Blood Glucose.: 264 mg/dL (26 Feb 2024 18:44)  POCT Blood Glucose.: 254 mg/dL (26 Feb 2024 11:19)        Assesment/plan      · Assessment	  58 y/o female with pmhx of HTN, DM, to ER c/o decreased appetite over the last few days with severe vaginal pain/spasms.Admitted with perforated gut.   Pt is s/p diagnostic laparoscopy, lysis of adhesions, conversion to laparotomy for adhesiolysis and incisional hernia repair without mesh 2/6/24/ Pt did well postoperatively and was d/c 2/14/2024.   Found to have uncont dm- pt known to me as out pt- on basaglar 15 and metformin. Now TPN with hyperglycemia.    Dm- insulin dependent as out pt  now hyperglycemic on TPN  inc lantus to 10units - hold when off of tpn  may inc insulin to the bag to 16 units  fsg q6h  a1c-8.4  cont iv abx- per ID

## 2024-02-27 NOTE — PROGRESS NOTE ADULT - ASSESSMENT
59F s/p exploratory laparoscopy with lysis of adhesions and Small Bowel Resection secondary to 2/19  PICC placed 2/22 for TPN  Wound with purulent drainage, improving  diarrhea resolved  H/H stable, WBC downtrending 11.6    Plan  - continue soft diet  - continue local wound care to midline incision  - TPN per nephrology, electrolyte repletion  - continue local wound care  - continue ZEKE monitor output  - encourage ambulation / OOB

## 2024-02-27 NOTE — PROGRESS NOTE ADULT - SUBJECTIVE AND OBJECTIVE BOX
[facility] NEPHROLOGY- METABOLIC SUPPORT PROGRESS NOTE    Patient is a 59y Female with HTN, DM, recently admitted 2/5-2/14/24 s/p diagnostic laparoscopy, lysis of adhesions, conversion to laparotomy for adhesiolysis and incisional hernia repair without mesh on 2/6, presents now with abdominal pain. Pt found to have small bowel perforation with pneumoperitoneum. s/p OR 2/19 for ex-lap with bowel resection, found to have adhesions with intrabdominal abscess. Pt transferred to ICU for septic shock 2/2 peritonitis, hypotension requiring brief pressors, with DEION and hyponatremia. DEION and hyponatremia resolved.   Nephrology consulted for TPN for prolonged NPO status.   2/22: s/p IR RUE PICC placement  2/23: Pt started on CLD  2/26: Advanced to full liquid diet    Hospital Medications: Medications reviewed.  REVIEW OF SYSTEMS:  CONSTITUTIONAL: No fevers or chills  RESPIRATORY: No shortness of breath  CARDIOVASCULAR: No chest pain.  GASTROINTESTINAL: No nausea, or vomiting, +improving abd pain. +3 BMs more formed  VASCULAR: No bilateral lower extremity edema.       VITALS:  T(F): 98.6 (02-27-24 @ 05:52), Max: 98.6 (02-27-24 @ 05:52)  HR: 80 (02-27-24 @ 05:52)  BP: 155/79 (02-27-24 @ 05:52)  RR: 18 (02-27-24 @ 05:52)  SpO2: 98% (02-27-24 @ 05:52)  Wt(kg): --    02-26 @ 07:01  -  02-27 @ 07:00  --------------------------------------------------------  IN: 50 mL / OUT: 1530 mL / NET: -1480 mL      PHYSICAL EXAM:  Constitutional: NAD,   HEENT: anicteric sclera,   Respiratory: CTAB, no wheezes, rales or rhonchi  Cardiovascular: S1, S2, RRR  Gastrointestinal: +midline incision covered, +ZEKE  Extremities: minimal peripheral edema b/l   Vascular Access: RUE single lumen PICC- saved for  TPN        LABS:  02-27    137  |  105  |  10  ----------------------------<  216<H>  4.2   |  25  |  0.72    Ca    8.9      27 Feb 2024 05:26  Phos  2.6     02-27  Mg     1.7     02-27    TPro  7.7  /  Alb  2.2<L>  /  TBili  0.4  /  DBili      /  AST  17  /  ALT  21  /  AlkPhos  175<H>  02-27    Creatinine Trend: 0.72 <--, 0.68 <--, 0.64 <--, 0.70 <--, 0.74 <--, 0.70 <--, 0.93 <--, 0.92 <--                        9.0    11.68 )-----------( 447      ( 27 Feb 2024 05:26 )             27.0     Urine Studies:  Urinalysis Basic - ( 27 Feb 2024 05:26 )    Color:  / Appearance:  / SG:  / pH:   Gluc: 216 mg/dL / Ketone:   / Bili:  / Urobili:    Blood:  / Protein:  / Nitrite:    Leuk Esterase:  / RBC:  / WBC    Sq Epi:  / Non Sq Epi:  / Bacteria:       Sodium, Random Urine: 48 mmol/L (02-21 @ 04:15)  Creatinine, Random Urine: 58 mg/dL (02-21 @ 04:15)

## 2024-02-27 NOTE — PROGRESS NOTE ADULT - SUBJECTIVE AND OBJECTIVE BOX
INTERVAL HPI/OVERNIGHT EVENTS:  Pt resting comfortably. No acute complaints. Tolerated clears yesterday. Ate small amount of soft diet - c/o abd pain. No nausea, vomiting. Ambulating. Voiding. Denies fever, chills. Improving diarrhea.     MEDICATIONS  (STANDING):  acetaminophen   IVPB .. 1000 milliGRAM(s) IV Intermittent every 6 hours  chlorhexidine 2% Cloths 1 Application(s) Topical daily  chlorhexidine 2% Cloths 1 Application(s) Topical <User Schedule>  dextrose 5%. 1000 milliLiter(s) (50 mL/Hr) IV Continuous <Continuous>  dextrose 5%. 1000 milliLiter(s) (100 mL/Hr) IV Continuous <Continuous>  dextrose 50% Injectable 25 Gram(s) IV Push once  dextrose 50% Injectable 12.5 Gram(s) IV Push once  dextrose 50% Injectable 25 Gram(s) IV Push once  fluconAZOLE IVPB 400 milliGRAM(s) IV Intermittent every 24 hours  gabapentin 100 milliGRAM(s) Oral three times a day  glucagon  Injectable 1 milliGRAM(s) IntraMuscular once  insulin glargine Injectable (LANTUS) 10 Unit(s) SubCutaneous at bedtime  insulin lispro (ADMELOG) corrective regimen sliding scale   SubCutaneous every 6 hours  lidocaine   4% Patch 2 Patch Transdermal every 24 hours  lipid, fat emulsion (Fish Oil and Plant Based) 20% Infusion 0.342 Gm/kG/Day (5.21 mL/Hr) IV Continuous <Continuous>  Parenteral Nutrition - Adult 1 Each (83 mL/Hr) TPN Continuous <Continuous>  piperacillin/tazobactam IVPB.. 3.375 Gram(s) IV Intermittent every 8 hours  vancomycin    Solution 125 milliGRAM(s) Oral every 6 hours    MEDICATIONS  (PRN):  benzocaine/menthol Lozenge 1 Lozenge Oral every 2 hours PRN Sore Throat  dextrose Oral Gel 15 Gram(s) Oral once PRN Blood Glucose LESS THAN 70 milliGRAM(s)/deciliter  HYDROmorphone  Injectable 0.5 milliGRAM(s) IV Push every 4 hours PRN Moderate Pain (4 - 6)  ondansetron Injectable 4 milliGRAM(s) IV Push every 6 hours PRN Nausea and/or Vomiting  sodium chloride 0.9% lock flush 10 milliLiter(s) IV Push every 1 hour PRN Pre/post blood products, medications, blood draw, and to maintain line patency      Vital Signs Last 24 Hrs  T(C): 37 (27 Feb 2024 05:52), Max: 37 (27 Feb 2024 05:52)  T(F): 98.6 (27 Feb 2024 05:52), Max: 98.6 (27 Feb 2024 05:52)  HR: 80 (27 Feb 2024 05:52) (80 - 83)  BP: 155/79 (27 Feb 2024 05:52) (155/79 - 171/91)  BP(mean): 104 (27 Feb 2024 05:52) (104 - 104)  RR: 18 (27 Feb 2024 05:52) (16 - 18)  SpO2: 98% (27 Feb 2024 05:52) (96% - 98%)    Parameters below as of 27 Feb 2024 05:52  Patient On (Oxygen Delivery Method): room air      Physical:  General: A&Ox3. NAD.  Resp: Unlabored breathing. Equal chest rise bilaterally.   Abdomen: Soft nondistended, nontender to palpation diffusely. midline dressing saturated at distal end. taken down. Minimal purulence noted. Midline incision well approximated, nonerythematous. Opened w/ packing at proximal and distal end. No pockets of purulence noted while probed. Repacked. ZEKE w/ SS output (20cc in 12 hrs).   UE: R PICC line in place. Dressing clean, dry, intact.     I&O's Detail    26 Feb 2024 07:01  -  27 Feb 2024 07:00  --------------------------------------------------------  IN:    Fat Emulsion (Fish Oil &amp; Plant Based) 20% Infusion: 50 mL  Total IN: 50 mL    OUT:    Voided (mL): 1530 mL  Total OUT: 1530 mL    Total NET: -1480 mL          LABS:                        9.0    11.68 )-----------( 447      ( 27 Feb 2024 05:26 )             27.0             02-27    137  |  105  |  10  ----------------------------<  216<H>  4.2   |  25  |  0.72    Ca    8.9      27 Feb 2024 05:26  Phos  2.6     02-27  Mg     1.7     02-27    TPro  7.7  /  Alb  2.2<L>  /  TBili  0.4  /  DBili  x   /  AST  17  /  ALT  21  /  AlkPhos  175<H>  02-27      59y.o. Female

## 2024-02-27 NOTE — PROGRESS NOTE ADULT - SUBJECTIVE AND OBJECTIVE BOX
Subjective/Objective:      MEDS  acetaminophen   IVPB .. 1000 milliGRAM(s) IV Intermittent every 6 hours  benzocaine/menthol Lozenge 1 Lozenge Oral every 2 hours PRN  chlorhexidine 2% Cloths 1 Application(s) Topical <User Schedule>  chlorhexidine 2% Cloths 1 Application(s) Topical daily  dextrose 5%. 1000 milliLiter(s) IV Continuous <Continuous>  dextrose 5%. 1000 milliLiter(s) IV Continuous <Continuous>  dextrose 50% Injectable 25 Gram(s) IV Push once  dextrose 50% Injectable 12.5 Gram(s) IV Push once  dextrose 50% Injectable 25 Gram(s) IV Push once  dextrose Oral Gel 15 Gram(s) Oral once PRN  fluconAZOLE IVPB 400 milliGRAM(s) IV Intermittent every 24 hours  gabapentin 100 milliGRAM(s) Oral three times a day  glucagon  Injectable 1 milliGRAM(s) IntraMuscular once  HYDROmorphone  Injectable 0.5 milliGRAM(s) IV Push every 4 hours PRN  insulin glargine Injectable (LANTUS) 10 Unit(s) SubCutaneous at bedtime  insulin lispro (ADMELOG) corrective regimen sliding scale   SubCutaneous every 6 hours  lidocaine   4% Patch 2 Patch Transdermal every 24 hours  lipid, fat emulsion (Fish Oil and Plant Based) 20% Infusion 0.342 Gm/kG/Day IV Continuous <Continuous>  ondansetron Injectable 4 milliGRAM(s) IV Push every 6 hours PRN  Parenteral Nutrition - Adult 1 Each TPN Continuous <Continuous>  piperacillin/tazobactam IVPB.. 3.375 Gram(s) IV Intermittent every 8 hours (D8)  sodium chloride 0.9% lock flush 10 milliLiter(s) IV Push every 1 hour PRN  vancomycin    Solution 125 milliGRAM(s) Oral every 6 hours      PHYSICAL EXAM:    Vital Signs Last 24 Hrs  T(C): 37 (27 Feb 2024 05:52), Max: 37 (27 Feb 2024 05:52)  T(F): 98.6 (27 Feb 2024 05:52), Max: 98.6 (27 Feb 2024 05:52)  HR: 80 (27 Feb 2024 05:52) (80 - 83)  BP: 155/79 (27 Feb 2024 05:52) (155/79 - 171/91)  BP(mean): 104 (27 Feb 2024 05:52) (104 - 104)  RR: 18 (27 Feb 2024 05:52) (16 - 18)  SpO2: 98% (27 Feb 2024 05:52) (96% - 98%)    Parameters below as of 27 Feb 2024 05:52  Patient On (Oxygen Delivery Method): room air        GEN:    HEENT:    CHEST/Respiratory:    Cardiovascular:    Abdomen:    Genitourinary:    Extremities:     Neurological:    Skin:      LABS/DIAGNOSTIC TESTS                            9.0    11.68 )-----------( 447      ( 27 Feb 2024 05:26 )             27.0       WBC Count: 11.68 K/uL (02-27 @ 05:26)  WBC Count: 12.24 K/uL (02-26 @ 05:30)  WBC Count: 12.80 K/uL (02-25 @ 06:53)      02-27    137  |  105  |  10  ----------------------------<  216<H>  4.2   |  25  |  0.72    Ca    8.9      27 Feb 2024 05:26  Phos  2.6     02-27  Mg     1.7     02-27    TPro  7.7  /  Alb  2.2<L>  /  TBili  0.4  /  DBili  x   /  AST  17  /  ALT  21  /  AlkPhos  175<H>  02-27          CULTURES      Culture - Abscess with Gram Stain (collected 02-19-24 @ 23:20)  Source: .Abscess abdominal abcess  Final Report (02-24-24 @ 19:35):    Rare Escherichia coli    Few Alpha hemolytic strep "Susceptibilities not performed"    Numerous Streptococcus anginosus "Susceptibilities not performed"    Few Lactobacillus rhamnosus "Susceptibilities not performed"  Organism: Escherichia coli (02-24-24 @ 19:35)  Organism: Escherichia coli (02-24-24 @ 19:35)      Method Type: CINDY      -  Amoxicillin/Clavulanic Acid: S <=8/4      -  Ampicillin: R >16 These ampicillin results predict results for amoxicillin      -  Ampicillin/Sulbactam: I 16/8      -  Aztreonam: S <=4      -  Cefazolin: S <=2      -  Cefepime: S <=2      -  Cefoxitin: S <=8      -  Ceftriaxone: S <=1      -  Ciprofloxacin: R >2      -  Ertapenem: S <=0.5      -  Gentamicin: S <=2      -  Imipenem: S <=1      -  Levofloxacin: R >4      -  Meropenem: S <=1      -  Piperacillin/Tazobactam: S <=8      -  Tobramycin: S <=2      -  Trimethoprim/Sulfamethoxazole: R >2/38    Culture - Urine (collected 02-19-24 @ 11:53)  Source: Clean Catch Clean Catch (Midstream)  Final Report (02-20-24 @ 15:54):    >=3 organisms. Probable collection contamination.    Culture - Blood (collected 02-19-24 @ 11:45)  Source: .Blood Blood-Peripheral  Final Report (02-24-24 @ 18:01):    No growth at 5 days    Culture - Blood (collected 02-19-24 @ 11:30)  Source: .Blood Blood-Peripheral  Final Report (02-24-24 @ 18:01):    No growth at 5 days          RADIOLOGY               Subjective/Objective: Pt with less frequent bms which are better formed (8 episodes since last night). Incisional pain less.       MEDS  acetaminophen   IVPB .. 1000 milliGRAM(s) IV Intermittent every 6 hours  benzocaine/menthol Lozenge 1 Lozenge Oral every 2 hours PRN  chlorhexidine 2% Cloths 1 Application(s) Topical <User Schedule>  chlorhexidine 2% Cloths 1 Application(s) Topical daily  dextrose 5%. 1000 milliLiter(s) IV Continuous <Continuous>  dextrose 5%. 1000 milliLiter(s) IV Continuous <Continuous>  dextrose 50% Injectable 25 Gram(s) IV Push once  dextrose 50% Injectable 12.5 Gram(s) IV Push once  dextrose 50% Injectable 25 Gram(s) IV Push once  dextrose Oral Gel 15 Gram(s) Oral once PRN  fluconAZOLE IVPB 400 milliGRAM(s) IV Intermittent every 24 hours  gabapentin 100 milliGRAM(s) Oral three times a day  glucagon  Injectable 1 milliGRAM(s) IntraMuscular once  HYDROmorphone  Injectable 0.5 milliGRAM(s) IV Push every 4 hours PRN  insulin glargine Injectable (LANTUS) 10 Unit(s) SubCutaneous at bedtime  insulin lispro (ADMELOG) corrective regimen sliding scale   SubCutaneous every 6 hours  lidocaine   4% Patch 2 Patch Transdermal every 24 hours  lipid, fat emulsion (Fish Oil and Plant Based) 20% Infusion 0.342 Gm/kG/Day IV Continuous <Continuous>  ondansetron Injectable 4 milliGRAM(s) IV Push every 6 hours PRN  Parenteral Nutrition - Adult 1 Each TPN Continuous <Continuous>  piperacillin/tazobactam IVPB.. 3.375 Gram(s) IV Intermittent every 8 hours (D8)  sodium chloride 0.9% lock flush 10 milliLiter(s) IV Push every 1 hour PRN  vancomycin    Solution 125 milliGRAM(s) Oral every 6 hours      PHYSICAL EXAM:    Vital Signs Last 24 Hrs  T(C): 37 (27 Feb 2024 05:52), Max: 37 (27 Feb 2024 05:52)  T(F): 98.6 (27 Feb 2024 05:52), Max: 98.6 (27 Feb 2024 05:52)  HR: 80 (27 Feb 2024 05:52) (80 - 83)  BP: 155/79 (27 Feb 2024 05:52) (155/79 - 171/91)  BP(mean): 104 (27 Feb 2024 05:52) (104 - 104)  RR: 18 (27 Feb 2024 05:52) (16 - 18)  SpO2: 98% (27 Feb 2024 05:52) (96% - 98%)    Parameters below as of 27 Feb 2024 05:52  Patient On (Oxygen Delivery Method): room air      GEN: obese female in NAD    HEENT: NC/AT; conj clear    Chest/Thorax: clear to auscultation ant.     Cardiovascular: S1S2 reg; no m, g, r    ABD: midline, vertical incision site with dressing; + serosanguinous drainage in drainage bulb; BS active; soft; + mild tenderness around incision site    Neurological: A+Ox3    Skin: no rashes noted    Extrem: R arm medial aspect with PICC    Psychiatric: affect appropriate        LABS/DIAGNOSTIC TESTS                            9.0    11.68 )-----------( 447      ( 27 Feb 2024 05:26 )             27.0       WBC Count: 11.68 K/uL (02-27 @ 05:26)  WBC Count: 12.24 K/uL (02-26 @ 05:30)  WBC Count: 12.80 K/uL (02-25 @ 06:53)      02-27    137  |  105  |  10  ----------------------------<  216<H>  4.2   |  25  |  0.72    Ca    8.9      27 Feb 2024 05:26  Phos  2.6     02-27  Mg     1.7     02-27    TPro  7.7  /  Alb  2.2<L>  /  TBili  0.4  /  DBili  x   /  AST  17  /  ALT  21  /  AlkPhos  175<H>  02-27          CULTURES      Culture - Abscess with Gram Stain (collected 02-19-24 @ 23:20)  Source: .Abscess abdominal abcess  Final Report (02-24-24 @ 19:35):    Rare Escherichia coli    Few Alpha hemolytic strep "Susceptibilities not performed"    Numerous Streptococcus anginosus "Susceptibilities not performed"    Few Lactobacillus rhamnosus "Susceptibilities not performed"  Organism: Escherichia coli (02-24-24 @ 19:35)  Organism: Escherichia coli (02-24-24 @ 19:35)      Method Type: CINDY      -  Amoxicillin/Clavulanic Acid: S <=8/4      -  Ampicillin: R >16 These ampicillin results predict results for amoxicillin      -  Ampicillin/Sulbactam: I 16/8      -  Aztreonam: S <=4      -  Cefazolin: S <=2      -  Cefepime: S <=2      -  Cefoxitin: S <=8      -  Ceftriaxone: S <=1      -  Ciprofloxacin: R >2      -  Ertapenem: S <=0.5      -  Gentamicin: S <=2      -  Imipenem: S <=1      -  Levofloxacin: R >4      -  Meropenem: S <=1      -  Piperacillin/Tazobactam: S <=8      -  Tobramycin: S <=2      -  Trimethoprim/Sulfamethoxazole: R >2/38    Culture - Urine (collected 02-19-24 @ 11:53)  Source: Clean Catch Clean Catch (Midstream)  Final Report (02-20-24 @ 15:54):    >=3 organisms. Probable collection contamination.    Culture - Blood (collected 02-19-24 @ 11:45)  Source: .Blood Blood-Peripheral  Final Report (02-24-24 @ 18:01):    No growth at 5 days    Culture - Blood (collected 02-19-24 @ 11:30)  Source: .Blood Blood-Peripheral  Final Report (02-24-24 @ 18:01):    No growth at 5 days          RADIOLOGY

## 2024-02-28 DIAGNOSIS — Z90.49 ACQUIRED ABSENCE OF OTHER SPECIFIED PARTS OF DIGESTIVE TRACT: ICD-10-CM

## 2024-02-28 DIAGNOSIS — T81.43XA INFECTION FOLLOWING A PROCEDURE, ORGAN AND SPACE SURGICAL SITE, INITIAL ENCOUNTER: ICD-10-CM

## 2024-02-28 DIAGNOSIS — A41.9 SEPSIS, UNSPECIFIED ORGANISM: ICD-10-CM

## 2024-02-28 DIAGNOSIS — R10.0 ACUTE ABDOMEN: ICD-10-CM

## 2024-02-28 LAB
ALBUMIN SERPL ELPH-MCNC: 2.3 G/DL — LOW (ref 3.5–5)
ALP SERPL-CCNC: 184 U/L — HIGH (ref 40–120)
ALT FLD-CCNC: 26 U/L DA — SIGNIFICANT CHANGE UP (ref 10–60)
ANION GAP SERPL CALC-SCNC: 8 MMOL/L — SIGNIFICANT CHANGE UP (ref 5–17)
AST SERPL-CCNC: 25 U/L — SIGNIFICANT CHANGE UP (ref 10–40)
BASOPHILS # BLD AUTO: 0.05 K/UL — SIGNIFICANT CHANGE UP (ref 0–0.2)
BASOPHILS NFR BLD AUTO: 0.4 % — SIGNIFICANT CHANGE UP (ref 0–2)
BILIRUB SERPL-MCNC: 0.4 MG/DL — SIGNIFICANT CHANGE UP (ref 0.2–1.2)
BUN SERPL-MCNC: 13 MG/DL — SIGNIFICANT CHANGE UP (ref 7–18)
CA-I BLD-SCNC: 5 MG/DL — SIGNIFICANT CHANGE UP (ref 4.5–5.6)
CALCIUM SERPL-MCNC: 9.1 MG/DL — SIGNIFICANT CHANGE UP (ref 8.4–10.5)
CHLORIDE SERPL-SCNC: 103 MMOL/L — SIGNIFICANT CHANGE UP (ref 96–108)
CO2 SERPL-SCNC: 22 MMOL/L — SIGNIFICANT CHANGE UP (ref 22–31)
CREAT SERPL-MCNC: 0.67 MG/DL — SIGNIFICANT CHANGE UP (ref 0.5–1.3)
EGFR: 101 ML/MIN/1.73M2 — SIGNIFICANT CHANGE UP
EOSINOPHIL # BLD AUTO: 0.31 K/UL — SIGNIFICANT CHANGE UP (ref 0–0.5)
EOSINOPHIL NFR BLD AUTO: 2.4 % — SIGNIFICANT CHANGE UP (ref 0–6)
GLUCOSE BLDC GLUCOMTR-MCNC: 154 MG/DL — HIGH (ref 70–99)
GLUCOSE BLDC GLUCOMTR-MCNC: 169 MG/DL — HIGH (ref 70–99)
GLUCOSE BLDC GLUCOMTR-MCNC: 193 MG/DL — HIGH (ref 70–99)
GLUCOSE BLDC GLUCOMTR-MCNC: 217 MG/DL — HIGH (ref 70–99)
GLUCOSE SERPL-MCNC: 188 MG/DL — HIGH (ref 70–99)
HCT VFR BLD CALC: 29.2 % — LOW (ref 34.5–45)
HGB BLD-MCNC: 9.6 G/DL — LOW (ref 11.5–15.5)
IMM GRANULOCYTES NFR BLD AUTO: 1.5 % — HIGH (ref 0–0.9)
LYMPHOCYTES # BLD AUTO: 1.91 K/UL — SIGNIFICANT CHANGE UP (ref 1–3.3)
LYMPHOCYTES # BLD AUTO: 14.6 % — SIGNIFICANT CHANGE UP (ref 13–44)
MAGNESIUM SERPL-MCNC: 1.8 MG/DL — SIGNIFICANT CHANGE UP (ref 1.6–2.6)
MCHC RBC-ENTMCNC: 28.1 PG — SIGNIFICANT CHANGE UP (ref 27–34)
MCHC RBC-ENTMCNC: 32.9 GM/DL — SIGNIFICANT CHANGE UP (ref 32–36)
MCV RBC AUTO: 85.4 FL — SIGNIFICANT CHANGE UP (ref 80–100)
MONOCYTES # BLD AUTO: 0.65 K/UL — SIGNIFICANT CHANGE UP (ref 0–0.9)
MONOCYTES NFR BLD AUTO: 5 % — SIGNIFICANT CHANGE UP (ref 2–14)
NEUTROPHILS # BLD AUTO: 9.94 K/UL — HIGH (ref 1.8–7.4)
NEUTROPHILS NFR BLD AUTO: 76.1 % — SIGNIFICANT CHANGE UP (ref 43–77)
NRBC # BLD: 0 /100 WBCS — SIGNIFICANT CHANGE UP (ref 0–0)
PHOSPHATE SERPL-MCNC: 2.5 MG/DL — SIGNIFICANT CHANGE UP (ref 2.5–4.5)
PLATELET # BLD AUTO: 413 K/UL — HIGH (ref 150–400)
POTASSIUM SERPL-MCNC: 4.4 MMOL/L — SIGNIFICANT CHANGE UP (ref 3.5–5.3)
POTASSIUM SERPL-SCNC: 4.4 MMOL/L — SIGNIFICANT CHANGE UP (ref 3.5–5.3)
PROT SERPL-MCNC: 8 G/DL — SIGNIFICANT CHANGE UP (ref 6–8.3)
RBC # BLD: 3.42 M/UL — LOW (ref 3.8–5.2)
RBC # FLD: 16.4 % — HIGH (ref 10.3–14.5)
SODIUM SERPL-SCNC: 133 MMOL/L — LOW (ref 135–145)
WBC # BLD: 13.06 K/UL — HIGH (ref 3.8–10.5)
WBC # FLD AUTO: 13.06 K/UL — HIGH (ref 3.8–10.5)

## 2024-02-28 PROCEDURE — 99232 SBSQ HOSP IP/OBS MODERATE 35: CPT

## 2024-02-28 RX ORDER — ELECTROLYTE SOLUTION,INJ
1 VIAL (ML) INTRAVENOUS
Refills: 0 | Status: DISCONTINUED | OUTPATIENT
Start: 2024-02-28 | End: 2024-02-28

## 2024-02-28 RX ORDER — I.V. FAT EMULSION 20 G/100ML
0.34 EMULSION INTRAVENOUS
Qty: 25 | Refills: 0 | Status: DISCONTINUED | OUTPATIENT
Start: 2024-02-28 | End: 2024-02-28

## 2024-02-28 RX ADMIN — PIPERACILLIN AND TAZOBACTAM 25 GRAM(S): 4; .5 INJECTION, POWDER, LYOPHILIZED, FOR SOLUTION INTRAVENOUS at 22:32

## 2024-02-28 RX ADMIN — HYDROMORPHONE HYDROCHLORIDE 0.5 MILLIGRAM(S): 2 INJECTION INTRAMUSCULAR; INTRAVENOUS; SUBCUTANEOUS at 13:22

## 2024-02-28 RX ADMIN — HYDROMORPHONE HYDROCHLORIDE 0.5 MILLIGRAM(S): 2 INJECTION INTRAMUSCULAR; INTRAVENOUS; SUBCUTANEOUS at 04:00

## 2024-02-28 RX ADMIN — Medication 1: at 06:05

## 2024-02-28 RX ADMIN — HYDROMORPHONE HYDROCHLORIDE 0.5 MILLIGRAM(S): 2 INJECTION INTRAMUSCULAR; INTRAVENOUS; SUBCUTANEOUS at 14:16

## 2024-02-28 RX ADMIN — CHLORHEXIDINE GLUCONATE 1 APPLICATION(S): 213 SOLUTION TOPICAL at 06:03

## 2024-02-28 RX ADMIN — LIDOCAINE 2 PATCH: 4 CREAM TOPICAL at 17:51

## 2024-02-28 RX ADMIN — GABAPENTIN 100 MILLIGRAM(S): 400 CAPSULE ORAL at 22:32

## 2024-02-28 RX ADMIN — Medication 2.5 MILLIGRAM(S): at 16:32

## 2024-02-28 RX ADMIN — I.V. FAT EMULSION 5.21 GM/KG/DAY: 20 EMULSION INTRAVENOUS at 22:51

## 2024-02-28 RX ADMIN — LIDOCAINE 2 PATCH: 4 CREAM TOPICAL at 06:02

## 2024-02-28 RX ADMIN — PIPERACILLIN AND TAZOBACTAM 25 GRAM(S): 4; .5 INJECTION, POWDER, LYOPHILIZED, FOR SOLUTION INTRAVENOUS at 06:03

## 2024-02-28 RX ADMIN — HYDROMORPHONE HYDROCHLORIDE 0.5 MILLIGRAM(S): 2 INJECTION INTRAMUSCULAR; INTRAVENOUS; SUBCUTANEOUS at 08:55

## 2024-02-28 RX ADMIN — CHLORHEXIDINE GLUCONATE 1 APPLICATION(S): 213 SOLUTION TOPICAL at 11:40

## 2024-02-28 RX ADMIN — LIDOCAINE 2 PATCH: 4 CREAM TOPICAL at 07:25

## 2024-02-28 RX ADMIN — Medication 1: at 18:07

## 2024-02-28 RX ADMIN — HYDROMORPHONE HYDROCHLORIDE 0.5 MILLIGRAM(S): 2 INJECTION INTRAMUSCULAR; INTRAVENOUS; SUBCUTANEOUS at 09:19

## 2024-02-28 RX ADMIN — Medication 42 EACH: at 22:51

## 2024-02-28 RX ADMIN — GABAPENTIN 100 MILLIGRAM(S): 400 CAPSULE ORAL at 14:16

## 2024-02-28 RX ADMIN — Medication 2: at 11:40

## 2024-02-28 RX ADMIN — GABAPENTIN 100 MILLIGRAM(S): 400 CAPSULE ORAL at 06:03

## 2024-02-28 RX ADMIN — INSULIN GLARGINE 10 UNIT(S): 100 INJECTION, SOLUTION SUBCUTANEOUS at 22:32

## 2024-02-28 RX ADMIN — HYDROMORPHONE HYDROCHLORIDE 0.5 MILLIGRAM(S): 2 INJECTION INTRAMUSCULAR; INTRAVENOUS; SUBCUTANEOUS at 20:32

## 2024-02-28 RX ADMIN — HYDROMORPHONE HYDROCHLORIDE 0.5 MILLIGRAM(S): 2 INJECTION INTRAMUSCULAR; INTRAVENOUS; SUBCUTANEOUS at 19:03

## 2024-02-28 RX ADMIN — PIPERACILLIN AND TAZOBACTAM 25 GRAM(S): 4; .5 INJECTION, POWDER, LYOPHILIZED, FOR SOLUTION INTRAVENOUS at 14:17

## 2024-02-28 RX ADMIN — HYDROMORPHONE HYDROCHLORIDE 0.5 MILLIGRAM(S): 2 INJECTION INTRAMUSCULAR; INTRAVENOUS; SUBCUTANEOUS at 02:53

## 2024-02-28 NOTE — PROGRESS NOTE ADULT - SUBJECTIVE AND OBJECTIVE BOX
[facility] NEPHROLOGY- METABOLIC SUPPORT PROGRESS NOTE    Patient is a 59y Female with HTN, DM, recently admitted 2/5-2/14/24 s/p diagnostic laparoscopy, lysis of adhesions, conversion to laparotomy for adhesiolysis and incisional hernia repair without mesh on 2/6, presents now with abdominal pain. Pt found to have small bowel perforation with pneumoperitoneum. s/p OR 2/19 for ex-lap with bowel resection, found to have adhesions with intrabdominal abscess. Pt transferred to ICU for septic shock 2/2 peritonitis, hypotension requiring brief pressors, with DEION and hyponatremia. DEION and hyponatremia resolved.   Nephrology consulted for TPN for prolonged NPO status.   2/22: s/p IR RUE PICC placement  2/23: Pt started on CLD  2/26: Advanced to full liquid diet    Hospital Medications: Medications reviewed.  REVIEW OF SYSTEMS:  CONSTITUTIONAL: No fevers or chills  RESPIRATORY: No shortness of breath  CARDIOVASCULAR: No chest pain.  GASTROINTESTINAL: No nausea, or vomiting, +improving abd pain. frequent BM's >5 not watery semi formed  VASCULAR: No bilateral lower extremity edema.       VITALS:  T(F): 98.4 (02-28-24 @ 14:47), Max: 98.6 (02-28-24 @ 06:16)  HR: 97 (02-28-24 @ 14:47)  BP: 165/96 (02-28-24 @ 14:47)  RR: 18 (02-28-24 @ 14:47)  SpO2: 97% (02-28-24 @ 14:47)  Wt(kg): --    02-27 @ 07:01  -  02-28 @ 07:00  --------------------------------------------------------  IN: 62.4 mL / OUT: 941 mL / NET: -878.6 mL      PHYSICAL EXAM:  Constitutional: NAD,   HEENT: anicteric sclera,   Respiratory: CTAB, no wheezes, rales or rhonchi  Cardiovascular: S1, S2, RRR  Gastrointestinal: +midline incision covered, +ZEKE  Extremities: No LE edema b/l   Vascular Access: RUE single lumen PICC- saved for  TPN      LABS:  02-28    133<L>  |  103  |  13  ----------------------------<  188<H>  4.4   |  22  |  0.67    Ca    9.1      28 Feb 2024 05:22  Phos  2.5     02-28  Mg     1.8     02-28    TPro  8.0  /  Alb  2.3<L>  /  TBili  0.4  /  DBili      /  AST  25  /  ALT  26  /  AlkPhos  184<H>  02-28    Creatinine Trend: 0.67 <--, 0.72 <--, 0.68 <--, 0.64 <--, 0.70 <--, 0.74 <--, 0.70 <--                        9.6    13.06 )-----------( 413      ( 28 Feb 2024 05:22 )             29.2     Urine Studies:  Urinalysis Basic - ( 28 Feb 2024 05:22 )    Color:  / Appearance:  / SG:  / pH:   Gluc: 188 mg/dL / Ketone:   / Bili:  / Urobili:    Blood:  / Protein:  / Nitrite:    Leuk Esterase:  / RBC:  / WBC    Sq Epi:  / Non Sq Epi:  / Bacteria:

## 2024-02-28 NOTE — PROGRESS NOTE ADULT - ASSESSMENT
Patient is a 59y Female with HTN, DM, recently admitted 2/5-2/14/24 s/p diagnostic laparoscopy, lysis of adhesions, conversion to laparotomy for adhesiolysis and incisional hernia repair without mesh on 2/6, presents now with abdominal pain. Pt found to have small bowel perforation with pneumoperitoneum. s/p OR 2/19 for ex-lap with bowel resection, found to have adhesions with intrabdominal abscess. Pt transferred to ICU for septic shock 2/2 peritonitis, hypotension requiring brief pressors, with DEION and hyponatremia. DEION and hyponatremia resolved.   Nephrology consulted for TPN for prolonged NPO status.     1. Severe PCM- albumin 1.4, edema with wt loss. Pt initiated on TPN 2/23. s/p IR RUE PICC line placed on 2/22 (after 1pm). Pt tolerating diet; advised to increase intake. Will taper TPN as per Surgery request.   Decrease to TPN with lipids to 1L. Check FS q6h. Will decrease insulin in TPN bag to 10 units. hgbA1C 8.4 on 2/6/24.  on 2/22 --> 174 2/27  Check CMP/Mg/Phos/ Ionized calcium in am.   Daily weights. Strict I/O. Will hold TPN if pt spikes fever and check BCX x2    2. DEION - likely ATN due to Septic shock. DEION resolved. Strict I/Os. Avoid nephrotoxins/ NSAIDs/ RCA. Monitor BMP.    3. Septic shock- 2/2 peritonitis/ small bowel perforation. Pt on Zosyn. Finished Diflucan for ppx . BCx 2/19 NG.  Plan as per ID/ Surgery. Cdiff neg.      4. Hypotension- resolved. Now with elevated BP. Recc Losartan 25mg PO daily. Monitor BP    TPN orders:    60 kg  Total Kcal 1500  Lipid 50 g =500 kcal  AA 100g = 400 kcal  Dextrose 176g - 600 kcal    [facility] NEPHROLOGY  Dean Shipley M.D.  Jaylon Kang D.O.  Debbi Garcia M.D.  MD Laura Kwon, MSN, ANP-C    Telephone: [phone]  Facsimile: [phone]    [facility] [place] [address], [address]  [place] [address]

## 2024-02-28 NOTE — PROGRESS NOTE ADULT - ASSESSMENT
Subjective: *** this is a template    REVIEW OF SYSTEMS:  CONSTITUTIONAL:  No fevers or chills  EYES/ENT:  No visual changes; no throat pain   NECK:  No neck pain or stiffness  RESPIRATORY:  No cough, no wheezing. No shortness of breath  CARDIOVASCULAR:  No chest pain or palpitations  GASTROINTESTINAL:  No abdominal pain. No N/V or diarrhea  GENITOURINARY:  No dysuria, frequency or hematuria  NEUROLOGICAL:  No numbness or weakness  MSK: no back pain, no joint pain  SKIN:  No itching, no skin rash    PE:  Vital Signs Last 24 Hrs  T(C): 37 (28 Feb 2024 06:16), Max: 37 (28 Feb 2024 06:16)  T(F): 98.6 (28 Feb 2024 06:16), Max: 98.6 (28 Feb 2024 06:16)  HR: 91 (28 Feb 2024 06:16) (77 - 91)  BP: 157/91 (28 Feb 2024 06:16) (145/77 - 157/91)  BP(mean): 100 (27 Feb 2024 20:21) (100 - 100)  RR: 16 (28 Feb 2024 06:16) (16 - 18)  SpO2: 97% (28 Feb 2024 06:16) (94% - 97%)    Parameters below as of 28 Feb 2024 06:16  Patient On (Oxygen Delivery Method): room air    Gen: AOx3, NAD, non-toxic, pleasant  HEAD:  Atraumatic  EYES: PERRLA, conjunctiva and sclera clear  ENT: Moist mucous membranes  NECK: Supple, No JVD  CV: S1+S2 normal, no murmurs  Resp: Clear bilat, no resp distress, no crackles/wheezes  Abd: Soft, nontender, +BS  Ext: No LE edema, no cyanosis, pulses +  : No Petersen  IV/Skin: No thrombophlebitis  Msk: No low back pain, no arthralgias, no joint swelling  Neuro: AAOx3. No sensory deficits, no motor deficits  Psych: no anxiety, no delusional ideas, no suicidal ideation    LABS/DIAGNOSTIC TESTS:                        9.6    13.06 )-----------( 413      ( 28 Feb 2024 05:22 )             29.2     WBC Count: 13.06 K/uL (02-28 @ 05:22)  WBC Count: 11.68 K/uL (02-27 @ 05:26)  WBC Count: 12.24 K/uL (02-26 @ 05:30)    02-28    133<L>  |  103  |  13  ----------------------------<  188<H>  4.4   |  22  |  0.67    Ca    9.1      28 Feb 2024 05:22  Phos  2.5     02-28  Mg     1.8     02-28    TPro  8.0  /  Alb  2.3<L>  /  TBili  0.4  /  DBili  x   /  AST  25  /  ALT  26  /  AlkPhos  184<H>  02-28    Urine Microscopic-Add On (NC) (02.21.24 @ 04:15)    Comment - Urine: few budding yeast seen.   Squamous Epithelial Cells: Present   Granular Cast: Present   Red Blood Cell - Urine: 3 /HPF   White Blood Cell - Urine: 0 /HPF   Hyaline Casts: Present   Bacteria: Few /HPF    CULTURES:  Culture - Abscess with Gram Stain (collected 02-19-24 @ 23:20)  Source: .Abscess abdominal abcess  Final Report (02-24-24 @ 19:35):    Rare Escherichia coli    Few Alpha hemolytic strep "Susceptibilities not performed"    Numerous Streptococcus anginosus "Susceptibilities not performed"    Few Lactobacillus rhamnosus "Susceptibilities not performed"  Organism: Escherichia coli (02-24-24 @ 19:35)  Organism: Escherichia coli (02-24-24 @ 19:35)      Method Type: CINDY      -  Amoxicillin/Clavulanic Acid: S <=8/4      -  Ampicillin: R >16 These ampicillin results predict results for amoxicillin      -  Ampicillin/Sulbactam: I 16/8      -  Aztreonam: S <=4      -  Cefazolin: S <=2      -  Cefepime: S <=2      -  Cefoxitin: S <=8      -  Ceftriaxone: S <=1      -  Ciprofloxacin: R >2      -  Ertapenem: S <=0.5      -  Gentamicin: S <=2      -  Imipenem: S <=1      -  Levofloxacin: R >4      -  Meropenem: S <=1      -  Piperacillin/Tazobactam: S <=8      -  Tobramycin: S <=2      -  Trimethoprim/Sulfamethoxazole: R >2/38    Culture - Urine (collected 02-19-24 @ 11:53)  Source: Clean Catch Clean Catch (Midstream)  Final Report (02-20-24 @ 15:54):    >=3 organisms. Probable collection contamination.    Culture - Blood (collected 02-19-24 @ 11:45)  Source: .Blood Blood-Peripheral  Final Report (02-24-24 @ 18:01):    No growth at 5 days    Culture - Blood (collected 02-19-24 @ 11:30)  Source: .Blood Blood-Peripheral  Final Report (02-24-24 @ 18:01):    No growth at 5 days    C Diff by PCR Result: NotDetec (02-25 @ 13:00)  C Diff by PCR Result: NotDetec (02-25-24 @ 13:00)    RADIOLOGY:   < from: CT Abdomen and Pelvis w/ IV Cont (02.05.24 @ 09:52) >  IMPRESSION:  Etiology of abdominal pain is not elucidated.      < from: CT Abdomen and Pelvis w/ Oral Cont and w/ IV Cont (02.11.24 @ 13:02) >  Dilated small bowel with an apparent transition point in the anterior   midline lower abdomen with small bowel fecalization proximal to the   transition point, suggestive of a high-grade small bowel obstruction   which is probably due to adhesion. Oral contrast has not reached the   transition point. Follow-up serial abdominal x-rays may be helpful to   assess passage.    6.3 x 2.3 x 3.4 cm left paramedian pelvic fluid collection. 4.0 x 1.9 x   6.5 cm focal fluid collection in the right lower quadrant abdomen; these   are suggestive of abscesses. Mesenteric and omental edema in the lower   abdomen, probably postsurgical in nature.    Small droplets of free air, probably postsurgical in nature.    Small ascites, mostly in the pelvis.    Small air in the urinary bladder may be due to prior Petersen catheter   placement, or cystitis. Clinical correlation is recommended.      < from: CT Abdomen and Pelvis w/ Oral Cont (02.19.24 @ 15:24) >  IMPRESSION:  Mildly dilated loops of small bowel, possibly postsurgical ileus.  Extraluminal oral contrast in the lower abdomen compatible with bowel   perforation.  Pneumoperitoneum.        < from: CT Abdomen and Pelvis w/ IV Cont (02.25.24 @ 13:25) >  IMPRESSION:  Interval small bowel resection with a right lower quadrant small bowel   anastomosis. In the right hemipelvis, there is a rim-enhancing 8.6 cm   fluid collection abutting a distal small bowel loop proximal to the   anastomosis. The small bowel further upstream demonstrates wall   thickening and distention favoring ileus due to the collection or in the   setting of recent surgery. Obstruction is less likely. A right lower   quadrant approach drainage catheter courses anterior to and is separate   from the collection.    Trace loculated free fluid with foci of gas at the anterior midline just   deep to the abdominal wall incision, consistent with recent surgery.    Left lower lobe atelectasis with new patchy hypoenhancing consolidative   opacity in the medial lower lobes, question infection superimposed on   atelectasis versus atelectasis. Correlate clinically.      ANTIBIOTICS:  piperacillin/tazobactam IVPB.. 3.375 every 8 hours    IMPRESSION:  60 y/o female with pmhx of HTN, DM admitted for acute abdomen on 2/5 (RLQ pain).  s/p diagnostic laparoscopy , lysis of adhesions and conversion to laparotomy for adhesiolysis and incisional hernia repair 2/6.  D/C on 2/14 after stable post OP.  Pt returned to ED 2/19 with complains of abd pain/vaginal pain and sob. CT A/P 2/19 showed evidence of bowel perforation and pneumoperitoneum.  S/P Ex Lap SBR and abd washout 2/19 with intraop findings of abscess(intraop cx polymicrobial + Rare Escherichia coli + Few Alpha hemolytic strep +Numerous Streptococcus anginosus + Few Lactobacillus rhamnosus.  Post operatively admitted to MICU 2/20 for septic shock.  ID consulted while in MICU for abx guidance.  Most recent CT A/P 2/25 with Interval small bowel resection with a right lower quadrant small bowel anastomosis. In the right hemipelvis, there is a rim-enhancing 8.6 cm fluid collection abutting a distal small bowel loop proximal to the   anastomosis.  Per IR  (Dr. Stockton), he doesn't see a window through which to insert a drain into the abscess of ~ 9 cm size.     - Acute abdomen s/p Ex/Lap complicated with perforation and abscess formation   -Post op abscess s/p small bowel resection and washout (cx + polymicrobial)  -Septic shock- resolved  -R hemipelvis RLQ anastomosis abscess (8.6 cm)  -Ileus     PLAN:   ***** in progress    Please reach ID with any questions or concerns  Dr. Madelyn Rashid  Available in Teams               Subjective: Pt is feeling better, afebrile, tolerating PO, denies abd pain, no N/V, diarrhea improving today 2 semiliquid BM.    REVIEW OF SYSTEMS:  CONSTITUTIONAL:  No fevers or chills, ambulatory  EYES/ENT:  No visual changes; no throat pain   NECK:  No neck pain or stiffness  RESPIRATORY:  No cough, no wheezing. No shortness of breath  CARDIOVASCULAR:  No chest pain or palpitations  GASTROINTESTINAL:  No abdominal pain. No N/V, + diarrhea  GENITOURINARY:  No dysuria, frequency or hematuria  NEUROLOGICAL:  No numbness or weakness  MSK: no back pain, no joint pain  SKIN:  No itching, no skin rash    PE:  Vital Signs Last 24 Hrs  T(C): 37 (28 Feb 2024 06:16), Max: 37 (28 Feb 2024 06:16)  T(F): 98.6 (28 Feb 2024 06:16), Max: 98.6 (28 Feb 2024 06:16)  HR: 91 (28 Feb 2024 06:16) (77 - 91)  BP: 157/91 (28 Feb 2024 06:16) (145/77 - 157/91)  BP(mean): 100 (27 Feb 2024 20:21) (100 - 100)  RR: 16 (28 Feb 2024 06:16) (16 - 18)  SpO2: 97% (28 Feb 2024 06:16) (94% - 97%)    Parameters below as of 28 Feb 2024 06:16  Patient On (Oxygen Delivery Method): room air    Gen: AOx3, NAD, non-toxic, pleasant  HEAD:  Atraumatic  EYES: PERRLA, conjunctiva and sclera clear  ENT: Moist mucous membranes  NECK: Supple, No JVD  CV: S1+S2 normal, no murmurs  Resp: CTA  Abd: Soft, nontender, +BS ant abd sx wound healing well, R ZEKE drain with minimal drainage  Ext: No LE edema, no cyanosis, pulses +  : No dysuria  IV/Skin: No thrombophlebitis, R arm PICC + site OK  Msk: No low back pain, no arthralgias, no joint swelling  Neuro: AAOx3. No sensory deficits, no motor deficits  Psych: no anxiety, no delusional ideas, no suicidal ideation    LABS/DIAGNOSTIC TESTS:                        9.6    13.06 )-----------( 413      ( 28 Feb 2024 05:22 )             29.2     WBC Count: 13.06 K/uL (02-28 @ 05:22)  WBC Count: 11.68 K/uL (02-27 @ 05:26)  WBC Count: 12.24 K/uL (02-26 @ 05:30)    02-28    133<L>  |  103  |  13  ----------------------------<  188<H>  4.4   |  22  |  0.67    Ca    9.1      28 Feb 2024 05:22  Phos  2.5     02-28  Mg     1.8     02-28    TPro  8.0  /  Alb  2.3<L>  /  TBili  0.4  /  DBili  x   /  AST  25  /  ALT  26  /  AlkPhos  184<H>  02-28    C Diff by PCR Result: NotDetec (02.25.24 @ 13:00)    Urine Microscopic-Add On (NC) (02.21.24 @ 04:15)    Comment - Urine: few budding yeast seen.   Squamous Epithelial Cells: Present   Granular Cast: Present   Red Blood Cell - Urine: 3 /HPF   White Blood Cell - Urine: 0 /HPF   Hyaline Casts: Present   Bacteria: Few /HPF    CULTURES:  Culture - Abscess with Gram Stain (collected 02-19-24 @ 23:20)  Source: .Abscess abdominal abcess  Final Report (02-24-24 @ 19:35):    Rare Escherichia coli    Few Alpha hemolytic strep "Susceptibilities not performed"    Numerous Streptococcus anginosus "Susceptibilities not performed"    Few Lactobacillus rhamnosus "Susceptibilities not performed"  Organism: Escherichia coli (02-24-24 @ 19:35)  Organism: Escherichia coli (02-24-24 @ 19:35)      Method Type: CINDY      -  Amoxicillin/Clavulanic Acid: S <=8/4      -  Ampicillin: R >16 These ampicillin results predict results for amoxicillin      -  Ampicillin/Sulbactam: I 16/8      -  Aztreonam: S <=4      -  Cefazolin: S <=2      -  Cefepime: S <=2      -  Cefoxitin: S <=8      -  Ceftriaxone: S <=1      -  Ciprofloxacin: R >2      -  Ertapenem: S <=0.5      -  Gentamicin: S <=2      -  Imipenem: S <=1      -  Levofloxacin: R >4      -  Meropenem: S <=1      -  Piperacillin/Tazobactam: S <=8      -  Tobramycin: S <=2      -  Trimethoprim/Sulfamethoxazole: R >2/38    Culture - Urine (collected 02-19-24 @ 11:53)  Source: Clean Catch Clean Catch (Midstream)  Final Report (02-20-24 @ 15:54):    >=3 organisms. Probable collection contamination.    Culture - Blood (collected 02-19-24 @ 11:45)  Source: .Blood Blood-Peripheral  Final Report (02-24-24 @ 18:01):    No growth at 5 days    Culture - Blood (collected 02-19-24 @ 11:30)  Source: .Blood Blood-Peripheral  Final Report (02-24-24 @ 18:01):    No growth at 5 days    C Diff by PCR Result: NotDetec (02-25 @ 13:00)  C Diff by PCR Result: NotDetec (02-25-24 @ 13:00)    RADIOLOGY:   < from: CT Abdomen and Pelvis w/ IV Cont (02.05.24 @ 09:52) >  IMPRESSION:  Etiology of abdominal pain is not elucidated.      < from: CT Abdomen and Pelvis w/ Oral Cont and w/ IV Cont (02.11.24 @ 13:02) >  Dilated small bowel with an apparent transition point in the anterior   midline lower abdomen with small bowel fecalization proximal to the   transition point, suggestive of a high-grade small bowel obstruction   which is probably due to adhesion. Oral contrast has not reached the   transition point. Follow-up serial abdominal x-rays may be helpful to   assess passage.    6.3 x 2.3 x 3.4 cm left paramedian pelvic fluid collection. 4.0 x 1.9 x   6.5 cm focal fluid collection in the right lower quadrant abdomen; these   are suggestive of abscesses. Mesenteric and omental edema in the lower   abdomen, probably postsurgical in nature.    Small droplets of free air, probably postsurgical in nature.    Small ascites, mostly in the pelvis.    Small air in the urinary bladder may be due to prior Petersen catheter   placement, or cystitis. Clinical correlation is recommended.      < from: CT Abdomen and Pelvis w/ Oral Cont (02.19.24 @ 15:24) >  IMPRESSION:  Mildly dilated loops of small bowel, possibly postsurgical ileus.  Extraluminal oral contrast in the lower abdomen compatible with bowel   perforation.  Pneumoperitoneum.    < from: CT Abdomen and Pelvis w/ IV Cont (02.25.24 @ 13:25) >  IMPRESSION:  Interval small bowel resection with a right lower quadrant small bowel   anastomosis. In the right hemipelvis, there is a rim-enhancing 8.6 cm   fluid collection abutting a distal small bowel loop proximal to the   anastomosis. The small bowel further upstream demonstrates wall   thickening and distention favoring ileus due to the collection or in the   setting of recent surgery. Obstruction is less likely. A right lower   quadrant approach drainage catheter courses anterior to and is separate   from the collection.    Trace loculated free fluid with foci of gas at the anterior midline just   deep to the abdominal wall incision, consistent with recent surgery.    Left lower lobe atelectasis with new patchy hypoenhancing consolidative   opacity in the medial lower lobes, question infection superimposed on   atelectasis versus atelectasis. Correlate clinically.      ANTIBIOTICS:  piperacillin/tazobactam IVPB.. 3.375 every 8 hours    IMPRESSION:  60 y/o female with pmhx of HTN, DM admitted for acute abdomen on 2/5 (RLQ pain).  s/p diagnostic laparoscopy , lysis of adhesions and conversion to laparotomy for adhesiolysis and incisional hernia repair 2/6.  D/C on 2/14 after stable post OP.  Pt returned to ED 2/19 with complains of abd pain/vaginal pain and sob. CT A/P 2/19 showed evidence of bowel perforation and pneumoperitoneum.  S/P Ex Lap SBR and abd washout 2/19 with intraop findings of abscess(intraop cx polymicrobial + Rare Escherichia coli + Few Alpha hemolytic strep +Numerous Streptococcus anginosus + Few Lactobacillus rhamnosus.  Post operatively admitted to MICU 2/20 for septic shock.  ID consulted while in MICU for abx guidance.  Most recent CT A/P 2/25 with Interval small bowel resection with a right lower quadrant small bowel anastomosis. In the right hemipelvis, there is a rim-enhancing 8.6 cm fluid collection abutting a distal small bowel loop proximal to the   anastomosis.  Per IR  (Dr. Stockton), he doesn't see a window through which to insert a drain into the abscess of ~ 9 cm size.     - Acute abdomen s/p Ex/Lap complicated with perforation and abscess formation   -Post op abscess s/p small bowel resection and washout (cx + polymicrobial)  -Septic shock- resolved  -R hemipelvis RLQ anastomosis abscess (8.6 cm)  -Ileus  -Diarrhea      PLAN:  Continue Zosyn 3.375 g q8 hrs IV  ESR and CRP  Follow up cx  Symptomatic management for acute diarrhea likely viral vs malabsorption  Recommend IR for RLQ 8.6 cm collection  Follow up surgery  PT, incentive spirometry, pain management    Please reach ID with any questions or concerns  Dr. Madelyn Rashid  Available in Teams

## 2024-02-28 NOTE — PROGRESS NOTE ADULT - SUBJECTIVE AND OBJECTIVE BOX
Interval Events:      Allergies    No Known Allergies    Intolerances      Endocrine/Metabolic Medications:  dextrose 50% Injectable 25 Gram(s) IV Push once  dextrose 50% Injectable 12.5 Gram(s) IV Push once  dextrose 50% Injectable 25 Gram(s) IV Push once  dextrose Oral Gel 15 Gram(s) Oral once PRN  glucagon  Injectable 1 milliGRAM(s) IntraMuscular once  insulin glargine Injectable (LANTUS) 10 Unit(s) SubCutaneous at bedtime  insulin lispro (ADMELOG) corrective regimen sliding scale   SubCutaneous every 6 hours      Vital Signs Last 24 Hrs  T(C): 37 (28 Feb 2024 06:16), Max: 37 (28 Feb 2024 06:16)  T(F): 98.6 (28 Feb 2024 06:16), Max: 98.6 (28 Feb 2024 06:16)  HR: 91 (28 Feb 2024 06:16) (77 - 91)  BP: 157/91 (28 Feb 2024 06:16) (145/77 - 157/91)  BP(mean): 100 (27 Feb 2024 20:21) (100 - 100)  RR: 16 (28 Feb 2024 06:16) (16 - 18)  SpO2: 97% (28 Feb 2024 06:16) (94% - 97%)    Parameters below as of 28 Feb 2024 06:16  Patient On (Oxygen Delivery Method): room air          PHYSICAL EXAM  All physical exam findings normal, except those marked:  General:	Alert, active, cooperative, NAD, well hydrated  .		[] Abnormal:  Neck		Normal: supple, no cervical adenopathy, no palpable thyroid  .		[] Abnormal:  Cardiovascular	Normal: regular rate, normal S1, S2, no murmurs  .		[] Abnormal:  Respiratory	Normal: no chest wall deformity, normal respiratory pattern, CTA B/L  .		[] Abnormal:  Abdominal	Normal: soft, ND, NT, bowel sounds present, no masses, no organomegaly  .		[] Abnormal:  		Normal normal genitalia, testes descended, circumcised/uncircumcised  .		Velma stage:			Breast velma:  .		Menstrual history:  .		[] Abnormal:  Extremities	Normal: FROM x4  .		[] Abnormal:  Skin		Normal: intact and not indurated, no rash, no acanthosis nigricans  .		[] Abnormal:  Neurologic	Normal: grossly intact  .		[] Abnormal:    LABS                        9.6    13.06 )-----------( 413      ( 28 Feb 2024 05:22 )             29.2                               133    |  103    |  13                  Calcium: 9.1   / iCa: x      (02-28 @ 05:22)    ----------------------------<  188       Magnesium: 1.8                              4.4     |  22     |  0.67             Phosphorous: 2.5      TPro  8.0    /  Alb  2.3    /  TBili  0.4    /  DBili  x      /  AST  25     /  ALT  26     /  AlkPhos  184    28 Feb 2024 05:22    CAPILLARY BLOOD GLUCOSE      POCT Blood Glucose.: 169 mg/dL (28 Feb 2024 05:50)  POCT Blood Glucose.: 178 mg/dL (27 Feb 2024 23:29)  POCT Blood Glucose.: 174 mg/dL (27 Feb 2024 22:16)  POCT Blood Glucose.: 233 mg/dL (27 Feb 2024 18:31)  POCT Blood Glucose.: 241 mg/dL (27 Feb 2024 12:42)        Assesment/plan       Interval Events:  pt in nad    Allergies    No Known Allergies    Intolerances      Endocrine/Metabolic Medications:  dextrose 50% Injectable 25 Gram(s) IV Push once  dextrose 50% Injectable 12.5 Gram(s) IV Push once  dextrose 50% Injectable 25 Gram(s) IV Push once  dextrose Oral Gel 15 Gram(s) Oral once PRN  glucagon  Injectable 1 milliGRAM(s) IntraMuscular once  insulin glargine Injectable (LANTUS) 10 Unit(s) SubCutaneous at bedtime  insulin lispro (ADMELOG) corrective regimen sliding scale   SubCutaneous every 6 hours      Vital Signs Last 24 Hrs  T(C): 37 (28 Feb 2024 06:16), Max: 37 (28 Feb 2024 06:16)  T(F): 98.6 (28 Feb 2024 06:16), Max: 98.6 (28 Feb 2024 06:16)  HR: 91 (28 Feb 2024 06:16) (77 - 91)  BP: 157/91 (28 Feb 2024 06:16) (145/77 - 157/91)  BP(mean): 100 (27 Feb 2024 20:21) (100 - 100)  RR: 16 (28 Feb 2024 06:16) (16 - 18)  SpO2: 97% (28 Feb 2024 06:16) (94% - 97%)    Parameters below as of 28 Feb 2024 06:16  Patient On (Oxygen Delivery Method): room air          PHYSICAL EXAM  All physical exam findings normal, except those marked:  General:	Alert, active, cooperative, NAD, well hydrated  .		[] Abnormal:  Neck		Normal: supple, no cervical adenopathy, no palpable thyroid  .		[] Abnormal:  Cardiovascular	Normal: regular rate, normal S1, S2, no murmurs  .		[] Abnormal:  Respiratory	Normal: no chest wall deformity, normal respiratory pattern, CTA B/L  .		[] Abnormal:  Abdominal	Normal: soft, ND, NT, bowel sounds present, no masses, no organomegaly  .		[] Abnormal:  		Normal normal genitalia, testes descended, circumcised/uncircumcised  .		Velma stage:			Breast velma:  .		Menstrual history:  .		[] Abnormal:  Extremities	Normal: FROM x4  .		[] Abnormal:  Skin		Normal: intact and not indurated, no rash, no acanthosis nigricans  .		[] Abnormal:  Neurologic	Normal: grossly intact  .		[] Abnormal:    LABS                        9.6    13.06 )-----------( 413      ( 28 Feb 2024 05:22 )             29.2                               133    |  103    |  13                  Calcium: 9.1   / iCa: x      (02-28 @ 05:22)    ----------------------------<  188       Magnesium: 1.8                              4.4     |  22     |  0.67             Phosphorous: 2.5      TPro  8.0    /  Alb  2.3    /  TBili  0.4    /  DBili  x      /  AST  25     /  ALT  26     /  AlkPhos  184    28 Feb 2024 05:22    CAPILLARY BLOOD GLUCOSE      POCT Blood Glucose.: 169 mg/dL (28 Feb 2024 05:50)  POCT Blood Glucose.: 178 mg/dL (27 Feb 2024 23:29)  POCT Blood Glucose.: 174 mg/dL (27 Feb 2024 22:16)  POCT Blood Glucose.: 233 mg/dL (27 Feb 2024 18:31)  POCT Blood Glucose.: 241 mg/dL (27 Feb 2024 12:42)        Assesment/plan      · Assessment	  58 y/o female with pmhx of HTN, DM, to ER c/o decreased appetite over the last few days with severe vaginal pain/spasms.Admitted with perforated gut.   Pt is s/p diagnostic laparoscopy, lysis of adhesions, conversion to laparotomy for adhesiolysis and incisional hernia repair without mesh 2/6/24/ Pt did well postoperatively and was d/c 2/14/2024.   Found to have uncont dm- pt known to me as out pt- on basaglar 15 and metformin. Now TPN with hyperglycemia.    Dm- insulin dependent as out pt  now better controlled on TPN  cont lantus to 10units - hold when off of tpn  cont insulin in the bag to 15 units  fsg q6h  a1c-8.4  cont iv abx- per ID

## 2024-02-28 NOTE — PROGRESS NOTE ADULT - SUBJECTIVE AND OBJECTIVE BOX
S: Reports an increase in incisional abdominal pain today with fear of dressing change. Diarrhea has lessened, normal brown color and more formed now.     O:  Vital Signs Last 24 Hrs  T(C): 37 (28 Feb 2024 06:16), Max: 37 (28 Feb 2024 06:16)  T(F): 98.6 (28 Feb 2024 06:16), Max: 98.6 (28 Feb 2024 06:16)  HR: 91 (28 Feb 2024 06:16) (77 - 91)  BP: 157/91 (28 Feb 2024 06:16) (145/77 - 157/91)  BP(mean): 100 (27 Feb 2024 20:21) (100 - 100)  RR: 16 (28 Feb 2024 06:16) (16 - 18)  SpO2: 97% (28 Feb 2024 06:16) (94% - 97%)    Parameters below as of 28 Feb 2024 06:16  Patient On (Oxygen Delivery Method): room air      Exam:  Gen: NAD, awake, alert,   Resp: nonlabored  Abd: soft, NT, ND, midline wound upper and lower pole packed with packing strip; removed and wound repacked, ZEKE drain in place with minimal serosanguinous output    LABS:                      9.6    13.06 )-----------( 413      ( 28 Feb 2024 05:22 )             29.2     02-28  133<L>  |  103  |  13  ----------------------------<  188<H>  4.4   |  22  |  0.67    Ca    9.1      28 Feb 2024 05:22  Phos  2.5     02-28  Mg     1.8     02-28    TPro  8.0  /  Alb  2.3<L>  /  TBili  0.4  /  DBili  x   /  AST  25  /  ALT  26  /  AlkPhos  184<H>  02-28    < from: CT Abdomen and Pelvis w/ IV Cont (02.25.24 @ 13:25) >    IMPRESSION:  Interval small bowel resection with a right lower quadrant small bowel   anastomosis. In the right hemipelvis, there is a rim-enhancing 8.6 cm   fluid collection abutting a distal small bowel loop proximal to the   anastomosis. The small bowel further upstream demonstrates wall   thickening and distention favoring ileus due to the collection or in the   setting of recent surgery. Obstruction is less likely. A right lower   quadrant approach drainage catheter courses anterior to and is separate   from the collection.  Trace loculated free fluid with foci of gas at the anterior midline just   deep to the abdominal wall incision, consistent with recent surgery.  Left lower lobe atelectasis with new patchy hypoenhancing consolidative   opacity in the medial lower lobes, question infection superimposed on   atelectasis versus atelectasis. Correlate clinically.  --- End of Report ---  < end of copied text >

## 2024-02-28 NOTE — PROGRESS NOTE ADULT - ASSESSMENT
59F s/p exploratory laparoscopy with lysis of adhesions and Small Bowel Resection secondary to 2/19  PICC placed 2/22 for TPN  midline wound with purulent drainage, improving with packing to upper and lower poles  diarrhea improved    Plan  - continue diet  - repeat CT scan 2/29 vs 3/1  - continue local wound care to midline incision with daily packing changes  - TPN per nephrology, electrolyte repletion  - continue local wound care  - continue ZEKE monitor output  - encourage ambulation / OOB  - discussed with Dr. Lopez    This note and all of its recommendations herein are preliminary until co-signed by an attending physician

## 2024-02-28 NOTE — CHART NOTE - NSCHARTNOTEFT_GEN_A_CORE
Assessment:   Patient is a 59y old  Female who presents with a chief complaint of bowel perf (2024 11:31). Pt continues on PO diet, PN has been halved (to start tonight). Diet now supplemented w/ Ensure Plant Based BID (each serving provides 20 gm protein), pt had accepted this on her previous admission). Pt seen. Discussed on ICU IDR and with RADHA MD      Diet Prescription: Minced and moist, Consistent CHO, fiber restricted, lactose restricted, Ensure Plant Based BID ()  Intake:     Daily     Daily Weight in k (2024 07:37)  Weight in k (2024 13:00)  Weight in k.1 (2024 05:11)        Pertinent Medications: MEDICATIONS  (STANDING):  chlorhexidine 2% Cloths 1 Application(s) Topical <User Schedule>  chlorhexidine 2% Cloths 1 Application(s) Topical daily  dextrose 5%. 1000 milliLiter(s) (100 mL/Hr) IV Continuous <Continuous>  dextrose 5%. 1000 milliLiter(s) (50 mL/Hr) IV Continuous <Continuous>  dextrose 50% Injectable 25 Gram(s) IV Push once  dextrose 50% Injectable 12.5 Gram(s) IV Push once  dextrose 50% Injectable 25 Gram(s) IV Push once  gabapentin 100 milliGRAM(s) Oral three times a day  glucagon  Injectable 1 milliGRAM(s) IntraMuscular once  insulin glargine Injectable (LANTUS) 10 Unit(s) SubCutaneous at bedtime  insulin lispro (ADMELOG) corrective regimen sliding scale   SubCutaneous every 6 hours  lidocaine   4% Patch 2 Patch Transdermal every 24 hours  lipid, fat emulsion (Fish Oil and Plant Based) 20% Infusion 0.342 Gm/kG/Day (5.21 mL/Hr) IV Continuous <Continuous>  lipid, fat emulsion (Fish Oil and Plant Based) 20% Infusion 0.342 Gm/kG/Day (5.21 mL/Hr) IV Continuous <Continuous>  Parenteral Nutrition - Adult 1 Each (42 mL/Hr) TPN Continuous <Continuous>  Parenteral Nutrition - Adult 1 Each (83 mL/Hr) TPN Continuous <Continuous>  piperacillin/tazobactam IVPB.. 3.375 Gram(s) IV Intermittent every 8 hours    MEDICATIONS  (PRN):  benzocaine/menthol Lozenge 1 Lozenge Oral every 2 hours PRN Sore Throat  dextrose Oral Gel 15 Gram(s) Oral once PRN Blood Glucose LESS THAN 70 milliGRAM(s)/deciliter  HYDROmorphone  Injectable 0.5 milliGRAM(s) IV Push every 4 hours PRN Moderate Pain (4 - 6)  ondansetron Injectable 4 milliGRAM(s) IV Push every 6 hours PRN Nausea and/or Vomiting  sodium chloride 0.9% lock flush 10 milliLiter(s) IV Push every 1 hour PRN Pre/post blood products, medications, blood draw, and to maintain line patency    Pertinent Labs:  Na133 mmol/L<L> Glu 188 mg/dL<H> K+ 4.4 mmol/L Cr  0.67 mg/dL BUN 13 mg/dL  Phos 2.5 mg/dL  Alb 2.3 g/dL<L>  Chol --    LDL --    HDL --    Trig 174 mg/dL<H>     CAPILLARY BLOOD GLUCOSE      POCT Blood Glucose.: 217 mg/dL (2024 11:22)  POCT Blood Glucose.: 169 mg/dL (2024 05:50)  POCT Blood Glucose.: 178 mg/dL (2024 23:29)  POCT Blood Glucose.: 174 mg/dL (2024 22:16)  POCT Blood Glucose.: 233 mg/dL (2024 18:31)         Estimated Needs:   [x ] no change since previous assessment  [ ] recalculated:       Previous Nutrition Diagnosis:   [ ] Altered GI function  [ ]Inadequate Oral Intake [ ] Swallowing Difficulty   [ ] Altered nutrition related labs [ ] Increased Nutrient Needs [ ] Overweight/Obesity   [ ] Unintended Weight Loss [ ] Food & Nutrition Related Knowledge Deficit [x ] Malnutrition (severe PCM)  [ ] Other:     Nutrition Diagnosis is [x ] ongoing  [ ] resolved [ ] not applicable     New Nutrition Diagnosis: [ ] not applicable       Interventions:   Recommend  [ ] Change Diet To:  [x ] Nutrition Supplement: Ensure Plant Based BID  [x ] Nutrition Support: PN orders per RADHA MD.  [x ] Other: MD to monitor. RD available.     Monitoring and Evaluation:   [x ] PO intake [ x ] Tolerance to diet prescription [ x ] weights [ x ] labs[ x ] follow up per protocol  [ ] other:

## 2024-02-29 LAB
ALBUMIN SERPL ELPH-MCNC: 2.4 G/DL — LOW (ref 3.5–5)
ALP SERPL-CCNC: 199 U/L — HIGH (ref 40–120)
ALT FLD-CCNC: 31 U/L DA — SIGNIFICANT CHANGE UP (ref 10–60)
ANION GAP SERPL CALC-SCNC: 7 MMOL/L — SIGNIFICANT CHANGE UP (ref 5–17)
AST SERPL-CCNC: 25 U/L — SIGNIFICANT CHANGE UP (ref 10–40)
BASOPHILS # BLD AUTO: 0.04 K/UL — SIGNIFICANT CHANGE UP (ref 0–0.2)
BASOPHILS NFR BLD AUTO: 0.3 % — SIGNIFICANT CHANGE UP (ref 0–2)
BILIRUB SERPL-MCNC: 0.4 MG/DL — SIGNIFICANT CHANGE UP (ref 0.2–1.2)
BUN SERPL-MCNC: 16 MG/DL — SIGNIFICANT CHANGE UP (ref 7–18)
CA-I BLD-SCNC: 5.1 MG/DL — SIGNIFICANT CHANGE UP (ref 4.5–5.6)
CALCIUM SERPL-MCNC: 9.5 MG/DL — SIGNIFICANT CHANGE UP (ref 8.4–10.5)
CHLORIDE SERPL-SCNC: 102 MMOL/L — SIGNIFICANT CHANGE UP (ref 96–108)
CO2 SERPL-SCNC: 23 MMOL/L — SIGNIFICANT CHANGE UP (ref 22–31)
CREAT SERPL-MCNC: 0.75 MG/DL — SIGNIFICANT CHANGE UP (ref 0.5–1.3)
CRP SERPL-MCNC: 14 MG/L — HIGH
EGFR: 92 ML/MIN/1.73M2 — SIGNIFICANT CHANGE UP
EOSINOPHIL # BLD AUTO: 0.26 K/UL — SIGNIFICANT CHANGE UP (ref 0–0.5)
EOSINOPHIL NFR BLD AUTO: 1.9 % — SIGNIFICANT CHANGE UP (ref 0–6)
ERYTHROCYTE [SEDIMENTATION RATE] IN BLOOD: 106 MM/HR — HIGH (ref 0–20)
GLUCOSE BLDC GLUCOMTR-MCNC: 138 MG/DL — HIGH (ref 70–99)
GLUCOSE BLDC GLUCOMTR-MCNC: 155 MG/DL — HIGH (ref 70–99)
GLUCOSE BLDC GLUCOMTR-MCNC: 156 MG/DL — HIGH (ref 70–99)
GLUCOSE BLDC GLUCOMTR-MCNC: 162 MG/DL — HIGH (ref 70–99)
GLUCOSE BLDC GLUCOMTR-MCNC: 183 MG/DL — HIGH (ref 70–99)
GLUCOSE BLDC GLUCOMTR-MCNC: 193 MG/DL — HIGH (ref 70–99)
GLUCOSE SERPL-MCNC: 153 MG/DL — HIGH (ref 70–99)
HCT VFR BLD CALC: 30.6 % — LOW (ref 34.5–45)
HGB BLD-MCNC: 9.9 G/DL — LOW (ref 11.5–15.5)
IMM GRANULOCYTES NFR BLD AUTO: 1.3 % — HIGH (ref 0–0.9)
LYMPHOCYTES # BLD AUTO: 15.9 % — SIGNIFICANT CHANGE UP (ref 13–44)
LYMPHOCYTES # BLD AUTO: 2.15 K/UL — SIGNIFICANT CHANGE UP (ref 1–3.3)
MAGNESIUM SERPL-MCNC: 1.8 MG/DL — SIGNIFICANT CHANGE UP (ref 1.6–2.6)
MCHC RBC-ENTMCNC: 27.5 PG — SIGNIFICANT CHANGE UP (ref 27–34)
MCHC RBC-ENTMCNC: 32.4 GM/DL — SIGNIFICANT CHANGE UP (ref 32–36)
MCV RBC AUTO: 85 FL — SIGNIFICANT CHANGE UP (ref 80–100)
MONOCYTES # BLD AUTO: 0.73 K/UL — SIGNIFICANT CHANGE UP (ref 0–0.9)
MONOCYTES NFR BLD AUTO: 5.4 % — SIGNIFICANT CHANGE UP (ref 2–14)
NEUTROPHILS # BLD AUTO: 10.2 K/UL — HIGH (ref 1.8–7.4)
NEUTROPHILS NFR BLD AUTO: 75.2 % — SIGNIFICANT CHANGE UP (ref 43–77)
NRBC # BLD: 0 /100 WBCS — SIGNIFICANT CHANGE UP (ref 0–0)
PHOSPHATE SERPL-MCNC: 3.3 MG/DL — SIGNIFICANT CHANGE UP (ref 2.5–4.5)
PLATELET # BLD AUTO: 468 K/UL — HIGH (ref 150–400)
POTASSIUM SERPL-MCNC: 4.5 MMOL/L — SIGNIFICANT CHANGE UP (ref 3.5–5.3)
POTASSIUM SERPL-SCNC: 4.5 MMOL/L — SIGNIFICANT CHANGE UP (ref 3.5–5.3)
PROT SERPL-MCNC: 8.2 G/DL — SIGNIFICANT CHANGE UP (ref 6–8.3)
RBC # BLD: 3.6 M/UL — LOW (ref 3.8–5.2)
RBC # FLD: 16.1 % — HIGH (ref 10.3–14.5)
SODIUM SERPL-SCNC: 132 MMOL/L — LOW (ref 135–145)
WBC # BLD: 13.56 K/UL — HIGH (ref 3.8–10.5)
WBC # FLD AUTO: 13.56 K/UL — HIGH (ref 3.8–10.5)

## 2024-02-29 PROCEDURE — 74177 CT ABD & PELVIS W/CONTRAST: CPT | Mod: 26

## 2024-02-29 PROCEDURE — 99232 SBSQ HOSP IP/OBS MODERATE 35: CPT

## 2024-02-29 RX ORDER — I.V. FAT EMULSION 20 G/100ML
0.34 EMULSION INTRAVENOUS
Qty: 25 | Refills: 0 | Status: DISCONTINUED | OUTPATIENT
Start: 2024-02-29 | End: 2024-02-29

## 2024-02-29 RX ORDER — ACETAMINOPHEN 500 MG
1000 TABLET ORAL ONCE
Refills: 0 | Status: COMPLETED | OUTPATIENT
Start: 2024-02-29 | End: 2024-02-29

## 2024-02-29 RX ORDER — ELECTROLYTE SOLUTION,INJ
1 VIAL (ML) INTRAVENOUS
Refills: 0 | Status: DISCONTINUED | OUTPATIENT
Start: 2024-02-29 | End: 2024-02-29

## 2024-02-29 RX ORDER — VALSARTAN 80 MG/1
160 TABLET ORAL DAILY
Refills: 0 | Status: DISCONTINUED | OUTPATIENT
Start: 2024-02-29 | End: 2024-03-08

## 2024-02-29 RX ADMIN — GABAPENTIN 100 MILLIGRAM(S): 400 CAPSULE ORAL at 22:30

## 2024-02-29 RX ADMIN — CHLORHEXIDINE GLUCONATE 1 APPLICATION(S): 213 SOLUTION TOPICAL at 12:31

## 2024-02-29 RX ADMIN — HYDROMORPHONE HYDROCHLORIDE 0.5 MILLIGRAM(S): 2 INJECTION INTRAMUSCULAR; INTRAVENOUS; SUBCUTANEOUS at 00:12

## 2024-02-29 RX ADMIN — Medication 400 MILLIGRAM(S): at 13:44

## 2024-02-29 RX ADMIN — Medication 1 EACH: at 22:27

## 2024-02-29 RX ADMIN — CHLORHEXIDINE GLUCONATE 1 APPLICATION(S): 213 SOLUTION TOPICAL at 05:21

## 2024-02-29 RX ADMIN — HYDROMORPHONE HYDROCHLORIDE 0.5 MILLIGRAM(S): 2 INJECTION INTRAMUSCULAR; INTRAVENOUS; SUBCUTANEOUS at 00:30

## 2024-02-29 RX ADMIN — INSULIN GLARGINE 10 UNIT(S): 100 INJECTION, SOLUTION SUBCUTANEOUS at 22:31

## 2024-02-29 RX ADMIN — PIPERACILLIN AND TAZOBACTAM 25 GRAM(S): 4; .5 INJECTION, POWDER, LYOPHILIZED, FOR SOLUTION INTRAVENOUS at 05:21

## 2024-02-29 RX ADMIN — HYDROMORPHONE HYDROCHLORIDE 0.5 MILLIGRAM(S): 2 INJECTION INTRAMUSCULAR; INTRAVENOUS; SUBCUTANEOUS at 16:25

## 2024-02-29 RX ADMIN — GABAPENTIN 100 MILLIGRAM(S): 400 CAPSULE ORAL at 05:21

## 2024-02-29 RX ADMIN — PIPERACILLIN AND TAZOBACTAM 25 GRAM(S): 4; .5 INJECTION, POWDER, LYOPHILIZED, FOR SOLUTION INTRAVENOUS at 15:28

## 2024-02-29 RX ADMIN — HYDROMORPHONE HYDROCHLORIDE 0.5 MILLIGRAM(S): 2 INJECTION INTRAMUSCULAR; INTRAVENOUS; SUBCUTANEOUS at 21:09

## 2024-02-29 RX ADMIN — Medication 1: at 00:01

## 2024-02-29 RX ADMIN — HYDROMORPHONE HYDROCHLORIDE 0.5 MILLIGRAM(S): 2 INJECTION INTRAMUSCULAR; INTRAVENOUS; SUBCUTANEOUS at 05:20

## 2024-02-29 RX ADMIN — HYDROMORPHONE HYDROCHLORIDE 0.5 MILLIGRAM(S): 2 INJECTION INTRAMUSCULAR; INTRAVENOUS; SUBCUTANEOUS at 16:01

## 2024-02-29 RX ADMIN — HYDROMORPHONE HYDROCHLORIDE 0.5 MILLIGRAM(S): 2 INJECTION INTRAMUSCULAR; INTRAVENOUS; SUBCUTANEOUS at 09:51

## 2024-02-29 RX ADMIN — HYDROMORPHONE HYDROCHLORIDE 0.5 MILLIGRAM(S): 2 INJECTION INTRAMUSCULAR; INTRAVENOUS; SUBCUTANEOUS at 10:40

## 2024-02-29 RX ADMIN — PIPERACILLIN AND TAZOBACTAM 25 GRAM(S): 4; .5 INJECTION, POWDER, LYOPHILIZED, FOR SOLUTION INTRAVENOUS at 22:30

## 2024-02-29 RX ADMIN — Medication 1000 MILLIGRAM(S): at 14:15

## 2024-02-29 RX ADMIN — HYDROMORPHONE HYDROCHLORIDE 0.5 MILLIGRAM(S): 2 INJECTION INTRAMUSCULAR; INTRAVENOUS; SUBCUTANEOUS at 20:09

## 2024-02-29 RX ADMIN — I.V. FAT EMULSION 10.4 GM/KG/DAY: 20 EMULSION INTRAVENOUS at 22:27

## 2024-02-29 RX ADMIN — Medication 1: at 17:38

## 2024-02-29 RX ADMIN — Medication 1: at 12:30

## 2024-02-29 RX ADMIN — GABAPENTIN 100 MILLIGRAM(S): 400 CAPSULE ORAL at 13:45

## 2024-02-29 NOTE — PROGRESS NOTE ADULT - ASSESSMENT
59F s/p exploratory laparoscopy with lysis of adhesions and Small Bowel Resection secondary to 2/19  PICC placed 2/22 for TPN  diarrhea resolved  hyponatremic     -continue diet  -repeat CT scan today   -continue local wound care to midline incision with daily packing changes  -TPN per nephrology, electrolyte repletion/monitoring, plan to taper tpn   -continue local wound care  -continue ZEKE monitor output  -encourage ambulation / OOB  -discussed with Dr. Lopez    This note and all of its recommendations herein are preliminary until co-signed by an attending physician

## 2024-02-29 NOTE — PROGRESS NOTE ADULT - SUBJECTIVE AND OBJECTIVE BOX
Interval Events:  pt in nad  tolerating soft diet    Allergies    No Known Allergies    Intolerances      Endocrine/Metabolic Medications:  dextrose 50% Injectable 25 Gram(s) IV Push once  dextrose 50% Injectable 25 Gram(s) IV Push once  dextrose 50% Injectable 12.5 Gram(s) IV Push once  dextrose Oral Gel 15 Gram(s) Oral once PRN  glucagon  Injectable 1 milliGRAM(s) IntraMuscular once  insulin glargine Injectable (LANTUS) 10 Unit(s) SubCutaneous at bedtime  insulin lispro (ADMELOG) corrective regimen sliding scale   SubCutaneous every 6 hours      Vital Signs Last 24 Hrs  T(C): 36.8 (29 Feb 2024 06:00), Max: 36.9 (28 Feb 2024 14:47)  T(F): 98.3 (29 Feb 2024 06:00), Max: 98.4 (28 Feb 2024 14:47)  HR: 92 (29 Feb 2024 06:00) (92 - 97)  BP: 126/77 (29 Feb 2024 06:00) (126/77 - 165/96)  BP(mean): 94 (29 Feb 2024 06:00) (94 - 119)  RR: 16 (29 Feb 2024 06:00) (16 - 18)  SpO2: 97% (29 Feb 2024 06:00) (97% - 97%)    Parameters below as of 29 Feb 2024 06:00  Patient On (Oxygen Delivery Method): room air          PHYSICAL EXAM  All physical exam findings normal, except those marked:  General:	Alert, active, cooperative, NAD, well hydrated  .		[] Abnormal:  Neck		Normal: supple, no cervical adenopathy, no palpable thyroid  .		[] Abnormal:  Cardiovascular	Normal: regular rate, normal S1, S2, no murmurs  .		[] Abnormal:  Respiratory	Normal: no chest wall deformity, normal respiratory pattern, CTA B/L  .		[] Abnormal:  Abdominal	Normal: soft, ND, NT, bowel sounds present, no masses, no organomegaly  .		[] Abnormal:  		Normal normal genitalia, testes descended, circumcised/uncircumcised  .		Velma stage:			Breast velma:  .		Menstrual history:  .		[] Abnormal:  Extremities	Normal: FROM x4  .		[] Abnormal:  Skin		Normal: intact and not indurated, no rash, no acanthosis nigricans  .		[] Abnormal:  Neurologic	Normal: grossly intact  .		[] Abnormal:    LABS                        9.9    13.56 )-----------( 468      ( 29 Feb 2024 06:03 )             30.6                               132    |  102    |  16                  Calcium: 9.5   / iCa: x      (02-29 @ 06:03)    ----------------------------<  153       Magnesium: 1.8                              4.5     |  23     |  0.75             Phosphorous: 3.3      TPro  8.2    /  Alb  2.4    /  TBili  0.4    /  DBili  x      /  AST  25     /  ALT  31     /  AlkPhos  199    29 Feb 2024 06:03    CAPILLARY BLOOD GLUCOSE      POCT Blood Glucose.: 155 mg/dL (29 Feb 2024 07:52)  POCT Blood Glucose.: 138 mg/dL (29 Feb 2024 06:37)  POCT Blood Glucose.: 154 mg/dL (28 Feb 2024 23:54)  POCT Blood Glucose.: 193 mg/dL (28 Feb 2024 17:45)  POCT Blood Glucose.: 217 mg/dL (28 Feb 2024 11:22)        Assesment/plan    58 y/o female with pmhx of HTN, DM, to ER c/o decreased appetite over the last few days with severe vaginal pain/spasms.Admitted with perforated gut.   Pt is s/p diagnostic laparoscopy, lysis of adhesions, conversion to laparotomy for adhesiolysis and incisional hernia repair without mesh 2/6/24/ Pt did well postoperatively and was d/c 2/14/2024.   Found to have uncont dm- pt known to me as out pt- on basaglar 15 and metformin. Now TPN with hyperglycemia.    Dm- insulin dependent as out pt  now better controlled on TPN  cont lantus to 10units - hold when off of tpn  cont insulin in the bag to 10 units  fsg q6h  a1c-8.4  cont iv abx- per ID

## 2024-02-29 NOTE — PROGRESS NOTE ADULT - ASSESSMENT
Subjective: NAD, afebrile, still with significant liquid yellow BM right after eating anything, no N/V, no abd pain.     REVIEW OF SYSTEMS:  CONSTITUTIONAL:  No fevers or chills  EYES/ENT:  No visual changes; no throat pain   NECK:  No neck pain or stiffness  RESPIRATORY:  No cough, no wheezing. No shortness of breath  CARDIOVASCULAR:  No chest pain or palpitations  GASTROINTESTINAL:  No abdominal pain. No N/V, + diarrhea  GENITOURINARY:  No dysuria, frequency or hematuria  NEUROLOGICAL:  No numbness or weakness  MSK: no back pain, no joint pain  SKIN:  No itching, no skin rash    PE:  Vital Signs Last 24 Hrs  T(C): 36.8 (29 Feb 2024 06:00), Max: 36.9 (28 Feb 2024 14:47)  T(F): 98.3 (29 Feb 2024 06:00), Max: 98.4 (28 Feb 2024 14:47)  HR: 92 (29 Feb 2024 06:00) (92 - 97)  BP: 126/77 (29 Feb 2024 06:00) (126/77 - 165/96)  BP(mean): 94 (29 Feb 2024 06:00) (94 - 119)  RR: 16 (29 Feb 2024 06:00) (16 - 18)  SpO2: 97% (29 Feb 2024 06:00) (97% - 97%)    Parameters below as of 29 Feb 2024 06:00  Patient On (Oxygen Delivery Method): room air    Gen: AOx3, NAD, non-toxic  HEAD:  Atraumatic  EYES: PERRLA, conjunctiva and sclera clear  ENT: Moist mucous membranes  NECK: Supple, No JVD  CV: S1+S2 normal, no murmurs  Resp: Clear bilat, no resp distress, no crackles/wheezes  Abd: Soft, nontender, +BS ant abd sx site OK, ZEKE drain +  Ext: No LE edema, no cyanosis, pulses +  : No dysuria  IV/Skin: No thrombophlebitis  Msk: No low back pain, no arthralgias, no joint swelling  Neuro: AAOx3. No focal signs     LABS/DIAGNOSTIC TESTS:                        9.9    13.56 )-----------( 468      ( 29 Feb 2024 06:03 )             30.6     WBC Count: 13.56 K/uL (02-29 @ 06:03)  WBC Count: 13.06 K/uL (02-28 @ 05:22)  WBC Count: 11.68 K/uL (02-27 @ 05:26)    02-29    132<L>  |  102  |  16  ----------------------------<  153<H>  4.5   |  23  |  0.75    Ca    9.5      29 Feb 2024 06:03  Phos  3.3     02-29  Mg     1.8     02-29    TPro  8.2  /  Alb  2.4<L>  /  TBili  0.4  /  DBili  x   /  AST  25  /  ALT  31  /  AlkPhos  199<H>  02-29    CULTURES:   Culture - Abscess with Gram Stain (collected 02-19-24 @ 23:20)  Source: .Abscess abdominal abcess  Final Report (02-24-24 @ 19:35):    Rare Escherichia coli    Few Alpha hemolytic strep "Susceptibilities not performed"    Numerous Streptococcus anginosus "Susceptibilities not performed"    Few Lactobacillus rhamnosus "Susceptibilities not performed"  Organism: Escherichia coli (02-24-24 @ 19:35)  Organism: Escherichia coli (02-24-24 @ 19:35)      Method Type: CINDY      -  Amoxicillin/Clavulanic Acid: S <=8/4      -  Ampicillin: R >16 These ampicillin results predict results for amoxicillin      -  Ampicillin/Sulbactam: I 16/8      -  Aztreonam: S <=4      -  Cefazolin: S <=2      -  Cefepime: S <=2      -  Cefoxitin: S <=8      -  Ceftriaxone: S <=1      -  Ciprofloxacin: R >2      -  Ertapenem: S <=0.5      -  Gentamicin: S <=2      -  Imipenem: S <=1      -  Levofloxacin: R >4      -  Meropenem: S <=1      -  Piperacillin/Tazobactam: S <=8      -  Tobramycin: S <=2      -  Trimethoprim/Sulfamethoxazole: R >2/38    Culture - Urine (collected 02-19-24 @ 11:53)  Source: Clean Catch Clean Catch (Midstream)  Final Report (02-20-24 @ 15:54):    >=3 organisms. Probable collection contamination.    Culture - Blood (collected 02-19-24 @ 11:45)  Source: .Blood Blood-Peripheral  Final Report (02-24-24 @ 18:01):    No growth at 5 days    Culture - Blood (collected 02-19-24 @ 11:30)  Source: .Blood Blood-Peripheral  Final Report (02-24-24 @ 18:01):    No growth at 5 days    C Diff by PCR Result: NotDetec (02-25 @ 13:00)  C Diff by PCR Result: NotDetec (02-25-24 @ 13:00)    RADIOLOGY:   Available pertinent Imaging reviewed    ANTIBIOTICS:  piperacillin/tazobactam IVPB.. 3.375 every 8 hours    IMPRESSION:  58 y/o female with pmhx of HTN, DM admitted for acute abdomen on 2/5 (RLQ pain).  s/p diagnostic laparoscopy , lysis of adhesions and conversion to laparotomy for adhesiolysis and incisional hernia repair 2/6.  D/C on 2/14 after stable post OP.  Pt returned to ED 2/19 with complains of abd pain/vaginal pain and sob. CT A/P 2/19 showed evidence of bowel perforation and pneumoperitoneum.  S/P Ex Lap SBR and abd washout 2/19 with intraop findings of abscess(intraop cx polymicrobial + Rare Escherichia coli + Few Alpha hemolytic strep +Numerous Streptococcus anginosus + Few Lactobacillus rhamnosus.  Post operatively admitted to MICU 2/20 for septic shock.  ID consulted while in MICU for abx guidance.  Most recent CT A/P 2/25 with Interval small bowel resection with a right lower quadrant small bowel anastomosis. In the right hemipelvis, there is a rim-enhancing 8.6 cm fluid collection abutting a distal small bowel loop proximal to the   anastomosis.  Per IR  (Dr. Stockton), he doesn't see a window through which to insert a drain into the abscess of ~ 9 cm size.     - Acute abdomen s/p Ex/Lap complicated with perforation and abscess formation   -Post op abscess s/p small bowel resection and washout (cx + polymicrobial)  -Septic shock- resolved  -R hemipelvis RLQ anastomosis abscess (8.6 cm)  -Ileus  -Diarrhea - c diff neg     PLAN:  Continue Zosyn 3.375 g q8 hrs IV  Stool PCR, stool cultures, C diff  Trend ESR and CRP  Follow up cx  Follow up CT A/P  Recommend IR drain of RLQ 8.6 cm collection  Follow up surgery  PT, incentive spirometry, pain management  Discussed with Dr. Lopez    Please reach ID with any questions or concerns  Dr. Madelyn Rashid  Available in Teams

## 2024-02-29 NOTE — PROGRESS NOTE ADULT - SUBJECTIVE AND OBJECTIVE BOX
INTERVAL HPI/OVERNIGHT EVENTS:  seen and examined   Pt resting comfortably. No acute complaints.   Tolerating diet.   denies any diarrhea   Denies N/V    MEDICATIONS  (STANDING):  chlorhexidine 2% Cloths 1 Application(s) Topical <User Schedule>  chlorhexidine 2% Cloths 1 Application(s) Topical daily  dextrose 5%. 1000 milliLiter(s) (50 mL/Hr) IV Continuous <Continuous>  dextrose 5%. 1000 milliLiter(s) (100 mL/Hr) IV Continuous <Continuous>  dextrose 50% Injectable 25 Gram(s) IV Push once  dextrose 50% Injectable 12.5 Gram(s) IV Push once  dextrose 50% Injectable 25 Gram(s) IV Push once  gabapentin 100 milliGRAM(s) Oral three times a day  glucagon  Injectable 1 milliGRAM(s) IntraMuscular once  hydrochlorothiazide 25 milliGRAM(s) Oral daily  insulin glargine Injectable (LANTUS) 10 Unit(s) SubCutaneous at bedtime  insulin lispro (ADMELOG) corrective regimen sliding scale   SubCutaneous every 6 hours  lidocaine   4% Patch 2 Patch Transdermal every 24 hours  lipid, fat emulsion (Fish Oil and Plant Based) 20% Infusion 0.342 Gm/kG/Day (5.21 mL/Hr) IV Continuous <Continuous>  Parenteral Nutrition - Adult 1 Each (42 mL/Hr) TPN Continuous <Continuous>  piperacillin/tazobactam IVPB.. 3.375 Gram(s) IV Intermittent every 8 hours  valsartan 160 milliGRAM(s) Oral daily    MEDICATIONS  (PRN):  benzocaine/menthol Lozenge 1 Lozenge Oral every 2 hours PRN Sore Throat  dextrose Oral Gel 15 Gram(s) Oral once PRN Blood Glucose LESS THAN 70 milliGRAM(s)/deciliter  HYDROmorphone  Injectable 0.5 milliGRAM(s) IV Push every 4 hours PRN Moderate Pain (4 - 6)  ondansetron Injectable 4 milliGRAM(s) IV Push every 6 hours PRN Nausea and/or Vomiting  sodium chloride 0.9% lock flush 10 milliLiter(s) IV Push every 1 hour PRN Pre/post blood products, medications, blood draw, and to maintain line patency      Vital Signs Last 24 Hrs  T(C): 36.8 (29 Feb 2024 06:00), Max: 36.9 (28 Feb 2024 14:47)  T(F): 98.3 (29 Feb 2024 06:00), Max: 98.4 (28 Feb 2024 14:47)  HR: 92 (29 Feb 2024 06:00) (92 - 97)  BP: 126/77 (29 Feb 2024 06:00) (126/77 - 165/96)  BP(mean): 94 (29 Feb 2024 06:00) (94 - 119)  RR: 16 (29 Feb 2024 06:00) (16 - 18)  SpO2: 97% (29 Feb 2024 06:00) (97% - 97%)    Parameters below as of 29 Feb 2024 06:00  Patient On (Oxygen Delivery Method): room air        Physical:  General: NAD.  Respirations: Unlabored  Abd: soft, nontender, nondistended, midline wound upper and lower pole packed with packing strip; no surrounding erythema, removed and wound repacked, ZEKE drain in place with minimal serosanguinous output    I&O's Detail      LABS:                        9.9    13.56 )-----------( 468      ( 29 Feb 2024 06:03 )             30.6             02-29    132<L>  |  102  |  16  ----------------------------<  153<H>  4.5   |  23  |  0.75    Ca    9.5      29 Feb 2024 06:03  Phos  3.3     02-29  Mg     1.8     02-29    TPro  8.2  /  Alb  2.4<L>  /  TBili  0.4  /  DBili  x   /  AST  25  /  ALT  31  /  AlkPhos  199<H>  02-29

## 2024-02-29 NOTE — PROGRESS NOTE ADULT - ASSESSMENT
Patient is a 59y Female with HTN, DM, recently admitted 2/5-2/14/24 s/p diagnostic laparoscopy, lysis of adhesions, conversion to laparotomy for adhesiolysis and incisional hernia repair without mesh on 2/6, presents now with abdominal pain. Pt found to have small bowel perforation with pneumoperitoneum. s/p OR 2/19 for ex-lap with bowel resection, found to have adhesions with intrabdominal abscess. Pt transferred to ICU for septic shock 2/2 peritonitis, hypotension requiring brief pressors, with DEION and hyponatremia. DEION and hyponatremia resolved.   Nephrology consulted for TPN for prolonged NPO status.     1. Severe PCM- albumin 1.4, edema with wt loss. Pt initiated on TPN 2/23. s/p IR RUE PICC line placed on 2/22 (after 1pm). Pt tolerating diet; however with frequent BMs/ diarrhea.   c/w TPN with lipids @ 1L; will cycle over 12hrs (10 pm-10am). Will adjust electrolytes accordingly.  Check FS q6h. FS acceptable. c/w 10 units of insulin in TPN bag hgbA1C 8.4 on 2/6/24.  on 2/22 --> 174 2/27  Check CMP/Mg/Phos/ Ionized calcium in am. Check TG in am.   Daily weights. Strict I/O. Will hold TPN if pt spikes fever and check BCX x2    2. DEION - likely ATN due to Septic shock. DEION resolved. Strict I/Os. Avoid nephrotoxins/ NSAIDs/ RCA. Monitor BMP.    3. Septic shock- 2/2 peritonitis/ small bowel perforation. Pt on Zosyn. Finished Diflucan for ppx . BCx 2/19 NG.  Plan as per ID/ Surgery. Cdiff neg.  Repeating Cdiff, and checking stool Cx and PCR due to diarrhea    4. Hypotension- resolved. Now with elevated BP for which Valsartan 160mg PO daily was resumed. Will d/c HCTZ due to hyponatremia and pt has diarrhea; minimize fluid loss. Monitor BP    TPN orders:    60 kg  Total Kcal 1500  Lipid 50 g =500 kcal  AA 100g = 400 kcal  Dextrose 176g - 600 kcal    [facility] NEPHROLOGY  Dean Shipley M.D.  Jaylon Kang D.O.  Debbi Garcia, MD Laura Claudio, MSN, ANP-C    Telephone: [phone]  Facsimile: [phone] [place] [address], [address]  [place] [id number]

## 2024-02-29 NOTE — CHART NOTE - NSCHARTNOTEFT_GEN_A_CORE
Assessment:   Patient is a 59y old  Female who presents with a chief complaint of bowel perf (2024 11:36). Pt seen in AM, reports diarrhea after eating. Discussed on ICU IDR, Discussed w/ RADHA MD, PN 1/2 dose maintained but cycled for D/C PN by tomorrow morning. Pt seen in PM, still c/o of diarrhea after eating, feels week, not taking PO supplement (afraid).      Factors impacting intake: [ ] none [ ] nausea  [ ] vomiting [ ] diarrhea [ ] constipation  [ ]chewing problems [ ] swallowing issues  [x ] other: altered GI fx/ structure    Diet Prescription: Diet, Consistent Carbohydrate w/Evening Snack:   Fiber/Residue Restricted  Lactose Restricted (Milk Sugar Intoler.)  Supplement Feeding Modality:  Oral  Ensure Plant-Based Cans or Servings Per Day:  1       Frequency:  Two Times a day (24 @ 09:53)        Daily     Daily Weight in k (2024 07:37)  Weight in k (2024 13:00)  Weight in k.1 (2024 05:11)        Pertinent Medications: MEDICATIONS  (STANDING):  chlorhexidine 2% Cloths 1 Application(s) Topical <User Schedule>  chlorhexidine 2% Cloths 1 Application(s) Topical daily  dextrose 5%. 1000 milliLiter(s) (50 mL/Hr) IV Continuous <Continuous>  dextrose 5%. 1000 milliLiter(s) (100 mL/Hr) IV Continuous <Continuous>  dextrose 50% Injectable 25 Gram(s) IV Push once  dextrose 50% Injectable 12.5 Gram(s) IV Push once  dextrose 50% Injectable 25 Gram(s) IV Push once  gabapentin 100 milliGRAM(s) Oral three times a day  glucagon  Injectable 1 milliGRAM(s) IntraMuscular once  hydrochlorothiazide 25 milliGRAM(s) Oral daily  insulin glargine Injectable (LANTUS) 10 Unit(s) SubCutaneous at bedtime  insulin lispro (ADMELOG) corrective regimen sliding scale   SubCutaneous every 6 hours  lidocaine   4% Patch 2 Patch Transdermal every 24 hours  lipid, fat emulsion (Fish Oil and Plant Based) 20% Infusion 0.342 Gm/kG/Day (5.21 mL/Hr) IV Continuous <Continuous>  lipid, fat emulsion (Fish Oil and Plant Based) 20% Infusion 0.342 Gm/kG/Day (10.4 mL/Hr) IV Continuous <Continuous>  Parenteral Nutrition - Adult 1 Each TPN Continuous <Continuous>  Parenteral Nutrition - Adult 1 Each (42 mL/Hr) TPN Continuous <Continuous>  piperacillin/tazobactam IVPB.. 3.375 Gram(s) IV Intermittent every 8 hours  valsartan 160 milliGRAM(s) Oral daily    MEDICATIONS  (PRN):  benzocaine/menthol Lozenge 1 Lozenge Oral every 2 hours PRN Sore Throat  dextrose Oral Gel 15 Gram(s) Oral once PRN Blood Glucose LESS THAN 70 milliGRAM(s)/deciliter  HYDROmorphone  Injectable 0.5 milliGRAM(s) IV Push every 4 hours PRN Moderate Pain (4 - 6)  ondansetron Injectable 4 milliGRAM(s) IV Push every 6 hours PRN Nausea and/or Vomiting  sodium chloride 0.9% lock flush 10 milliLiter(s) IV Push every 1 hour PRN Pre/post blood products, medications, blood draw, and to maintain line patency    Pertinent Labs:  Na132 mmol/L<L> Glu 153 mg/dL<H> K+ 4.5 mmol/L Cr  0.75 mg/dL BUN 16 mg/dL  Phos 3.3 mg/dL  Alb 2.4 g/dL<L>  Chol --    LDL --    HDL --    Trig 174 mg/dL<H>     CAPILLARY BLOOD GLUCOSE      POCT Blood Glucose.: 162 mg/dL (2024 11:53)  POCT Blood Glucose.: 155 mg/dL (2024 07:52)  POCT Blood Glucose.: 138 mg/dL (2024 06:37)  POCT Blood Glucose.: 154 mg/dL (2024 23:54)  POCT Blood Glucose.: 193 mg/dL (2024 17:45)        Estimated Needs:   [x ] no change since previous assessment  [ ] recalculated:       Previous Nutrition Diagnosis:   [ ] Altered GI function  [ ]Inadequate Oral Intake [ ] Swallowing Difficulty   [ ] Altered nutrition related labs [ ] Increased Nutrient Needs [ ] Overweight/Obesity   [ ] Unintended Weight Loss [ ] Food & Nutrition Related Knowledge Deficit [x ] Malnutrition (severe PCM)  [ ] Other:     Nutrition Diagnosis is [x ] ongoing  [ ] resolved [ ] not applicable       Interventions:   Recommend  [ ] Change Diet To:  [ ] Nutrition Supplement  [x ] Nutrition Support: PN orders per RADHA MD. PO diet per Sx   [x ] Other: MD to monitor. RD available.     Monitoring and Evaluation:   [x ] PO intake [ x ] Tolerance to diet prescription [ x ] weights [ x ] labs[ x ] follow up per protocol  [ ] other: Assessment:   Patient is a 59y old  Female who presents with a chief complaint of bowel perf (2024 11:36). Pt seen in AM, reports diarrhea after eating. Discussed on ICU IDR, Discussed w/ RADHA MD, PN 1/2 dose maintained but cycled for D/C PN by tomorrow morning. Pt seen in PM, still c/o of diarrhea after eating, feels week, not taking PO supplement (afraid).      Factors impacting intake: [ ] none [ ] nausea  [ ] vomiting [ ] diarrhea [ ] constipation  [ ]chewing problems [ ] swallowing issues  [x ] other: altered GI fx/ structure    Diet Prescription: Diet, Consistent Carbohydrate w/Evening Snack:   Fiber/Residue Restricted  Lactose Restricted (Milk Sugar Intoler.)  Supplement Feeding Modality:  Oral  Ensure Plant-Based Cans or Servings Per Day:  1       Frequency:  Two Times a day (24 @ 09:53)        Daily    wt 146# (66.4 KG) on standing scale by MAX.  Daily Weight in k (2024 07:37)  Weight in k (2024 13:00)  Weight in k.1 (2024 05:11)        Pertinent Medications: MEDICATIONS  (STANDING):  chlorhexidine 2% Cloths 1 Application(s) Topical <User Schedule>  chlorhexidine 2% Cloths 1 Application(s) Topical daily  dextrose 5%. 1000 milliLiter(s) (50 mL/Hr) IV Continuous <Continuous>  dextrose 5%. 1000 milliLiter(s) (100 mL/Hr) IV Continuous <Continuous>  dextrose 50% Injectable 25 Gram(s) IV Push once  dextrose 50% Injectable 12.5 Gram(s) IV Push once  dextrose 50% Injectable 25 Gram(s) IV Push once  gabapentin 100 milliGRAM(s) Oral three times a day  glucagon  Injectable 1 milliGRAM(s) IntraMuscular once  hydrochlorothiazide 25 milliGRAM(s) Oral daily  insulin glargine Injectable (LANTUS) 10 Unit(s) SubCutaneous at bedtime  insulin lispro (ADMELOG) corrective regimen sliding scale   SubCutaneous every 6 hours  lidocaine   4% Patch 2 Patch Transdermal every 24 hours  lipid, fat emulsion (Fish Oil and Plant Based) 20% Infusion 0.342 Gm/kG/Day (5.21 mL/Hr) IV Continuous <Continuous>  lipid, fat emulsion (Fish Oil and Plant Based) 20% Infusion 0.342 Gm/kG/Day (10.4 mL/Hr) IV Continuous <Continuous>  Parenteral Nutrition - Adult 1 Each TPN Continuous <Continuous>  Parenteral Nutrition - Adult 1 Each (42 mL/Hr) TPN Continuous <Continuous>  piperacillin/tazobactam IVPB.. 3.375 Gram(s) IV Intermittent every 8 hours  valsartan 160 milliGRAM(s) Oral daily    MEDICATIONS  (PRN):  benzocaine/menthol Lozenge 1 Lozenge Oral every 2 hours PRN Sore Throat  dextrose Oral Gel 15 Gram(s) Oral once PRN Blood Glucose LESS THAN 70 milliGRAM(s)/deciliter  HYDROmorphone  Injectable 0.5 milliGRAM(s) IV Push every 4 hours PRN Moderate Pain (4 - 6)  ondansetron Injectable 4 milliGRAM(s) IV Push every 6 hours PRN Nausea and/or Vomiting  sodium chloride 0.9% lock flush 10 milliLiter(s) IV Push every 1 hour PRN Pre/post blood products, medications, blood draw, and to maintain line patency    Pertinent Labs:  Na132 mmol/L<L> Glu 153 mg/dL<H> K+ 4.5 mmol/L Cr  0.75 mg/dL BUN 16 mg/dL  Phos 3.3 mg/dL  Alb 2.4 g/dL<L>  Chol --    LDL --    HDL --    Trig 174 mg/dL<H>     CAPILLARY BLOOD GLUCOSE      POCT Blood Glucose.: 162 mg/dL (2024 11:53)  POCT Blood Glucose.: 155 mg/dL (2024 07:52)  POCT Blood Glucose.: 138 mg/dL (2024 06:37)  POCT Blood Glucose.: 154 mg/dL (2024 23:54)  POCT Blood Glucose.: 193 mg/dL (2024 17:45)        Estimated Needs:   [x ] no change since previous assessment  [ ] recalculated:       Previous Nutrition Diagnosis:   [ ] Altered GI function  [ ]Inadequate Oral Intake [ ] Swallowing Difficulty   [ ] Altered nutrition related labs [ ] Increased Nutrient Needs [ ] Overweight/Obesity   [ ] Unintended Weight Loss [ ] Food & Nutrition Related Knowledge Deficit [x ] Malnutrition (severe PCM)  [ ] Other:     Nutrition Diagnosis is [x ] ongoing  [ ] resolved [ ] not applicable       Interventions:   Recommend  [ ] Change Diet To:  [ ] Nutrition Supplement  [x ] Nutrition Support: PN orders per RADHA MD. PO diet per Sx   [x ] Other: MD to monitor. RD available.     Monitoring and Evaluation:   [x ] PO intake [ x ] Tolerance to diet prescription [ x ] weights [ x ] labs[ x ] follow up per protocol  [ ] other:

## 2024-02-29 NOTE — PROGRESS NOTE ADULT - SUBJECTIVE AND OBJECTIVE BOX
[facility] NEPHROLOGY- METABOLIC SUPPORT PROGRESS NOTE    Patient is a 59y Female with HTN, DM, recently admitted 2/5-2/14/24 s/p diagnostic laparoscopy, lysis of adhesions, conversion to laparotomy for adhesiolysis and incisional hernia repair without mesh on 2/6, presents now with abdominal pain. Pt found to have small bowel perforation with pneumoperitoneum. s/p OR 2/19 for ex-lap with bowel resection, found to have adhesions with intrabdominal abscess. Pt transferred to ICU for septic shock 2/2 peritonitis, hypotension requiring brief pressors, with DEION and hyponatremia. DEION and hyponatremia resolved.   Nephrology consulted for TPN for prolonged NPO status.   2/22: s/p IR RUE PICC placement  2/23: Pt started on CLD  2/26: Advanced to full liquid diet  2/28: PO diet    Hospital Medications: Medications reviewed.  REVIEW OF SYSTEMS:  CONSTITUTIONAL: No fevers or chills  RESPIRATORY: No shortness of breath  CARDIOVASCULAR: No chest pain.  GASTROINTESTINAL: No nausea, or vomiting, +improving abd pain. frequent BM's >5-8 every time she eats  VASCULAR: No bilateral lower extremity edema.     VITALS:  T(F): 98.3 (02-29-24 @ 14:00), Max: 98.3 (02-28-24 @ 20:47)  HR: 93 (02-29-24 @ 14:00)  BP: 137/74 (02-29-24 @ 14:00)  RR: 18 (02-29-24 @ 14:00)  SpO2: 97% (02-29-24 @ 14:00)  Wt(kg): --    PHYSICAL EXAM:  Constitutional: NAD,   HEENT: anicteric sclera,   Respiratory: CTAB, no wheezes, rales or rhonchi  Cardiovascular: S1, S2, RRR  Gastrointestinal: +midline incision covered, +ZEKE  Extremities: No LE edema b/l   Vascular Access: RUE single lumen PICC- saved for  TPN    LABS:  02-29    132<L>  |  102  |  16  ----------------------------<  153<H>  4.5   |  23  |  0.75    Ca    9.5      29 Feb 2024 06:03  Phos  3.3     02-29  Mg     1.8     02-29    TPro  8.2  /  Alb  2.4<L>  /  TBili  0.4  /  DBili      /  AST  25  /  ALT  31  /  AlkPhos  199<H>  02-29    Creatinine Trend: 0.75 <--, 0.67 <--, 0.72 <--, 0.68 <--, 0.64 <--, 0.70 <--, 0.74 <--                        9.9    13.56 )-----------( 468      ( 29 Feb 2024 06:03 )             30.6     Urine Studies:  Urinalysis Basic - ( 29 Feb 2024 06:03 )    Color:  / Appearance:  / SG:  / pH:   Gluc: 153 mg/dL / Ketone:   / Bili:  / Urobili:    Blood:  / Protein:  / Nitrite:    Leuk Esterase:  / RBC:  / WBC    Sq Epi:  / Non Sq Epi:  / Bacteria:

## 2024-03-01 ENCOUNTER — APPOINTMENT (OUTPATIENT)
Dept: OBGYN | Facility: CLINIC | Age: 59
End: 2024-03-01

## 2024-03-01 LAB
ALBUMIN SERPL ELPH-MCNC: 2.4 G/DL — LOW (ref 3.5–5)
ALP SERPL-CCNC: 225 U/L — HIGH (ref 40–120)
ALT FLD-CCNC: 40 U/L DA — SIGNIFICANT CHANGE UP (ref 10–60)
ANION GAP SERPL CALC-SCNC: 5 MMOL/L — SIGNIFICANT CHANGE UP (ref 5–17)
AST SERPL-CCNC: 30 U/L — SIGNIFICANT CHANGE UP (ref 10–40)
BASOPHILS # BLD AUTO: 0.04 K/UL — SIGNIFICANT CHANGE UP (ref 0–0.2)
BASOPHILS NFR BLD AUTO: 0.4 % — SIGNIFICANT CHANGE UP (ref 0–2)
BILIRUB SERPL-MCNC: 0.4 MG/DL — SIGNIFICANT CHANGE UP (ref 0.2–1.2)
BUN SERPL-MCNC: 18 MG/DL — SIGNIFICANT CHANGE UP (ref 7–18)
C DIFF BY PCR RESULT: SIGNIFICANT CHANGE UP
CA-I BLD-SCNC: 5.3 MG/DL — SIGNIFICANT CHANGE UP (ref 4.5–5.6)
CALCIUM SERPL-MCNC: 9.1 MG/DL — SIGNIFICANT CHANGE UP (ref 8.4–10.5)
CHLORIDE SERPL-SCNC: 106 MMOL/L — SIGNIFICANT CHANGE UP (ref 96–108)
CO2 SERPL-SCNC: 23 MMOL/L — SIGNIFICANT CHANGE UP (ref 22–31)
CREAT SERPL-MCNC: 0.8 MG/DL — SIGNIFICANT CHANGE UP (ref 0.5–1.3)
EGFR: 85 ML/MIN/1.73M2 — SIGNIFICANT CHANGE UP
EOSINOPHIL # BLD AUTO: 0.22 K/UL — SIGNIFICANT CHANGE UP (ref 0–0.5)
EOSINOPHIL NFR BLD AUTO: 2.1 % — SIGNIFICANT CHANGE UP (ref 0–6)
GI PCR PANEL: SIGNIFICANT CHANGE UP
GLUCOSE BLDC GLUCOMTR-MCNC: 123 MG/DL — HIGH (ref 70–99)
GLUCOSE BLDC GLUCOMTR-MCNC: 159 MG/DL — HIGH (ref 70–99)
GLUCOSE BLDC GLUCOMTR-MCNC: 170 MG/DL — HIGH (ref 70–99)
GLUCOSE BLDC GLUCOMTR-MCNC: 204 MG/DL — HIGH (ref 70–99)
GLUCOSE SERPL-MCNC: 167 MG/DL — HIGH (ref 70–99)
HCT VFR BLD CALC: 30.1 % — LOW (ref 34.5–45)
HGB BLD-MCNC: 9.9 G/DL — LOW (ref 11.5–15.5)
IMM GRANULOCYTES NFR BLD AUTO: 0.9 % — SIGNIFICANT CHANGE UP (ref 0–0.9)
LYMPHOCYTES # BLD AUTO: 19.5 % — SIGNIFICANT CHANGE UP (ref 13–44)
LYMPHOCYTES # BLD AUTO: 2.09 K/UL — SIGNIFICANT CHANGE UP (ref 1–3.3)
MAGNESIUM SERPL-MCNC: 2.1 MG/DL — SIGNIFICANT CHANGE UP (ref 1.6–2.6)
MCHC RBC-ENTMCNC: 28 PG — SIGNIFICANT CHANGE UP (ref 27–34)
MCHC RBC-ENTMCNC: 32.9 GM/DL — SIGNIFICANT CHANGE UP (ref 32–36)
MCV RBC AUTO: 85 FL — SIGNIFICANT CHANGE UP (ref 80–100)
MONOCYTES # BLD AUTO: 0.75 K/UL — SIGNIFICANT CHANGE UP (ref 0–0.9)
MONOCYTES NFR BLD AUTO: 7 % — SIGNIFICANT CHANGE UP (ref 2–14)
NEUTROPHILS # BLD AUTO: 7.53 K/UL — HIGH (ref 1.8–7.4)
NEUTROPHILS NFR BLD AUTO: 70.1 % — SIGNIFICANT CHANGE UP (ref 43–77)
NRBC # BLD: 0 /100 WBCS — SIGNIFICANT CHANGE UP (ref 0–0)
PHOSPHATE SERPL-MCNC: 2.6 MG/DL — SIGNIFICANT CHANGE UP (ref 2.5–4.5)
PLATELET # BLD AUTO: 469 K/UL — HIGH (ref 150–400)
POTASSIUM SERPL-MCNC: 4.5 MMOL/L — SIGNIFICANT CHANGE UP (ref 3.5–5.3)
POTASSIUM SERPL-SCNC: 4.5 MMOL/L — SIGNIFICANT CHANGE UP (ref 3.5–5.3)
PROT SERPL-MCNC: 8.4 G/DL — HIGH (ref 6–8.3)
RBC # BLD: 3.54 M/UL — LOW (ref 3.8–5.2)
RBC # FLD: 15.9 % — HIGH (ref 10.3–14.5)
SODIUM SERPL-SCNC: 134 MMOL/L — LOW (ref 135–145)
TRIGL SERPL-MCNC: 202 MG/DL — HIGH
WBC # BLD: 10.73 K/UL — HIGH (ref 3.8–10.5)
WBC # FLD AUTO: 10.73 K/UL — HIGH (ref 3.8–10.5)

## 2024-03-01 PROCEDURE — 99232 SBSQ HOSP IP/OBS MODERATE 35: CPT

## 2024-03-01 RX ORDER — ACETAMINOPHEN 500 MG
1000 TABLET ORAL ONCE
Refills: 0 | Status: COMPLETED | OUTPATIENT
Start: 2024-03-01 | End: 2024-03-01

## 2024-03-01 RX ORDER — I.V. FAT EMULSION 20 G/100ML
0.34 EMULSION INTRAVENOUS
Qty: 25 | Refills: 0 | Status: DISCONTINUED | OUTPATIENT
Start: 2024-03-01 | End: 2024-03-01

## 2024-03-01 RX ORDER — HYDROMORPHONE HYDROCHLORIDE 2 MG/ML
0.5 INJECTION INTRAMUSCULAR; INTRAVENOUS; SUBCUTANEOUS EVERY 4 HOURS
Refills: 0 | Status: DISCONTINUED | OUTPATIENT
Start: 2024-03-01 | End: 2024-03-02

## 2024-03-01 RX ORDER — ELECTROLYTE SOLUTION,INJ
1 VIAL (ML) INTRAVENOUS
Refills: 0 | Status: DISCONTINUED | OUTPATIENT
Start: 2024-03-01 | End: 2024-03-01

## 2024-03-01 RX ADMIN — GABAPENTIN 100 MILLIGRAM(S): 400 CAPSULE ORAL at 21:58

## 2024-03-01 RX ADMIN — HYDROMORPHONE HYDROCHLORIDE 0.5 MILLIGRAM(S): 2 INJECTION INTRAMUSCULAR; INTRAVENOUS; SUBCUTANEOUS at 17:35

## 2024-03-01 RX ADMIN — Medication 2: at 12:33

## 2024-03-01 RX ADMIN — Medication 1: at 23:07

## 2024-03-01 RX ADMIN — Medication 1000 MILLIGRAM(S): at 13:49

## 2024-03-01 RX ADMIN — CHLORHEXIDINE GLUCONATE 1 APPLICATION(S): 213 SOLUTION TOPICAL at 05:59

## 2024-03-01 RX ADMIN — I.V. FAT EMULSION 10.4 GM/KG/DAY: 20 EMULSION INTRAVENOUS at 22:11

## 2024-03-01 RX ADMIN — Medication 400 MILLIGRAM(S): at 13:04

## 2024-03-01 RX ADMIN — HYDROMORPHONE HYDROCHLORIDE 0.5 MILLIGRAM(S): 2 INJECTION INTRAMUSCULAR; INTRAVENOUS; SUBCUTANEOUS at 12:23

## 2024-03-01 RX ADMIN — PIPERACILLIN AND TAZOBACTAM 25 GRAM(S): 4; .5 INJECTION, POWDER, LYOPHILIZED, FOR SOLUTION INTRAVENOUS at 14:53

## 2024-03-01 RX ADMIN — Medication 1: at 05:49

## 2024-03-01 RX ADMIN — HYDROMORPHONE HYDROCHLORIDE 0.5 MILLIGRAM(S): 2 INJECTION INTRAMUSCULAR; INTRAVENOUS; SUBCUTANEOUS at 00:09

## 2024-03-01 RX ADMIN — INSULIN GLARGINE 10 UNIT(S): 100 INJECTION, SOLUTION SUBCUTANEOUS at 23:02

## 2024-03-01 RX ADMIN — HYDROMORPHONE HYDROCHLORIDE 0.5 MILLIGRAM(S): 2 INJECTION INTRAMUSCULAR; INTRAVENOUS; SUBCUTANEOUS at 01:09

## 2024-03-01 RX ADMIN — HYDROMORPHONE HYDROCHLORIDE 0.5 MILLIGRAM(S): 2 INJECTION INTRAMUSCULAR; INTRAVENOUS; SUBCUTANEOUS at 22:30

## 2024-03-01 RX ADMIN — HYDROMORPHONE HYDROCHLORIDE 0.5 MILLIGRAM(S): 2 INJECTION INTRAMUSCULAR; INTRAVENOUS; SUBCUTANEOUS at 09:54

## 2024-03-01 RX ADMIN — HYDROMORPHONE HYDROCHLORIDE 0.5 MILLIGRAM(S): 2 INJECTION INTRAMUSCULAR; INTRAVENOUS; SUBCUTANEOUS at 05:47

## 2024-03-01 RX ADMIN — Medication 1 EACH: at 22:01

## 2024-03-01 RX ADMIN — PIPERACILLIN AND TAZOBACTAM 25 GRAM(S): 4; .5 INJECTION, POWDER, LYOPHILIZED, FOR SOLUTION INTRAVENOUS at 22:21

## 2024-03-01 RX ADMIN — HYDROMORPHONE HYDROCHLORIDE 0.5 MILLIGRAM(S): 2 INJECTION INTRAMUSCULAR; INTRAVENOUS; SUBCUTANEOUS at 21:58

## 2024-03-01 RX ADMIN — GABAPENTIN 100 MILLIGRAM(S): 400 CAPSULE ORAL at 05:50

## 2024-03-01 RX ADMIN — CHLORHEXIDINE GLUCONATE 1 APPLICATION(S): 213 SOLUTION TOPICAL at 12:34

## 2024-03-01 RX ADMIN — GABAPENTIN 100 MILLIGRAM(S): 400 CAPSULE ORAL at 14:53

## 2024-03-01 RX ADMIN — PIPERACILLIN AND TAZOBACTAM 25 GRAM(S): 4; .5 INJECTION, POWDER, LYOPHILIZED, FOR SOLUTION INTRAVENOUS at 05:47

## 2024-03-01 RX ADMIN — Medication 1: at 00:08

## 2024-03-01 RX ADMIN — I.V. FAT EMULSION 10.4 GM/KG/DAY: 20 EMULSION INTRAVENOUS at 22:01

## 2024-03-01 RX ADMIN — VALSARTAN 160 MILLIGRAM(S): 80 TABLET ORAL at 05:50

## 2024-03-01 RX ADMIN — HYDROMORPHONE HYDROCHLORIDE 0.5 MILLIGRAM(S): 2 INJECTION INTRAMUSCULAR; INTRAVENOUS; SUBCUTANEOUS at 06:47

## 2024-03-01 NOTE — PROGRESS NOTE ADULT - ASSESSMENT
Patient is a 59y Female with HTN, DM, recently admitted 2/5-2/14/24 s/p diagnostic laparoscopy, lysis of adhesions, conversion to laparotomy for adhesiolysis and incisional hernia repair without mesh on 2/6, presents now with abdominal pain. Pt found to have small bowel perforation with pneumoperitoneum. s/p OR 2/19 for ex-lap with bowel resection, found to have adhesions with intrabdominal abscess. Pt transferred to ICU for septic shock 2/2 peritonitis, hypotension requiring brief pressors, with DEION and hyponatremia. DEION and hyponatremia resolved.   Nephrology consulted for TPN for prolonged NPO status.     1. Severe PCM- albumin 1.4, edema with wt loss. Pt initiated on TPN 2/23. s/p IR RUE PICC line placed on 2/22 (after 1pm). Pt tolerating diet; however with frequent BMs/ diarrhea.   c/w TPN with lipids @ 1L; cycled over 12hrs (10 pm-10am). Will adjust electrolytes accordingly. Pt with poor PO intake, if persistent diarrhea; recc full dose TPN in am   Check FS q6h. FS acceptable. c/w 10 units of insulin in TPN bag hgbA1C 8.4 on 2/6/24.  on 3/1  Check CMP/Mg/Phos/ Ionized calcium in am.   Daily weights. Strict I/O. Will hold TPN if pt spikes fever and check BCX x2    2. DEION - likely ATN due to Septic shock. DEION resolved. Strict I/Os. Avoid nephrotoxins/ NSAIDs/ RCA. Monitor BMP.    3. Septic shock- 2/2 peritonitis/ small bowel perforation. Pt on Zosyn. Finished Diflucan for ppx . BCx 2/19 NG.  Plan as per ID/ Surgery. Cdiff neg.  Repeating Cdiff and stool PCR neg.     4. Hypotension- resolved. Now with elevated BP for which Valsartan 160mg PO daily was resumed. Continue to hold HCTZ due to hyponatremia with diarrhea; minimize fluid loss. Monitor BP    TPN orders:    60 kg  Total Kcal 1500  Lipid 50 g =500 kcal  AA 100g = 400 kcal  Dextrose 176g - 600 kcal    [facility] NEPHROLOGY  Dean Shipley M.D.  Jaylon Kang D.O.  Debbi Garcia, M.MD Laura Nicholas, MSN, ANP-C    Telephone: [phone]  Facsimile: [phone] [place] [address], [address]  [place] [address]

## 2024-03-01 NOTE — PROGRESS NOTE ADULT - SUBJECTIVE AND OBJECTIVE BOX
Interval Events:  pt in nad    Allergies    No Known Allergies    Intolerances      Endocrine/Metabolic Medications:  dextrose 50% Injectable 25 Gram(s) IV Push once  dextrose 50% Injectable 12.5 Gram(s) IV Push once  dextrose 50% Injectable 25 Gram(s) IV Push once  dextrose Oral Gel 15 Gram(s) Oral once PRN  glucagon  Injectable 1 milliGRAM(s) IntraMuscular once  insulin glargine Injectable (LANTUS) 10 Unit(s) SubCutaneous at bedtime  insulin lispro (ADMELOG) corrective regimen sliding scale   SubCutaneous every 6 hours      Vital Signs Last 24 Hrs  T(C): 37.2 (01 Mar 2024 05:00), Max: 37.2 (01 Mar 2024 05:00)  T(F): 99 (01 Mar 2024 05:00), Max: 99 (01 Mar 2024 05:00)  HR: 93 (01 Mar 2024 05:00) (90 - 93)  BP: 126/76 (01 Mar 2024 05:00) (121/79 - 137/74)  BP(mean): --  RR: 18 (01 Mar 2024 05:00) (18 - 18)  SpO2: 97% (01 Mar 2024 05:00) (97% - 97%)    Parameters below as of 01 Mar 2024 05:00  Patient On (Oxygen Delivery Method): room air          PHYSICAL EXAM  All physical exam findings normal, except those marked:  General:	Alert, active, cooperative, NAD, well hydrated  .		[] Abnormal:  Neck		Normal: supple, no cervical adenopathy, no palpable thyroid  .		[] Abnormal:  Cardiovascular	Normal: regular rate, normal S1, S2, no murmurs  .		[] Abnormal:  Respiratory	Normal: no chest wall deformity, normal respiratory pattern, CTA B/L  .		[] Abnormal:  Abdominal	Normal: soft, ND, NT, bowel sounds present, no masses, no organomegaly  .		[] Abnormal:  		Normal normal genitalia, testes descended, circumcised/uncircumcised  .		Velma stage:			Breast velma:  .		Menstrual history:  .		[] Abnormal:  Extremities	Normal: FROM x4  .		[] Abnormal:  Skin		Normal: intact and not indurated, no rash, no acanthosis nigricans  .		[] Abnormal:  Neurologic	Normal: grossly intact  .		[] Abnormal:    LABS                        9.9    10.73 )-----------( 469      ( 01 Mar 2024 06:04 )             30.1                               134    |  106    |  18                  Calcium: 9.1   / iCa: x      (03-01 @ 06:04)    ----------------------------<  167       Magnesium: 2.1                              4.5     |  23     |  0.80             Phosphorous: 2.6      TPro  8.4    /  Alb  2.4    /  TBili  0.4    /  DBili  x      /  AST  30     /  ALT  40     /  AlkPhos  225    01 Mar 2024 06:04    CAPILLARY BLOOD GLUCOSE      POCT Blood Glucose.: 159 mg/dL (01 Mar 2024 05:37)  POCT Blood Glucose.: 193 mg/dL (29 Feb 2024 23:44)  POCT Blood Glucose.: 183 mg/dL (29 Feb 2024 22:16)  POCT Blood Glucose.: 156 mg/dL (29 Feb 2024 17:26)  POCT Blood Glucose.: 162 mg/dL (29 Feb 2024 11:53)        Assesment/plan    60 y/o female with pmhx of HTN, DM, to ER c/o decreased appetite over the last few days with severe vaginal pain/spasms.Admitted with perforated gut.   Pt is s/p diagnostic laparoscopy, lysis of adhesions, conversion to laparotomy for adhesiolysis and incisional hernia repair without mesh 2/6/24/ Pt did well postoperatively and was d/c 2/14/2024.   Found to have uncont dm- pt known to me as out pt- on basaglar 15 and metformin. Now TPN with hyperglycemia.    Dm- insulin dependent as out pt  now better controlled on TPN  cont lantus to 10units - hold when off of tpn  cont insulin in the bag to 10 units  fsg q6h  a1c-8.4  cont iv abx- per ID

## 2024-03-01 NOTE — CHART NOTE - NSCHARTNOTEFT_GEN_A_CORE
Assessment:   Patient is a 59y old  Female who presents with a chief complaint of bowel perf (01 Mar 2024 13:38). Pt on 1/2 dose PN (cycled). Pt reports diarrhea ongoing. Reported as not taking PO. Pt visited a few times today (asleep). Discussed pt w/ RN, Sx PAs (on 6N IDR), ID and RDAHA MD      Factors impacting intake: [ ] none [ ] nausea  [ ] vomiting [ ] diarrhea [ ] constipation  [ ]chewing problems [ ] swallowing issues  [xAltered GI fx/ structure ] other:     Diet Prescription: Diet, Consistent Carbohydrate w/Evening Snack:   Fiber/Residue Restricted  Lactose Restricted (Milk Sugar Intoler.)  Supplement Feeding Modality:  Oral  Ensure Plant-Based Cans or Servings Per Day:  1       Frequency:  Two Times a day (24 @ 09:53)          Daily    wt 146# (66.4 KG) on standing scale by AVINASH   Daily Weight in k (2024 07:37)  Weight in k (2024 13:00)  Weight in k.1 (2024 05:11)        Pertinent Medications: MEDICATIONS  (STANDING):  chlorhexidine 2% Cloths 1 Application(s) Topical daily  chlorhexidine 2% Cloths 1 Application(s) Topical <User Schedule>  dextrose 5%. 1000 milliLiter(s) (50 mL/Hr) IV Continuous <Continuous>  dextrose 5%. 1000 milliLiter(s) (100 mL/Hr) IV Continuous <Continuous>  dextrose 50% Injectable 25 Gram(s) IV Push once  dextrose 50% Injectable 12.5 Gram(s) IV Push once  dextrose 50% Injectable 25 Gram(s) IV Push once  gabapentin 100 milliGRAM(s) Oral three times a day  glucagon  Injectable 1 milliGRAM(s) IntraMuscular once  insulin glargine Injectable (LANTUS) 10 Unit(s) SubCutaneous at bedtime  insulin lispro (ADMELOG) corrective regimen sliding scale   SubCutaneous every 6 hours  lidocaine   4% Patch 2 Patch Transdermal every 24 hours  lipid, fat emulsion (Fish Oil and Plant Based) 20% Infusion 0.342 Gm/kG/Day (10.4 mL/Hr) IV Continuous <Continuous>  lipid, fat emulsion (Fish Oil and Plant Based) 20% Infusion 0.342 Gm/kG/Day (10.4 mL/Hr) IV Continuous <Continuous>  Parenteral Nutrition - Adult 1 Each TPN Continuous <Continuous>  Parenteral Nutrition - Adult 1 Each TPN Continuous <Continuous>  piperacillin/tazobactam IVPB.. 3.375 Gram(s) IV Intermittent every 8 hours  valsartan 160 milliGRAM(s) Oral daily    MEDICATIONS  (PRN):  benzocaine/menthol Lozenge 1 Lozenge Oral every 2 hours PRN Sore Throat  dextrose Oral Gel 15 Gram(s) Oral once PRN Blood Glucose LESS THAN 70 milliGRAM(s)/deciliter  HYDROmorphone  Injectable 0.5 milliGRAM(s) IV Push every 4 hours PRN Moderate Pain (4 - 6)  ondansetron Injectable 4 milliGRAM(s) IV Push every 6 hours PRN Nausea and/or Vomiting  sodium chloride 0.9% lock flush 10 milliLiter(s) IV Push every 1 hour PRN Pre/post blood products, medications, blood draw, and to maintain line patency    Pertinent Labs:  Na134 mmol/L<L> Glu 167 mg/dL<H> K+ 4.5 mmol/L Cr  0.80 mg/dL BUN 18 mg/dL  Phos 2.6 mg/dL  Alb 2.4 g/dL<L>  Chol --    LDL --    HDL --    Trig 202 mg/dL<H>     CAPILLARY BLOOD GLUCOSE      POCT Blood Glucose.: 204 mg/dL (01 Mar 2024 11:39)  POCT Blood Glucose.: 159 mg/dL (01 Mar 2024 05:37)  POCT Blood Glucose.: 193 mg/dL (2024 23:44)  POCT Blood Glucose.: 183 mg/dL (2024 22:16)  POCT Blood Glucose.: 156 mg/dL (2024 17:26)        Estimated Needs:   [x ] no change since previous assessment  [ ] recalculated:       Previous Nutrition Diagnosis:   [ ] Altered GI function  [ ]Inadequate Oral Intake [ ] Swallowing Difficulty   [ ] Altered nutrition related labs [ ] Increased Nutrient Needs [ ] Overweight/Obesity   [ ] Unintended Weight Loss [ ] Food & Nutrition Related Knowledge Deficit [x ] Malnutrition (severe PCM)  [ ] Other:     Nutrition Diagnosis is [x ] ongoing  [ ] resolved [ ] not applicable           Interventions:   Recommend  [ ] Change Diet To:  [ ] Nutrition Supplement  [x ] Nutrition Support: PN orders per RADHA MD (per Sx PMD)  [x ] Other: Suggest increase PN to full dose 2/2 diarrhea/ poor oral intake. MD to monitor. RD available.     Monitoring and Evaluation:   [x ] PO intake [ x ] Tolerance to diet prescription [ x ] weights [ x ] labs[ x ] follow up per protocol  [ ] other:

## 2024-03-01 NOTE — PROGRESS NOTE ADULT - SUBJECTIVE AND OBJECTIVE BOX
INTERVAL HPI/OVERNIGHT EVENTS:  seen and examined   Pt resting comfortably.   reporting many episodes of diarrhea overnight   rechecking stool studies  minimal po intake reported   Denies N/V  denies abdominal pain at baseline, reports some abdominal pain while defecating     MEDICATIONS  (STANDING):  chlorhexidine 2% Cloths 1 Application(s) Topical <User Schedule>  chlorhexidine 2% Cloths 1 Application(s) Topical daily  dextrose 5%. 1000 milliLiter(s) (50 mL/Hr) IV Continuous <Continuous>  dextrose 5%. 1000 milliLiter(s) (100 mL/Hr) IV Continuous <Continuous>  dextrose 50% Injectable 25 Gram(s) IV Push once  dextrose 50% Injectable 12.5 Gram(s) IV Push once  dextrose 50% Injectable 25 Gram(s) IV Push once  gabapentin 100 milliGRAM(s) Oral three times a day  glucagon  Injectable 1 milliGRAM(s) IntraMuscular once  insulin glargine Injectable (LANTUS) 10 Unit(s) SubCutaneous at bedtime  insulin lispro (ADMELOG) corrective regimen sliding scale   SubCutaneous every 6 hours  lidocaine   4% Patch 2 Patch Transdermal every 24 hours  lipid, fat emulsion (Fish Oil and Plant Based) 20% Infusion 0.342 Gm/kG/Day (10.4 mL/Hr) IV Continuous <Continuous>  Parenteral Nutrition - Adult 1 Each TPN Continuous <Continuous>  piperacillin/tazobactam IVPB.. 3.375 Gram(s) IV Intermittent every 8 hours  valsartan 160 milliGRAM(s) Oral daily    MEDICATIONS  (PRN):  benzocaine/menthol Lozenge 1 Lozenge Oral every 2 hours PRN Sore Throat  dextrose Oral Gel 15 Gram(s) Oral once PRN Blood Glucose LESS THAN 70 milliGRAM(s)/deciliter  HYDROmorphone  Injectable 0.5 milliGRAM(s) IV Push every 4 hours PRN Moderate Pain (4 - 6)  ondansetron Injectable 4 milliGRAM(s) IV Push every 6 hours PRN Nausea and/or Vomiting  sodium chloride 0.9% lock flush 10 milliLiter(s) IV Push every 1 hour PRN Pre/post blood products, medications, blood draw, and to maintain line patency      Vital Signs Last 24 Hrs  T(C): 37.2 (01 Mar 2024 05:00), Max: 37.2 (01 Mar 2024 05:00)  T(F): 99 (01 Mar 2024 05:00), Max: 99 (01 Mar 2024 05:00)  HR: 93 (01 Mar 2024 05:00) (90 - 93)  BP: 126/76 (01 Mar 2024 05:00) (121/79 - 137/74)  BP(mean): --  RR: 18 (01 Mar 2024 05:00) (18 - 18)  SpO2: 97% (01 Mar 2024 05:00) (97% - 97%)    Parameters below as of 01 Mar 2024 05:00  Patient On (Oxygen Delivery Method): room air        Physical:  General: NAD.  Respirations: Unlabored  Abd: soft, nontender, nondistended, midline wound upper and lower pole packed with packing strip; no surrounding erythema, removed and wound repacked, ZEKE drain in place with minimal serosanguinous output      I&O's Detail    29 Feb 2024 07:01  -  01 Mar 2024 07:00  --------------------------------------------------------  IN:  Total IN: 0 mL    OUT:    Bulb (mL): 10 mL  Total OUT: 10 mL    Total NET: -10 mL          LABS:                        9.9    10.73 )-----------( 469      ( 01 Mar 2024 06:04 )             30.1             03-01    134<L>  |  106  |  18  ----------------------------<  167<H>  4.5   |  23  |  0.80    Ca    9.1      01 Mar 2024 06:04  Phos  2.6     03-01  Mg     2.1     03-01    TPro  8.4<H>  /  Alb  2.4<L>  /  TBili  0.4  /  DBili  x   /  AST  30  /  ALT  40  /  AlkPhos  225<H>  03-01      < from: CT Abdomen and Pelvis w/ IV Cont (02.29.24 @ 11:23) >  ACC: [id number] EXAM:  CT ABDOMEN AND PELVIS IC   ORDERED BY:  CHRISTOPH ALVARENGA     PROCEDURE DATE:  02/29/2024          INTERPRETATION:  CLINICAL INFORMATION: 59 years  Female with SBR,   intra-abd collections. Status post exploratory laparotomywith lysis of   adhesions and small bowel resection on 2/19/2024    COMPARISON: 2/25/2024    CONTRAST/COMPLICATIONS:  IV Contrast: Omnipaque 350  90 cc administered   10 cc discarded  Oral Contrast: NONE  Complications: None reported at time of studycompletion    PROCEDURE:  CT of the Abdomen and Pelvis was performed.  Sagittal and coronal reformats were performed.    FINDINGS:  LOWER CHEST: Mild bibasilar dependent atelectasis.    LIVER: Within normal limits.  BILE DUCTS: Normal caliber.  GALLBLADDER: Within normal limits.  SPLEEN: Within normal limits.  PANCREAS: Within normal limits.  ADRENALS: Within normal limits.  KIDNEYS/URETERS: Bilateral subcentimeter cortical hypodensities too small   to characterize. Left upper pole cyst. No hydronephrosis. Symmetrical   nephrograms.    BLADDER: Completely decompressed.  REPRODUCTIVE ORGANS: Hysterectomy.    BOWEL: Improvement in prior small bowel distention.   Appendix not   visualized. Right lower quadrant small bowel anastomosis. Liquid stool   throughout the colon.  PERITONEUM: No ascites. Redemonstrated peripherally enhancing right   pelvic collection measuring 7.0 cm AP x 4.7 cm transverse x 3.1 cm   sagittal, previously 8.6 x 4.2 x 2.3 cm. The drainage catheter courses   anterior tothe collection. No pneumoperitoneum. Lower abdominal   mesenteric edema, postoperative.  VESSELS: Atherosclerotic changes.  RETROPERITONEUM/LYMPH NODES: No lymphadenopathy.  ABDOMINAL WALL: Recent postoperative changes. Percutaneous drainage   catheter via a right lower abdominal approach with tip in the left lower   quadrant. Small fluid collection deep to the incision is unchanged.  BONES: Degenerative change.    IMPRESSION:  Rim enhancing right pelvic collection without significant change. Again,   the drainage catheter courses anterior to the collection.    Resolution of small bowel dilatation. Fluid-filled colon either   infectious colitis or ileus.    Small fluid collection deep to the incision unchanged.        --- End of Report ---            BAILEY GREENWOOD MD; Attending Radiologist  This document has been electronically signed. Feb 29 2024  2:12PM    < end of copied text >

## 2024-03-01 NOTE — PROGRESS NOTE ADULT - ASSESSMENT
Subjective: Afebrile, non toxic, clinically stable, no N/V, no abd pain, still with semiliquid yellow diarrhea # 5 today right after eating or drinking.     REVIEW OF SYSTEMS:  CONSTITUTIONAL:  No fevers or chills  EYES/ENT:  No visual changes;  No vertigo or throat pain   NECK:  No pain or stiffness  RESPIRATORY:  No cough, wheezing, hemoptysis; No shortness of breath  CARDIOVASCULAR:  No chest pain or palpitations  GASTROINTESTINAL:  No abdominal pain, no N/V, + diarrhea  GENITOURINARY:  No dysuria, frequency or hematuria  NEUROLOGICAL:  No numbness or weakness  MSK: no back pain, no joint pain  SKIN:  No itching, rashes    PE:  Vital Signs Last 24 Hrs  T(C): 36.9 (01 Mar 2024 14:13), Max: 37.2 (01 Mar 2024 05:00)  T(F): 98.4 (01 Mar 2024 14:13), Max: 99 (01 Mar 2024 05:00)  HR: 85 (01 Mar 2024 14:13) (85 - 93)  BP: 118/68 (01 Mar 2024 14:13) (118/68 - 126/76)  RR: 18 (01 Mar 2024 14:13) (18 - 18)  SpO2: 98% (01 Mar 2024 14:13) (97% - 98%)    Parameters below as of 01 Mar 2024 14:13  Patient On (Oxygen Delivery Method): room air    Gen: AOx3, NAD, non-toxic, pleasant  HEAD:  Atraumatic, Normocephalic  EYES: PERRLA, conjunctiva and sclera clear  ENT: Moist mucous membranes  NECK: Supple, No JVD  CV: S1+S2 normal, nontachycardic  Resp: Clear bilat, no resp distress, no crackles/wheezes  Abd: Soft, nontender, ZEKE with minimal output , +BS surgical wound healing well with no dehiscence of drainage   Ext: No LE edema, no cyanosis, pulses +  : No dysuria  IV/Skin: No thrombophlebitis  Msk: No low back pain, no arthralgias, no joint swelling  Neuro: AAOx3. No focal signs     LABS:                        9.9    10.73 )-----------( 469      ( 01 Mar 2024 06:04 )             30.1     03-01    134<L>  |  106  |  18  ----------------------------<  167<H>  4.5   |  23  |  0.80    Ca    9.1      01 Mar 2024 06:04  Phos  2.6     03-01  Mg     2.1     03-01    TPro  8.4<H>  /  Alb  2.4<L>  /  TBili  0.4  /  DBili  x   /  AST  30  /  ALT  40  /  AlkPhos  225<H>  03-01    C-Reactive Protein, Serum: 14 mg/L *H* (02-29-24 @ 06:03)  Sedimentation Rate, Erythrocyte: 106 mm/Hr *H* (02-29-24 @ 06:03)    MICROBIOLOGY:  v    .Abscess abdominal abcess  02-19-24   Rare Escherichia coli  Few Alpha hemolytic strep "Susceptibilities not performed"  Numerous Streptococcus anginosus "Susceptibilities not performed"  Few Lactobacillus rhamnosus "Susceptibilities not performed"  --  Escherichia coli      Clean Catch Clean Catch (Midstream)  02-19-24   >=3 organisms. Probable collection contamination.  --  --      .Blood Blood-Peripheral  02-19-24   No growth at 5 days  --  --      .Blood Blood-Peripheral  02-19-24   No growth at 5 days  --  --      C Diff by PCR Result: NotDetec (02-29 @ 14:39)  C Diff by PCR Result: NotDetec (02-25 @ 13:00)    C Diff by PCR Result: NotDetec (02-29-24 @ 14:39)  C Diff by PCR Result: NotDetec (02-25-24 @ 13:00)    RADIOLOGY:< from: CT Abdomen and Pelvis w/ IV Cont (02.29.24 @ 11:23) >  PROCEDURE:  CT of the Abdomen and Pelvis was performed.  Sagittal and coronal reformats were performed.    FINDINGS:  LOWER CHEST: Mild bibasilar dependent atelectasis.    LIVER: Within normal limits.  BILE DUCTS: Normal caliber.  GALLBLADDER: Within normal limits.  SPLEEN: Within normal limits.  PANCREAS: Within normal limits.  ADRENALS: Within normal limits.  KIDNEYS/URETERS: Bilateral subcentimeter cortical hypodensities too small   to characterize. Left upper pole cyst. No hydronephrosis. Symmetrical   nephrograms.    BLADDER: Completely decompressed.  REPRODUCTIVE ORGANS: Hysterectomy.    BOWEL: Improvement in prior small bowel distention.   Appendix not   visualized. Right lower quadrant small bowel anastomosis. Liquid stool   throughout the colon.  PERITONEUM: No ascites. Redemonstrated peripherally enhancing right   pelvic collection measuring 7.0 cm AP x 4.7 cm transverse x 3.1 cm   sagittal, previously 8.6 x 4.2 x 2.3 cm. The drainage catheter courses   anterior tothe collection. No pneumoperitoneum. Lower abdominal   mesenteric edema, postoperative.  VESSELS: Atherosclerotic changes.  RETROPERITONEUM/LYMPH NODES: No lymphadenopathy.  ABDOMINAL WALL: Recent postoperative changes. Percutaneous drainage   catheter via a right lower abdominal approach with tip in the left lower   quadrant. Small fluid collection deep to the incision is unchanged.  BONES: Degenerative change.    IMPRESSION:  Rim enhancing right pelvic collection without significant change. Again,   the drainage catheter courses anterior to the collection.    Resolution of small bowel dilatation. Fluid-filled colon either   infectious colitis or ileus.    Small fluid collection deep to the incision unchanged.    ANTIBIOTICS:  piperacillin/tazobactam IVPB.. 3.375 every 8 hours    IMPRESSION:  60 y/o female with pmhx of HTN, DM admitted for acute abdomen on 2/5 (RLQ pain).  s/p diagnostic laparoscopy , lysis of adhesions and conversion to laparotomy for adhesiolysis and incisional hernia repair 2/6.  D/C on 2/14 after stable post OP.  Pt returned to ED 2/19 with complains of abd pain/vaginal pain and sob. CT A/P 2/19 showed evidence of bowel perforation and pneumoperitoneum.  S/P Ex Lap SBR and abd washout 2/19 with intraop findings of abscess(intraop cx polymicrobial + Rare Escherichia coli + Few Alpha hemolytic strep +Numerous Streptococcus anginosus + Few Lactobacillus rhamnosus.  Post operatively admitted to MICU 2/20 for septic shock.  ID consulted while in MICU for abx guidance.  Most recent CT A/P 2/25 with Interval small bowel resection with a right lower quadrant small bowel anastomosis. In the right hemipelvis, there is a rim-enhancing 8.6 cm fluid collection abutting a distal small bowel loop proximal to the   anastomosis.  CT A/P 2/29  showed again peripherally enhancing right pelvic collection 7.0 cm AP x 4.7 cm transverse x 3.1 cm sagittal, previously 8.6 x 4.2 x 2.3 cm.    - Acute abdomen s/p Ex/Lap complicated with perforation and abscess formation   -Post op abscess s/p small bowel resection and washout (cx + polymicrobial)  -Septic shock- resolved  -R hemipelvis RLQ anastomosis abscess (8.6 cm)  -Ileus  -Diarrhea - unclear etiology (C diff neg, PCR neg)    PLAN:  Continue Zosyn 3.375g q8 hrs IV  Planning IR drain of RLQ abscess monday  Trend ESR and CRP  wound care  Incentive spirometry   Rest per primary  ID weekend coverage by Dr. Cadet    Please reach ID with any questions or concerns  Dr. Madelyn Rashid  Available in Teams

## 2024-03-01 NOTE — PROGRESS NOTE ADULT - SUBJECTIVE AND OBJECTIVE BOX
[facility] NEPHROLOGY- METABOLIC SUPPORT PROGRESS NOTE    Patient is a 59y Female with HTN, DM, recently admitted 2/5-2/14/24 s/p diagnostic laparoscopy, lysis of adhesions, conversion to laparotomy for adhesiolysis and incisional hernia repair without mesh on 2/6, presents now with abdominal pain. Pt found to have small bowel perforation with pneumoperitoneum. s/p OR 2/19 for ex-lap with bowel resection, found to have adhesions with intrabdominal abscess. Pt transferred to ICU for septic shock 2/2 peritonitis, hypotension requiring brief pressors, with DEION and hyponatremia. DEION and hyponatremia resolved.   Nephrology consulted for TPN for prolonged NPO status.   2/22: s/p IR RUE PICC placement  2/23: Pt started on CLD  2/26: Advanced to full liquid diet  2/28: PO diet    Hospital Medications: Medications reviewed.  REVIEW OF SYSTEMS:  CONSTITUTIONAL: No fevers or chills  RESPIRATORY: No shortness of breath  CARDIOVASCULAR: No chest pain.  GASTROINTESTINAL: No nausea, or vomiting, +abd pain jose enrique when defecating. frequent diarrhea ~ 6 episodes since this am  VASCULAR: No bilateral lower extremity edema.     VITALS:  T(F): 99 (03-01-24 @ 05:00), Max: 99 (03-01-24 @ 05:00)  HR: 93 (03-01-24 @ 05:00)  BP: 126/76 (03-01-24 @ 05:00)  RR: 18 (03-01-24 @ 05:00)  SpO2: 97% (03-01-24 @ 05:00)  Wt(kg): --    02-29 @ 07:01  -  03-01 @ 07:00  --------------------------------------------------------  IN: 0 mL / OUT: 10 mL / NET: -10 mL      PHYSICAL EXAM:  Constitutional: NAD,   HEENT: anicteric sclera,   Respiratory: CTAB, no wheezes, rales or rhonchi  Cardiovascular: S1, S2, RRR  Gastrointestinal: +midline incision covered, +ZEKE  Extremities: No LE edema b/l   Vascular Access: RUE single lumen PICC- saved for  TPN    LABS:  03-01    134<L>  |  106  |  18  ----------------------------<  167<H>  4.5   |  23  |  0.80    Ca    9.1      01 Mar 2024 06:04  Phos  2.6     03-01  Mg     2.1     03-01    TPro  8.4<H>  /  Alb  2.4<L>  /  TBili  0.4  /  DBili      /  AST  30  /  ALT  40  /  AlkPhos  225<H>  03-01    Creatinine Trend: 0.80 <--, 0.75 <--, 0.67 <--, 0.72 <--, 0.68 <--, 0.64 <--, 0.70 <--                        9.9    10.73 )-----------( 469      ( 01 Mar 2024 06:04 )             30.1     Urine Studies:  Urinalysis Basic - ( 01 Mar 2024 06:04 )    Color:  / Appearance:  / SG:  / pH:   Gluc: 167 mg/dL / Ketone:   / Bili:  / Urobili:    Blood:  / Protein:  / Nitrite:    Leuk Esterase:  / RBC:  / WBC    Sq Epi:  / Non Sq Epi:  / Bacteria:

## 2024-03-01 NOTE — PROGRESS NOTE ADULT - ASSESSMENT
59F s/p exploratory laparoscopy with lysis of adhesions and Small Bowel Resection secondary to 2/19  PICC placed 2/22 for TPN  rpt ct 2/29 grossly unchanged with rim enhancing pelvic collection   diarrhea     -continue diet as tolerated   -continue local wound care to midline incision with daily packing changes  -TPN per nephrology, electrolyte repletion/monitoring  -continue ZEKE monitor output  -encourage ambulation / OOB  -f/u id recs, f/u repeat stool studies   -discussed with Dr. Lopez    This note and all of its recommendations herein are preliminary until co-signed by an attending physician

## 2024-03-02 LAB
ALBUMIN SERPL ELPH-MCNC: 2.3 G/DL — LOW (ref 3.5–5)
ALP SERPL-CCNC: 247 U/L — HIGH (ref 40–120)
ALT FLD-CCNC: 52 U/L DA — SIGNIFICANT CHANGE UP (ref 10–60)
ANION GAP SERPL CALC-SCNC: 5 MMOL/L — SIGNIFICANT CHANGE UP (ref 5–17)
AST SERPL-CCNC: 47 U/L — HIGH (ref 10–40)
BASOPHILS # BLD AUTO: 0.04 K/UL — SIGNIFICANT CHANGE UP (ref 0–0.2)
BASOPHILS NFR BLD AUTO: 0.5 % — SIGNIFICANT CHANGE UP (ref 0–2)
BILIRUB SERPL-MCNC: 0.4 MG/DL — SIGNIFICANT CHANGE UP (ref 0.2–1.2)
BUN SERPL-MCNC: 16 MG/DL — SIGNIFICANT CHANGE UP (ref 7–18)
CA-I BLD-SCNC: 5.2 MG/DL — SIGNIFICANT CHANGE UP (ref 4.5–5.6)
CALCIUM SERPL-MCNC: 9.2 MG/DL — SIGNIFICANT CHANGE UP (ref 8.4–10.5)
CHLORIDE SERPL-SCNC: 107 MMOL/L — SIGNIFICANT CHANGE UP (ref 96–108)
CO2 SERPL-SCNC: 24 MMOL/L — SIGNIFICANT CHANGE UP (ref 22–31)
CREAT SERPL-MCNC: 0.7 MG/DL — SIGNIFICANT CHANGE UP (ref 0.5–1.3)
CULTURE RESULTS: SIGNIFICANT CHANGE UP
EGFR: 100 ML/MIN/1.73M2 — SIGNIFICANT CHANGE UP
EOSINOPHIL # BLD AUTO: 0.24 K/UL — SIGNIFICANT CHANGE UP (ref 0–0.5)
EOSINOPHIL NFR BLD AUTO: 3.2 % — SIGNIFICANT CHANGE UP (ref 0–6)
GLUCOSE BLDC GLUCOMTR-MCNC: 128 MG/DL — HIGH (ref 70–99)
GLUCOSE BLDC GLUCOMTR-MCNC: 142 MG/DL — HIGH (ref 70–99)
GLUCOSE BLDC GLUCOMTR-MCNC: 162 MG/DL — HIGH (ref 70–99)
GLUCOSE BLDC GLUCOMTR-MCNC: 170 MG/DL — HIGH (ref 70–99)
GLUCOSE BLDC GLUCOMTR-MCNC: 172 MG/DL — HIGH (ref 70–99)
GLUCOSE SERPL-MCNC: 174 MG/DL — HIGH (ref 70–99)
HCT VFR BLD CALC: 29.1 % — LOW (ref 34.5–45)
HGB BLD-MCNC: 9.3 G/DL — LOW (ref 11.5–15.5)
IMM GRANULOCYTES NFR BLD AUTO: 0.5 % — SIGNIFICANT CHANGE UP (ref 0–0.9)
LYMPHOCYTES # BLD AUTO: 2.01 K/UL — SIGNIFICANT CHANGE UP (ref 1–3.3)
LYMPHOCYTES # BLD AUTO: 27 % — SIGNIFICANT CHANGE UP (ref 13–44)
MAGNESIUM SERPL-MCNC: 2 MG/DL — SIGNIFICANT CHANGE UP (ref 1.6–2.6)
MCHC RBC-ENTMCNC: 27 PG — SIGNIFICANT CHANGE UP (ref 27–34)
MCHC RBC-ENTMCNC: 32 GM/DL — SIGNIFICANT CHANGE UP (ref 32–36)
MCV RBC AUTO: 84.6 FL — SIGNIFICANT CHANGE UP (ref 80–100)
MONOCYTES # BLD AUTO: 0.61 K/UL — SIGNIFICANT CHANGE UP (ref 0–0.9)
MONOCYTES NFR BLD AUTO: 8.2 % — SIGNIFICANT CHANGE UP (ref 2–14)
NEUTROPHILS # BLD AUTO: 4.51 K/UL — SIGNIFICANT CHANGE UP (ref 1.8–7.4)
NEUTROPHILS NFR BLD AUTO: 60.6 % — SIGNIFICANT CHANGE UP (ref 43–77)
NRBC # BLD: 0 /100 WBCS — SIGNIFICANT CHANGE UP (ref 0–0)
PH STL: 7.1 — SIGNIFICANT CHANGE UP (ref 5.6–14)
PHOSPHATE SERPL-MCNC: 2.4 MG/DL — LOW (ref 2.5–4.5)
PLATELET # BLD AUTO: 454 K/UL — HIGH (ref 150–400)
POTASSIUM SERPL-MCNC: 4.2 MMOL/L — SIGNIFICANT CHANGE UP (ref 3.5–5.3)
POTASSIUM SERPL-SCNC: 4.2 MMOL/L — SIGNIFICANT CHANGE UP (ref 3.5–5.3)
PROT SERPL-MCNC: 8.2 G/DL — SIGNIFICANT CHANGE UP (ref 6–8.3)
RBC # BLD: 3.44 M/UL — LOW (ref 3.8–5.2)
RBC # FLD: 16 % — HIGH (ref 10.3–14.5)
SODIUM SERPL-SCNC: 136 MMOL/L — SIGNIFICANT CHANGE UP (ref 135–145)
SPECIMEN SOURCE: SIGNIFICANT CHANGE UP
WBC # BLD: 7.45 K/UL — SIGNIFICANT CHANGE UP (ref 3.8–10.5)
WBC # FLD AUTO: 7.45 K/UL — SIGNIFICANT CHANGE UP (ref 3.8–10.5)

## 2024-03-02 RX ORDER — CHOLESTYRAMINE 4 G/9G
4 POWDER, FOR SUSPENSION ORAL
Refills: 0 | Status: DISCONTINUED | OUTPATIENT
Start: 2024-03-02 | End: 2024-03-08

## 2024-03-02 RX ORDER — TRAMADOL HYDROCHLORIDE 50 MG/1
50 TABLET ORAL EVERY 6 HOURS
Refills: 0 | Status: DISCONTINUED | OUTPATIENT
Start: 2024-03-02 | End: 2024-03-08

## 2024-03-02 RX ORDER — ELECTROLYTE SOLUTION,INJ
1 VIAL (ML) INTRAVENOUS
Refills: 0 | Status: DISCONTINUED | OUTPATIENT
Start: 2024-03-02 | End: 2024-03-02

## 2024-03-02 RX ORDER — HYDROMORPHONE HYDROCHLORIDE 2 MG/ML
0.5 INJECTION INTRAMUSCULAR; INTRAVENOUS; SUBCUTANEOUS EVERY 6 HOURS
Refills: 0 | Status: DISCONTINUED | OUTPATIENT
Start: 2024-03-02 | End: 2024-03-07

## 2024-03-02 RX ORDER — I.V. FAT EMULSION 20 G/100ML
0.55 EMULSION INTRAVENOUS
Qty: 39.99 | Refills: 0 | Status: DISCONTINUED | OUTPATIENT
Start: 2024-03-02 | End: 2024-03-02

## 2024-03-02 RX ADMIN — LIDOCAINE 2 PATCH: 4 CREAM TOPICAL at 17:02

## 2024-03-02 RX ADMIN — HYDROMORPHONE HYDROCHLORIDE 0.5 MILLIGRAM(S): 2 INJECTION INTRAMUSCULAR; INTRAVENOUS; SUBCUTANEOUS at 05:47

## 2024-03-02 RX ADMIN — HYDROMORPHONE HYDROCHLORIDE 0.5 MILLIGRAM(S): 2 INJECTION INTRAMUSCULAR; INTRAVENOUS; SUBCUTANEOUS at 19:53

## 2024-03-02 RX ADMIN — VALSARTAN 160 MILLIGRAM(S): 80 TABLET ORAL at 05:53

## 2024-03-02 RX ADMIN — HYDROMORPHONE HYDROCHLORIDE 0.5 MILLIGRAM(S): 2 INJECTION INTRAMUSCULAR; INTRAVENOUS; SUBCUTANEOUS at 20:01

## 2024-03-02 RX ADMIN — PIPERACILLIN AND TAZOBACTAM 25 GRAM(S): 4; .5 INJECTION, POWDER, LYOPHILIZED, FOR SOLUTION INTRAVENOUS at 05:53

## 2024-03-02 RX ADMIN — Medication 1 EACH: at 22:01

## 2024-03-02 RX ADMIN — Medication 1: at 12:05

## 2024-03-02 RX ADMIN — HYDROMORPHONE HYDROCHLORIDE 0.5 MILLIGRAM(S): 2 INJECTION INTRAMUSCULAR; INTRAVENOUS; SUBCUTANEOUS at 10:19

## 2024-03-02 RX ADMIN — PIPERACILLIN AND TAZOBACTAM 25 GRAM(S): 4; .5 INJECTION, POWDER, LYOPHILIZED, FOR SOLUTION INTRAVENOUS at 21:31

## 2024-03-02 RX ADMIN — CHOLESTYRAMINE 4 GRAM(S): 4 POWDER, FOR SUSPENSION ORAL at 16:34

## 2024-03-02 RX ADMIN — INSULIN GLARGINE 10 UNIT(S): 100 INJECTION, SOLUTION SUBCUTANEOUS at 21:30

## 2024-03-02 RX ADMIN — HYDROMORPHONE HYDROCHLORIDE 0.5 MILLIGRAM(S): 2 INJECTION INTRAMUSCULAR; INTRAVENOUS; SUBCUTANEOUS at 06:21

## 2024-03-02 RX ADMIN — Medication 1: at 06:21

## 2024-03-02 RX ADMIN — HYDROMORPHONE HYDROCHLORIDE 0.5 MILLIGRAM(S): 2 INJECTION INTRAMUSCULAR; INTRAVENOUS; SUBCUTANEOUS at 11:27

## 2024-03-02 RX ADMIN — TRAMADOL HYDROCHLORIDE 50 MILLIGRAM(S): 50 TABLET ORAL at 17:56

## 2024-03-02 RX ADMIN — TRAMADOL HYDROCHLORIDE 50 MILLIGRAM(S): 50 TABLET ORAL at 19:53

## 2024-03-02 RX ADMIN — CHLORHEXIDINE GLUCONATE 1 APPLICATION(S): 213 SOLUTION TOPICAL at 05:52

## 2024-03-02 RX ADMIN — HYDROMORPHONE HYDROCHLORIDE 0.5 MILLIGRAM(S): 2 INJECTION INTRAMUSCULAR; INTRAVENOUS; SUBCUTANEOUS at 16:11

## 2024-03-02 RX ADMIN — PIPERACILLIN AND TAZOBACTAM 25 GRAM(S): 4; .5 INJECTION, POWDER, LYOPHILIZED, FOR SOLUTION INTRAVENOUS at 13:20

## 2024-03-02 RX ADMIN — HYDROMORPHONE HYDROCHLORIDE 0.5 MILLIGRAM(S): 2 INJECTION INTRAMUSCULAR; INTRAVENOUS; SUBCUTANEOUS at 20:16

## 2024-03-02 RX ADMIN — HYDROMORPHONE HYDROCHLORIDE 0.5 MILLIGRAM(S): 2 INJECTION INTRAMUSCULAR; INTRAVENOUS; SUBCUTANEOUS at 02:20

## 2024-03-02 RX ADMIN — GABAPENTIN 100 MILLIGRAM(S): 400 CAPSULE ORAL at 05:53

## 2024-03-02 RX ADMIN — HYDROMORPHONE HYDROCHLORIDE 0.5 MILLIGRAM(S): 2 INJECTION INTRAMUSCULAR; INTRAVENOUS; SUBCUTANEOUS at 14:46

## 2024-03-02 RX ADMIN — GABAPENTIN 100 MILLIGRAM(S): 400 CAPSULE ORAL at 13:20

## 2024-03-02 RX ADMIN — CHLORHEXIDINE GLUCONATE 1 APPLICATION(S): 213 SOLUTION TOPICAL at 11:27

## 2024-03-02 RX ADMIN — I.V. FAT EMULSION 16.7 GM/KG/DAY: 20 EMULSION INTRAVENOUS at 22:00

## 2024-03-02 RX ADMIN — Medication 1: at 17:02

## 2024-03-02 RX ADMIN — LIDOCAINE 2 PATCH: 4 CREAM TOPICAL at 05:50

## 2024-03-02 RX ADMIN — GABAPENTIN 100 MILLIGRAM(S): 400 CAPSULE ORAL at 21:29

## 2024-03-02 RX ADMIN — Medication 1000 MILLIGRAM(S): at 19:53

## 2024-03-02 NOTE — PROGRESS NOTE ADULT - SUBJECTIVE AND OBJECTIVE BOX
Pt resting comfortably.   reporting many episodes of diarrhea overnight   minimal po intake reported   Denies N/V  denies abdominal pain at baseline, reports some abdominal pain while defecating           ICU Vital Signs Last 24 Hrs  T(C): 36.7 (02 Mar 2024 05:57), Max: 36.9 (01 Mar 2024 14:13)  T(F): 98.1 (02 Mar 2024 05:57), Max: 98.4 (01 Mar 2024 14:13)  HR: 99 (02 Mar 2024 05:57) (85 - 99)  BP: 128/84 (02 Mar 2024 05:57) (110/73 - 128/84)  BP(mean): 99 (02 Mar 2024 05:57) (85 - 99)  ABP: --  ABP(mean): --  RR: 18 (02 Mar 2024 05:57) (18 - 18)  SpO2: 98% (02 Mar 2024 05:57) (98% - 98%)    O2 Parameters below as of 02 Mar 2024 05:57  Patient On (Oxygen Delivery Method): room air                Physical:  General: NAD.  Respirations: Unlabored  Abd: soft, nontender, nondistended, midline wound upper and lower pole packed with packing strip; no surrounding erythema, removed and wound repacked, ZEKE drain in place with minimal serosanguinous output            LABS:                        9.9    10.73 )-----------( 469      ( 01 Mar 2024 06:04 )             30.1             03-01    134<L>  |  106  |  18  ----------------------------<  167<H>  4.5   |  23  |  0.80    Ca    9.1      01 Mar 2024 06:04  Phos  2.6     03-01  Mg     2.1     03-01    TPro  8.4<H>  /  Alb  2.4<L>  /  TBili  0.4  /  DBili  x   /  AST  30  /  ALT  40  /  AlkPhos  225<H>  03-01      < from: CT Abdomen and Pelvis w/ IV Cont (02.29.24 @ 11:23) >  ACC: [id number] EXAM:  CT ABDOMEN AND PELVIS IC   ORDERED BY:  CHRISTOPH ALVARENGA     PROCEDURE DATE:  02/29/2024          INTERPRETATION:  CLINICAL INFORMATION: 59 years  Female with SBR,   intra-abd collections. Status post exploratory laparotomywith lysis of   adhesions and small bowel resection on 2/19/2024    COMPARISON: 2/25/2024    CONTRAST/COMPLICATIONS:  IV Contrast: Omnipaque 350  90 cc administered   10 cc discarded  Oral Contrast: NONE  Complications: None reported at time of studycompletion    PROCEDURE:  CT of the Abdomen and Pelvis was performed.  Sagittal and coronal reformats were performed.    FINDINGS:  LOWER CHEST: Mild bibasilar dependent atelectasis.    LIVER: Within normal limits.  BILE DUCTS: Normal caliber.  GALLBLADDER: Within normal limits.  SPLEEN: Within normal limits.  PANCREAS: Within normal limits.  ADRENALS: Within normal limits.  KIDNEYS/URETERS: Bilateral subcentimeter cortical hypodensities too small   to characterize. Left upper pole cyst. No hydronephrosis. Symmetrical   nephrograms.    BLADDER: Completely decompressed.  REPRODUCTIVE ORGANS: Hysterectomy.    BOWEL: Improvement in prior small bowel distention.   Appendix not   visualized. Right lower quadrant small bowel anastomosis. Liquid stool   throughout the colon.  PERITONEUM: No ascites. Redemonstrated peripherally enhancing right   pelvic collection measuring 7.0 cm AP x 4.7 cm transverse x 3.1 cm   sagittal, previously 8.6 x 4.2 x 2.3 cm. The drainage catheter courses   anterior tothe collection. No pneumoperitoneum. Lower abdominal   mesenteric edema, postoperative.  VESSELS: Atherosclerotic changes.  RETROPERITONEUM/LYMPH NODES: No lymphadenopathy.  ABDOMINAL WALL: Recent postoperative changes. Percutaneous drainage   catheter via a right lower abdominal approach with tip in the left lower   quadrant. Small fluid collection deep to the incision is unchanged.  BONES: Degenerative change.    IMPRESSION:  Rim enhancing right pelvic collection without significant change. Again,   the drainage catheter courses anterior to the collection.    Resolution of small bowel dilatation. Fluid-filled colon either   infectious colitis or ileus.    Small fluid collection deep to the incision unchanged.        --- End of Report ---            BAILEY GREENWOOD MD; Attending Radiologist  This document has been electronically signed. Feb 29 2024  2:12PM    < end of copied text >

## 2024-03-02 NOTE — PROGRESS NOTE ADULT - ASSESSMENT
59F s/p exploratory laparoscopy with lysis of adhesions and Small Bowel Resection secondary to 2/19  PICC placed 2/22 for TPN  rpt ct 2/29 grossly unchanged with rim enhancing pelvic collection   diarrhea     -diet as tolerated   -continue local wound care to midline incision with daily packing changes  -TPN per nephrology  -ZEKE output  -encourage ambulation / OOB  -f/u id recs, f/u repeat stool studies   IR for drain colection next week

## 2024-03-02 NOTE — PROGRESS NOTE ADULT - ASSESSMENT
Patient is a 59y Female with HTN, DM, recently admitted 2/5-2/14/24 s/p diagnostic laparoscopy, lysis of adhesions, conversion to laparotomy for adhesiolysis and incisional hernia repair without mesh on 2/6, presents now with abdominal pain. Pt found to have small bowel perforation with pneumoperitoneum. s/p OR 2/19 for ex-lap with bowel resection, found to have adhesions with intrabdominal abscess. Pt transferred to ICU for septic shock 2/2 peritonitis, hypotension requiring brief pressors, with DEION and hyponatremia. DEION and hyponatremia resolved.   Nephrology consulted for TPN for prolonged NPO status.     1. Severe PCM-  Pt reports she is not eating due to painful diarrhea immediately after she eats ("within 2 minutes"), which may be neurologic (or psychiatric/anxiety based) but does NOT represent physiologic transit of food.  Encouraged pt to eat progressively larger meals despite diarrhea.  However, for now will increase TPN dose (50% increase) as pt is not taking in sufficient PO, is planned for an IR procedure next week, and still has severe PCM.    Albumin 1.4, edema with wt loss. Pt initiated on TPN 2/23. s/p IR RUE PICC line placed on 2/22 (after 1pm). Pt tolerating diet; however with frequent BMs/ diarrhea.   Increase TPN with lipids @ 1.5L; STILL cycled over 12hrs (10 pm-10am). Will adjust electrolytes accordingly. Pt with poor PO intake, if persistent diarrhea; will consider increasing further to full dose TPN    Added calcium back into TPN as ionized calcium was only borderline high and in preparation for d/c home, I would not want pt to be completely off calcium. Check ionized calcium tomorrow am.    Check FS q6h. FS acceptable. c/w 10 units of insulin in TPN bag hgbA1C 8.4 on 2/6/24.  on 3/1  Check CMP/Mg/Phos/ Ionized calcium in am.   Daily weights. Strict I/O. Will hold TPN if pt spikes fever and check BCX x2    2. DEION - likely ATN due to Septic shock. DEION resolved. Strict I/Os. Avoid nephrotoxins/ NSAIDs/ RCA. Monitor BMP.    3. Septic shock- 2/2 peritonitis/ small bowel perforation. Pt on Zosyn. Finished Diflucan for ppx . BCx 2/19 NG.  Plan as per ID/ Surgery. Cdiff neg.  Repeating Cdiff and stool PCR neg.     4. Hypotension- resolved. Now with elevated BP for which Valsartan 160mg PO daily was resumed. Continue to hold HCTZ due to hyponatremia with diarrhea; minimize fluid loss. Monitor BP    TPN orders:    60 kg  Total Kcal 1500  Lipid 50 g =500 kcal  AA 100g = 400 kcal  Dextrose 176g - 600 kcal    [facility] NEPHROLOGY  Dean Shipley M.D.  Jaylon Kang D.O.  Debbi Garcia M.D.  MD Laura Kwon, MSN, ANP-C    Telephone: [phone]  Facsimile: [phone] [place] [address], [id number]  [place] [id number]

## 2024-03-02 NOTE — CHART NOTE - NSCHARTNOTEFT_GEN_A_CORE
Assessment:   Patient is a 59y old  Female who presents with a chief complaint of bowel perf (02 Mar 2024 11:15). Pt seen, discussed w/ Sx PA, RADHA UHMPHRIES and Sx PMD, PN ordered increased to 3/4 dose/ cycled to start tonight.. Pt continues w/ poor PO and diarrhea. Discussed PN order w/ RN, he to endorse to next shift.       Factors impacting intake: [ ] none [ ] nausea  [ ] vomiting [ ] diarrhea [ ] constipation  [ ]chewing problems [ ] swallowing issues  [x ] other:     Diet Prescription: Diet, Consistent Carbohydrate w/Evening Snack:   Fiber/Residue Restricted  Lactose Restricted (Milk Sugar Intoler.)  Supplement Feeding Modality:  Oral  Ensure Plant-Based Cans or Servings Per Day:  1       Frequency:  Two Times a day (24 @ 09:53)    Intake: Poor (as described by pt) and observed at lunch (entree not touched).    Daily     Daily Weight in k.2 (02 Mar 2024 11:28) weighed by RD on standing scale, entered by RN.  Weight in k (2024 07:37)  Weight in k (2024 13:00)  Weight in k.1 (2024 05:11)        Pertinent Medications: MEDICATIONS  (STANDING):  chlorhexidine 2% Cloths 1 Application(s) Topical <User Schedule>  chlorhexidine 2% Cloths 1 Application(s) Topical daily  cholestyramine Powder (Sugar-Free) 4 Gram(s) Oral two times a day  dextrose 5%. 1000 milliLiter(s) (50 mL/Hr) IV Continuous <Continuous>  dextrose 5%. 1000 milliLiter(s) (100 mL/Hr) IV Continuous <Continuous>  dextrose 50% Injectable 25 Gram(s) IV Push once  dextrose 50% Injectable 12.5 Gram(s) IV Push once  dextrose 50% Injectable 25 Gram(s) IV Push once  gabapentin 100 milliGRAM(s) Oral three times a day  glucagon  Injectable 1 milliGRAM(s) IntraMuscular once  insulin glargine Injectable (LANTUS) 10 Unit(s) SubCutaneous at bedtime  insulin lispro (ADMELOG) corrective regimen sliding scale   SubCutaneous every 6 hours  lidocaine   4% Patch 2 Patch Transdermal every 24 hours  lipid, fat emulsion (Fish Oil and Plant Based) 20% Infusion 0.547 Gm/kG/Day (16.7 mL/Hr) IV Continuous <Continuous>  lipid, fat emulsion (Fish Oil and Plant Based) 20% Infusion 0.342 Gm/kG/Day (10.4 mL/Hr) IV Continuous <Continuous>  Parenteral Nutrition - Adult 1 Each TPN Continuous <Continuous>  Parenteral Nutrition - Adult 1 Each TPN Continuous <Continuous>  piperacillin/tazobactam IVPB.. 3.375 Gram(s) IV Intermittent every 8 hours  valsartan 160 milliGRAM(s) Oral daily    MEDICATIONS  (PRN):  benzocaine/menthol Lozenge 1 Lozenge Oral every 2 hours PRN Sore Throat  dextrose Oral Gel 15 Gram(s) Oral once PRN Blood Glucose LESS THAN 70 milliGRAM(s)/deciliter  HYDROmorphone  Injectable 0.5 milliGRAM(s) IV Push every 4 hours PRN Moderate Pain (4 - 6)  ondansetron Injectable 4 milliGRAM(s) IV Push every 6 hours PRN Nausea and/or Vomiting  sodium chloride 0.9% lock flush 10 milliLiter(s) IV Push every 1 hour PRN Pre/post blood products, medications, blood draw, and to maintain line patency    Pertinent Labs:  Na136 mmol/L Glu 174 mg/dL<H> K+ 4.2 mmol/L Cr  0.70 mg/dL BUN 16 mg/dL  Phos 2.4 mg/dL<L> 03- Alb 2.3 g/dL<L> 03- Chol --    LDL --    HDL --    Trig 202 mg/dL<H>     CAPILLARY BLOOD GLUCOSE      POCT Blood Glucose.: 162 mg/dL (02 Mar 2024 11:46)  POCT Blood Glucose.: 142 mg/dL (02 Mar 2024 07:58)  POCT Blood Glucose.: 172 mg/dL (02 Mar 2024 06:11)  POCT Blood Glucose.: 170 mg/dL (01 Mar 2024 23:00)  POCT Blood Glucose.: 123 mg/dL (01 Mar 2024 17:24)        Estimated Needs:   [x ] no change since previous assessment  [ ] recalculated:       Previous Nutrition Diagnosis:   [ ] Altered GI function  [ ]Inadequate Oral Intake [ ] Swallowing Difficulty   [ ] Altered nutrition related labs [ ] Increased Nutrient Needs [ ] Overweight/Obesity   [ ] Unintended Weight Loss [ ] Food & Nutrition Related Knowledge Deficit [x ] Malnutrition (severe PCM)  [ ] Other:     Nutrition Diagnosis is [x ] ongoing  [ ] resolved [ ] not applicable         Interventions:   Recommend  [ ] Change Diet To:  [ ] Nutrition Supplement  [x ] Nutrition Support: PN orders per RADHA MD  [x ] Other: MD to monitor. RD available.     Monitoring and Evaluation:   [x ] PO intake [ x ] Tolerance to diet prescription [ x ] weights [ x ] labs[ x ] follow up per protocol  [ ] other:

## 2024-03-02 NOTE — PROGRESS NOTE ADULT - SUBJECTIVE AND OBJECTIVE BOX
[facility] NEPHROLOGY- METABOLIC SUPPORT PROGRESS NOTE    Patient is a 59y Female with HTN, DM, recently admitted 2/5-2/14/24 s/p diagnostic laparoscopy, lysis of adhesions, conversion to laparotomy for adhesiolysis and incisional hernia repair without mesh on 2/6, presents now with abdominal pain. Pt found to have small bowel perforation with pneumoperitoneum. s/p OR 2/19 for ex-lap with bowel resection, found to have adhesions with intrabdominal abscess. Pt transferred to ICU for septic shock 2/2 peritonitis, hypotension requiring brief pressors, with DEION and hyponatremia. DEION and hyponatremia resolved.   Nephrology consulted for TPN for prolonged NPO status.   2/22: s/p IR RUE PICC placement  2/23: Pt started on CLD  2/26: Advanced to full liquid diet  2/28: PO diet    Pt is still not really taking in significant PO.  She reports this is due to painful diarrhea immediately after she eats ("within 2 minutes").      Hospital Medications: Medications reviewed.  REVIEW OF SYSTEMS:  CONSTITUTIONAL: No fevers or chills  RESPIRATORY: No shortness of breath  CARDIOVASCULAR: No chest pain.  GASTROINTESTINAL: No nausea, or vomiting, +abd pain jose enrique when defecating. frequent diarrhea ~ 6 episodes since this am  VASCULAR: No bilateral lower extremity edema.     VITALS:  T(F): 99 (03-01-24 @ 05:00), Max: 99 (03-01-24 @ 05:00)  HR: 93 (03-01-24 @ 05:00)  BP: 126/76 (03-01-24 @ 05:00)  RR: 18 (03-01-24 @ 05:00)  SpO2: 97% (03-01-24 @ 05:00)  Wt(kg): --    02-29 @ 07:01  -  03-01 @ 07:00  --------------------------------------------------------  IN: 0 mL / OUT: 10 mL / NET: -10 mL      PHYSICAL EXAM:  Constitutional: NAD,   HEENT: anicteric sclera,   Respiratory: CTAB, no wheezes, rales or rhonchi  Cardiovascular: S1, S2, RRR  Gastrointestinal: +midline incision covered, +ZEKE  Extremities: No LE edema b/l   Vascular Access: RUE single lumen PICC- saved for  TPN    LABS:  03-01    134<L>  |  106  |  18  ----------------------------<  167<H>  4.5   |  23  |  0.80    Ca    9.1      01 Mar 2024 06:04  Phos  2.6     03-01  Mg     2.1     03-01    TPro  8.4<H>  /  Alb  2.4<L>  /  TBili  0.4  /  DBili      /  AST  30  /  ALT  40  /  AlkPhos  225<H>  03-01    Creatinine Trend: 0.80 <--, 0.75 <--, 0.67 <--, 0.72 <--, 0.68 <--, 0.64 <--, 0.70 <--                        9.9    10.73 )-----------( 469      ( 01 Mar 2024 06:04 )             30.1     Urine Studies:  Urinalysis Basic - ( 01 Mar 2024 06:04 )    Color:  / Appearance:  / SG:  / pH:   Gluc: 167 mg/dL / Ketone:   / Bili:  / Urobili:    Blood:  / Protein:  / Nitrite:    Leuk Esterase:  / RBC:  / WBC    Sq Epi:  / Non Sq Epi:  / Bacteria:

## 2024-03-03 LAB
ALBUMIN SERPL ELPH-MCNC: 2.2 G/DL — LOW (ref 3.5–5)
ALP SERPL-CCNC: 218 U/L — HIGH (ref 40–120)
ALT FLD-CCNC: 49 U/L DA — SIGNIFICANT CHANGE UP (ref 10–60)
ANION GAP SERPL CALC-SCNC: 9 MMOL/L — SIGNIFICANT CHANGE UP (ref 5–17)
AST SERPL-CCNC: 36 U/L — SIGNIFICANT CHANGE UP (ref 10–40)
BASOPHILS # BLD AUTO: 0.04 K/UL — SIGNIFICANT CHANGE UP (ref 0–0.2)
BASOPHILS NFR BLD AUTO: 0.5 % — SIGNIFICANT CHANGE UP (ref 0–2)
BILIRUB SERPL-MCNC: 0.3 MG/DL — SIGNIFICANT CHANGE UP (ref 0.2–1.2)
BUN SERPL-MCNC: 14 MG/DL — SIGNIFICANT CHANGE UP (ref 7–18)
CALCIUM SERPL-MCNC: 9.1 MG/DL — SIGNIFICANT CHANGE UP (ref 8.4–10.5)
CHLORIDE SERPL-SCNC: 107 MMOL/L — SIGNIFICANT CHANGE UP (ref 96–108)
CO2 SERPL-SCNC: 23 MMOL/L — SIGNIFICANT CHANGE UP (ref 22–31)
CREAT SERPL-MCNC: 0.72 MG/DL — SIGNIFICANT CHANGE UP (ref 0.5–1.3)
EGFR: 97 ML/MIN/1.73M2 — SIGNIFICANT CHANGE UP
EOSINOPHIL # BLD AUTO: 0.25 K/UL — SIGNIFICANT CHANGE UP (ref 0–0.5)
EOSINOPHIL NFR BLD AUTO: 3.3 % — SIGNIFICANT CHANGE UP (ref 0–6)
GLUCOSE BLDC GLUCOMTR-MCNC: 140 MG/DL — HIGH (ref 70–99)
GLUCOSE BLDC GLUCOMTR-MCNC: 162 MG/DL — HIGH (ref 70–99)
GLUCOSE BLDC GLUCOMTR-MCNC: 170 MG/DL — HIGH (ref 70–99)
GLUCOSE BLDC GLUCOMTR-MCNC: 177 MG/DL — HIGH (ref 70–99)
GLUCOSE BLDC GLUCOMTR-MCNC: 192 MG/DL — HIGH (ref 70–99)
GLUCOSE BLDC GLUCOMTR-MCNC: 200 MG/DL — HIGH (ref 70–99)
GLUCOSE BLDC GLUCOMTR-MCNC: 239 MG/DL — HIGH (ref 70–99)
GLUCOSE SERPL-MCNC: 200 MG/DL — HIGH (ref 70–99)
HCT VFR BLD CALC: 28.1 % — LOW (ref 34.5–45)
HGB BLD-MCNC: 9.3 G/DL — LOW (ref 11.5–15.5)
IMM GRANULOCYTES NFR BLD AUTO: 0.3 % — SIGNIFICANT CHANGE UP (ref 0–0.9)
LYMPHOCYTES # BLD AUTO: 2.13 K/UL — SIGNIFICANT CHANGE UP (ref 1–3.3)
LYMPHOCYTES # BLD AUTO: 27.8 % — SIGNIFICANT CHANGE UP (ref 13–44)
MAGNESIUM SERPL-MCNC: 1.8 MG/DL — SIGNIFICANT CHANGE UP (ref 1.6–2.6)
MCHC RBC-ENTMCNC: 28.2 PG — SIGNIFICANT CHANGE UP (ref 27–34)
MCHC RBC-ENTMCNC: 33.1 GM/DL — SIGNIFICANT CHANGE UP (ref 32–36)
MCV RBC AUTO: 85.2 FL — SIGNIFICANT CHANGE UP (ref 80–100)
MONOCYTES # BLD AUTO: 0.7 K/UL — SIGNIFICANT CHANGE UP (ref 0–0.9)
MONOCYTES NFR BLD AUTO: 9.2 % — SIGNIFICANT CHANGE UP (ref 2–14)
NEUTROPHILS # BLD AUTO: 4.51 K/UL — SIGNIFICANT CHANGE UP (ref 1.8–7.4)
NEUTROPHILS NFR BLD AUTO: 58.9 % — SIGNIFICANT CHANGE UP (ref 43–77)
NRBC # BLD: 0 /100 WBCS — SIGNIFICANT CHANGE UP (ref 0–0)
PHOSPHATE SERPL-MCNC: 2.3 MG/DL — LOW (ref 2.5–4.5)
PLATELET # BLD AUTO: 452 K/UL — HIGH (ref 150–400)
POTASSIUM SERPL-MCNC: 4.2 MMOL/L — SIGNIFICANT CHANGE UP (ref 3.5–5.3)
POTASSIUM SERPL-SCNC: 4.2 MMOL/L — SIGNIFICANT CHANGE UP (ref 3.5–5.3)
PROT SERPL-MCNC: 7.8 G/DL — SIGNIFICANT CHANGE UP (ref 6–8.3)
RBC # BLD: 3.3 M/UL — LOW (ref 3.8–5.2)
RBC # FLD: 15.9 % — HIGH (ref 10.3–14.5)
SODIUM SERPL-SCNC: 139 MMOL/L — SIGNIFICANT CHANGE UP (ref 135–145)
WBC # BLD: 7.65 K/UL — SIGNIFICANT CHANGE UP (ref 3.8–10.5)
WBC # FLD AUTO: 7.65 K/UL — SIGNIFICANT CHANGE UP (ref 3.8–10.5)

## 2024-03-03 RX ORDER — HEPARIN SODIUM 5000 [USP'U]/ML
5000 INJECTION INTRAVENOUS; SUBCUTANEOUS ONCE
Refills: 0 | Status: COMPLETED | OUTPATIENT
Start: 2024-03-03 | End: 2024-03-03

## 2024-03-03 RX ORDER — I.V. FAT EMULSION 20 G/100ML
0.55 EMULSION INTRAVENOUS
Qty: 39.99 | Refills: 0 | Status: DISCONTINUED | OUTPATIENT
Start: 2024-03-03 | End: 2024-03-03

## 2024-03-03 RX ORDER — ELECTROLYTE SOLUTION,INJ
1 VIAL (ML) INTRAVENOUS
Refills: 0 | Status: DISCONTINUED | OUTPATIENT
Start: 2024-03-03 | End: 2024-03-04

## 2024-03-03 RX ADMIN — TRAMADOL HYDROCHLORIDE 50 MILLIGRAM(S): 50 TABLET ORAL at 17:52

## 2024-03-03 RX ADMIN — GABAPENTIN 100 MILLIGRAM(S): 400 CAPSULE ORAL at 21:09

## 2024-03-03 RX ADMIN — Medication 1 EACH: at 22:03

## 2024-03-03 RX ADMIN — TRAMADOL HYDROCHLORIDE 50 MILLIGRAM(S): 50 TABLET ORAL at 18:26

## 2024-03-03 RX ADMIN — LIDOCAINE 2 PATCH: 4 CREAM TOPICAL at 16:35

## 2024-03-03 RX ADMIN — HEPARIN SODIUM 5000 UNIT(S): 5000 INJECTION INTRAVENOUS; SUBCUTANEOUS at 08:28

## 2024-03-03 RX ADMIN — HYDROMORPHONE HYDROCHLORIDE 0.5 MILLIGRAM(S): 2 INJECTION INTRAMUSCULAR; INTRAVENOUS; SUBCUTANEOUS at 19:45

## 2024-03-03 RX ADMIN — PIPERACILLIN AND TAZOBACTAM 25 GRAM(S): 4; .5 INJECTION, POWDER, LYOPHILIZED, FOR SOLUTION INTRAVENOUS at 21:09

## 2024-03-03 RX ADMIN — Medication 1: at 06:00

## 2024-03-03 RX ADMIN — GABAPENTIN 100 MILLIGRAM(S): 400 CAPSULE ORAL at 13:13

## 2024-03-03 RX ADMIN — CHLORHEXIDINE GLUCONATE 1 APPLICATION(S): 213 SOLUTION TOPICAL at 11:18

## 2024-03-03 RX ADMIN — GABAPENTIN 100 MILLIGRAM(S): 400 CAPSULE ORAL at 05:35

## 2024-03-03 RX ADMIN — HYDROMORPHONE HYDROCHLORIDE 0.5 MILLIGRAM(S): 2 INJECTION INTRAMUSCULAR; INTRAVENOUS; SUBCUTANEOUS at 14:35

## 2024-03-03 RX ADMIN — PIPERACILLIN AND TAZOBACTAM 25 GRAM(S): 4; .5 INJECTION, POWDER, LYOPHILIZED, FOR SOLUTION INTRAVENOUS at 13:13

## 2024-03-03 RX ADMIN — HYDROMORPHONE HYDROCHLORIDE 0.5 MILLIGRAM(S): 2 INJECTION INTRAMUSCULAR; INTRAVENOUS; SUBCUTANEOUS at 02:35

## 2024-03-03 RX ADMIN — CHLORHEXIDINE GLUCONATE 1 APPLICATION(S): 213 SOLUTION TOPICAL at 05:34

## 2024-03-03 RX ADMIN — TRAMADOL HYDROCHLORIDE 50 MILLIGRAM(S): 50 TABLET ORAL at 06:14

## 2024-03-03 RX ADMIN — Medication 2: at 11:50

## 2024-03-03 RX ADMIN — LIDOCAINE 2 PATCH: 4 CREAM TOPICAL at 05:34

## 2024-03-03 RX ADMIN — Medication 1: at 00:30

## 2024-03-03 RX ADMIN — I.V. FAT EMULSION 16.7 GM/KG/DAY: 20 EMULSION INTRAVENOUS at 22:03

## 2024-03-03 RX ADMIN — HYDROMORPHONE HYDROCHLORIDE 0.5 MILLIGRAM(S): 2 INJECTION INTRAMUSCULAR; INTRAVENOUS; SUBCUTANEOUS at 15:04

## 2024-03-03 RX ADMIN — HYDROMORPHONE HYDROCHLORIDE 0.5 MILLIGRAM(S): 2 INJECTION INTRAMUSCULAR; INTRAVENOUS; SUBCUTANEOUS at 08:32

## 2024-03-03 RX ADMIN — VALSARTAN 160 MILLIGRAM(S): 80 TABLET ORAL at 05:35

## 2024-03-03 RX ADMIN — PIPERACILLIN AND TAZOBACTAM 25 GRAM(S): 4; .5 INJECTION, POWDER, LYOPHILIZED, FOR SOLUTION INTRAVENOUS at 05:34

## 2024-03-03 RX ADMIN — TRAMADOL HYDROCHLORIDE 50 MILLIGRAM(S): 50 TABLET ORAL at 05:35

## 2024-03-03 RX ADMIN — CHOLESTYRAMINE 4 GRAM(S): 4 POWDER, FOR SUSPENSION ORAL at 16:06

## 2024-03-03 RX ADMIN — LIDOCAINE 2 PATCH: 4 CREAM TOPICAL at 07:29

## 2024-03-03 RX ADMIN — HYDROMORPHONE HYDROCHLORIDE 0.5 MILLIGRAM(S): 2 INJECTION INTRAMUSCULAR; INTRAVENOUS; SUBCUTANEOUS at 09:25

## 2024-03-03 RX ADMIN — CHOLESTYRAMINE 4 GRAM(S): 4 POWDER, FOR SUSPENSION ORAL at 07:29

## 2024-03-03 RX ADMIN — Medication 1: at 23:28

## 2024-03-03 RX ADMIN — HYDROMORPHONE HYDROCHLORIDE 0.5 MILLIGRAM(S): 2 INJECTION INTRAMUSCULAR; INTRAVENOUS; SUBCUTANEOUS at 20:15

## 2024-03-03 RX ADMIN — HYDROMORPHONE HYDROCHLORIDE 0.5 MILLIGRAM(S): 2 INJECTION INTRAMUSCULAR; INTRAVENOUS; SUBCUTANEOUS at 02:20

## 2024-03-03 RX ADMIN — INSULIN GLARGINE 10 UNIT(S): 100 INJECTION, SOLUTION SUBCUTANEOUS at 21:09

## 2024-03-03 NOTE — PROGRESS NOTE ADULT - SUBJECTIVE AND OBJECTIVE BOX
[facility] NEPHROLOGY- METABOLIC SUPPORT PROGRESS NOTE    Patient is a 59y Female with HTN, DM, recently admitted 2/5-2/14/24 s/p diagnostic laparoscopy, lysis of adhesions, conversion to laparotomy for adhesiolysis and incisional hernia repair without mesh on 2/6, presents now with abdominal pain. Pt found to have small bowel perforation with pneumoperitoneum. s/p OR 2/19 for ex-lap with bowel resection, found to have adhesions with intrabdominal abscess. Pt transferred to ICU for septic shock 2/2 peritonitis, hypotension requiring brief pressors, with DEION and hyponatremia. DEION and hyponatremia resolved.   Nephrology consulted for TPN for prolonged NPO status.   2/22: s/p IR RUE PICC placement  2/23: Pt started on CLD  2/26: Advanced to full liquid diet  2/28: PO diet    3/2- Pt is still not really taking in significant PO.  She reports this is due to painful diarrhea immediately after she eats ("within 2 minutes").  3/3- Today pt was able to eat a much more substantial portion of her meals.  I also encouraged he to have her daughter (who is a ) bring some of her favorite foods from home.  Pt reports diarrhea with some pain, but less pain than yesterday. Cholestyramine and tramadol are helping with the pain.    Hospital Medications: Medications reviewed.  REVIEW OF SYSTEMS:  CONSTITUTIONAL: No fevers or chills  RESPIRATORY: No shortness of breath  CARDIOVASCULAR: No chest pain.  GASTROINTESTINAL: No nausea, or vomiting, +abd pain jose enrique when defecating. frequent diarrhea ~ 6 episodes since this am  VASCULAR: No bilateral lower extremity edema.     VITALS:  T(F): 99.3 (03-03-24 @ 14:26), Max: 99.3 (03-03-24 @ 14:26)  HR: 95 (03-03-24 @ 14:26)  BP: 154/98 (03-03-24 @ 14:26)  RR: 18 (03-03-24 @ 14:26)  SpO2: 95% (03-03-24 @ 14:26)  Wt(kg): --    03-02 @ 07:01  -  03-03 @ 07:00  --------------------------------------------------------  IN: 2048.2 mL / OUT: 1107 mL / NET: 941.2 mL    03-03 @ 07:01  -  03-03 @ 16:39  --------------------------------------------------------  IN: 395.1 mL / OUT: 2 mL / NET: 393.1 mL    PHYSICAL EXAM:  Constitutional: NAD,   HEENT: anicteric sclera,   Respiratory: CTAB, no wheezes, rales or rhonchi  Cardiovascular: S1, S2, RRR  Gastrointestinal: +midline incision covered, +ZEKE  Extremities: No LE edema b/l   Vascular Access: RUE single lumen PICC- saved for  TPN    LABS:  03-03    139  |  107  |  14  ----------------------------<  200<H>  4.2   |  23  |  0.72    Ca    9.1      03 Mar 2024 05:33  Phos  2.3     03-03  Mg     1.8     03-03    TPro  7.8  /  Alb  2.2<L>  /  TBili  0.3  /  DBili      /  AST  36  /  ALT  49  /  AlkPhos  218<H>  03-03    Creatinine Trend: 0.72 <--, 0.70 <--, 0.80 <--, 0.75 <--, 0.67 <--, 0.72 <--, 0.68 <--                        9.3    7.65  )-----------( 452      ( 03 Mar 2024 05:33 )             28.1     Urine Studies:  Urinalysis Basic - ( 03 Mar 2024 05:33 )    Color:  / Appearance:  / SG:  / pH:   Gluc: 200 mg/dL / Ketone:   / Bili:  / Urobili:    Blood:  / Protein:  / Nitrite:    Leuk Esterase:  / RBC:  / WBC    Sq Epi:  / Non Sq Epi:  / Bacteria:

## 2024-03-03 NOTE — PROGRESS NOTE ADULT - ASSESSMENT
59F s/p exploratory laparoscopy with lysis of adhesions and Small Bowel Resection secondary to 2/19  PICC placed 2/22 for TPN  rpt ct 2/29 grossly unchanged with rim enhancing pelvic collection   diarrhea     -IR drainage of pelvic collection TOMORROW 3/4, NPOmn  -diet as tolerated   -continue local wound care to midline incision with daily packing changes  -TPN per nephrology  -monitor ZEKE output  -encourage ambulation / OOB  -discussed with Dr. Lopez     This note and all of its recommendations herein are preliminary until co-signed by an attending physician

## 2024-03-03 NOTE — PROGRESS NOTE ADULT - SUBJECTIVE AND OBJECTIVE BOX
INTERVAL HPI/OVERNIGHT EVENTS:  seen and examined   continued diarrhea after po intake, reports stool to be a little more formed   daughter brought her in homemade vegetable soup, tolerating more po than prior days but still limited   Denies N/V  endorses abdominal pain only when having bms     MEDICATIONS  (STANDING):  chlorhexidine 2% Cloths 1 Application(s) Topical daily  chlorhexidine 2% Cloths 1 Application(s) Topical <User Schedule>  cholestyramine Powder (Sugar-Free) 4 Gram(s) Oral two times a day  dextrose 5%. 1000 milliLiter(s) (50 mL/Hr) IV Continuous <Continuous>  dextrose 5%. 1000 milliLiter(s) (100 mL/Hr) IV Continuous <Continuous>  dextrose 50% Injectable 25 Gram(s) IV Push once  dextrose 50% Injectable 12.5 Gram(s) IV Push once  dextrose 50% Injectable 25 Gram(s) IV Push once  gabapentin 100 milliGRAM(s) Oral three times a day  glucagon  Injectable 1 milliGRAM(s) IntraMuscular once  insulin glargine Injectable (LANTUS) 10 Unit(s) SubCutaneous at bedtime  insulin lispro (ADMELOG) corrective regimen sliding scale   SubCutaneous every 6 hours  lidocaine   4% Patch 2 Patch Transdermal every 24 hours  Parenteral Nutrition - Adult 1 Each TPN Continuous <Continuous>  piperacillin/tazobactam IVPB.. 3.375 Gram(s) IV Intermittent every 8 hours  valsartan 160 milliGRAM(s) Oral daily    MEDICATIONS  (PRN):  benzocaine/menthol Lozenge 1 Lozenge Oral every 2 hours PRN Sore Throat  dextrose Oral Gel 15 Gram(s) Oral once PRN Blood Glucose LESS THAN 70 milliGRAM(s)/deciliter  HYDROmorphone  Injectable 0.5 milliGRAM(s) IV Push every 6 hours PRN Severe Pain (7 - 10)  ondansetron Injectable 4 milliGRAM(s) IV Push every 6 hours PRN Nausea and/or Vomiting  sodium chloride 0.9% lock flush 10 milliLiter(s) IV Push every 1 hour PRN Pre/post blood products, medications, blood draw, and to maintain line patency  traMADol 50 milliGRAM(s) Oral every 6 hours PRN Moderate Pain (4 - 6)      Vital Signs Last 24 Hrs  T(C): 36.8 (03 Mar 2024 05:30), Max: 36.9 (02 Mar 2024 20:55)  T(F): 98.2 (03 Mar 2024 05:30), Max: 98.4 (02 Mar 2024 20:55)  HR: 95 (03 Mar 2024 05:30) (92 - 95)  BP: 138/74 (03 Mar 2024 05:30) (120/77 - 144/86)  BP(mean): 105 (02 Mar 2024 20:55) (105 - 105)  RR: 17 (03 Mar 2024 05:30) (16 - 17)  SpO2: 98% (03 Mar 2024 05:30) (97% - 98%)    Parameters below as of 03 Mar 2024 05:30  Patient On (Oxygen Delivery Method): room air        Physical:  General: NAD.  Respirations: Unlabored  Abd: soft, nontender, nondistended, midline wound upper and lower pole packed with packing strip; no surrounding erythema, removed and wound repacked, ZEKE drain in place with minimal serosanguinous output        I&O's Detail    02 Mar 2024 07:01  -  03 Mar 2024 07:00  --------------------------------------------------------  IN:    Fat Emulsion (Fish Oil &amp; Plant Based) 20% Infusion: 31.2 mL    Fat Emulsion 20%: 167 mL    IV PiggyBack: 200 mL    Oral Fluid: 360 mL    TPN (Total Parenteral Nutrition): 1290 mL  Total IN: 2048.2 mL    OUT:    Bulb (mL): 4 mL    Stool (mL): 3 mL    Voided (mL): 1100 mL  Total OUT: 1107 mL    Total NET: 941.2 mL          LABS:                        9.3    7.65  )-----------( 452      ( 03 Mar 2024 05:33 )             28.1             03-03    139  |  107  |  14  ----------------------------<  200<H>  4.2   |  23  |  0.72    Ca    9.1      03 Mar 2024 05:33  Phos  2.3     03-03  Mg     1.8     03-03    TPro  7.8  /  Alb  2.2<L>  /  TBili  0.3  /  DBili  x   /  AST  36  /  ALT  49  /  AlkPhos  218<H>  03-03      59y.o. Female

## 2024-03-03 NOTE — PROGRESS NOTE ADULT - ASSESSMENT
Patient is a 59y Female with HTN, DM, recently admitted 2/5-2/14/24 s/p diagnostic laparoscopy, lysis of adhesions, conversion to laparotomy for adhesiolysis and incisional hernia repair without mesh on 2/6, presents now with abdominal pain. Pt found to have small bowel perforation with pneumoperitoneum. s/p OR 2/19 for ex-lap with bowel resection, found to have adhesions with intrabdominal abscess. Pt transferred to ICU for septic shock 2/2 peritonitis, hypotension requiring brief pressors, with DEION and hyponatremia. DEION and hyponatremia resolved.   Nephrology consulted for TPN for prolonged NPO status.     1. Severe PCM-  Pt reports she is not eating due to painful diarrhea immediately after she eats ("within 2 minutes"), which may be neurologic (or psychiatric/anxiety based) but does NOT represent physiologic transit of food.  Encouraged pt to eat progressively larger meals despite diarrhea.  However, for now increased TPN dose (50% increase) as pt is not taking in sufficient PO, is planned for an IR procedure next week, and still has severe PCM.  Continue TPN cycled to 12h in the case pt will be discharged on TPN.  However- 3/3- Today pt was able to eat a much more substantial portion of her meals.  I also encouraged he to have her daughter (who is a ) bring some of her favorite foods from home.  Pt reports diarrhea with some pain, but less pain than yesterday. Cholestyramine and tramadol are helping with the pain. Will continue 1.5L TPN for now, but if pt is truly able to eat, will consider tapering down/off TPN.    Albumin 1.4, edema with wt loss. Pt initiated on TPN 2/23. s/p IR RUE PICC line placed on 2/22 (after 1pm). Pt tolerating diet; however with frequent BMs/ diarrhea.   Increase TPN with lipids @ 1.5L; STILL cycled over 12hrs (10 pm-10am). Will adjust electrolytes accordingly. Pt with poor PO intake, if persistent diarrhea; will consider increasing further to full dose TPN    Added calcium back into TPN as ionized calcium was only borderline high and in preparation for d/c home, I would not want pt to be completely off calcium. Check ionized calcium tomorrow am.    Check FS q6h. FS acceptable. c/w 10 units of insulin in TPN bag hgbA1C 8.4 on 2/6/24.  on 3/1  Check CMP/Mg/Phos/ Ionized calcium in am.   Daily weights. Strict I/O. Will hold TPN if pt spikes fever and check BCX x2    2. DEION - likely ATN due to Septic shock. DEION resolved. Strict I/Os. Avoid nephrotoxins/ NSAIDs/ RCA. Monitor BMP.    3. Septic shock- 2/2 peritonitis/ small bowel perforation. Pt on Zosyn. Finished Diflucan for ppx . BCx 2/19 NG.  Plan as per ID/ Surgery. Cdiff neg.  Repeating Cdiff and stool PCR neg.     4. Hypotension- resolved. Now with elevated BP for which Valsartan 160mg PO daily was resumed. Continue to hold HCTZ due to hyponatremia with diarrhea; minimize fluid loss. Monitor BP    TPN orders:    60 kg  Total Kcal 1500  Lipid 50 g =500 kcal  AA 100g = 400 kcal  Dextrose 176g - 600 kcal    [facility] NEPHROLOGY  Dean Shipley M.D.  Jaylon Kang D.O.  Debbi Garcia M.D.  MD Laura Kwon, MSN, ANP-C    Telephone: [phone]  Facsimile: [phone] [facility], [address]  [place] [phone]

## 2024-03-04 LAB
ALBUMIN SERPL ELPH-MCNC: 2.1 G/DL — LOW (ref 3.5–5)
ALLERGY+IMMUNOLOGY DIAG STUDY NOTE: SIGNIFICANT CHANGE UP
ALLERGY+IMMUNOLOGY DIAG STUDY NOTE: SIGNIFICANT CHANGE UP
ALP SERPL-CCNC: 198 U/L — HIGH (ref 40–120)
ALT FLD-CCNC: 39 U/L DA — SIGNIFICANT CHANGE UP (ref 10–60)
ANION GAP SERPL CALC-SCNC: 5 MMOL/L — SIGNIFICANT CHANGE UP (ref 5–17)
APTT BLD: 27.4 SEC — SIGNIFICANT CHANGE UP (ref 24.5–35.6)
AST SERPL-CCNC: 21 U/L — SIGNIFICANT CHANGE UP (ref 10–40)
BASOPHILS # BLD AUTO: 0.04 K/UL — SIGNIFICANT CHANGE UP (ref 0–0.2)
BASOPHILS NFR BLD AUTO: 0.7 % — SIGNIFICANT CHANGE UP (ref 0–2)
BILIRUB SERPL-MCNC: 0.3 MG/DL — SIGNIFICANT CHANGE UP (ref 0.2–1.2)
BLD GP AB SCN SERPL QL: SIGNIFICANT CHANGE UP
BUN SERPL-MCNC: 13 MG/DL — SIGNIFICANT CHANGE UP (ref 7–18)
CA-I BLD-SCNC: 5.1 MG/DL — SIGNIFICANT CHANGE UP (ref 4.5–5.6)
CALCIUM SERPL-MCNC: 9.2 MG/DL — SIGNIFICANT CHANGE UP (ref 8.4–10.5)
CHLORIDE SERPL-SCNC: 104 MMOL/L — SIGNIFICANT CHANGE UP (ref 96–108)
CO2 SERPL-SCNC: 26 MMOL/L — SIGNIFICANT CHANGE UP (ref 22–31)
CREAT SERPL-MCNC: 0.64 MG/DL — SIGNIFICANT CHANGE UP (ref 0.5–1.3)
DAT C3-SP REAG RBC QL: SIGNIFICANT CHANGE UP
DAT IGG-SP REAG RBC-IMP: ABNORMAL
DIR ANTIGLOB POLYSPECIFIC INTERPRETATION: ABNORMAL
EGFR: 102 ML/MIN/1.73M2 — SIGNIFICANT CHANGE UP
EOSINOPHIL # BLD AUTO: 0.28 K/UL — SIGNIFICANT CHANGE UP (ref 0–0.5)
EOSINOPHIL NFR BLD AUTO: 4.6 % — SIGNIFICANT CHANGE UP (ref 0–6)
FAT STL QN: NORMAL — SIGNIFICANT CHANGE UP
FAT STL QN: NORMAL — SIGNIFICANT CHANGE UP
GLUCOSE BLDC GLUCOMTR-MCNC: 147 MG/DL — HIGH (ref 70–99)
GLUCOSE BLDC GLUCOMTR-MCNC: 168 MG/DL — HIGH (ref 70–99)
GLUCOSE BLDC GLUCOMTR-MCNC: 200 MG/DL — HIGH (ref 70–99)
GLUCOSE BLDC GLUCOMTR-MCNC: 228 MG/DL — HIGH (ref 70–99)
GLUCOSE SERPL-MCNC: 203 MG/DL — HIGH (ref 70–99)
HCT VFR BLD CALC: 27.6 % — LOW (ref 34.5–45)
HGB BLD-MCNC: 8.9 G/DL — LOW (ref 11.5–15.5)
IMM GRANULOCYTES NFR BLD AUTO: 0.3 % — SIGNIFICANT CHANGE UP (ref 0–0.9)
INR BLD: 1.05 RATIO — SIGNIFICANT CHANGE UP (ref 0.85–1.18)
LYMPHOCYTES # BLD AUTO: 2.06 K/UL — SIGNIFICANT CHANGE UP (ref 1–3.3)
LYMPHOCYTES # BLD AUTO: 33.9 % — SIGNIFICANT CHANGE UP (ref 13–44)
MAGNESIUM SERPL-MCNC: 1.7 MG/DL — SIGNIFICANT CHANGE UP (ref 1.6–2.6)
MCHC RBC-ENTMCNC: 27.5 PG — SIGNIFICANT CHANGE UP (ref 27–34)
MCHC RBC-ENTMCNC: 32.2 GM/DL — SIGNIFICANT CHANGE UP (ref 32–36)
MCV RBC AUTO: 85.2 FL — SIGNIFICANT CHANGE UP (ref 80–100)
MONOCYTES # BLD AUTO: 0.58 K/UL — SIGNIFICANT CHANGE UP (ref 0–0.9)
MONOCYTES NFR BLD AUTO: 9.5 % — SIGNIFICANT CHANGE UP (ref 2–14)
NEUTROPHILS # BLD AUTO: 3.1 K/UL — SIGNIFICANT CHANGE UP (ref 1.8–7.4)
NEUTROPHILS NFR BLD AUTO: 51 % — SIGNIFICANT CHANGE UP (ref 43–77)
NRBC # BLD: 0 /100 WBCS — SIGNIFICANT CHANGE UP (ref 0–0)
PHOSPHATE SERPL-MCNC: 3 MG/DL — SIGNIFICANT CHANGE UP (ref 2.5–4.5)
PLATELET # BLD AUTO: 455 K/UL — HIGH (ref 150–400)
POTASSIUM SERPL-MCNC: 3.7 MMOL/L — SIGNIFICANT CHANGE UP (ref 3.5–5.3)
POTASSIUM SERPL-SCNC: 3.7 MMOL/L — SIGNIFICANT CHANGE UP (ref 3.5–5.3)
PROT SERPL-MCNC: 7.7 G/DL — SIGNIFICANT CHANGE UP (ref 6–8.3)
PROTHROM AB SERPL-ACNC: 12 SEC — SIGNIFICANT CHANGE UP (ref 9.5–13)
RBC # BLD: 3.24 M/UL — LOW (ref 3.8–5.2)
RBC # FLD: 15.4 % — HIGH (ref 10.3–14.5)
SODIUM SERPL-SCNC: 135 MMOL/L — SIGNIFICANT CHANGE UP (ref 135–145)
WBC # BLD: 6.08 K/UL — SIGNIFICANT CHANGE UP (ref 3.8–10.5)
WBC # FLD AUTO: 6.08 K/UL — SIGNIFICANT CHANGE UP (ref 3.8–10.5)

## 2024-03-04 PROCEDURE — 77012 CT SCAN FOR NEEDLE BIOPSY: CPT | Mod: 26

## 2024-03-04 PROCEDURE — 10160 PNXR ASPIR ABSC HMTMA BULLA: CPT | Mod: 1L

## 2024-03-04 PROCEDURE — 99232 SBSQ HOSP IP/OBS MODERATE 35: CPT

## 2024-03-04 RX ORDER — ELECTROLYTE SOLUTION,INJ
1 VIAL (ML) INTRAVENOUS
Refills: 0 | Status: DISCONTINUED | OUTPATIENT
Start: 2024-03-04 | End: 2024-03-05

## 2024-03-04 RX ORDER — I.V. FAT EMULSION 20 G/100ML
0.38 EMULSION INTRAVENOUS
Qty: 25 | Refills: 0 | Status: DISCONTINUED | OUTPATIENT
Start: 2024-03-04 | End: 2024-03-05

## 2024-03-04 RX ADMIN — GABAPENTIN 100 MILLIGRAM(S): 400 CAPSULE ORAL at 05:37

## 2024-03-04 RX ADMIN — I.V. FAT EMULSION 10.4 GM/KG/DAY: 20 EMULSION INTRAVENOUS at 22:34

## 2024-03-04 RX ADMIN — CHLORHEXIDINE GLUCONATE 1 APPLICATION(S): 213 SOLUTION TOPICAL at 11:47

## 2024-03-04 RX ADMIN — HYDROMORPHONE HYDROCHLORIDE 0.5 MILLIGRAM(S): 2 INJECTION INTRAMUSCULAR; INTRAVENOUS; SUBCUTANEOUS at 03:20

## 2024-03-04 RX ADMIN — HYDROMORPHONE HYDROCHLORIDE 0.5 MILLIGRAM(S): 2 INJECTION INTRAMUSCULAR; INTRAVENOUS; SUBCUTANEOUS at 08:59

## 2024-03-04 RX ADMIN — PIPERACILLIN AND TAZOBACTAM 25 GRAM(S): 4; .5 INJECTION, POWDER, LYOPHILIZED, FOR SOLUTION INTRAVENOUS at 22:02

## 2024-03-04 RX ADMIN — GABAPENTIN 100 MILLIGRAM(S): 400 CAPSULE ORAL at 13:32

## 2024-03-04 RX ADMIN — CHOLESTYRAMINE 4 GRAM(S): 4 POWDER, FOR SUSPENSION ORAL at 17:34

## 2024-03-04 RX ADMIN — Medication 0: at 11:47

## 2024-03-04 RX ADMIN — TRAMADOL HYDROCHLORIDE 50 MILLIGRAM(S): 50 TABLET ORAL at 11:49

## 2024-03-04 RX ADMIN — VALSARTAN 160 MILLIGRAM(S): 80 TABLET ORAL at 05:38

## 2024-03-04 RX ADMIN — HYDROMORPHONE HYDROCHLORIDE 0.5 MILLIGRAM(S): 2 INJECTION INTRAMUSCULAR; INTRAVENOUS; SUBCUTANEOUS at 19:05

## 2024-03-04 RX ADMIN — TRAMADOL HYDROCHLORIDE 50 MILLIGRAM(S): 50 TABLET ORAL at 01:18

## 2024-03-04 RX ADMIN — Medication 1 EACH: at 10:00

## 2024-03-04 RX ADMIN — TRAMADOL HYDROCHLORIDE 50 MILLIGRAM(S): 50 TABLET ORAL at 12:00

## 2024-03-04 RX ADMIN — Medication 1: at 05:39

## 2024-03-04 RX ADMIN — INSULIN GLARGINE 10 UNIT(S): 100 INJECTION, SOLUTION SUBCUTANEOUS at 22:55

## 2024-03-04 RX ADMIN — SODIUM CHLORIDE 10 MILLILITER(S): 9 INJECTION INTRAMUSCULAR; INTRAVENOUS; SUBCUTANEOUS at 22:37

## 2024-03-04 RX ADMIN — CHLORHEXIDINE GLUCONATE 1 APPLICATION(S): 213 SOLUTION TOPICAL at 05:38

## 2024-03-04 RX ADMIN — PIPERACILLIN AND TAZOBACTAM 25 GRAM(S): 4; .5 INJECTION, POWDER, LYOPHILIZED, FOR SOLUTION INTRAVENOUS at 13:32

## 2024-03-04 RX ADMIN — Medication 1: at 23:35

## 2024-03-04 RX ADMIN — Medication 2: at 19:04

## 2024-03-04 RX ADMIN — PIPERACILLIN AND TAZOBACTAM 25 GRAM(S): 4; .5 INJECTION, POWDER, LYOPHILIZED, FOR SOLUTION INTRAVENOUS at 05:38

## 2024-03-04 RX ADMIN — Medication 1 EACH: at 22:33

## 2024-03-04 RX ADMIN — HYDROMORPHONE HYDROCHLORIDE 0.5 MILLIGRAM(S): 2 INJECTION INTRAMUSCULAR; INTRAVENOUS; SUBCUTANEOUS at 09:00

## 2024-03-04 RX ADMIN — GABAPENTIN 100 MILLIGRAM(S): 400 CAPSULE ORAL at 22:02

## 2024-03-04 RX ADMIN — TRAMADOL HYDROCHLORIDE 50 MILLIGRAM(S): 50 TABLET ORAL at 00:18

## 2024-03-04 RX ADMIN — HYDROMORPHONE HYDROCHLORIDE 0.5 MILLIGRAM(S): 2 INJECTION INTRAMUSCULAR; INTRAVENOUS; SUBCUTANEOUS at 02:49

## 2024-03-04 RX ADMIN — HYDROMORPHONE HYDROCHLORIDE 0.5 MILLIGRAM(S): 2 INJECTION INTRAMUSCULAR; INTRAVENOUS; SUBCUTANEOUS at 23:44

## 2024-03-04 RX ADMIN — HYDROMORPHONE HYDROCHLORIDE 0.5 MILLIGRAM(S): 2 INJECTION INTRAMUSCULAR; INTRAVENOUS; SUBCUTANEOUS at 17:41

## 2024-03-04 NOTE — PROGRESS NOTE ADULT - SUBJECTIVE AND OBJECTIVE BOX
s/p exploratory laparostomy, Lysis of adhesions, Small Bowel Resection 2/19  Patient seen and examined at bedside with  Admits to flatus/BM.   Denies nausea/ vomiting.   Tolerating diet. On TPN     Vital Signs Last 24 Hrs  T(F): 98.4 (03-04-24 @ 05:19), Max: 99.3 (03-03-24 @ 14:26)  HR: 83 (03-04-24 @ 05:19)  BP: 183/95 (03-04-24 @ 05:19)  RR: 17 (03-04-24 @ 05:19)  SpO2: 97% (03-04-24 @ 05:19)  POCT Blood Glucose.: 200 mg/dL (04 Mar 2024 05:27)    GENERAL: Alert, NAD  CHEST/LUNG: respirations nonlabored  ABDOMEN: Dressing taken down and packing removed, area repacked gently with iodoform, covered with gauze and medipore. Drain with serosanguineous output; Soft, Nontender, Nondistended  EXTREMITIES:  no calf tenderness, No edema    I&O's Detail    03 Mar 2024 07:01  -  04 Mar 2024 07:00  --------------------------------------------------------  IN:    Fat Emulsion 20%: 200.4 mL    TPN (Total Parenteral Nutrition): 1500 mL  Total IN: 1700.4 mL    OUT:    Bulb (mL): 4.5 mL  Total OUT: 4.5 mL    Total NET: 1695.9 mL    LABS:                        8.9    6.08  )-----------( 455      ( 04 Mar 2024 05:13 )             27.6     03-04    135  |  104  |  13  ----------------------------<  203<H>  3.7   |  26  |  0.64    Ca    9.2      04 Mar 2024 05:13  Phos  3.0     03-04  Mg     1.7     03-04    TPro  7.7  /  Alb  2.1<L>  /  TBili  0.3  /  DBili  x   /  AST  21  /  ALT  39  /  AlkPhos  198<H>  03-04    PT/INR - ( 04 Mar 2024 05:13 )   PT: 12.0 sec;   INR: 1.05 ratio       PTT - ( 04 Mar 2024 05:13 )  PTT:27.4 sec

## 2024-03-04 NOTE — PROGRESS NOTE ADULT - SUBJECTIVE AND OBJECTIVE BOX
[facility] NEPHROLOGY- METABOLIC SUPPORT PROGRESS NOTE    Patient is a 59y Female with HTN, DM, recently admitted 2/5-2/14/24 s/p diagnostic laparoscopy, lysis of adhesions, conversion to laparotomy for adhesiolysis and incisional hernia repair without mesh on 2/6, presents now with abdominal pain. Pt found to have small bowel perforation with pneumoperitoneum. s/p OR 2/19 for ex-lap with bowel resection, found to have adhesions with intrabdominal abscess. Pt transferred to ICU for septic shock 2/2 peritonitis, hypotension requiring brief pressors, with DEION and hyponatremia. DEION and hyponatremia resolved.   Nephrology consulted for TPN for prolonged NPO status.   2/22: s/p IR RUE PICC placement  2/23: Pt started on CLD  2/26: Advanced to full liquid diet  2/28: PO diet      Hospital Medications: Medications reviewed.  REVIEW OF SYSTEMS:  CONSTITUTIONAL: No fevers or chills  RESPIRATORY: No shortness of breath  CARDIOVASCULAR: No chest pain.  GASTROINTESTINAL: No nausea, or vomiting, +abd pain  frequent BMs more formed~ 6 -7 episodes since this am  VASCULAR: No bilateral lower extremity edema.     VITALS:  T(F): 97.9 (03-04-24 @ 12:21), Max: 98.4 (03-04-24 @ 05:19)  HR: 85 (03-04-24 @ 12:21)  BP: 159/76 (03-04-24 @ 12:21)  RR: 17 (03-04-24 @ 12:21)  SpO2: 97% (03-04-24 @ 12:21)  Wt(kg): --    03-03 @ 07:01  -  03-04 @ 07:00  --------------------------------------------------------  IN: 1852.1 mL / OUT: 4.5 mL / NET: 1847.6 mL    03-04 @ 07:01  -  03-04 @ 15:18  --------------------------------------------------------  IN: 243.4 mL / OUT: 0 mL / NET: 243.4 mL          PHYSICAL EXAM:  Constitutional: NAD,   HEENT: anicteric sclera,   Respiratory: CTAB, no wheezes, rales or rhonchi  Cardiovascular: S1, S2, RRR  Gastrointestinal: +midline incision covered, +ZEKE  Extremities: No LE edema b/l   Vascular Access: RUE single lumen PICC- saved for  TPN    LABS:  03-04    135  |  104  |  13  ----------------------------<  203<H>  3.7   |  26  |  0.64    Ca    9.2      04 Mar 2024 05:13  Phos  3.0     03-04  Mg     1.7     03-04    TPro  7.7  /  Alb  2.1<L>  /  TBili  0.3  /  DBili      /  AST  21  /  ALT  39  /  AlkPhos  198<H>  03-04    Creatinine Trend: 0.64 <--, 0.72 <--, 0.70 <--, 0.80 <--, 0.75 <--, 0.67 <--, 0.72 <--                        8.9    6.08  )-----------( 455      ( 04 Mar 2024 05:13 )             27.6     Urine Studies:  Urinalysis Basic - ( 04 Mar 2024 05:13 )    Color:  / Appearance:  / SG:  / pH:   Gluc: 203 mg/dL / Ketone:   / Bili:  / Urobili:    Blood:  / Protein:  / Nitrite:    Leuk Esterase:  / RBC:  / WBC    Sq Epi:  / Non Sq Epi:  / Bacteria:

## 2024-03-04 NOTE — PRE PROCEDURE NOTE - PRE PROCEDURE EVALUATION
Interventional Radiology Pre-Procedure Note    Diagnosis/Indication: Patient is a 59y old Female with bowel perforation s/p surgery, complicated with pelvic abscess. Drainage of the abscess was requested. Case d/w Dr. Lopez.    PAST MEDICAL & SURGICAL HISTORY:  Other specified diabetes mellitus without complication, without long-term current use of insulin  HTN (hypertension)  DM (diabetes mellitus)  HLD (hyperlipidemia)  H/O abdominal hysterectomy       Allergies: No Known Allergies      LABS:  CBC Full  -  ( 04 Mar 2024 05:13 )  WBC Count : 6.08 K/uL  RBC Count : 3.24 M/uL  Hemoglobin : 8.9 g/dL  Hematocrit : 27.6 %  Platelet Count - Automated : 455 K/uL  Mean Cell Volume : 85.2 fl  Mean Cell Hemoglobin : 27.5 pg  Mean Cell Hemoglobin Concentration : 32.2 gm/dL  Auto Neutrophil # : 3.10 K/uL  Auto Lymphocyte # : 2.06 K/uL  Auto Monocyte # : 0.58 K/uL  Auto Eosinophil # : 0.28 K/uL  Auto Basophil # : 0.04 K/uL  Auto Neutrophil % : 51.0 %  Auto Lymphocyte % : 33.9 %  Auto Monocyte % : 9.5 %  Auto Eosinophil % : 4.6 %  Auto Basophil % : 0.7 %    03-04    135  |  104  |  13  ----------------------------<  203<H>  3.7   |  26  |  0.64    Ca    9.2      04 Mar 2024 05:13  Phos  3.0     03-04  Mg     1.7     03-04    TPro  7.7  /  Alb  2.1<L>  /  TBili  0.3  /  DBili  x   /  AST  21  /  ALT  39  /  AlkPhos  198<H>  03-04    PT/INR - ( 04 Mar 2024 05:13 )   PT: 12.0 sec;   INR: 1.05 ratio      PTT - ( 04 Mar 2024 05:13 )  PTT:27.4 sec    Procedure/ risks/ benefits/ alternatives were discussed with the patient, including but not limited to risks of infection, bleeding, and possible bowel injury. The patient verbalizes understanding, and witnessed informed consent was obtained.

## 2024-03-04 NOTE — PROGRESS NOTE ADULT - ASSESSMENT
Patient is a 59y Female with HTN, DM, recently admitted 2/5-2/14/24 s/p diagnostic laparoscopy, lysis of adhesions, conversion to laparotomy for adhesiolysis and incisional hernia repair without mesh on 2/6, presents now with abdominal pain. Pt found to have small bowel perforation with pneumoperitoneum. s/p OR 2/19 for ex-lap with bowel resection, found to have adhesions with intrabdominal abscess. Pt transferred to ICU for septic shock 2/2 peritonitis, hypotension requiring brief pressors, with DEION and hyponatremia. DEION and hyponatremia resolved.   Nephrology consulted for TPN for prolonged NPO status.     1. Severe PCM-   Pt initiated on TPN 2/23. s/p IR RUE PICC line placed on 2/22 (after 1pm). Pt tolerating diet; however with frequent BMs/ diarrhea.   cw TPN with lipids @ 1.5L, cycled over 12 hrs (10 pm-10am). Will adjust electrolytes accordingly.  Check FS q6h. FS acceptable. c/w 10 units of insulin in TPN bag hgbA1C 8.4 on 2/6/24.  on 3/1  Check CMP/Mg/Phos/ Ionized calcium in am.   Daily weights. Strict I/O. Will hold TPN if pt spikes fever and check BCX x2    2. DEION - likely ATN due to Septic shock. DEION resolved. Strict I/Os. Avoid nephrotoxins/ NSAIDs/ RCA. Monitor BMP.    3. Septic shock- 2/2 peritonitis/ small bowel perforation. Pt on Zosyn. Finished Diflucan for ppx . BCx 2/19 NG.  Plan as per ID/ Surgery. Cdiff neg.  Repeating Cdiff and stool PCR neg.     4. Hypotension- resolved. Now with elevated BP for which Valsartan 160mg PO daily was resumed, titrate as needed. Continue to hold HCTZ due to hyponatremia with diarrhea; minimize fluid loss. Monitor BP    TPN orders:    60 kg  Total Kcal 1500  Lipid 50 g =500 kcal  AA 100g = 400 kcal  Dextrose 176g - 600 kcal    [facility] NEPHROLOGY  Dean Shipley M.D.  Jaylon Kang D.O.  Debbi Garcia M.D.  MD Laura Kwon, BRUCE, ANP-C    Telephone: [phone]  Facsimile: [phone], [address]  [place] [id number]

## 2024-03-04 NOTE — CHART NOTE - NSCHARTNOTEFT_GEN_A_CORE
Assessment:   Patient is a 59y old  Female who presents with a chief complaint of bowel perf (04 Mar 2024 08:16). Pt continues on cycled PN. Today's order (for tonight) discussed w/ RADHA MD, 3/4 dose PN and 1/2 dose lipids, cycled. Pt seen,      Factors impacting intake: [ ] none [ ] nausea  [ ] vomiting [ ] diarrhea [ ] constipation  [ ]chewing problems [ ] swallowing issues  [x ] other: altered GI fx/ structure    Diet Prescription: Diet, NPO after Midnight:      NPO Start Date: 03-Mar-2024,   NPO Start Time: 23:59 (24 @ 08:12)  Diet, Consistent Carbohydrate w/Evening Snack:   Fiber/Residue Restricted  Lactose Restricted (Milk Sugar Intoler.)  Supplement Feeding Modality:  Oral  Ensure Plant-Based Cans or Servings Per Day:  1       Frequency:  Two Times a day (24 @ 09:53)    Daily     Daily Weight in k.7 (04 Mar 2024 05:19)  Weight in k.5 (03 Mar 2024 05:30)  Weight in k.2 (02 Mar 2024 11:28)  Weight in k (2024 07:37)  Weight in k (2024 13:00)  Weight in k.1 (2024 05:11)        Pertinent Medications: MEDICATIONS  (STANDING):  chlorhexidine 2% Cloths 1 Application(s) Topical daily  chlorhexidine 2% Cloths 1 Application(s) Topical <User Schedule>  cholestyramine Powder (Sugar-Free) 4 Gram(s) Oral two times a day  dextrose 5%. 1000 milliLiter(s) (50 mL/Hr) IV Continuous <Continuous>  dextrose 5%. 1000 milliLiter(s) (100 mL/Hr) IV Continuous <Continuous>  dextrose 50% Injectable 25 Gram(s) IV Push once  dextrose 50% Injectable 12.5 Gram(s) IV Push once  dextrose 50% Injectable 25 Gram(s) IV Push once  gabapentin 100 milliGRAM(s) Oral three times a day  glucagon  Injectable 1 milliGRAM(s) IntraMuscular once  insulin glargine Injectable (LANTUS) 10 Unit(s) SubCutaneous at bedtime  insulin lispro (ADMELOG) corrective regimen sliding scale   SubCutaneous every 6 hours  lidocaine   4% Patch 2 Patch Transdermal every 24 hours  lipid, fat emulsion (Fish Oil and Plant Based) 20% Infusion 0.3777 Gm/kG/Day (10.4 mL/Hr) IV Continuous <Continuous>  Parenteral Nutrition - Adult 1 Each TPN Continuous <Continuous>  Parenteral Nutrition - Adult 1 Each TPN Continuous <Continuous>  piperacillin/tazobactam IVPB.. 3.375 Gram(s) IV Intermittent every 8 hours  valsartan 160 milliGRAM(s) Oral daily    MEDICATIONS  (PRN):  benzocaine/menthol Lozenge 1 Lozenge Oral every 2 hours PRN Sore Throat  dextrose Oral Gel 15 Gram(s) Oral once PRN Blood Glucose LESS THAN 70 milliGRAM(s)/deciliter  HYDROmorphone  Injectable 0.5 milliGRAM(s) IV Push every 6 hours PRN Severe Pain (7 - 10)  ondansetron Injectable 4 milliGRAM(s) IV Push every 6 hours PRN Nausea and/or Vomiting  sodium chloride 0.9% lock flush 10 milliLiter(s) IV Push every 1 hour PRN Pre/post blood products, medications, blood draw, and to maintain line patency  traMADol 50 milliGRAM(s) Oral every 6 hours PRN Moderate Pain (4 - 6)    Pertinent Labs:  Na135 mmol/L Glu 203 mg/dL<H> K+ 3.7 mmol/L Cr  0.64 mg/dL BUN 13 mg/dL  Phos 3.0 mg/dL  Alb 2.1 g/dL<L>  Chol --    LDL --    HDL --    Trig 202 mg/dL<H>     CAPILLARY BLOOD GLUCOSE      POCT Blood Glucose.: 147 mg/dL (04 Mar 2024 11:31)  POCT Blood Glucose.: 200 mg/dL (04 Mar 2024 05:27)  POCT Blood Glucose.: 177 mg/dL (03 Mar 2024 23:23)  POCT Blood Glucose.: 170 mg/dL (03 Mar 2024 20:45)  POCT Blood Glucose.: 140 mg/dL (03 Mar 2024 16:27)      Estimated Needs:   [x ] no change since previous assessment  [ ] recalculated:       Previous Nutrition Diagnosis:   [ ] Altered GI function  [ ]Inadequate Oral Intake [ ] Swallowing Difficulty   [ ] Altered nutrition related labs [ ] Increased Nutrient Needs [ ] Overweight/Obesity   [ ] Unintended Weight Loss [ ] Food & Nutrition Related Knowledge Deficit [x ] Malnutrition (severe PCM)  [ ] Other:     Nutrition Diagnosis is [x ] ongoing  [ ] resolved [ ] not applicable       Interventions:   Recommend  [ ] Change Diet To:  [ ] Nutrition Supplement  [x ] Nutrition Support: PN orders per RADHA MD  [x ] Other: Diet order per Sx. MD to monitor. RD available.     Monitoring and Evaluation:   [x ] PO intake [ x ] Tolerance to diet prescription [ x ] weights [ x ] labs[ x ] follow up per protocol  [ ] other:

## 2024-03-04 NOTE — PROGRESS NOTE ADULT - ASSESSMENT
59F s/p exploratory laparoscopy with lysis of adhesions and Small Bowel Resection secondary to 2/19  PICC placed 2/22 for TPN  rpt ct 2/29 grossly unchanged with rim enhancing pelvic collection   diarrhea     -IR drainage of pelvic collection 3/4, NPO  -diet as tolerated   -continue local wound care to midline incision with daily packing changes  -TPN per nephrology  -antibiotics per ID   -monitor ZEKE output  -encourage ambulation / OOB  -discussed with Dr. Lopez     This note and all of its recommendations herein are preliminary until co-signed by an attending physician

## 2024-03-04 NOTE — CHART NOTE - NSCHARTNOTEFT_GEN_A_CORE
Patient presented to IR for CT guided drainage of pelvic abscess. Informed consent was obtained and patient was placed on CT table. Preliminary CT images demonstrated significant interval decrease in size of the pelvic abscess with no definite window for safe percutaneous drainage. The procedure was therefore not performed and patient was transported back to her room in stable condition.     Continued management as per primary team.     Case d/w Dr. Lopez.

## 2024-03-04 NOTE — PROGRESS NOTE ADULT - ASSESSMENT
Subjective: NAD, afebrile, no N/V , BM # 6 today formed, appetite improved, IR RUQ collection drain aborted this morning.     REVIEW OF SYSTEMS:  CONSTITUTIONAL:  No fevers or chills  EYES/ENT:  No visual changes;  No vertigo or throat pain   NECK:  No pain or stiffness  RESPIRATORY:  No cough, wheezing, hemoptysis; No shortness of breath  CARDIOVASCULAR:  No chest pain or palpitations  GASTROINTESTINAL: abd bloating, no abd pain, no N/V, BM formed now  GENITOURINARY:  No dysuria, frequency or hematuria  NEUROLOGICAL:  No numbness or weakness  MSK: no back pain, no joint pain  SKIN:  No itching, rashes    PE:  Vital Signs Last 24 Hrs  T(C): 36.6 (04 Mar 2024 12:21), Max: 37.4 (03 Mar 2024 14:26)  T(F): 97.9 (04 Mar 2024 12:21), Max: 99.3 (03 Mar 2024 14:26)  HR: 85 (04 Mar 2024 12:21) (70 - 95)  BP: 159/76 (04 Mar 2024 12:21) (129/73 - 183/95)  BP(mean): 92 (03 Mar 2024 20:06) (92 - 92)  RR: 17 (04 Mar 2024 12:21) (16 - 18)  SpO2: 97% (04 Mar 2024 12:21) (95% - 97%)    Parameters below as of 04 Mar 2024 12:21  Patient On (Oxygen Delivery Method): room air    Gen: AOx3, NAD, non-toxic, pleasant  HEAD:  Atraumatic, Normocephalic  EYES: PERRLA, conjunctiva and sclera clear  ENT: Moist mucous membranes  NECK: Supple, No JVD  CV: S1+S2 normal, nontachycardic  Resp: Clear bilat, no resp distress, no crackles/wheezes  Abd: Soft, nontender, +BS R abd ZEKE drain with scant serosanguineous content, ant abd surgical wound healing well  Ext: No LE edema, no cyanosis, pulses +  : No dysuria  IV/Skin: No thrombophlebitis, PICC line site ok(L arm)  Msk: No low back pain, no arthralgias, no joint swelling  Neuro: AAOx3. No focal signs     LABS:                        8.9    6.08  )-----------( 455      ( 04 Mar 2024 05:13 )             27.6     03-04    135  |  104  |  13  ----------------------------<  203<H>  3.7   |  26  |  0.64    Ca    9.2      04 Mar 2024 05:13  Phos  3.0     03-04  Mg     1.7     03-04    TPro  7.7  /  Alb  2.1<L>  /  TBili  0.3  /  DBili  x   /  AST  21  /  ALT  39  /  AlkPhos  198<H>  03-04    MICROBIOLOGY:  v    .Stool Feces  02-29-24   No enteric gram negative rods isolated  No enteric pathogens isolated.  (Stool culture examined for Salmonella,  Shigella, Campylobacter, Aeromonas, Plesiomonas,  Vibrio, E.coli O157 and Yersinia)  --  --    .Abscess abdominal abcess  02-19-24   Rare Escherichia coli  Few Alpha hemolytic strep "Susceptibilities not performed"  Numerous Streptococcus anginosus "Susceptibilities not performed"  Few Lactobacillus rhamnosus "Susceptibilities not performed"  --  Escherichia coli    Clean Catch Clean Catch (Midstream)  02-19-24   >=3 organisms. Probable collection contamination.  --  --    .Blood Blood-Peripheral  02-19-24   No growth at 5 days  --  --    .Blood Blood-Peripheral  02-19-24   No growth at 5 days  --  --    C Diff by PCR Result: NotDetec (02-29 @ 14:39)  C Diff by PCR Result: NotDetec (02-29-24 @ 14:39)    RADIOLOGY:< from: CT Aspiration Abscess Hematoma, Cyst (03.04.24 @ 10:57) >  Preliminary noncontrast transaxial images of   the pelvis were obtained, which demonstrated significant interval   decrease in size of the pelvic abscess with no definite window for safe   percutaneous drainage. The procedure was therefore not performed    ANTIBIOTICS:  piperacillin/tazobactam IVPB.. 3.375 every 8 hours    IMPRESSION:  60 y/o female with pmhx of HTN, DM admitted for acute abdomen on 2/5 (RLQ pain).  s/p diagnostic laparoscopy , lysis of adhesions and conversion to laparotomy for adhesiolysis and incisional hernia repair 2/6.  D/C on 2/14 after stable post OP.  Pt returned to ED 2/19 with complains of abd pain/vaginal pain and sob. CT A/P 2/19 showed evidence of bowel perforation and pneumoperitoneum.  S/P Ex Lap SBR and abd washout 2/19 with intraop findings of abscess(intraop cx polymicrobial + Rare Escherichia coli + Few Alpha hemolytic strep +Numerous Streptococcus anginosus + Few Lactobacillus rhamnosus.  Post operatively admitted to MICU 2/20 for septic shock.  ID consulted while in MICU for abx guidance.  Most recent CT A/P 2/25 with Interval small bowel resection with a right lower quadrant small bowel anastomosis. In the right hemipelvis, there is a rim-enhancing 8.6 cm fluid collection abutting a distal small bowel loop proximal to the   anastomosis.  CT A/P 2/29  showed again peripherally enhancing right pelvic collection 7.0 cm AP x 4.7 cm transverse x 3.1 cm sagittal, previously 8.6 x 4.2 x 2.3 cm.    -Acute abdomen s/p Ex/Lap complicated with perforation and abscess formation   -Post op abscess s/p small bowel resection and washout (cx + polymicrobial)  -Septic shock- resolved  -R hemipelvis RLQ anastomosis abscess (8.6 cm)  -Ileus  -Diarrhea - unclear etiology (C diff neg, PCR neg)    PLAN:  Continue Zosyn 3.375g q8 hrs IV  Trend inflammatory markers  Anticipating 6 weeks of IV abx once pt is clinically ready for d/c    Please reach ID with any questions or concerns  Dr. Madelyn Rashid  Available in Teams

## 2024-03-05 LAB
ALBUMIN SERPL ELPH-MCNC: 2.4 G/DL — LOW (ref 3.5–5)
ALP SERPL-CCNC: 191 U/L — HIGH (ref 40–120)
ALT FLD-CCNC: 35 U/L DA — SIGNIFICANT CHANGE UP (ref 10–60)
ANION GAP SERPL CALC-SCNC: 3 MMOL/L — LOW (ref 5–17)
AST SERPL-CCNC: 17 U/L — SIGNIFICANT CHANGE UP (ref 10–40)
BASOPHILS # BLD AUTO: 0.03 K/UL — SIGNIFICANT CHANGE UP (ref 0–0.2)
BASOPHILS NFR BLD AUTO: 0.5 % — SIGNIFICANT CHANGE UP (ref 0–2)
BILIRUB SERPL-MCNC: 0.3 MG/DL — SIGNIFICANT CHANGE UP (ref 0.2–1.2)
BUN SERPL-MCNC: 13 MG/DL — SIGNIFICANT CHANGE UP (ref 7–18)
CA-I BLD-SCNC: 5.1 MG/DL — SIGNIFICANT CHANGE UP (ref 4.5–5.6)
CALCIUM SERPL-MCNC: 9.2 MG/DL — SIGNIFICANT CHANGE UP (ref 8.4–10.5)
CHLORIDE SERPL-SCNC: 104 MMOL/L — SIGNIFICANT CHANGE UP (ref 96–108)
CO2 SERPL-SCNC: 27 MMOL/L — SIGNIFICANT CHANGE UP (ref 22–31)
CREAT SERPL-MCNC: 0.62 MG/DL — SIGNIFICANT CHANGE UP (ref 0.5–1.3)
EGFR: 103 ML/MIN/1.73M2 — SIGNIFICANT CHANGE UP
EOSINOPHIL # BLD AUTO: 0.17 K/UL — SIGNIFICANT CHANGE UP (ref 0–0.5)
EOSINOPHIL NFR BLD AUTO: 2.8 % — SIGNIFICANT CHANGE UP (ref 0–6)
GLUCOSE BLDC GLUCOMTR-MCNC: 153 MG/DL — HIGH (ref 70–99)
GLUCOSE BLDC GLUCOMTR-MCNC: 162 MG/DL — HIGH (ref 70–99)
GLUCOSE BLDC GLUCOMTR-MCNC: 168 MG/DL — HIGH (ref 70–99)
GLUCOSE BLDC GLUCOMTR-MCNC: 185 MG/DL — HIGH (ref 70–99)
GLUCOSE BLDC GLUCOMTR-MCNC: 210 MG/DL — HIGH (ref 70–99)
GLUCOSE BLDC GLUCOMTR-MCNC: 224 MG/DL — HIGH (ref 70–99)
GLUCOSE SERPL-MCNC: 189 MG/DL — HIGH (ref 70–99)
HCT VFR BLD CALC: 27.7 % — LOW (ref 34.5–45)
HGB BLD-MCNC: 9.2 G/DL — LOW (ref 11.5–15.5)
IMM GRANULOCYTES NFR BLD AUTO: 0.3 % — SIGNIFICANT CHANGE UP (ref 0–0.9)
LYMPHOCYTES # BLD AUTO: 2 K/UL — SIGNIFICANT CHANGE UP (ref 1–3.3)
LYMPHOCYTES # BLD AUTO: 32.4 % — SIGNIFICANT CHANGE UP (ref 13–44)
MAGNESIUM SERPL-MCNC: 1.6 MG/DL — SIGNIFICANT CHANGE UP (ref 1.6–2.6)
MCHC RBC-ENTMCNC: 27.9 PG — SIGNIFICANT CHANGE UP (ref 27–34)
MCHC RBC-ENTMCNC: 33.2 GM/DL — SIGNIFICANT CHANGE UP (ref 32–36)
MCV RBC AUTO: 83.9 FL — SIGNIFICANT CHANGE UP (ref 80–100)
MONOCYTES # BLD AUTO: 0.5 K/UL — SIGNIFICANT CHANGE UP (ref 0–0.9)
MONOCYTES NFR BLD AUTO: 8.1 % — SIGNIFICANT CHANGE UP (ref 2–14)
NEUTROPHILS # BLD AUTO: 3.45 K/UL — SIGNIFICANT CHANGE UP (ref 1.8–7.4)
NEUTROPHILS NFR BLD AUTO: 55.9 % — SIGNIFICANT CHANGE UP (ref 43–77)
NRBC # BLD: 0 /100 WBCS — SIGNIFICANT CHANGE UP (ref 0–0)
PHOSPHATE SERPL-MCNC: 2.5 MG/DL — SIGNIFICANT CHANGE UP (ref 2.5–4.5)
PLATELET # BLD AUTO: 473 K/UL — HIGH (ref 150–400)
POTASSIUM SERPL-MCNC: 3.9 MMOL/L — SIGNIFICANT CHANGE UP (ref 3.5–5.3)
POTASSIUM SERPL-SCNC: 3.9 MMOL/L — SIGNIFICANT CHANGE UP (ref 3.5–5.3)
PROT SERPL-MCNC: 8.2 G/DL — SIGNIFICANT CHANGE UP (ref 6–8.3)
RBC # BLD: 3.3 M/UL — LOW (ref 3.8–5.2)
RBC # FLD: 15.1 % — HIGH (ref 10.3–14.5)
SODIUM SERPL-SCNC: 134 MMOL/L — LOW (ref 135–145)
TRIGL SERPL-MCNC: 214 MG/DL — HIGH
WBC # BLD: 6.17 K/UL — SIGNIFICANT CHANGE UP (ref 3.8–10.5)
WBC # FLD AUTO: 6.17 K/UL — SIGNIFICANT CHANGE UP (ref 3.8–10.5)

## 2024-03-05 RX ORDER — KETOROLAC TROMETHAMINE 30 MG/ML
15 SYRINGE (ML) INJECTION EVERY 8 HOURS
Refills: 0 | Status: DISCONTINUED | OUTPATIENT
Start: 2024-03-05 | End: 2024-03-08

## 2024-03-05 RX ADMIN — TRAMADOL HYDROCHLORIDE 50 MILLIGRAM(S): 50 TABLET ORAL at 09:32

## 2024-03-05 RX ADMIN — TRAMADOL HYDROCHLORIDE 50 MILLIGRAM(S): 50 TABLET ORAL at 03:06

## 2024-03-05 RX ADMIN — Medication 2: at 06:35

## 2024-03-05 RX ADMIN — Medication 15 MILLIGRAM(S): at 18:04

## 2024-03-05 RX ADMIN — PIPERACILLIN AND TAZOBACTAM 25 GRAM(S): 4; .5 INJECTION, POWDER, LYOPHILIZED, FOR SOLUTION INTRAVENOUS at 06:03

## 2024-03-05 RX ADMIN — HYDROMORPHONE HYDROCHLORIDE 0.5 MILLIGRAM(S): 2 INJECTION INTRAMUSCULAR; INTRAVENOUS; SUBCUTANEOUS at 00:10

## 2024-03-05 RX ADMIN — Medication 1: at 23:57

## 2024-03-05 RX ADMIN — PIPERACILLIN AND TAZOBACTAM 25 GRAM(S): 4; .5 INJECTION, POWDER, LYOPHILIZED, FOR SOLUTION INTRAVENOUS at 16:09

## 2024-03-05 RX ADMIN — LIDOCAINE 2 PATCH: 4 CREAM TOPICAL at 19:25

## 2024-03-05 RX ADMIN — LIDOCAINE 2 PATCH: 4 CREAM TOPICAL at 06:35

## 2024-03-05 RX ADMIN — GABAPENTIN 100 MILLIGRAM(S): 400 CAPSULE ORAL at 06:03

## 2024-03-05 RX ADMIN — LIDOCAINE 2 PATCH: 4 CREAM TOPICAL at 07:00

## 2024-03-05 RX ADMIN — GABAPENTIN 100 MILLIGRAM(S): 400 CAPSULE ORAL at 15:52

## 2024-03-05 RX ADMIN — HYDROMORPHONE HYDROCHLORIDE 0.5 MILLIGRAM(S): 2 INJECTION INTRAMUSCULAR; INTRAVENOUS; SUBCUTANEOUS at 06:03

## 2024-03-05 RX ADMIN — CHOLESTYRAMINE 4 GRAM(S): 4 POWDER, FOR SUSPENSION ORAL at 08:21

## 2024-03-05 RX ADMIN — INSULIN GLARGINE 10 UNIT(S): 100 INJECTION, SOLUTION SUBCUTANEOUS at 21:44

## 2024-03-05 RX ADMIN — CHOLESTYRAMINE 4 GRAM(S): 4 POWDER, FOR SUSPENSION ORAL at 16:09

## 2024-03-05 RX ADMIN — TRAMADOL HYDROCHLORIDE 50 MILLIGRAM(S): 50 TABLET ORAL at 10:33

## 2024-03-05 RX ADMIN — TRAMADOL HYDROCHLORIDE 50 MILLIGRAM(S): 50 TABLET ORAL at 03:26

## 2024-03-05 RX ADMIN — HYDROMORPHONE HYDROCHLORIDE 0.5 MILLIGRAM(S): 2 INJECTION INTRAMUSCULAR; INTRAVENOUS; SUBCUTANEOUS at 06:23

## 2024-03-05 RX ADMIN — Medication 1: at 11:40

## 2024-03-05 RX ADMIN — PIPERACILLIN AND TAZOBACTAM 25 GRAM(S): 4; .5 INJECTION, POWDER, LYOPHILIZED, FOR SOLUTION INTRAVENOUS at 21:44

## 2024-03-05 RX ADMIN — CHLORHEXIDINE GLUCONATE 1 APPLICATION(S): 213 SOLUTION TOPICAL at 06:09

## 2024-03-05 RX ADMIN — Medication 15 MILLIGRAM(S): at 19:27

## 2024-03-05 RX ADMIN — CHLORHEXIDINE GLUCONATE 1 APPLICATION(S): 213 SOLUTION TOPICAL at 11:40

## 2024-03-05 RX ADMIN — GABAPENTIN 100 MILLIGRAM(S): 400 CAPSULE ORAL at 21:43

## 2024-03-05 RX ADMIN — Medication 1: at 17:04

## 2024-03-05 RX ADMIN — HYDROMORPHONE HYDROCHLORIDE 0.5 MILLIGRAM(S): 2 INJECTION INTRAMUSCULAR; INTRAVENOUS; SUBCUTANEOUS at 12:18

## 2024-03-05 RX ADMIN — HYDROMORPHONE HYDROCHLORIDE 0.5 MILLIGRAM(S): 2 INJECTION INTRAMUSCULAR; INTRAVENOUS; SUBCUTANEOUS at 13:33

## 2024-03-05 RX ADMIN — VALSARTAN 160 MILLIGRAM(S): 80 TABLET ORAL at 06:03

## 2024-03-05 NOTE — CHART NOTE - NSCHARTNOTESELECT_GEN_ALL_CORE
Event Note
Nutrition Services
Nutrition Services
refusing blood/Event Note
Event Note
Event Note
ICU Downgrade/Transfer Note
Interventional Radiology
Nutrition Services

## 2024-03-05 NOTE — PROGRESS NOTE ADULT - ASSESSMENT
Patient is a 59y Female with HTN, DM, recently admitted 2/5-2/14/24 s/p diagnostic laparoscopy, lysis of adhesions, conversion to laparotomy for adhesiolysis and incisional hernia repair without mesh on 2/6, presents now with abdominal pain. Pt found to have small bowel perforation with pneumoperitoneum. s/p OR 2/19 for ex-lap with bowel resection, found to have adhesions with intrabdominal abscess. Pt transferred to ICU for septic shock 2/2 peritonitis, hypotension requiring brief pressors, with DEION and hyponatremia. DEION and hyponatremia resolved.   Nephrology consulted for TPN for prolonged NPO status.     1. Severe PCM-   Pt initiated on TPN 2/23. s/p IR RUE PICC line placed on 2/22 (after 1pm). Pt tolerating diet; with improved appetite.   Will hold further TPN at this time. Encourage PO intake with frequent BMs/ diarrhea. Recc to d/c PICC prior to discharge.   Daily weights. Strict I/O.     2. DEION - likely ATN due to Septic shock. DEION resolved. Strict I/Os. Avoid nephrotoxins/ NSAIDs/ RCA. Monitor BMP.    3. Septic shock- 2/2 peritonitis/ small bowel perforation. Pt on Zosyn. Finished Diflucan for ppx . BCx 2/19 NG.  Plan as per ID/ Surgery. Cdiff neg.  Repeating Cdiff and stool PCR neg.     4. Hypotension- resolved. Now with elevated BP for which Valsartan 160mg PO daily was resumed, titrate as needed. Continue to hold HCTZ due to hyponatremia with diarrhea; minimize fluid loss. Monitor BP    TPN orders:    60 kg  Total Kcal 1500  Lipid 50 g =500 kcal  AA 100g = 400 kcal  Dextrose 176g - 600 kcal    [facility] NEPHROLOGY  Dean Shipley M.D.  Jaylon Kang D.O.  Debbi Garcia M.D.  MD Laura Kwon, MSN, ANP-C    Telephone: [phone]  Facsimile: [phone]    [facility] [place] [address], [address]  [place] [address]

## 2024-03-05 NOTE — PROGRESS NOTE ADULT - SUBJECTIVE AND OBJECTIVE BOX
s/p exploratory laparostomy, Lysis of adhesions, Small Bowel Resection 2/19  Patient seen and examined at bedside with  Admits to flatus/. Diarrhea resolved   Denies nausea/ vomiting.   Tolerating diet. On TPN     Vital Signs Last 24 Hrs  T(F): 98.4 (03-05-24 @ 05:20), Max: 98.4 (03-05-24 @ 05:20)  HR: 84 (03-05-24 @ 05:20)  BP: 146/83 (03-05-24 @ 05:20)  RR: 16 (03-05-24 @ 05:20)  SpO2: 97% (03-05-24 @ 05:20)  POCT Blood Glucose.: 210 mg/dL (05 Mar 2024 05:38)    GENERAL: Alert, NAD  CHEST/LUNG: respirations nonlabored  ABDOMEN: Dressing taken down and packing removed, area repacked gently with iodoform, covered with gauze and medipore. Wound draining seropurulent fluid from inferior skin opening; Drain with serosanguineous output; Soft, Nontender, Nondistended  EXTREMITIES:  no calf tenderness, No edema    I&O's Detail    04 Mar 2024 07:01  -  05 Mar 2024 07:00  --------------------------------------------------------  IN:    Fat Emulsion 20%: 33.4 mL    Oral Fluid: 40 mL    TPN (Total Parenteral Nutrition): 210 mL  Total IN: 283.4 mL    OUT:    Bulb (mL): 5 mL  Total OUT: 5 mL    Total NET: 278.4 mL    LABS:                        9.2    6.17  )-----------( 473      ( 05 Mar 2024 06:05 )             27.7     03-05    134<L>  |  104  |  13  ----------------------------<  189<H>  3.9   |  27  |  0.62    Ca    9.2      05 Mar 2024 06:05  Phos  2.5     03-05  Mg     1.6     03-05    TPro  8.2  /  Alb  2.4<L>  /  TBili  0.3  /  DBili  x   /  AST  17  /  ALT  35  /  AlkPhos  191<H>  03-05    PT/INR - ( 04 Mar 2024 05:13 )   PT: 12.0 sec;   INR: 1.05 ratio      PTT - ( 04 Mar 2024 05:13 )  PTT:27.4 sec

## 2024-03-05 NOTE — CHART NOTE - NSCHARTNOTEFT_GEN_A_CORE
Assessment:   Patient is a 59y old  Female who presents with a chief complaint of bowel perf (05 Mar 2024 10:18). Pt seen in AM, reports no diarrhea, eating better (describes intake). Discussed on 6N IDR and with RADHA MD, PN D/C (cycle stopped at 10AM, not reordering for tonight). Weighed pt on standing scale @ 147#. Discussed  PO supplement after D/C (pt wants to comtinue w/ Ensure Plant Based, finds Glucerna too sweet.      Factors impacting intake: [ ] none [ ] nausea  [ ] vomiting [ ] diarrhea [ ] constipation  [ ]chewing problems [ ] swallowing issues  [x ] other: altered structure/ fx    Diet Prescription: Diet, Consistent Carbohydrate w/Evening Snack:   Fiber/Residue Restricted  Lactose Restricted (Milk Sugar Intoler.)  Supplement Feeding Modality:  Oral  Ensure Plant-Based Cans or Servings Per Day:  1       Frequency:  Two Times a day (24 @ 09:53)      Daily   3/5 Weighed by RD on standing scale @ 147# (66.8 kg)  Daily Weight in k.7 (04 Mar 2024 05:19) (??) 3/5 pt reports this was on bedscale  Weight in k.5 (03 Mar 2024 05:30)  Weight in k.2 (02 Mar 2024 11:28)  Weight in k (2024 07:37)  Weight in k (2024 13:00)  Weight in k.1 (2024 05:11)      Pertinent Medications: MEDICATIONS  (STANDING):  chlorhexidine 2% Cloths 1 Application(s) Topical daily  chlorhexidine 2% Cloths 1 Application(s) Topical <User Schedule>  cholestyramine Powder (Sugar-Free) 4 Gram(s) Oral two times a day  dextrose 5%. 1000 milliLiter(s) (50 mL/Hr) IV Continuous <Continuous>  dextrose 5%. 1000 milliLiter(s) (100 mL/Hr) IV Continuous <Continuous>  dextrose 50% Injectable 25 Gram(s) IV Push once  dextrose 50% Injectable 12.5 Gram(s) IV Push once  dextrose 50% Injectable 25 Gram(s) IV Push once  gabapentin 100 milliGRAM(s) Oral three times a day  glucagon  Injectable 1 milliGRAM(s) IntraMuscular once  insulin glargine Injectable (LANTUS) 10 Unit(s) SubCutaneous at bedtime  insulin lispro (ADMELOG) corrective regimen sliding scale   SubCutaneous every 6 hours  lidocaine   4% Patch 2 Patch Transdermal every 24 hours  lipid, fat emulsion (Fish Oil and Plant Based) 20% Infusion 0.3777 Gm/kG/Day (10.4 mL/Hr) IV Continuous <Continuous>  Parenteral Nutrition - Adult 1 Each TPN Continuous <Continuous>  piperacillin/tazobactam IVPB.. 3.375 Gram(s) IV Intermittent every 8 hours  valsartan 160 milliGRAM(s) Oral daily    MEDICATIONS  (PRN):  benzocaine/menthol Lozenge 1 Lozenge Oral every 2 hours PRN Sore Throat  dextrose Oral Gel 15 Gram(s) Oral once PRN Blood Glucose LESS THAN 70 milliGRAM(s)/deciliter  HYDROmorphone  Injectable 0.5 milliGRAM(s) IV Push every 6 hours PRN Severe Pain (7 - 10)  ondansetron Injectable 4 milliGRAM(s) IV Push every 6 hours PRN Nausea and/or Vomiting  sodium chloride 0.9% lock flush 10 milliLiter(s) IV Push every 1 hour PRN Pre/post blood products, medications, blood draw, and to maintain line patency  traMADol 50 milliGRAM(s) Oral every 6 hours PRN Moderate Pain (4 - 6)    Pertinent Labs:  Na134 mmol/L<L> Glu 189 mg/dL<H> K+ 3.9 mmol/L Cr  0.62 mg/dL BUN 13 mg/dL  Phos 2.5 mg/dL - Alb 2.4 g/dL<L> 03- Chol --    LDL --    HDL --    Trig 214 mg/dL<H>     CAPILLARY BLOOD GLUCOSE      POCT Blood Glucose.: 162 mg/dL (05 Mar 2024 11:36)  POCT Blood Glucose.: 210 mg/dL (05 Mar 2024 05:38)  POCT Blood Glucose.: 224 mg/dL (04 Mar 2024 23:59)  POCT Blood Glucose.: 168 mg/dL (04 Mar 2024 22:54)  POCT Blood Glucose.: 228 mg/dL (04 Mar 2024 18:36)        Estimated Needs:   [x ] no change since previous assessment  [ ] recalculated:       Previous Nutrition Diagnosis:   [ ] Altered GI function  [ ]Inadequate Oral Intake [ ] Swallowing Difficulty   [ ] Altered nutrition related labs [ ] Increased Nutrient Needs [ ] Overweight/Obesity   [ ] Unintended Weight Loss [ ] Food & Nutrition Related Knowledge Deficit [x ] Malnutrition (severe PCM)  [ ] Other:     Nutrition Diagnosis is [x ] ongoing  [ ] resolved [ ] not applicable     Interventions:   Recommend  [ ] Change Diet To:  [x ] Nutrition Supplement: Has order for Ensure Plant Based BID  [x ] Nutrition Support: PN D/C this AM  [x ] Other: MD to monitor. RD available.     Monitoring and Evaluation:   [ x ] follow up per protocol  [ ] other:

## 2024-03-05 NOTE — PROGRESS NOTE ADULT - SUBJECTIVE AND OBJECTIVE BOX
Interval Events:  pt in nad    Allergies    No Known Allergies    Intolerances      Endocrine/Metabolic Medications:  cholestyramine Powder (Sugar-Free) 4 Gram(s) Oral two times a day  dextrose 50% Injectable 25 Gram(s) IV Push once  dextrose 50% Injectable 12.5 Gram(s) IV Push once  dextrose 50% Injectable 25 Gram(s) IV Push once  dextrose Oral Gel 15 Gram(s) Oral once PRN  glucagon  Injectable 1 milliGRAM(s) IntraMuscular once  insulin glargine Injectable (LANTUS) 10 Unit(s) SubCutaneous at bedtime  insulin lispro (ADMELOG) corrective regimen sliding scale   SubCutaneous every 6 hours      Vital Signs Last 24 Hrs  T(C): 36.9 (05 Mar 2024 05:20), Max: 36.9 (05 Mar 2024 05:20)  T(F): 98.4 (05 Mar 2024 05:20), Max: 98.4 (05 Mar 2024 05:20)  HR: 84 (05 Mar 2024 05:20) (84 - 90)  BP: 146/83 (05 Mar 2024 05:20) (146/83 - 159/76)  BP(mean): --  RR: 16 (05 Mar 2024 05:20) (16 - 17)  SpO2: 97% (05 Mar 2024 05:20) (96% - 97%)    Parameters below as of 05 Mar 2024 05:20  Patient On (Oxygen Delivery Method): room air          PHYSICAL EXAM  All physical exam findings normal, except those marked:  General:	Alert, active, cooperative, NAD, well hydrated  .		[] Abnormal:  Neck		Normal: supple, no cervical adenopathy, no palpable thyroid  .		[] Abnormal:  Cardiovascular	Normal: regular rate, normal S1, S2, no murmurs  .		[] Abnormal:  Respiratory	Normal: no chest wall deformity, normal respiratory pattern, CTA B/L  .		[] Abnormal:  Abdominal	Normal: soft, ND, NT, bowel sounds present, no masses, no organomegaly  .		[] Abnormal:  		Normal normal genitalia, testes descended, circumcised/uncircumcised  .		Velma stage:			Breast velma:  .		Menstrual history:  .		[] Abnormal:  Extremities	Normal: FROM x4  .		[] Abnormal:  Skin		Normal: intact and not indurated, no rash, no acanthosis nigricans  .		[] Abnormal:  Neurologic	Normal: grossly intact  .		[] Abnormal:    LABS                        9.2    6.17  )-----------( 473      ( 05 Mar 2024 06:05 )             27.7                               134    |  104    |  13                  Calcium: 9.2   / iCa: x      (03-05 @ 06:05)    ----------------------------<  189       Magnesium: 1.6                              3.9     |  27     |  0.62             Phosphorous: 2.5      TPro  8.2    /  Alb  2.4    /  TBili  0.3    /  DBili  x      /  AST  17     /  ALT  35     /  AlkPhos  191    05 Mar 2024 06:05    CAPILLARY BLOOD GLUCOSE      POCT Blood Glucose.: 210 mg/dL (05 Mar 2024 05:38)  POCT Blood Glucose.: 224 mg/dL (04 Mar 2024 23:59)  POCT Blood Glucose.: 168 mg/dL (04 Mar 2024 22:54)  POCT Blood Glucose.: 228 mg/dL (04 Mar 2024 18:36)  POCT Blood Glucose.: 147 mg/dL (04 Mar 2024 11:31)        Assesment/plan    60 y/o female with pmhx of HTN, DM, to ER c/o decreased appetite over the last few days with severe vaginal pain/spasms.Admitted with perforated gut.   Pt is s/p diagnostic laparoscopy, lysis of adhesions, conversion to laparotomy for adhesiolysis and incisional hernia repair without mesh 2/6/24/ Pt did well postoperatively and was d/c 2/14/2024.   Found to have uncont dm- pt known to me as out pt- on basaglar 15 and metformin. Now TPN with hyperglycemia.    Dm- insulin dependent as out pt  now better controlled on TPN  pt eating better   cont lantus to 10units - hold when off of tpn  cont insulin in the bag to 10 units  fsg q6h  a1c-8.4  cont iv abx- per ID

## 2024-03-05 NOTE — PROGRESS NOTE ADULT - ASSESSMENT
59F s/p exploratory laparoscopy with lysis of adhesions and Small Bowel Resection secondary to 2/19  PICC placed 2/22 for TPN  rpt ct 2/29 grossly unchanged with rim enhancing pelvic collection, at IR collection smaller and with no window   diarrhea resolved    -diet as tolerated   -continue local wound care to midline incision with daily packing changes  -taper TPN per nephrology  -antibiotics per ID - Anticipating 6 weeks of IV abx once pt is clinically ready for d/c  -monitor ZEKE output  -encourage ambulation / OOB  -discussed with Dr. Lopez     This note and all of its recommendations herein are preliminary until co-signed by an attending physician

## 2024-03-05 NOTE — PROGRESS NOTE ADULT - SUBJECTIVE AND OBJECTIVE BOX
[facility] NEPHROLOGY- METABOLIC SUPPORT PROGRESS NOTE    Patient is a 59y Female with HTN, DM, recently admitted 2/5-2/14/24 s/p diagnostic laparoscopy, lysis of adhesions, conversion to laparotomy for adhesiolysis and incisional hernia repair without mesh on 2/6, presents now with abdominal pain. Pt found to have small bowel perforation with pneumoperitoneum. s/p OR 2/19 for ex-lap with bowel resection, found to have adhesions with intrabdominal abscess. Pt transferred to ICU for septic shock 2/2 peritonitis, hypotension requiring brief pressors, with DEION and hyponatremia. DEION and hyponatremia resolved.   Nephrology consulted for TPN for prolonged NPO status.   2/22: s/p IR RUE PICC placement  2/23: Pt started on CLD  2/26: Advanced to full liquid diet  2/28: PO diet      Hospital Medications: Medications reviewed.  REVIEW OF SYSTEMS:  CONSTITUTIONAL: No fevers or chills  RESPIRATORY: No shortness of breath  CARDIOVASCULAR: No chest pain.  GASTROINTESTINAL: No nausea, or vomiting, +abd pain  improved BM  VASCULAR: No bilateral lower extremity edema.     VITALS:  T(F): 98.9 (03-05-24 @ 12:18), Max: 98.9 (03-05-24 @ 12:18)  HR: 91 (03-05-24 @ 12:18)  BP: 143/90 (03-05-24 @ 12:18)  RR: 18 (03-05-24 @ 12:18)  SpO2: 97% (03-05-24 @ 12:18)  Wt(kg): --    03-04 @ 07:01  -  03-05 @ 07:00  --------------------------------------------------------  IN: 283.4 mL / OUT: 5 mL / NET: 278.4 mL        PHYSICAL EXAM:  Constitutional: NAD,   HEENT: anicteric sclera,   Respiratory: CTAB, no wheezes, rales or rhonchi  Cardiovascular: S1, S2, RRR  Gastrointestinal: +midline incision covered, +ZEKE  Extremities: No LE edema b/l   Vascular Access: RUE single lumen PICC- saved for  TPN    LABS:  03-05    134<L>  |  104  |  13  ----------------------------<  189<H>  3.9   |  27  |  0.62    Ca    9.2      05 Mar 2024 06:05  Phos  2.5     03-05  Mg     1.6     03-05    TPro  8.2  /  Alb  2.4<L>  /  TBili  0.3  /  DBili      /  AST  17  /  ALT  35  /  AlkPhos  191<H>  03-05    Creatinine Trend: 0.62 <--, 0.64 <--, 0.72 <--, 0.70 <--, 0.80 <--, 0.75 <--, 0.67 <--                        9.2    6.17  )-----------( 473      ( 05 Mar 2024 06:05 )             27.7     Urine Studies:  Urinalysis Basic - ( 05 Mar 2024 06:05 )    Color:  / Appearance:  / SG:  / pH:   Gluc: 189 mg/dL / Ketone:   / Bili:  / Urobili:    Blood:  / Protein:  / Nitrite:    Leuk Esterase:  / RBC:  / WBC    Sq Epi:  / Non Sq Epi:  / Bacteria:

## 2024-03-06 LAB
CA-I BLD-SCNC: 5.3 MG/DL — SIGNIFICANT CHANGE UP (ref 4.5–5.6)
GLUCOSE BLDC GLUCOMTR-MCNC: 153 MG/DL — HIGH (ref 70–99)
GLUCOSE BLDC GLUCOMTR-MCNC: 159 MG/DL — HIGH (ref 70–99)
GLUCOSE BLDC GLUCOMTR-MCNC: 161 MG/DL — HIGH (ref 70–99)
GLUCOSE BLDC GLUCOMTR-MCNC: 197 MG/DL — HIGH (ref 70–99)
GLUCOSE BLDC GLUCOMTR-MCNC: 212 MG/DL — HIGH (ref 70–99)

## 2024-03-06 PROCEDURE — 99232 SBSQ HOSP IP/OBS MODERATE 35: CPT

## 2024-03-06 RX ORDER — INSULIN GLARGINE 100 [IU]/ML
8 INJECTION, SOLUTION SUBCUTANEOUS AT BEDTIME
Refills: 0 | Status: DISCONTINUED | OUTPATIENT
Start: 2024-03-06 | End: 2024-03-08

## 2024-03-06 RX ORDER — INSULIN LISPRO 100/ML
VIAL (ML) SUBCUTANEOUS
Refills: 0 | Status: DISCONTINUED | OUTPATIENT
Start: 2024-03-06 | End: 2024-03-08

## 2024-03-06 RX ADMIN — LIDOCAINE 2 PATCH: 4 CREAM TOPICAL at 07:10

## 2024-03-06 RX ADMIN — CHOLESTYRAMINE 4 GRAM(S): 4 POWDER, FOR SUSPENSION ORAL at 15:57

## 2024-03-06 RX ADMIN — Medication 1: at 08:12

## 2024-03-06 RX ADMIN — GABAPENTIN 100 MILLIGRAM(S): 400 CAPSULE ORAL at 14:22

## 2024-03-06 RX ADMIN — HYDROMORPHONE HYDROCHLORIDE 0.5 MILLIGRAM(S): 2 INJECTION INTRAMUSCULAR; INTRAVENOUS; SUBCUTANEOUS at 18:28

## 2024-03-06 RX ADMIN — Medication 15 MILLIGRAM(S): at 16:43

## 2024-03-06 RX ADMIN — PIPERACILLIN AND TAZOBACTAM 25 GRAM(S): 4; .5 INJECTION, POWDER, LYOPHILIZED, FOR SOLUTION INTRAVENOUS at 14:22

## 2024-03-06 RX ADMIN — LIDOCAINE 2 PATCH: 4 CREAM TOPICAL at 18:26

## 2024-03-06 RX ADMIN — Medication 15 MILLIGRAM(S): at 05:21

## 2024-03-06 RX ADMIN — LIDOCAINE 2 PATCH: 4 CREAM TOPICAL at 06:02

## 2024-03-06 RX ADMIN — HYDROMORPHONE HYDROCHLORIDE 0.5 MILLIGRAM(S): 2 INJECTION INTRAMUSCULAR; INTRAVENOUS; SUBCUTANEOUS at 12:22

## 2024-03-06 RX ADMIN — GABAPENTIN 100 MILLIGRAM(S): 400 CAPSULE ORAL at 21:06

## 2024-03-06 RX ADMIN — INSULIN GLARGINE 8 UNIT(S): 100 INJECTION, SOLUTION SUBCUTANEOUS at 21:35

## 2024-03-06 RX ADMIN — Medication 2: at 21:35

## 2024-03-06 RX ADMIN — PIPERACILLIN AND TAZOBACTAM 25 GRAM(S): 4; .5 INJECTION, POWDER, LYOPHILIZED, FOR SOLUTION INTRAVENOUS at 21:05

## 2024-03-06 RX ADMIN — GABAPENTIN 100 MILLIGRAM(S): 400 CAPSULE ORAL at 06:02

## 2024-03-06 RX ADMIN — HYDROMORPHONE HYDROCHLORIDE 0.5 MILLIGRAM(S): 2 INJECTION INTRAMUSCULAR; INTRAVENOUS; SUBCUTANEOUS at 07:05

## 2024-03-06 RX ADMIN — Medication 15 MILLIGRAM(S): at 05:55

## 2024-03-06 RX ADMIN — HYDROMORPHONE HYDROCHLORIDE 0.5 MILLIGRAM(S): 2 INJECTION INTRAMUSCULAR; INTRAVENOUS; SUBCUTANEOUS at 23:59

## 2024-03-06 RX ADMIN — HYDROMORPHONE HYDROCHLORIDE 0.5 MILLIGRAM(S): 2 INJECTION INTRAMUSCULAR; INTRAVENOUS; SUBCUTANEOUS at 00:24

## 2024-03-06 RX ADMIN — HYDROMORPHONE HYDROCHLORIDE 0.5 MILLIGRAM(S): 2 INJECTION INTRAMUSCULAR; INTRAVENOUS; SUBCUTANEOUS at 00:01

## 2024-03-06 RX ADMIN — HYDROMORPHONE HYDROCHLORIDE 0.5 MILLIGRAM(S): 2 INJECTION INTRAMUSCULAR; INTRAVENOUS; SUBCUTANEOUS at 13:22

## 2024-03-06 RX ADMIN — Medication 1: at 11:52

## 2024-03-06 RX ADMIN — CHOLESTYRAMINE 4 GRAM(S): 4 POWDER, FOR SUSPENSION ORAL at 09:08

## 2024-03-06 RX ADMIN — VALSARTAN 160 MILLIGRAM(S): 80 TABLET ORAL at 06:02

## 2024-03-06 RX ADMIN — Medication 15 MILLIGRAM(S): at 15:57

## 2024-03-06 RX ADMIN — CHLORHEXIDINE GLUCONATE 1 APPLICATION(S): 213 SOLUTION TOPICAL at 11:53

## 2024-03-06 RX ADMIN — Medication 1: at 16:49

## 2024-03-06 RX ADMIN — HYDROMORPHONE HYDROCHLORIDE 0.5 MILLIGRAM(S): 2 INJECTION INTRAMUSCULAR; INTRAVENOUS; SUBCUTANEOUS at 19:00

## 2024-03-06 RX ADMIN — HYDROMORPHONE HYDROCHLORIDE 0.5 MILLIGRAM(S): 2 INJECTION INTRAMUSCULAR; INTRAVENOUS; SUBCUTANEOUS at 06:48

## 2024-03-06 RX ADMIN — PIPERACILLIN AND TAZOBACTAM 25 GRAM(S): 4; .5 INJECTION, POWDER, LYOPHILIZED, FOR SOLUTION INTRAVENOUS at 06:02

## 2024-03-06 RX ADMIN — CHLORHEXIDINE GLUCONATE 1 APPLICATION(S): 213 SOLUTION TOPICAL at 06:10

## 2024-03-06 NOTE — DISCHARGE NOTE PROVIDER - DETAILS OF MALNUTRITION DIAGNOSIS/DIAGNOSES
This patient has been assessed with a concern for Malnutrition and was treated during this hospitalization for the following Nutrition diagnosis/diagnoses:     -  02/26/2024: Severe protein-calorie malnutrition   -  02/21/2024: Moderate protein-calorie malnutrition

## 2024-03-06 NOTE — DISCHARGE NOTE PROVIDER - ATTENDING DISCHARGE PHYSICAL EXAMINATION:
PT seen and examined. Anxious to go home. Discussed pain regimen with her, ok take alternate tylenol and ibuprofen. Will prescribe few percocet 5/325 to take 1-2 times per day only as needed for severe pain and as needed prior to dressing changes. She expressed understanding.   Afeb, VSS. Abd- soft, non distended. Min incisional tenderness. Packing removed from inferior skin opening. + murky serosanguinous drainage. No drainage from old ZEKE site RLQ. wound irrigated with NS and repacked gently    Plan in place for IV abx Ertapenem for 3 weeks.  Pt renewed her insurance however is not showing active yet in the system. She reports she is willing to pay out of pocket for IV abx for now until insurance posts as active. I d/w Alessandro from home care arranging her VNS who stated pt could be reimbursed retroactive once insurance posts as active. Pt provided with dressing supplies and I instructed her on wound care. [facility] will be providing [facility] home VNS to assist with wound care as well. Pt feels comfortable performing wound care and reports has help at home.  Will f/u with me in office next week and will arrange for f/u with Infectious Disease as well.

## 2024-03-06 NOTE — PROGRESS NOTE ADULT - TIME BILLING
- Review of records, vital signs and daily labs, microbiology and other Infectious Diseases related work up  - General physical examination performed  - Generation of Infectious Diseases treatment plan discussed with attending Physician  - Coordination of care with the multidisciplinary team  -Counseling of the patient and/or family members
--Chart review, including additional notes/labs/cultures/imaging  -- bedside re-evaluation of pt   --discussion of DX/management of peritoneal infection, including concern for developing abscess, with the patient and with the surgical team  --discussion of Dx/management of diarrhea with the patient and the surgical team with recommendations noted above
- Review of records, vital signs and daily labs, microbiology and other Infectious Diseases related work up  - General physical examination performed  - Generation of Infectious Diseases treatment plan discussed with attending Physician  - Coordination of care with the multidisciplinary team  -Counseling of the patient and/or family members
- Review of records, vital signs and daily labs, microbiology and other Infectious Diseases related work up  - General physical examination performed  - Generation of Infectious Diseases treatment plan discussed with attending Physician  - Coordination of care with the multidisciplinary team  -Counseling of the patient and/or family members

## 2024-03-06 NOTE — PROGRESS NOTE ADULT - SUBJECTIVE AND OBJECTIVE BOX
Interval Events:  pt in nad  eating > 50 %     Allergies    No Known Allergies    Intolerances      Endocrine/Metabolic Medications:  cholestyramine Powder (Sugar-Free) 4 Gram(s) Oral two times a day  dextrose 50% Injectable 25 Gram(s) IV Push once  dextrose 50% Injectable 12.5 Gram(s) IV Push once  dextrose 50% Injectable 25 Gram(s) IV Push once  dextrose Oral Gel 15 Gram(s) Oral once PRN  glucagon  Injectable 1 milliGRAM(s) IntraMuscular once  insulin glargine Injectable (LANTUS) 10 Unit(s) SubCutaneous at bedtime  insulin lispro (ADMELOG) corrective regimen sliding scale   SubCutaneous Before meals and at bedtime      Vital Signs Last 24 Hrs  T(C): 37.2 (06 Mar 2024 05:30), Max: 37.2 (05 Mar 2024 12:18)  T(F): 99 (06 Mar 2024 05:30), Max: 99 (06 Mar 2024 05:30)  HR: 88 (06 Mar 2024 05:30) (84 - 91)  BP: 147/79 (06 Mar 2024 05:30) (124/77 - 147/79)  BP(mean): --  RR: 18 (06 Mar 2024 05:30) (18 - 18)  SpO2: 97% (06 Mar 2024 05:30) (97% - 98%)    Parameters below as of 06 Mar 2024 05:30  Patient On (Oxygen Delivery Method): room air          PHYSICAL EXAM  All physical exam findings normal, except those marked:  General:	Alert, active, cooperative, NAD, well hydrated  .		[] Abnormal:  Neck		Normal: supple, no cervical adenopathy, no palpable thyroid  .		[] Abnormal:  Cardiovascular	Normal: regular rate, normal S1, S2, no murmurs  .		[] Abnormal:  Respiratory	Normal: no chest wall deformity, normal respiratory pattern, CTA B/L  .		[] Abnormal:  Abdominal	Normal: soft, ND, NT, bowel sounds present, no masses, no organomegaly  .		[] Abnormal:  		Normal normal genitalia, testes descended, circumcised/uncircumcised  .		Velma stage:			Breast velma:  .		Menstrual history:  .		[] Abnormal:  Extremities	Normal: FROM x4  .		[] Abnormal:  Skin		Normal: intact and not indurated, no rash, no acanthosis nigricans  .		[] Abnormal:  Neurologic	Normal: grossly intact  .		[] Abnormal:    LABS        CAPILLARY BLOOD GLUCOSE      POCT Blood Glucose.: 161 mg/dL (06 Mar 2024 07:25)  POCT Blood Glucose.: 159 mg/dL (06 Mar 2024 05:49)  POCT Blood Glucose.: 168 mg/dL (05 Mar 2024 23:51)  POCT Blood Glucose.: 185 mg/dL (05 Mar 2024 21:15)  POCT Blood Glucose.: 153 mg/dL (05 Mar 2024 16:20)  POCT Blood Glucose.: 162 mg/dL (05 Mar 2024 11:36)        Assesment/plan    60 y/o female with pmhx of HTN, DM, to ER c/o decreased appetite over the last few days with severe vaginal pain/spasms.Admitted with perforated gut.   Pt is s/p diagnostic laparoscopy, lysis of adhesions, conversion to laparotomy for adhesiolysis and incisional hernia repair without mesh 2/6/24/ Pt did well postoperatively and was d/c 2/14/2024.   Found to have uncont dm- pt known to me as out pt- on basaglar 15 and metformin. Now TPN with hyperglycemia.    Dm- insulin dependent as out pt  now better controlled  off of TPN  pt eating better   dec lantus to 8 units  fsg qac and hs  a1c-8.4

## 2024-03-06 NOTE — DISCHARGE NOTE PROVIDER - NSDCMRMEDTOKEN_GEN_ALL_CORE_FT
Lantus 100 units/mL subcutaneous solution: 15 subcutaneous once a day (in the morning)  metFORMIN 500 mg oral tablet, extended release: 1 tab(s) orally 2 times a day  Ozempic 2 mg/1.5 mL (0.25 mg or 0.5 mg dose) subcutaneous solution: 0.5 milligram(s) subcutaneously  valsartan-hydrochlorothiazide 160 mg-25 mg oral tablet: 1 tab(s) orally once a day   ertapenem 1 g injection: 1 gram(s) injectable once a day  Lantus 100 units/mL subcutaneous solution: 15 subcutaneous once a day (in the morning)  metFORMIN 500 mg oral tablet, extended release: 1 tab(s) orally 2 times a day  Ozempic 2 mg/1.5 mL (0.25 mg or 0.5 mg dose) subcutaneous solution: 0.5 milligram(s) subcutaneously  valsartan-hydrochlorothiazide 160 mg-25 mg oral tablet: 1 tab(s) orally once a day   cyclobenzaprine 5 mg oral tablet: 1 tab(s) orally 3 times a day as needed for  muscle spasm  ertapenem 1 g injection: 1 gram(s) injectable once a day  Lantus 100 units/mL subcutaneous solution: 15 subcutaneous once a day (in the morning)  metFORMIN 500 mg oral tablet, extended release: 1 tab(s) orally 2 times a day  oxycodone-acetaminophen 5 mg-325 mg oral tablet: 1 tab(s) orally 2 times a day as needed for  severe pain twice per day as needed for abdominal pain and as needed before dressing changes MDD: 2 tabs  Ozempic 2 mg/1.5 mL (0.25 mg or 0.5 mg dose) subcutaneous solution: 0.5 milligram(s) subcutaneously  valsartan-hydrochlorothiazide 160 mg-25 mg oral tablet: 1 tab(s) orally once a day

## 2024-03-06 NOTE — DISCHARGE NOTE PROVIDER - CARE PROVIDERS DIRECT ADDRESSES
[email or web address]

## 2024-03-06 NOTE — PROGRESS NOTE ADULT - SUBJECTIVE AND OBJECTIVE BOX
INTERVAL HPI/OVERNIGHT EVENTS: NAEO. AVSS. Patient seen and examined at bedside.  C/o lower abdominal pain. Denies N/V, fever, chills    Vital Signs Last 24 Hrs  T(C): 37.2 (06 Mar 2024 05:30), Max: 37.2 (05 Mar 2024 12:18)  T(F): 99 (06 Mar 2024 05:30), Max: 99 (06 Mar 2024 05:30)  HR: 88 (06 Mar 2024 05:30) (84 - 91)  BP: 147/79 (06 Mar 2024 05:30) (124/77 - 147/79)  BP(mean): --  RR: 18 (06 Mar 2024 05:30) (18 - 18)  SpO2: 97% (06 Mar 2024 05:30) (97% - 98%)    Parameters below as of 06 Mar 2024 05:30  Patient On (Oxygen Delivery Method): room air      I&O's Detail    05 Mar 2024 07:01  -  06 Mar 2024 07:00  --------------------------------------------------------  IN:  Total IN: 0 mL    OUT:    Bulb (mL): 5 mL  Total OUT: 5 mL    Total NET: -5 mL        piperacillin/tazobactam IVPB.. 3.375 Gram(s) IV Intermittent every 8 hours      Physical Exam:  General: AAOx3, No acute distress  HEENT: NC/AT, trachea midline  Respiratory: Nonlabored breathing, equal chest rise b/l   Skin: No jaundice, no icterus  Abdomen: soft,  nondistended, minimal lower abd tenderness. No rebound tenderness. Midline incision well healed with packing in inferior portion. No erythema or induration appreciated. ZEKE x1 with SS output  Extremities: non edematous, no calf pain bilaterally  Lines/Drains/Tubes:     Drains/Tubes:     03-05-24 @ 07:01  -  03-06-24 @ 07:00  --------------------------------------------------------  IN: 0 mL / OUT: 5 mL / NET: -5 mL        Labs:                        9.2    6.17  )-----------( 473      ( 05 Mar 2024 06:05 )             27.7     03-05    134<L>  |  104  |  13  ----------------------------<  189<H>  3.9   |  27  |  0.62    Ca    9.2      05 Mar 2024 06:05  Phos  2.5     03-05  Mg     1.6     03-05    TPro  8.2  /  Alb  2.4<L>  /  TBili  0.3  /  DBili  x   /  AST  17  /  ALT  35  /  AlkPhos  191<H>  03-05        RADIOLOGY & ADDITIONAL STUDIES:

## 2024-03-06 NOTE — DISCHARGE NOTE PROVIDER - NSDCFUSCHEDAPPT_GEN_ALL_CORE_FT
Maritza Lopez  [facility] Physician [facility]  GENSURG [address] [facility]  Scheduled Appointment: 03/27/2024    [facility]  CATSCAN [address] [facility]  Scheduled Appointment: 03/28/2024    Catrina Flanagan  [facility] Physician [facility]  INFDISEASE [address]  Scheduled Appointment: 04/04/2024

## 2024-03-06 NOTE — PROGRESS NOTE ADULT - ASSESSMENT
59F s/p exploratory laparoscopy with lysis of adhesions and Small Bowel Resection secondary to 2/19  PICC placed 2/22 for TPN  rpt ct 2/29 grossly unchanged with rim enhancing pelvic collection, at IR collection smaller and with no window     PLAN  -PO diet as tolerated; TPN discontinued   -continue local wound care to midline incision with daily packing changes  -antibiotics per ID - Anticipating 4 weeks of IV abx on discharge   -monitor ZEKE output  -encourage ambulation / OOB  -discharge planning for wound care and antibiotics   -discussed with Dr. Lopez     This note and all of its recommendations herein are preliminary until co-signed by an attending physician

## 2024-03-06 NOTE — DISCHARGE NOTE PROVIDER - NSDCCPCAREPLAN_GEN_ALL_CORE_FT
PRINCIPAL DISCHARGE DIAGNOSIS  Diagnosis: Perforated bowel  Assessment and Plan of Treatment: Please follow-up with your surgeon in 1 week. Drink plenty of fluids and rest as needed. Call for any fever over 101, nausea, vomiting, severe pain, no passing of gas or bowel movement.   DIET  You may resume your regular diet as normal.   SURGICAL SITES  Pack the inferior aspct of the midline wound daily with sterile packing, cover with dry gauze and tape or medipore   ACTIVITY  Do not lift anything heavier than 10 pounds for 2 weeks (2-3 months for hernia, no exercise for 2 weeks) and avoid strenuous activity for 4-6 weeks.   PAIN CONTROL  You may take Motrin 600mg (with food) or Tylenol 650mg as needed for mild pain. Stagger one medication 3 hours after the other for maximum pain control. Maximum daily dose of Tylenol should not exceed 4grams/day.   ANTIBIOTICS  You will be set up with home infusions for IV antibiotic therapy. Nurses will come to your home to manage extended dwell and administer antibiotics.   FOLLOW UP   Please follow up with Dr. Lopez in 1-2 weeks  Please follow up with Dr. Flanagan for further evaluation.

## 2024-03-06 NOTE — PROGRESS NOTE ADULT - ASSESSMENT
Subjective: NAD, afebrile, mild nausea, no vomiting or diarrhea, BM normalizing , reports abdominal bloating, denies other symptoms or complains.     REVIEW OF SYSTEMS:  CONSTITUTIONAL:  No fevers or chills  EYES/ENT:  No visual changes;  No vertigo or throat pain   NECK:  No pain or stiffness  RESPIRATORY:  No cough, wheezing, hemoptysis; No shortness of breath  CARDIOVASCULAR:  No chest pain or palpitations  GASTROINTESTINAL: abdominal bloating, + nausea, no vomiting, no diarrhea  GENITOURINARY:  No dysuria, frequency or hematuria  NEUROLOGICAL:  No numbness or weakness  MSK: no back pain, no joint pain  SKIN:  No itching, rashes    PE:  Vital Signs Last 24 Hrs  T(C): 37.2 (06 Mar 2024 05:30), Max: 37.2 (06 Mar 2024 05:30)  T(F): 99 (06 Mar 2024 05:30), Max: 99 (06 Mar 2024 05:30)  HR: 88 (06 Mar 2024 05:30) (84 - 88)  BP: 147/79 (06 Mar 2024 05:30) (124/77 - 147/79)  BP(mean): --  RR: 18 (06 Mar 2024 05:30) (18 - 18)  SpO2: 97% (06 Mar 2024 05:30) (97% - 98%)    Parameters below as of 06 Mar 2024 05:30  Patient On (Oxygen Delivery Method): room air    Gen: AOx3, NAD, non-toxic, pleasant  HEAD:  Atraumatic, Normocephalic  EYES: PERRLA, conjunctiva and sclera clear  ENT: Moist mucous membranes  NECK: Supple, No JVD  CV: S1+S2 normal, nontachycardic  Resp: Clear bilat, no resp distress, no crackles/wheezes  Abd: Soft, nontender, +BS ant abdomen surgical site healing well with no wound dehiscence, no signs of infection   Ext: No LE edema, no cyanosis, pulses +  : No dysuria  IV/Skin: No thrombophlebitis, PICC line R arm + site OK  Msk: No low back pain, no arthralgias, no joint swelling  Neuro: AAOx3. No focal signs     LABS:                        9.2    6.17  )-----------( 473      ( 05 Mar 2024 06:05 )             27.7     03-05    134<L>  |  104  |  13  ----------------------------<  189<H>  3.9   |  27  |  0.62    Ca    9.2      05 Mar 2024 06:05  Phos  2.5     03-05  Mg     1.6     03-05    TPro  8.2  /  Alb  2.4<L>  /  TBili  0.3  /  DBili  x   /  AST  17  /  ALT  35  /  AlkPhos  191<H>  03-05    C-Reactive Protein, Serum: 14 mg/L *H* (02-29-24 @ 06:03)  Sedimentation Rate, Erythrocyte: 106 mm/Hr *H* (02-29-24 @ 06:03)    MICROBIOLOGY:  v    .Stool Feces  02-29-24   No enteric gram negative rods isolated  No enteric pathogens isolated.  (Stool culture examined for Salmonella,  Shigella, Campylobacter, Aeromonas, Plesiomonas,  Vibrio, E.coli O157 and Yersinia)  --  --    .Abscess abdominal abcess  02-19-24   Rare Escherichia coli  Few Alpha hemolytic strep "Susceptibilities not performed"  Numerous Streptococcus anginosus "Susceptibilities not performed"  Few Lactobacillus rhamnosus "Susceptibilities not performed"  --  Escherichia coli    Clean Catch Clean Catch (Midstream)  02-19-24   >=3 organisms. Probable collection contamination.  --  --    .Blood Blood-Peripheral  02-19-24   No growth at 5 days  --  --    .Blood Blood-Peripheral  02-19-24   No growth at 5 days  --  --    C Diff by PCR Result: NotDetec (02-29 @ 14:39)  C Diff by PCR Result: NotDetec (02-29-24 @ 14:39)    RADIOLOGY: all available relevant imaging reviewed     ANTIBIOTICS:  piperacillin/tazobactam IVPB.. 3.375 every 8 hours    IMPRESSION:  60 y/o female with pmhx of HTN, DM admitted for acute abdomen on 2/5 (RLQ pain).  s/p diagnostic laparoscopy , lysis of adhesions and conversion to laparotomy for adhesiolysis and incisional hernia repair 2/6.  D/C on 2/14 after stable post OP.  Pt returned to ED 2/19 with complains of abd pain/vaginal pain and sob. CT A/P 2/19 showed evidence of bowel perforation and pneumoperitoneum.  S/P Ex Lap SBR and abd washout 2/19 with intraop findings of abscess(intraop cx polymicrobial + Rare Escherichia coli + Few Alpha hemolytic strep +Numerous Streptococcus anginosus + Few Lactobacillus rhamnosus.  Post operatively admitted to MICU 2/20 for septic shock.  Most recent CT A/P 2/25 with Interval small bowel resection with a right lower quadrant small bowel anastomosis. In the right hemipelvis, there is a rim-enhancing 8.6 cm fluid collection abutting a distal small bowel loop proximal to the   anastomosis.  CT A/P 2/29  showed again peripherally enhancing right pelvic collection 7.0 cm AP x 4.7 cm transverse x 3.1 cm sagittal, previously 8.6 x 4.2 x 2.3 cm.  CT aspiration of collection 3/4 with significant interval decreased in RLQ collection size hence procedure cancelled.     -Acute abdomen s/p Ex/Lap complicated with perforation and abscess formation   -Post op abscess s/p small bowel resection and washout (cx + polymicrobial)  -Septic shock- resolved  -R hemipelvis RLQ anastomosis abscess (8.6 cm)  -Diarrhea - unclear etiology (C diff neg, PCR neg) resolved    PLAN:  Continue IV zosyn 3.75g q8 hrs while impatient , once pt is ready for d/c change to Ertapenem 1g q24 hrs IV until April 1st  Surveillance labs while on abx CBC w/diff, CMP, ESR and CRP  CT A/P with contrast in 2-3 weeks (before stopping abx)  Follow up OP with Dr. Flanagan at ([address] [place] [address]; [phone]).  Wound care  Follow up OP with surgery  Rest per primary  Discussed with NP    Please reach ID with any questions or concerns  Dr. Madelyn Rashid  Available in Teams

## 2024-03-06 NOTE — DISCHARGE NOTE PROVIDER - HOSPITAL COURSE
58 y/o female with pmhx of HTN, DM, recently admitted and underwent diagnostic laparoscopy, lysis of adhesions, conversion to laparotomy for adhesiolysis and incisional hernia repair without mesh 2/6/24 and was d/c 2/14/2024. Patient called the office and was told to report to ED on 2/19/24 with complaints of decreased appetite and severe abdominal pain, found to have bowel perforation with pneumoperitoneum on CT. NGT was placed and patient was taken emergently to the OR for lysis of adhesion, and small bowel resection on 2/19/2024. Pt tolerated surgery well. During hospital course, it was decided patient would benefit from TPN due to longer NPO status and slow return of bowel function. Patient required nephrology consulted and patient was started on TPN for nutritional support 2/22/2024. Postoperatively, pt remained stable, however continued to have abdominal pain and mild drainage from midline incision. Repeat CT scan demonstrated R pelvic fluid collection for which IR was consulted- no window for drainage. ID was consulted and pt received broad spectrum antibiotics along with local wound care to midline incision. Pt remained stable and nutritional status improved postoperatively. Pt was tapered off TPN. Pt set up with home antibiotic infusion to receive IV antibiotics for 4 weeks through midline. IR and attending agreed IV antibiotics may used through same line as TPN as long as properly flushed. Pt will receive outpatient CT scan to monitor improvement. At the time of discharge, the patient was hemodynamically stable, was tolerating PO diet, was voiding urine and passing flatus and formed bowel movements, was ambulating, and was comfortable with adequate pain control. The patient was instructed to follow up with Dr. Lopez within 1-2 weeks after discharge from the hospital. Pt also educated to follow up with infectious disease, Dr. Flanagan, for further evaluation. The patient/family felt comfortable with discharge. The patient is stable and ready for discharg to home. The patient had no other issues.    60 y/o female with pmhx of HTN, DM, recently admitted and underwent diagnostic laparoscopy, lysis of adhesions, conversion to laparotomy for adhesiolysis and incisional hernia repair without mesh 2/6/24 and was d/c 2/14/2024. Patient called the office and was told to report to ED on 2/19/24 with complaints of decreased appetite and severe abdominal pain, found to have bowel perforation with pneumoperitoneum on CT. NGT was placed and patient was taken emergently to the OR for lysis of adhesion, and small bowel resection on 2/19/2024. Pt tolerated surgery well. During hospital course, it was decided patient would benefit from TPN due to longer NPO status and slow return of bowel function. Patient required nephrology consulted and patient was started on TPN for nutritional support 2/22/2024. Postoperatively, pt remained stable, however continued to have abdominal pain and mild drainage from midline incision. Repeat CT scan demonstrated R pelvic fluid collection for which IR was consulted- no window for drainage. ID was consulted and pt received broad spectrum antibiotics along with local wound care to midline incision. Pt remained stable and nutritional status improved postoperatively. Pt was tapered off TPN. Pt set up with home antibiotic infusion to receive IV antibiotics for through midline until April 1st. IR and attending agreed IV antibiotics may used through same line as TPN as long as properly flushed. Pt will receive outpatient CT scan to monitor improvement. At the time of discharge, the patient was hemodynamically stable, was tolerating PO diet, was voiding urine and passing flatus and formed bowel movements, was ambulating, and was comfortable with adequate pain control. The patient was instructed to follow up with Dr. Lopez within 1-2 weeks after discharge from the hospital. Pt also educated to follow up with infectious disease, Dr. Flanagan, for further evaluation. The patient/family felt comfortable with discharge. The patient is stable and ready for discharg to home. The patient had no other issues.    58 y/o female with pmhx of HTN, DM, recently admitted for worsening abdominal pain and underwent diagnostic laparoscopy, lysis of adhesions, conversion to laparotomy for adhesiolysis and incisional hernia repair without mesh 2/6/24 and was d/c 2/14/2024. Patient called the office and was told to report to ED on 2/19/24 with complaints of decreased appetite and severe vaginal spasms and abdominal pain. She had signs of sepsis on admission with low grade fever, tachycardia and leukocytosis. She was found to have bowel perforation on CT. NGT was placed and patient was taken emergently to the OR for lysis of adhesion, and small bowel resection on 2/19/2024. Pt tolerated surgery well. During hospital course, it was decided patient would benefit from TPN due to longer NPO status and slow return of bowel function. Patient required nephrology consulted and patient was started on TPN for nutritional support 2/22/2024. Postoperatively, pt remained stable, however continued to have abdominal pain and mild drainage from midline incision. Repeat CT scan demonstrated R pelvic fluid collection for which IR was consulted- no window for drainage. ID was consulted and pt received broad spectrum antibiotics along with local wound care to midline incision. Pt remained stable and nutritional status improved postoperatively. Pt was tapered off TPN. Pt set up with home antibiotic infusion to receive IV antibiotics for through midline until April 1st. IR and attending agreed IV antibiotics may used through same line as TPN as long as properly flushed. Pt will receive outpatient CT scan to monitor improvement. At the time of discharge, the patient was hemodynamically stable, was tolerating PO diet, was voiding urine and passing flatus and formed bowel movements, was ambulating, and was comfortable with adequate pain control. The patient was instructed to follow up with Dr. Lopez within 1-2 weeks after discharge from the hospital. Pt also educated to follow up with infectious disease, Dr. Flanagan, for further evaluation. The patient/family felt comfortable with discharge. The patient is stable and ready for discharg to home. The patient had no other issues.

## 2024-03-06 NOTE — DISCHARGE NOTE PROVIDER - CARE PROVIDER_API CALL
Maritza Lopez  Surgery  [address], [address]  [place] [address]  Phone: [phone]  Fax: [phone]  Follow Up Time:     Catrina Flanagan [facility]  Infectious Disease  [address] [address] [place] [address]  Phone: [phone]  Fax: [phone]  Follow Up Time:

## 2024-03-06 NOTE — DISCHARGE NOTE PROVIDER - NSDCCPTREATMENT_GEN_ALL_CORE_FT
PRINCIPAL PROCEDURE  Procedure: Exploratory laparotomy with bowel resection  Findings and Treatment:

## 2024-03-07 ENCOUNTER — TRANSCRIPTION ENCOUNTER (OUTPATIENT)
Age: 59
End: 2024-03-07

## 2024-03-07 LAB
ANION GAP SERPL CALC-SCNC: 6 MMOL/L — SIGNIFICANT CHANGE UP (ref 5–17)
BASOPHILS # BLD AUTO: 0.05 K/UL — SIGNIFICANT CHANGE UP (ref 0–0.2)
BASOPHILS NFR BLD AUTO: 0.7 % — SIGNIFICANT CHANGE UP (ref 0–2)
BUN SERPL-MCNC: 12 MG/DL — SIGNIFICANT CHANGE UP (ref 7–18)
CALCIUM SERPL-MCNC: 9.5 MG/DL — SIGNIFICANT CHANGE UP (ref 8.4–10.5)
CHLORIDE SERPL-SCNC: 103 MMOL/L — SIGNIFICANT CHANGE UP (ref 96–108)
CO2 SERPL-SCNC: 25 MMOL/L — SIGNIFICANT CHANGE UP (ref 22–31)
CREAT SERPL-MCNC: 0.79 MG/DL — SIGNIFICANT CHANGE UP (ref 0.5–1.3)
CRP SERPL-MCNC: 9 MG/L — HIGH
EGFR: 87 ML/MIN/1.73M2 — SIGNIFICANT CHANGE UP
EOSINOPHIL # BLD AUTO: 0.29 K/UL — SIGNIFICANT CHANGE UP (ref 0–0.5)
EOSINOPHIL NFR BLD AUTO: 4.2 % — SIGNIFICANT CHANGE UP (ref 0–6)
ERYTHROCYTE [SEDIMENTATION RATE] IN BLOOD: 106 MM/HR — HIGH (ref 0–20)
GLUCOSE BLDC GLUCOMTR-MCNC: 131 MG/DL — HIGH (ref 70–99)
GLUCOSE BLDC GLUCOMTR-MCNC: 144 MG/DL — HIGH (ref 70–99)
GLUCOSE BLDC GLUCOMTR-MCNC: 151 MG/DL — HIGH (ref 70–99)
GLUCOSE BLDC GLUCOMTR-MCNC: 165 MG/DL — HIGH (ref 70–99)
GLUCOSE SERPL-MCNC: 157 MG/DL — HIGH (ref 70–99)
HCT VFR BLD CALC: 28.8 % — LOW (ref 34.5–45)
HGB BLD-MCNC: 9.7 G/DL — LOW (ref 11.5–15.5)
IMM GRANULOCYTES NFR BLD AUTO: 0.3 % — SIGNIFICANT CHANGE UP (ref 0–0.9)
LYMPHOCYTES # BLD AUTO: 2.28 K/UL — SIGNIFICANT CHANGE UP (ref 1–3.3)
LYMPHOCYTES # BLD AUTO: 33.4 % — SIGNIFICANT CHANGE UP (ref 13–44)
MAGNESIUM SERPL-MCNC: 1.4 MG/DL — LOW (ref 1.6–2.6)
MCHC RBC-ENTMCNC: 27.8 PG — SIGNIFICANT CHANGE UP (ref 27–34)
MCHC RBC-ENTMCNC: 33.7 GM/DL — SIGNIFICANT CHANGE UP (ref 32–36)
MCV RBC AUTO: 82.5 FL — SIGNIFICANT CHANGE UP (ref 80–100)
MONOCYTES # BLD AUTO: 0.69 K/UL — SIGNIFICANT CHANGE UP (ref 0–0.9)
MONOCYTES NFR BLD AUTO: 10.1 % — SIGNIFICANT CHANGE UP (ref 2–14)
NEUTROPHILS # BLD AUTO: 3.5 K/UL — SIGNIFICANT CHANGE UP (ref 1.8–7.4)
NEUTROPHILS NFR BLD AUTO: 51.3 % — SIGNIFICANT CHANGE UP (ref 43–77)
NRBC # BLD: 0 /100 WBCS — SIGNIFICANT CHANGE UP (ref 0–0)
PHOSPHATE SERPL-MCNC: 3.2 MG/DL — SIGNIFICANT CHANGE UP (ref 2.5–4.5)
PLATELET # BLD AUTO: 473 K/UL — HIGH (ref 150–400)
POTASSIUM SERPL-MCNC: 3.4 MMOL/L — LOW (ref 3.5–5.3)
POTASSIUM SERPL-SCNC: 3.4 MMOL/L — LOW (ref 3.5–5.3)
RBC # BLD: 3.49 M/UL — LOW (ref 3.8–5.2)
RBC # FLD: 15.1 % — HIGH (ref 10.3–14.5)
SODIUM SERPL-SCNC: 134 MMOL/L — LOW (ref 135–145)
WBC # BLD: 6.83 K/UL — SIGNIFICANT CHANGE UP (ref 3.8–10.5)
WBC # FLD AUTO: 6.83 K/UL — SIGNIFICANT CHANGE UP (ref 3.8–10.5)

## 2024-03-07 RX ORDER — CYCLOBENZAPRINE HYDROCHLORIDE 10 MG/1
10 TABLET, FILM COATED ORAL THREE TIMES A DAY
Refills: 0 | Status: DISCONTINUED | OUTPATIENT
Start: 2024-03-07 | End: 2024-03-08

## 2024-03-07 RX ORDER — MAGNESIUM OXIDE 400 MG ORAL TABLET 241.3 MG
400 TABLET ORAL ONCE
Refills: 0 | Status: COMPLETED | OUTPATIENT
Start: 2024-03-07 | End: 2024-03-08

## 2024-03-07 RX ORDER — ACETAMINOPHEN 500 MG
650 TABLET ORAL EVERY 6 HOURS
Refills: 0 | Status: DISCONTINUED | OUTPATIENT
Start: 2024-03-07 | End: 2024-03-08

## 2024-03-07 RX ORDER — OXYCODONE HYDROCHLORIDE 5 MG/1
5 TABLET ORAL EVERY 6 HOURS
Refills: 0 | Status: DISCONTINUED | OUTPATIENT
Start: 2024-03-07 | End: 2024-03-08

## 2024-03-07 RX ORDER — ERTAPENEM SODIUM 1 G/1
1000 INJECTION, POWDER, LYOPHILIZED, FOR SOLUTION INTRAMUSCULAR; INTRAVENOUS EVERY 24 HOURS
Refills: 0 | Status: DISCONTINUED | OUTPATIENT
Start: 2024-03-07 | End: 2024-03-08

## 2024-03-07 RX ORDER — POTASSIUM PHOSPHATE, MONOBASIC POTASSIUM PHOSPHATE, DIBASIC 236; 224 MG/ML; MG/ML
15 INJECTION, SOLUTION INTRAVENOUS ONCE
Refills: 0 | Status: COMPLETED | OUTPATIENT
Start: 2024-03-07 | End: 2024-03-07

## 2024-03-07 RX ADMIN — LIDOCAINE 2 PATCH: 4 CREAM TOPICAL at 05:48

## 2024-03-07 RX ADMIN — ERTAPENEM SODIUM 120 MILLIGRAM(S): 1 INJECTION, POWDER, LYOPHILIZED, FOR SOLUTION INTRAMUSCULAR; INTRAVENOUS at 16:21

## 2024-03-07 RX ADMIN — TRAMADOL HYDROCHLORIDE 50 MILLIGRAM(S): 50 TABLET ORAL at 21:43

## 2024-03-07 RX ADMIN — CHOLESTYRAMINE 4 GRAM(S): 4 POWDER, FOR SUSPENSION ORAL at 16:21

## 2024-03-07 RX ADMIN — LIDOCAINE 2 PATCH: 4 CREAM TOPICAL at 08:10

## 2024-03-07 RX ADMIN — VALSARTAN 160 MILLIGRAM(S): 80 TABLET ORAL at 05:48

## 2024-03-07 RX ADMIN — HYDROMORPHONE HYDROCHLORIDE 0.5 MILLIGRAM(S): 2 INJECTION INTRAMUSCULAR; INTRAVENOUS; SUBCUTANEOUS at 07:10

## 2024-03-07 RX ADMIN — GABAPENTIN 100 MILLIGRAM(S): 400 CAPSULE ORAL at 05:48

## 2024-03-07 RX ADMIN — HYDROMORPHONE HYDROCHLORIDE 0.5 MILLIGRAM(S): 2 INJECTION INTRAMUSCULAR; INTRAVENOUS; SUBCUTANEOUS at 00:30

## 2024-03-07 RX ADMIN — Medication 15 MILLIGRAM(S): at 02:40

## 2024-03-07 RX ADMIN — LIDOCAINE 2 PATCH: 4 CREAM TOPICAL at 18:04

## 2024-03-07 RX ADMIN — Medication 15 MILLIGRAM(S): at 17:21

## 2024-03-07 RX ADMIN — Medication 15 MILLIGRAM(S): at 02:56

## 2024-03-07 RX ADMIN — CHLORHEXIDINE GLUCONATE 1 APPLICATION(S): 213 SOLUTION TOPICAL at 12:55

## 2024-03-07 RX ADMIN — OXYCODONE HYDROCHLORIDE 5 MILLIGRAM(S): 5 TABLET ORAL at 18:38

## 2024-03-07 RX ADMIN — POTASSIUM PHOSPHATE, MONOBASIC POTASSIUM PHOSPHATE, DIBASIC 62.5 MILLIMOLE(S): 236; 224 INJECTION, SOLUTION INTRAVENOUS at 18:39

## 2024-03-07 RX ADMIN — INSULIN GLARGINE 8 UNIT(S): 100 INJECTION, SOLUTION SUBCUTANEOUS at 21:58

## 2024-03-07 RX ADMIN — PIPERACILLIN AND TAZOBACTAM 25 GRAM(S): 4; .5 INJECTION, POWDER, LYOPHILIZED, FOR SOLUTION INTRAVENOUS at 05:48

## 2024-03-07 RX ADMIN — Medication 15 MILLIGRAM(S): at 16:21

## 2024-03-07 RX ADMIN — Medication 1: at 08:11

## 2024-03-07 RX ADMIN — HYDROMORPHONE HYDROCHLORIDE 0.5 MILLIGRAM(S): 2 INJECTION INTRAMUSCULAR; INTRAVENOUS; SUBCUTANEOUS at 12:43

## 2024-03-07 RX ADMIN — OXYCODONE HYDROCHLORIDE 5 MILLIGRAM(S): 5 TABLET ORAL at 18:58

## 2024-03-07 RX ADMIN — CHLORHEXIDINE GLUCONATE 1 APPLICATION(S): 213 SOLUTION TOPICAL at 05:55

## 2024-03-07 RX ADMIN — GABAPENTIN 100 MILLIGRAM(S): 400 CAPSULE ORAL at 14:31

## 2024-03-07 RX ADMIN — HYDROMORPHONE HYDROCHLORIDE 0.5 MILLIGRAM(S): 2 INJECTION INTRAMUSCULAR; INTRAVENOUS; SUBCUTANEOUS at 13:15

## 2024-03-07 RX ADMIN — TRAMADOL HYDROCHLORIDE 50 MILLIGRAM(S): 50 TABLET ORAL at 21:23

## 2024-03-07 RX ADMIN — HYDROMORPHONE HYDROCHLORIDE 0.5 MILLIGRAM(S): 2 INJECTION INTRAMUSCULAR; INTRAVENOUS; SUBCUTANEOUS at 06:36

## 2024-03-07 RX ADMIN — CHOLESTYRAMINE 4 GRAM(S): 4 POWDER, FOR SUSPENSION ORAL at 08:11

## 2024-03-07 RX ADMIN — Medication 1: at 21:58

## 2024-03-07 NOTE — PROGRESS NOTE ADULT - SUBJECTIVE AND OBJECTIVE BOX
INTERVAL HPI/OVERNIGHT EVENTS:  Pt resting comfortably. No acute complaints. Tolerating regular diet. Admits to abd discomfort intermittently. Denies nausea, vomiting, fever, chills. +f / +BM.     MEDICATIONS  (STANDING):  chlorhexidine 2% Cloths 1 Application(s) Topical daily  chlorhexidine 2% Cloths 1 Application(s) Topical <User Schedule>  cholestyramine Powder (Sugar-Free) 4 Gram(s) Oral two times a day  dextrose 5%. 1000 milliLiter(s) (100 mL/Hr) IV Continuous <Continuous>  dextrose 5%. 1000 milliLiter(s) (50 mL/Hr) IV Continuous <Continuous>  dextrose 50% Injectable 25 Gram(s) IV Push once  dextrose 50% Injectable 12.5 Gram(s) IV Push once  dextrose 50% Injectable 25 Gram(s) IV Push once  gabapentin 100 milliGRAM(s) Oral three times a day  glucagon  Injectable 1 milliGRAM(s) IntraMuscular once  insulin glargine Injectable (LANTUS) 8 Unit(s) SubCutaneous at bedtime  insulin lispro (ADMELOG) corrective regimen sliding scale   SubCutaneous Before meals and at bedtime  lidocaine   4% Patch 2 Patch Transdermal every 24 hours  piperacillin/tazobactam IVPB.. 3.375 Gram(s) IV Intermittent every 8 hours  valsartan 160 milliGRAM(s) Oral daily    MEDICATIONS  (PRN):  benzocaine/menthol Lozenge 1 Lozenge Oral every 2 hours PRN Sore Throat  dextrose Oral Gel 15 Gram(s) Oral once PRN Blood Glucose LESS THAN 70 milliGRAM(s)/deciliter  HYDROmorphone  Injectable 0.5 milliGRAM(s) IV Push every 6 hours PRN Severe Pain (7 - 10)  ketorolac   Injectable 15 milliGRAM(s) IV Push every 8 hours PRN Moderate Pain (4 - 6)  ondansetron Injectable 4 milliGRAM(s) IV Push every 6 hours PRN Nausea and/or Vomiting  sodium chloride 0.9% lock flush 10 milliLiter(s) IV Push every 1 hour PRN Pre/post blood products, medications, blood draw, and to maintain line patency  traMADol 50 milliGRAM(s) Oral every 6 hours PRN Moderate Pain (4 - 6)      Vital Signs Last 24 Hrs  T(C): 36.8 (07 Mar 2024 05:10), Max: 36.8 (06 Mar 2024 14:03)  T(F): 98.3 (07 Mar 2024 05:10), Max: 98.3 (07 Mar 2024 05:10)  HR: 77 (07 Mar 2024 05:10) (77 - 100)  BP: 130/76 (07 Mar 2024 05:10) (130/76 - 162/90)  BP(mean): --  RR: 17 (07 Mar 2024 05:10) (17 - 18)  SpO2: 98% (07 Mar 2024 05:10) (98% - 98%)    Parameters below as of 07 Mar 2024 05:10  Patient On (Oxygen Delivery Method): room air        Physical:  General: A&Ox3. NAD.  Abdomen: Soft nondistended, nontender to palpation diffusely. c/o lower abd discomfort. No rebound tenderness, no voluntary guarding. Midline incision well healed with packing in inferior portion. No erythema or induration appreciated. No purulence, mild SS drainage from inferior portion of incision. ZEKE x1 with SS output    I&O's Detail    06 Mar 2024 07:01  -  07 Mar 2024 07:00  --------------------------------------------------------  IN:  Total IN: 0 mL    OUT:    Bulb (mL): 0.2 mL    Voided (mL): 300 mL  Total OUT: 300.2 mL    Total NET: -300.2 mL          LABS:                        9.7    6.83  )-----------( 473      ( 07 Mar 2024 06:38 )             28.8             03-07    134<L>  |  103  |  12  ----------------------------<  157<H>  3.4<L>   |  25  |  0.79    Ca    9.5      07 Mar 2024 06:38  Phos  3.2     03-07  Mg     1.4     03-07

## 2024-03-07 NOTE — PROGRESS NOTE ADULT - ASSESSMENT
59F s/p exploratory laparoscopy with lysis of adhesions and Small Bowel Resection secondary to 2/19  PICC placed 2/22 for TPN  rpt ct 2/29 grossly unchanged with rim enhancing pelvic collection, at IR collection smaller and with no window     PLAN  -PO diet as tolerated; TPN discontinued   -continue local wound care to midline incision with daily packing changes  -antibiotics per ID - plan for 4 weeks of IV abx on discharge   -monitor ZEKE output  -encourage ambulation / OOB  -discharge planning for wound care and antibiotics       This note and all of its recommendations herein are preliminary until co-signed by an attending physician

## 2024-03-07 NOTE — PROGRESS NOTE ADULT - SUBJECTIVE AND OBJECTIVE BOX
Interval Events:  pt in nad    Allergies    No Known Allergies    Intolerances      Endocrine/Metabolic Medications:  cholestyramine Powder (Sugar-Free) 4 Gram(s) Oral two times a day  dextrose 50% Injectable 25 Gram(s) IV Push once  dextrose 50% Injectable 12.5 Gram(s) IV Push once  dextrose 50% Injectable 25 Gram(s) IV Push once  dextrose Oral Gel 15 Gram(s) Oral once PRN  glucagon  Injectable 1 milliGRAM(s) IntraMuscular once  insulin glargine Injectable (LANTUS) 8 Unit(s) SubCutaneous at bedtime  insulin lispro (ADMELOG) corrective regimen sliding scale   SubCutaneous Before meals and at bedtime      Vital Signs Last 24 Hrs  T(C): 36.8 (07 Mar 2024 05:10), Max: 36.8 (06 Mar 2024 14:03)  T(F): 98.3 (07 Mar 2024 05:10), Max: 98.3 (07 Mar 2024 05:10)  HR: 77 (07 Mar 2024 05:10) (77 - 100)  BP: 130/76 (07 Mar 2024 05:10) (130/76 - 162/90)  BP(mean): --  RR: 17 (07 Mar 2024 05:10) (17 - 18)  SpO2: 98% (07 Mar 2024 05:10) (98% - 98%)    Parameters below as of 07 Mar 2024 05:10  Patient On (Oxygen Delivery Method): room air          PHYSICAL EXAM  All physical exam findings normal, except those marked:  General:	Alert, active, cooperative, NAD, well hydrated  .		[] Abnormal:  Neck		Normal: supple, no cervical adenopathy, no palpable thyroid  .		[] Abnormal:  Cardiovascular	Normal: regular rate, normal S1, S2, no murmurs  .		[] Abnormal:  Respiratory	Normal: no chest wall deformity, normal respiratory pattern, CTA B/L  .		[] Abnormal:  Abdominal	Normal: soft, ND, NT, bowel sounds present, no masses, no organomegaly  .		[] Abnormal:  		Normal normal genitalia, testes descended, circumcised/uncircumcised  .		Velma stage:			Breast velma:  .		Menstrual history:  .		[] Abnormal:  Extremities	Normal: FROM x4  .		[] Abnormal:  Skin		Normal: intact and not indurated, no rash, no acanthosis nigricans  .		[] Abnormal:  Neurologic	Normal: grossly intact  .		[] Abnormal:    LABS                        9.7    6.83  )-----------( 473      ( 07 Mar 2024 06:38 )             28.8                               134    |  103    |  12                  Calcium: 9.5   / iCa: x      (03-07 @ 06:38)    ----------------------------<  157       Magnesium: 1.4                              3.4     |  25     |  0.79             Phosphorous: 3.2        CAPILLARY BLOOD GLUCOSE      POCT Blood Glucose.: 151 mg/dL (07 Mar 2024 07:43)  POCT Blood Glucose.: 212 mg/dL (06 Mar 2024 21:25)  POCT Blood Glucose.: 153 mg/dL (06 Mar 2024 16:37)  POCT Blood Glucose.: 197 mg/dL (06 Mar 2024 11:33)        Assesment/plan    58 y/o female with pmhx of HTN, DM, to ER c/o decreased appetite over the last few days with severe vaginal pain/spasms.Admitted with perforated gut.   Pt is s/p diagnostic laparoscopy, lysis of adhesions, conversion to laparotomy for adhesiolysis and incisional hernia repair without mesh 2/6/24/ Pt did well postoperatively and was d/c 2/14/2024.   Found to have uncont dm- pt known to me as out pt- on basaglar 15 and metformin. Now TPN with hyperglycemia.    Dm- insulin dependent as out pt  now better controlled  off of TPN  pt eating better   cont lantus 8 units  fsg qac and hs  a1c-8.4

## 2024-03-08 ENCOUNTER — TRANSCRIPTION ENCOUNTER (OUTPATIENT)
Age: 59
End: 2024-03-08

## 2024-03-08 VITALS
TEMPERATURE: 98 F | DIASTOLIC BLOOD PRESSURE: 80 MMHG | HEART RATE: 83 BPM | OXYGEN SATURATION: 99 % | SYSTOLIC BLOOD PRESSURE: 165 MMHG | RESPIRATION RATE: 16 BRPM

## 2024-03-08 LAB
GLUCOSE BLDC GLUCOMTR-MCNC: 110 MG/DL — HIGH (ref 70–99)
GLUCOSE BLDC GLUCOMTR-MCNC: 130 MG/DL — HIGH (ref 70–99)
GLUCOSE BLDC GLUCOMTR-MCNC: 196 MG/DL — HIGH (ref 70–99)

## 2024-03-08 PROCEDURE — 82570 ASSAY OF URINE CREATININE: CPT

## 2024-03-08 PROCEDURE — 87040 BLOOD CULTURE FOR BACTERIA: CPT

## 2024-03-08 PROCEDURE — 97162 PT EVAL MOD COMPLEX 30 MIN: CPT

## 2024-03-08 PROCEDURE — 74176 CT ABD & PELVIS W/O CONTRAST: CPT | Mod: MA

## 2024-03-08 PROCEDURE — 80053 COMPREHEN METABOLIC PANEL: CPT

## 2024-03-08 PROCEDURE — 86900 BLOOD TYPING SEROLOGIC ABO: CPT

## 2024-03-08 PROCEDURE — 97530 THERAPEUTIC ACTIVITIES: CPT

## 2024-03-08 PROCEDURE — 84478 ASSAY OF TRIGLYCERIDES: CPT

## 2024-03-08 PROCEDURE — 96375 TX/PRO/DX INJ NEW DRUG ADDON: CPT

## 2024-03-08 PROCEDURE — 82705 FATS/LIPIDS FECES QUAL: CPT

## 2024-03-08 PROCEDURE — C1751: CPT

## 2024-03-08 PROCEDURE — 87070 CULTURE OTHR SPECIMN AEROBIC: CPT

## 2024-03-08 PROCEDURE — 88307 TISSUE EXAM BY PATHOLOGIST: CPT

## 2024-03-08 PROCEDURE — 77012 CT SCAN FOR NEEDLE BIOPSY: CPT

## 2024-03-08 PROCEDURE — 85730 THROMBOPLASTIN TIME PARTIAL: CPT

## 2024-03-08 PROCEDURE — 84100 ASSAY OF PHOSPHORUS: CPT

## 2024-03-08 PROCEDURE — 93005 ELECTROCARDIOGRAM TRACING: CPT

## 2024-03-08 PROCEDURE — 96374 THER/PROPH/DIAG INJ IV PUSH: CPT

## 2024-03-08 PROCEDURE — 87077 CULTURE AEROBIC IDENTIFY: CPT

## 2024-03-08 PROCEDURE — 76937 US GUIDE VASCULAR ACCESS: CPT

## 2024-03-08 PROCEDURE — 80048 BASIC METABOLIC PNL TOTAL CA: CPT

## 2024-03-08 PROCEDURE — 10160 PNXR ASPIR ABSC HMTMA BULLA: CPT

## 2024-03-08 PROCEDURE — 97116 GAIT TRAINING THERAPY: CPT

## 2024-03-08 PROCEDURE — 85610 PROTHROMBIN TIME: CPT

## 2024-03-08 PROCEDURE — 87205 SMEAR GRAM STAIN: CPT

## 2024-03-08 PROCEDURE — 86140 C-REACTIVE PROTEIN: CPT

## 2024-03-08 PROCEDURE — 87086 URINE CULTURE/COLONY COUNT: CPT

## 2024-03-08 PROCEDURE — 87075 CULTR BACTERIA EXCEPT BLOOD: CPT

## 2024-03-08 PROCEDURE — 87493 C DIFF AMPLIFIED PROBE: CPT

## 2024-03-08 PROCEDURE — C1889: CPT

## 2024-03-08 PROCEDURE — 87045 FECES CULTURE AEROBIC BACT: CPT

## 2024-03-08 PROCEDURE — 87186 SC STD MICRODIL/AGAR DIL: CPT

## 2024-03-08 PROCEDURE — 71045 X-RAY EXAM CHEST 1 VIEW: CPT

## 2024-03-08 PROCEDURE — 74177 CT ABD & PELVIS W/CONTRAST: CPT | Mod: MC

## 2024-03-08 PROCEDURE — 85652 RBC SED RATE AUTOMATED: CPT

## 2024-03-08 PROCEDURE — 83986 ASSAY PH BODY FLUID NOS: CPT

## 2024-03-08 PROCEDURE — 36573 INSJ PICC RS&I 5 YR+: CPT

## 2024-03-08 PROCEDURE — 82803 BLOOD GASES ANY COMBINATION: CPT

## 2024-03-08 PROCEDURE — 84300 ASSAY OF URINE SODIUM: CPT

## 2024-03-08 PROCEDURE — 99285 EMERGENCY DEPT VISIT HI MDM: CPT

## 2024-03-08 PROCEDURE — 83605 ASSAY OF LACTIC ACID: CPT

## 2024-03-08 PROCEDURE — 86880 COOMBS TEST DIRECT: CPT

## 2024-03-08 PROCEDURE — 87637 SARSCOV2&INF A&B&RSV AMP PRB: CPT

## 2024-03-08 PROCEDURE — 85027 COMPLETE CBC AUTOMATED: CPT

## 2024-03-08 PROCEDURE — 36415 COLL VENOUS BLD VENIPUNCTURE: CPT

## 2024-03-08 PROCEDURE — 87046 STOOL CULTR AEROBIC BACT EA: CPT

## 2024-03-08 PROCEDURE — 86870 RBC ANTIBODY IDENTIFICATION: CPT

## 2024-03-08 PROCEDURE — 85025 COMPLETE CBC W/AUTO DIFF WBC: CPT

## 2024-03-08 PROCEDURE — 87507 IADNA-DNA/RNA PROBE TQ 12-25: CPT

## 2024-03-08 PROCEDURE — 86850 RBC ANTIBODY SCREEN: CPT

## 2024-03-08 PROCEDURE — 84484 ASSAY OF TROPONIN QUANT: CPT

## 2024-03-08 PROCEDURE — 77001 FLUOROGUIDE FOR VEIN DEVICE: CPT

## 2024-03-08 PROCEDURE — 82330 ASSAY OF CALCIUM: CPT

## 2024-03-08 PROCEDURE — 86703 HIV-1/HIV-2 1 RESULT ANTBDY: CPT

## 2024-03-08 PROCEDURE — 81001 URINALYSIS AUTO W/SCOPE: CPT

## 2024-03-08 PROCEDURE — P9040: CPT

## 2024-03-08 PROCEDURE — 83735 ASSAY OF MAGNESIUM: CPT

## 2024-03-08 PROCEDURE — 84540 ASSAY OF URINE/UREA-N: CPT

## 2024-03-08 PROCEDURE — 82962 GLUCOSE BLOOD TEST: CPT

## 2024-03-08 PROCEDURE — 86923 COMPATIBILITY TEST ELECTRIC: CPT

## 2024-03-08 PROCEDURE — 86901 BLOOD TYPING SEROLOGIC RH(D): CPT

## 2024-03-08 PROCEDURE — 96376 TX/PRO/DX INJ SAME DRUG ADON: CPT

## 2024-03-08 PROCEDURE — 36430 TRANSFUSION BLD/BLD COMPNT: CPT

## 2024-03-08 RX ORDER — OXYCODONE HYDROCHLORIDE 5 MG/1
1 TABLET ORAL
Qty: 20 | Refills: 0
Start: 2024-03-08 | End: 2024-03-17

## 2024-03-08 RX ORDER — OXYCODONE AND ACETAMINOPHEN 5; 325 MG/1; MG/1
1 TABLET ORAL
Qty: 20 | Refills: 0
Start: 2024-03-08 | End: 2024-03-17

## 2024-03-08 RX ORDER — ERTAPENEM SODIUM 1 G/1
1 INJECTION, POWDER, LYOPHILIZED, FOR SOLUTION INTRAMUSCULAR; INTRAVENOUS
Qty: 0 | Refills: 0 | DISCHARGE
Start: 2024-03-08

## 2024-03-08 RX ORDER — CYCLOBENZAPRINE HYDROCHLORIDE 10 MG/1
1 TABLET, FILM COATED ORAL
Refills: 0
Start: 2024-03-08

## 2024-03-08 RX ORDER — CYCLOBENZAPRINE HYDROCHLORIDE 10 MG/1
1 TABLET, FILM COATED ORAL
Qty: 30 | Refills: 0
Start: 2024-03-08 | End: 2024-03-17

## 2024-03-08 RX ADMIN — Medication 650 MILLIGRAM(S): at 18:32

## 2024-03-08 RX ADMIN — OXYCODONE HYDROCHLORIDE 5 MILLIGRAM(S): 5 TABLET ORAL at 00:48

## 2024-03-08 RX ADMIN — CHLORHEXIDINE GLUCONATE 1 APPLICATION(S): 213 SOLUTION TOPICAL at 15:21

## 2024-03-08 RX ADMIN — Medication 1: at 11:51

## 2024-03-08 RX ADMIN — Medication 15 MILLIGRAM(S): at 18:32

## 2024-03-08 RX ADMIN — LIDOCAINE 2 PATCH: 4 CREAM TOPICAL at 05:37

## 2024-03-08 RX ADMIN — OXYCODONE HYDROCHLORIDE 5 MILLIGRAM(S): 5 TABLET ORAL at 06:48

## 2024-03-08 RX ADMIN — VALSARTAN 160 MILLIGRAM(S): 80 TABLET ORAL at 05:37

## 2024-03-08 RX ADMIN — MAGNESIUM OXIDE 400 MG ORAL TABLET 400 MILLIGRAM(S): 241.3 TABLET ORAL at 09:22

## 2024-03-08 RX ADMIN — OXYCODONE HYDROCHLORIDE 5 MILLIGRAM(S): 5 TABLET ORAL at 15:21

## 2024-03-08 RX ADMIN — Medication 15 MILLIGRAM(S): at 19:35

## 2024-03-08 RX ADMIN — Medication 650 MILLIGRAM(S): at 09:22

## 2024-03-08 RX ADMIN — CHOLESTYRAMINE 4 GRAM(S): 4 POWDER, FOR SUSPENSION ORAL at 08:44

## 2024-03-08 RX ADMIN — CHOLESTYRAMINE 4 GRAM(S): 4 POWDER, FOR SUSPENSION ORAL at 18:48

## 2024-03-08 RX ADMIN — ERTAPENEM SODIUM 120 MILLIGRAM(S): 1 INJECTION, POWDER, LYOPHILIZED, FOR SOLUTION INTRAMUSCULAR; INTRAVENOUS at 18:53

## 2024-03-08 RX ADMIN — OXYCODONE HYDROCHLORIDE 5 MILLIGRAM(S): 5 TABLET ORAL at 14:00

## 2024-03-08 RX ADMIN — CHLORHEXIDINE GLUCONATE 1 APPLICATION(S): 213 SOLUTION TOPICAL at 05:49

## 2024-03-08 RX ADMIN — Medication 650 MILLIGRAM(S): at 10:15

## 2024-03-08 RX ADMIN — OXYCODONE HYDROCHLORIDE 5 MILLIGRAM(S): 5 TABLET ORAL at 01:10

## 2024-03-08 RX ADMIN — Medication 650 MILLIGRAM(S): at 19:35

## 2024-03-08 RX ADMIN — Medication 15 MILLIGRAM(S): at 09:22

## 2024-03-08 NOTE — PROGRESS NOTE ADULT - REASON FOR ADMISSION
bowel perf

## 2024-03-08 NOTE — PROGRESS NOTE ADULT - NUTRITIONAL ASSESSMENT
This patient has been assessed with a concern for Malnutrition and has been determined to have a diagnosis/diagnoses of Severe protein-calorie malnutrition.    This patient is being managed with:   lipid fat emulsion (Fish Oil and Plant Based) 20% Infusion-[Known as SMOFLIPID 20% Infusion]  25 Gram(s) in IV Solution 125 milliLiter(s) infuse at 10.4 mL/Hr  Dose Rate: 0.342 Gm/kG/Day Infuse Over: 12 Hours; Stop After 12 Hours  Administration Instructions: Use 1.2 micron in-line filter  Special Instructions: only for 12 hrs  start 22:00- end 10 am next day  Entered: Feb 29 2024 10:00PM    Parenteral Nutrition - Adult-  Entered: Feb 29 2024 10:00PM    Diet Consistent Carbohydrate w/Evening Snack-  Fiber/Residue Restricted  Lactose Restricted (Milk Sugar Intoler.)  Supplement Feeding Modality:  Oral  Ensure Plant-Based Cans or Servings Per Day:  1       Frequency:  Two Times a day  Entered: Feb 29 2024  9:53AM  
This patient has been assessed with a concern for Malnutrition and has been determined to have a diagnosis/diagnoses of Severe protein-calorie malnutrition.    This patient is being managed with:   lipid fat emulsion (Fish Oil and Plant Based) 20% Infusion-[Known as SMOFLIPID 20% Infusion]  25 Gram(s) in IV Solution 125 milliLiter(s) infuse at 5.21 mL/Hr  Dose Rate: 0.342 Gm/kG/Day Infuse Over: 24 Hours; Stop After 24 Hours  Administration Instructions: Use 1.2 micron in-line filter  Entered: Feb 28 2024 10:00PM    Parenteral Nutrition - Adult-  Entered: Feb 28 2024 10:00PM    Diet Minced and Moist-  Consistent Carbohydrate {Evening Snacks}  Fiber/Residue Restricted  Lactose Restricted (Milk Sugar Intoler.)  Supplement Feeding Modality:  Oral  Ensure Plant-Based Cans or Servings Per Day:  1       Frequency:  Two Times a day  Entered: Feb 28 2024 10:38AM  
This patient has been assessed with a concern for Malnutrition and has been determined to have a diagnosis/diagnoses of Severe protein-calorie malnutrition.    This patient is being managed with:   lipid fat emulsion (Fish Oil and Plant Based) 20% Infusion-[Known as SMOFLIPID 20% Infusion]  25 Gram(s) in IV Solution 125 milliLiter(s) infuse at 5.21 mL/Hr  Dose Rate: 0.342 Gm/kG/Day Infuse Over: 24 Hours; Stop After 24 Hours  Administration Instructions: Use 1.2 micron in-line filter  Entered: Feb 28 2024 10:00PM    Parenteral Nutrition - Adult-  Entered: Feb 28 2024 10:00PM    lipid fat emulsion (Fish Oil and Plant Based) 20% Infusion-[Known as SMOFLIPID 20% Infusion]  50 Gram(s) in IV Solution 250 milliLiter(s) infuse at 5.21 mL/Hr  Dose Rate: 0.342 Gm/kG/Day Infuse Over: 24 Hours; Stop After 24 Hours  Administration Instructions: Use 1.2 micron in-line filter  Entered: Feb 27 2024 10:00PM    Parenteral Nutrition - Adult-  Entered: Feb 27 2024 10:00PM    Diet Minced and Moist-  Consistent Carbohydrate {Evening Snacks}  Fiber/Residue Restricted  Lactose Restricted (Milk Sugar Intoler.)  Entered: Feb 26 2024 12:08PM  
This patient has been assessed with a concern for Malnutrition and has been determined to have a diagnosis/diagnoses of Severe protein-calorie malnutrition.    This patient is being managed with:   Diet Consistent Carbohydrate w/Evening Snack-  Fiber/Residue Restricted  Lactose Restricted (Milk Sugar Intoler.)  Supplement Feeding Modality:  Oral  Ensure Plant-Based Cans or Servings Per Day:  1       Frequency:  Two Times a day  Entered: Feb 29 2024  9:53AM  
This patient has been assessed with a concern for Malnutrition and has been determined to have a diagnosis/diagnoses of Severe protein-calorie malnutrition.    This patient is being managed with:   Diet Consistent Carbohydrate w/Evening Snack-  Fiber/Residue Restricted  Lactose Restricted (Milk Sugar Intoler.)  Supplement Feeding Modality:  Oral  Ensure Plant-Based Cans or Servings Per Day:  1       Frequency:  Two Times a day  Entered: Feb 29 2024  9:53AM  
This patient has been assessed with a concern for Malnutrition and has been determined to have a diagnosis/diagnoses of Severe protein-calorie malnutrition.    This patient is being managed with:   lipid fat emulsion (Fish Oil and Plant Based) 20% Infusion-[Known as SMOFLIPID 20% Infusion]  25 Gram(s) in IV Solution 125 milliLiter(s) infuse at 10.4 mL/Hr  Dose Rate: 0.342 Gm/kG/Day Infuse Over: 12 Hours; Stop After 12 Hours  Administration Instructions: Use 1.2 micron in-line filter  Special Instructions: only for 12 hrs  start 22:00- end 10 am next day  Entered: Feb 29 2024 10:00PM    Parenteral Nutrition - Adult-  Entered: Feb 29 2024 10:00PM    Diet Consistent Carbohydrate w/Evening Snack-  Fiber/Residue Restricted  Lactose Restricted (Milk Sugar Intoler.)  Supplement Feeding Modality:  Oral  Ensure Plant-Based Cans or Servings Per Day:  1       Frequency:  Two Times a day  Entered: Feb 29 2024  9:53AM  
This patient has been assessed with a concern for Malnutrition and has been determined to have a diagnosis/diagnoses of Severe protein-calorie malnutrition.    This patient is being managed with:   lipid fat emulsion (Fish Oil and Plant Based) 20% Infusion-[Known as SMOFLIPID 20% Infusion]  25 Gram(s) in IV Solution 125 milliLiter(s) infuse at 10.4 mL/Hr  Dose Rate: 0.3777 Gm/kG/Day Infuse Over: 12 Hours; Stop After 12 Hours  Administration Instructions: Use 1.2 micron in-line filter  Special Instructions: only for 12 hrs  start 22:00- end 10 am next day  Entered: Mar  4 2024 10:00PM    Parenteral Nutrition - Adult-  Entered: Mar  4 2024 10:00PM    Diet Consistent Carbohydrate w/Evening Snack-  Fiber/Residue Restricted  Lactose Restricted (Milk Sugar Intoler.)  Supplement Feeding Modality:  Oral  Ensure Plant-Based Cans or Servings Per Day:  1       Frequency:  Two Times a day  Entered: Feb 29 2024  9:53AM  
This patient has been assessed with a concern for Malnutrition and has been determined to have a diagnosis/diagnoses of Severe protein-calorie malnutrition.    This patient is being managed with:   Diet Consistent Carbohydrate w/Evening Snack-  Fiber/Residue Restricted  Lactose Restricted (Milk Sugar Intoler.)  Supplement Feeding Modality:  Oral  Ensure Plant-Based Cans or Servings Per Day:  1       Frequency:  Two Times a day  Entered: Feb 29 2024  9:53AM  
This patient has been assessed with a concern for Malnutrition and has been determined to have a diagnosis/diagnoses of Severe protein-calorie malnutrition.    This patient is being managed with:   Diet Consistent Carbohydrate w/Evening Snack-  Fiber/Residue Restricted  Lactose Restricted (Milk Sugar Intoler.)  Supplement Feeding Modality:  Oral  Ensure Plant-Based Cans or Servings Per Day:  1       Frequency:  Two Times a day  Entered: Feb 29 2024  9:53AM  
This patient has been assessed with a concern for Malnutrition and has been determined to have a diagnosis/diagnoses of Severe protein-calorie malnutrition.    This patient is being managed with:   Diet Consistent Carbohydrate w/Evening Snack-  Fiber/Residue Restricted  Lactose Restricted (Milk Sugar Intoler.)  Supplement Feeding Modality:  Oral  Ensure Plant-Based Cans or Servings Per Day:  1       Frequency:  Two Times a day  Entered: Feb 29 2024  9:53AM    lipid fat emulsion (Fish Oil and Plant Based) 20% Infusion-[Known as SMOFLIPID 20% Infusion]  25 Gram(s) in IV Solution 125 milliLiter(s) infuse at 5.21 mL/Hr  Dose Rate: 0.342 Gm/kG/Day Infuse Over: 24 Hours; Stop After 24 Hours  Administration Instructions: Use 1.2 micron in-line filter  Entered: Feb 28 2024 10:00PM    Parenteral Nutrition - Adult-  Entered: Feb 28 2024 10:00PM  
Pt seen and examined. Having bowel function, states passing flatus and having BMs. STarted on clears- advanced to full liquid. Reports diarrhea with taking po  Labs reviewed. Hgb/hct stable. WBC improving still elevated. Alb 1.7.  Cont TPN, lytes repletion. Appreciate Nephrology input  Afeb, VSS. Tachycardia resolved since PRBC transfusion   Abd- soft, min incisional tenderness, no guarding no rebound. Midline incision with serosang murky drainage.  Cont IV abx. Will obtain CT abd/pelvis- assess for possible abscess as d/w ID to r/o drainable collections, guide abx management
This patient has been assessed with a concern for Malnutrition and has been determined to have a diagnosis/diagnoses of Moderate protein-calorie malnutrition.    This patient is being managed with:   Diet NPO-  Entered: Feb 20 2024  4:44PM  
This patient has been assessed with a concern for Malnutrition and has been determined to have a diagnosis/diagnoses of Severe protein-calorie malnutrition.    This patient is being managed with:   Diet Consistent Carbohydrate w/Evening Snack-  Fiber/Residue Restricted  Lactose Restricted (Milk Sugar Intoler.)  Supplement Feeding Modality:  Oral  Ensure Plant-Based Cans or Servings Per Day:  1       Frequency:  Two Times a day  Entered: Feb 29 2024  9:53AM  
This patient has been assessed with a concern for Malnutrition and has been determined to have a diagnosis/diagnoses of Severe protein-calorie malnutrition.    This patient is being managed with:   lipid fat emulsion (Fish Oil and Plant Based) 20% Infusion-[Known as SMOFLIPID 20% Infusion]  50 Gram(s) in IV Solution 250 milliLiter(s) infuse at 5.21 mL/Hr  Dose Rate: 0.342 Gm/kG/Day Infuse Over: 24 Hours; Stop After 24 Hours  Administration Instructions: Use 1.2 micron in-line filter  Entered: Feb 27 2024 10:00PM    Parenteral Nutrition - Adult-  Entered: Feb 27 2024 10:00PM    lipid fat emulsion (Fish Oil and Plant Based) 20% Infusion-[Known as SMOFLIPID 20% Infusion]  50 Gram(s) in IV Solution 250 milliLiter(s) infuse at 5.21 mL/Hr  Dose Rate: 0.342 Gm/kG/Day Infuse Over: 24 Hours; Stop After 24 Hours  Administration Instructions: Use 1.2 micron in-line filter  Entered: Feb 26 2024 10:00PM    Parenteral Nutrition - Adult-  Entered: Feb 26 2024 10:00PM    Diet Minced and Moist-  Consistent Carbohydrate {Evening Snacks}  Fiber/Residue Restricted  Lactose Restricted (Milk Sugar Intoler.)  Entered: Feb 26 2024 12:08PM  
Pt seen and examined. Reports had more formed BM today. + flatus C dif came back negative.  OOB in room  Labs reviewed. WBC 12. Alb improved now 2.0  Pt in mild distress, reports not due to pain. Is anxious  Abd- soft, less distended. + Tenderness along midline incision. Scant drainage from inferior aspect incision. No guarding no rebound. ZEKE serosanguinous  IR consulted for possible perc drainage R pelvic abscess, on evaluation no safe window seen. Recommend repeat imaging this Thurs  Advance diet soft food  Cont TPN  Keep on contact d/w Dr. Flanagan until additional C dif testing completed
This patient has been assessed with a concern for Malnutrition and has been determined to have a diagnosis/diagnoses of Severe protein-calorie malnutrition.    This patient is being managed with:   Parenteral Nutrition - Adult-  Entered: Mar  2 2024 10:00PM    Diet Consistent Carbohydrate w/Evening Snack-  Fiber/Residue Restricted  Lactose Restricted (Milk Sugar Intoler.)  Supplement Feeding Modality:  Oral  Ensure Plant-Based Cans or Servings Per Day:  1       Frequency:  Two Times a day  Entered: Feb 29 2024  9:53AM

## 2024-03-08 NOTE — PROGRESS NOTE ADULT - NS ATTEND AMEND GEN_ALL_CORE FT
Pt seen and examined. Reports having pain but is tolerable. In ICU overnight for hypotension. On modest amount of pressor  NGT light green bilious output  Abd- soft, non distended, + incisional tenderness, no guarding mild rebound. ZEKE serosanguinous. NENO dressing in place.  U/O adequate.   Labs reviewed wbc improved. still with DEION. Alb 1.7.    No flatus yet, await return of bowel function prior to advancing diet  Given alb and reported poor po intake 2 days prior to admission and anticipated ileus,  recommend nutrition consult and tpn consult as well.
Pt seen and examined. Reports passing flatus. Still feels weak but better overall after PRBC transfusion.  Has been OOB to ambulate in room.  Abd- soft, min distended. No guarding no rebound. Incision dressing saturated, with murky serosang drainage inferiorly, wound packing changed. ZEKE serosanguinous, not as murky as prior  Trial clears. To start TPN given prolonged NPO status and Alb<2.0
Pt seen and examined. C/o severe pain pelvic area and drain site. Drain was hanging shelter out and bulb no longer on suction, therefore   was removed.  Abd- soft, non distended. Incision all intact except inferior aspect with packing, was removed and repacked.  still with serosang murky drainage  Started on ertapenem today to ensure could tolerate prior as will be on at home.  Discharge planning, anticipate discharge tomorrow
Pt seen and examined. Was tearful stating she wants to go home. She reports she just received  notification she no longer has insurance and needs to reapply for medicaid.  Has been discontinued from TPN, oral intake adequate.   Reports occasional abdominal pain, at drain and incision. No longer has pain with BMs, are more formed, + flatus  Abd- soft, non distended, + Murky serosanguinous drainage from inferior aspect of wound. Packing changed  ZEKE drain- murky serosang drainage    Plan:  Pt has been afebrile, stable, nml WBC count, tolerating regular diet.  Dispo planning for discharge to home. Consult case management to coordinate IV abx, Ertapenem 1 gm per ID and VNS for wound care
Pt seen and examined. Less tachycardic now to 90's -110. Pt was to go down for PICC line today for TPN,  consented to procedure and seen by IR prior to procedure on floor. Pt then refused right before procedure.  I spoke with pt at length and explained the purpose of the IV nutrition was to provide her with nutrients intravenously since she was still unable to take po while awaiting bowel function. I also informed her labs were showing signs of low protein and malnutrition. She reports she did pass gas today. She then agreed to proceed with PICC line placement for TPN.    Also d/w pt her hgb/hct very low and recommended blood transfusion. At time of seeing pt repeat AM labs hgb/hct 6.8/20.8. Explained to her anemia could be contributing to her tachycardia and place additional strain on her heart and increase risk for MI, and if untreated and decreased further, possible death. She still refuses blood transfusion. Will keep pt on telemetry and O2.
Pt seen and examined. Reports still having multiple bowel movements today. + flatus States soon after eating has to move her bowel. It had improved a couple of days ago but states is more frequent again. C/o incisional pain.   Afeb, vss. Abd- soft, less distended. + mild incisional tenderness, no guarding no rebound. Drainage from incision serosanguinous. Packing removed and replaced. ZEKE serosanguinous  TPN half trength tonight.  Cont po diet.   C dif negative on PCR and reportedly on Ag test.  Still having loose stools. Advance to more sold diet to see if will decreases stool frequency
Pt seen and examined. Tolerating diet and oral intake now adequate. D/w Nephrology Dr. Garcia and will wean TPN  IR unable to drain abscess yesterday as decreased in size significantly and window seen on last CT scan no longer present on rpt CT for procedure  WBC improved and now normalized  Reports BMs now more formed since starting cholestyramine. Ambulating  frequently and independently  Abd- soft, ND. + serous murky drainage from inferior aspect of wound. packing removed and wound repacked. ZEKE serosang drainage slightly murky  Consult case management for discharge planning. Pt will likely require IV abx, to f/u with Dr. Flanagan upon discharge. Will also require wound care.  Pain management d/w pt for home as will unable to take dilaudid IV. Will maintain on tramadol and toradol for now, also on gabapentin. Pt agrees to try and see if will control pain.
Pt seen and examined in ICU. Denies flatus. Decreased output from NGT was d/c'd  Abd- soft, mildly distended, + incisional tenderness, no rebound no guarding. ZEKE serosanguinous. Sabrina dressing in place with serosang saturation  Off pressors. Still tachycardic low 100- 110, likely due to  combination sirs and anemia. Pt anemic hgb 6.8. Pt refusing blood transfusion  D/w nutrition and Nephrology. Recommend TPN due to prolonged NPO status and anticipating delay of bowel function. D/w pt and she agrees.  For PICC line placement tomorrow.
PT seen and examined. Anxious to go home. Discussed pain regimen with her, ok take alternate tylenol and ibuprofen. Will prescribe few percocet 5/325 to take 1-2 times per day only as needed for severe pain and as needed prior to dressing changes. She expressed understanding.   Afeb, VSS. Abd- soft, non distended. Min incisional tenderness. Packing removed from inferior skin opening. + murky serosanguinous drainage. No drainage from old ZEKE site RLQ. wound irrigated with NS and repacked gently    Plan in place for IV abx Ertapenem for 3 weeks.  Pt renewed her insurance however is not showing active yet in the system. She reports she is willing to pay out of pocket for IV abx for now until insurance posts as active. I d/w Alessandro from home care arranging her VNS who stated pt could be reimbursed retroactive once insurance posts as active. Pt provided with dressing supplies and I instructed her on wound care. [facility] will be providing [facility] home VNS to assist with wound care as well. Pt feels comfortable performing wound care and reports has help at home.  Will f/u with me in office next week and will arrange for f/u with Infectious Disease as well.
Pt seen and examined. Family at bedside. Now having multiple loose green bowel movements. When first started having BMs were formed soft. Likely C dif.  specimen sent. Start po vanco per ID  CT scan not done called CT to expedite and was just completed. Reviewed images. Read not up yet, shows dilated SB and colon with colon wall inflammatory changes. F/u report  Pt anxious tearful regarding multiple BMs. Reports passing flatus when having BMs. Denies nausea or vomiting but belching a lot. Insists does not want NGT reinserted even if needed  WBC 12    Afeb vss  Abd- soft, slightly distended upper abd. Mild left sided pain, slight rebound. ZEKE drain output serosang, is not murky. Incision with murky serosang drainage  Consult IR regarding R pelvic collection  Cont po vanco  OOB to ambulate

## 2024-03-08 NOTE — DISCHARGE NOTE NURSING/CASE MANAGEMENT/SOCIAL WORK - NSDCPEFALRISK_GEN_ALL_CORE
For information on Fall & Injury Prevention, visit: [email or web address] OR  [email or web address] OR  https://www.cdc.gov/steadi/patient.html

## 2024-03-08 NOTE — DISCHARGE NOTE NURSING/CASE MANAGEMENT/SOCIAL WORK - PATIENT PORTAL LINK FT
You can access the FollowMyHealth Patient Portal offered by [facility] by registering at the following website: [email or web address]. By joining [employer]’s FollowMyHealth portal, you will also be able to view your health information using other applications (apps) compatible with our system.

## 2024-03-08 NOTE — PROGRESS NOTE ADULT - SUBJECTIVE AND OBJECTIVE BOX
Interval Events:      Allergies    No Known Allergies    Intolerances      Endocrine/Metabolic Medications:  cholestyramine Powder (Sugar-Free) 4 Gram(s) Oral two times a day  dextrose 50% Injectable 12.5 Gram(s) IV Push once  dextrose 50% Injectable 25 Gram(s) IV Push once  dextrose 50% Injectable 25 Gram(s) IV Push once  dextrose Oral Gel 15 Gram(s) Oral once PRN  glucagon  Injectable 1 milliGRAM(s) IntraMuscular once  insulin glargine Injectable (LANTUS) 8 Unit(s) SubCutaneous at bedtime  insulin lispro (ADMELOG) corrective regimen sliding scale   SubCutaneous Before meals and at bedtime      Vital Signs Last 24 Hrs  T(C): 36.4 (08 Mar 2024 05:00), Max: 37.4 (07 Mar 2024 14:35)  T(F): 97.6 (08 Mar 2024 05:00), Max: 99.4 (07 Mar 2024 14:35)  HR: 85 (08 Mar 2024 05:00) (85 - 93)  BP: 155/94 (08 Mar 2024 05:00) (155/94 - 161/95)  BP(mean): --  RR: 18 (08 Mar 2024 05:00) (18 - 18)  SpO2: 97% (08 Mar 2024 05:00) (95% - 98%)    Parameters below as of 08 Mar 2024 05:00  Patient On (Oxygen Delivery Method): room air          PHYSICAL EXAM  All physical exam findings normal, except those marked:  General:	Alert, active, cooperative, NAD, well hydrated  .		[] Abnormal:  Neck		Normal: supple, no cervical adenopathy, no palpable thyroid  .		[] Abnormal:  Cardiovascular	Normal: regular rate, normal S1, S2, no murmurs  .		[] Abnormal:  Respiratory	Normal: no chest wall deformity, normal respiratory pattern, CTA B/L  .		[] Abnormal:  Abdominal	Normal: soft, ND, NT, bowel sounds present, no masses, no organomegaly  .		[] Abnormal:  		Normal normal genitalia, testes descended, circumcised/uncircumcised  .		Velma stage:			Breast velma:  .		Menstrual history:  .		[] Abnormal:  Extremities	Normal: FROM x4  .		[] Abnormal:  Skin		Normal: intact and not indurated, no rash, no acanthosis nigricans  .		[] Abnormal:  Neurologic	Normal: grossly intact  .		[] Abnormal:    LABS        CAPILLARY BLOOD GLUCOSE      POCT Blood Glucose.: 130 mg/dL (08 Mar 2024 08:08)  POCT Blood Glucose.: 165 mg/dL (07 Mar 2024 21:09)  POCT Blood Glucose.: 144 mg/dL (07 Mar 2024 16:11)  POCT Blood Glucose.: 131 mg/dL (07 Mar 2024 11:53)        Assesment/plan       Interval Events:  pt in nad    Allergies    No Known Allergies    Intolerances      Endocrine/Metabolic Medications:  cholestyramine Powder (Sugar-Free) 4 Gram(s) Oral two times a day  dextrose 50% Injectable 12.5 Gram(s) IV Push once  dextrose 50% Injectable 25 Gram(s) IV Push once  dextrose 50% Injectable 25 Gram(s) IV Push once  dextrose Oral Gel 15 Gram(s) Oral once PRN  glucagon  Injectable 1 milliGRAM(s) IntraMuscular once  insulin glargine Injectable (LANTUS) 8 Unit(s) SubCutaneous at bedtime  insulin lispro (ADMELOG) corrective regimen sliding scale   SubCutaneous Before meals and at bedtime      Vital Signs Last 24 Hrs  T(C): 36.4 (08 Mar 2024 05:00), Max: 37.4 (07 Mar 2024 14:35)  T(F): 97.6 (08 Mar 2024 05:00), Max: 99.4 (07 Mar 2024 14:35)  HR: 85 (08 Mar 2024 05:00) (85 - 93)  BP: 155/94 (08 Mar 2024 05:00) (155/94 - 161/95)  BP(mean): --  RR: 18 (08 Mar 2024 05:00) (18 - 18)  SpO2: 97% (08 Mar 2024 05:00) (95% - 98%)    Parameters below as of 08 Mar 2024 05:00  Patient On (Oxygen Delivery Method): room air          PHYSICAL EXAM  All physical exam findings normal, except those marked:  General:	Alert, active, cooperative, NAD, well hydrated  .		[] Abnormal:  Neck		Normal: supple, no cervical adenopathy, no palpable thyroid  .		[] Abnormal:  Cardiovascular	Normal: regular rate, normal S1, S2, no murmurs  .		[] Abnormal:  Respiratory	Normal: no chest wall deformity, normal respiratory pattern, CTA B/L  .		[] Abnormal:  Abdominal	Normal: soft, ND, NT, bowel sounds present, no masses, no organomegaly  .		[] Abnormal:  		Normal normal genitalia, testes descended, circumcised/uncircumcised  .		Velma stage:			Breast velma:  .		Menstrual history:  .		[] Abnormal:  Extremities	Normal: FROM x4  .		[] Abnormal:  Skin		Normal: intact and not indurated, no rash, no acanthosis nigricans  .		[] Abnormal:  Neurologic	Normal: grossly intact  .		[] Abnormal:    LABS        CAPILLARY BLOOD GLUCOSE      POCT Blood Glucose.: 130 mg/dL (08 Mar 2024 08:08)  POCT Blood Glucose.: 165 mg/dL (07 Mar 2024 21:09)  POCT Blood Glucose.: 144 mg/dL (07 Mar 2024 16:11)  POCT Blood Glucose.: 131 mg/dL (07 Mar 2024 11:53)          Assesment/plan    58 y/o female with pmhx of HTN, DM, to ER c/o decreased appetite over the last few days with severe vaginal pain/spasms.Admitted with perforated gut.   Pt is s/p diagnostic laparoscopy, lysis of adhesions, conversion to laparotomy for adhesiolysis and incisional hernia repair without mesh 2/6/24/ Pt did well postoperatively and was d/c 2/14/2024.   Found to have uncont dm- pt known to me as out pt- on basaglar 15 and metformin. Now TPN with hyperglycemia.    Dm- insulin dependent as out pt  now better controlled  off of TPN  pt eating better   cont lantus 8 units  fsg qac and hs  a1c-8.4

## 2024-03-08 NOTE — PROGRESS NOTE ADULT - ASSESSMENT
59F s/p exploratory laparoscopy with lysis of adhesions and Small Bowel Resection secondary to 2/19  PICC placed 2/22 for TPN  rpt ct 2/29 grossly unchanged with rim enhancing pelvic collection, at IR collection smaller and with no window     -PO diet as tolerated  -continue local wound care to midline incision with daily packing changes  -antibiotics per ID - plan for 4 weeks of IV abx on discharge, ok to use right arm picc line, first dose of ertapenem given yesterday thru line  -encourage ambulation / OOB  -discharge home with wound care and IV antibiotics   -discussed with Dr. Lopez     This note and all of its recommendations herein are preliminary until co-signed by an attending physician

## 2024-03-08 NOTE — PROGRESS NOTE ADULT - NS ATTEND OPT1A GEN_ALL_CORE
Exam/Medical decision making
History/Exam/Medical decision making
History/Exam/Medical decision making

## 2024-03-08 NOTE — PROGRESS NOTE ADULT - SUBJECTIVE AND OBJECTIVE BOX
INTERVAL HPI/OVERNIGHT EVENTS:  seen and examined   Pt resting comfortably. No acute complaints.   Tolerating diet.   Denies N/V  still with some diarrhea but much improved   concerned about pain regimen upon discharge    MEDICATIONS  (STANDING):  chlorhexidine 2% Cloths 1 Application(s) Topical daily  chlorhexidine 2% Cloths 1 Application(s) Topical <User Schedule>  cholestyramine Powder (Sugar-Free) 4 Gram(s) Oral two times a day  dextrose 5%. 1000 milliLiter(s) (100 mL/Hr) IV Continuous <Continuous>  dextrose 5%. 1000 milliLiter(s) (50 mL/Hr) IV Continuous <Continuous>  dextrose 50% Injectable 25 Gram(s) IV Push once  dextrose 50% Injectable 12.5 Gram(s) IV Push once  dextrose 50% Injectable 25 Gram(s) IV Push once  ertapenem  IVPB 1000 milliGRAM(s) IV Intermittent every 24 hours  gabapentin 100 milliGRAM(s) Oral three times a day  glucagon  Injectable 1 milliGRAM(s) IntraMuscular once  insulin glargine Injectable (LANTUS) 8 Unit(s) SubCutaneous at bedtime  insulin lispro (ADMELOG) corrective regimen sliding scale   SubCutaneous Before meals and at bedtime  lidocaine   4% Patch 2 Patch Transdermal every 24 hours  magnesium oxide 400 milliGRAM(s) Oral once  valsartan 160 milliGRAM(s) Oral daily    MEDICATIONS  (PRN):  acetaminophen     Tablet .. 650 milliGRAM(s) Oral every 6 hours PRN Mild Pain (1 - 3)  benzocaine/menthol Lozenge 1 Lozenge Oral every 2 hours PRN Sore Throat  cyclobenzaprine 10 milliGRAM(s) Oral three times a day PRN Muscle Spasm  dextrose Oral Gel 15 Gram(s) Oral once PRN Blood Glucose LESS THAN 70 milliGRAM(s)/deciliter  ketorolac   Injectable 15 milliGRAM(s) IV Push every 8 hours PRN Moderate Pain (4 - 6)  ondansetron Injectable 4 milliGRAM(s) IV Push every 6 hours PRN Nausea and/or Vomiting  oxyCODONE    IR 5 milliGRAM(s) Oral every 6 hours PRN Severe Pain (7 - 10)  sodium chloride 0.9% lock flush 10 milliLiter(s) IV Push every 1 hour PRN Pre/post blood products, medications, blood draw, and to maintain line patency  traMADol 50 milliGRAM(s) Oral every 6 hours PRN Moderate Pain (4 - 6)      Vital Signs Last 24 Hrs  T(C): 36.4 (08 Mar 2024 05:00), Max: 37.4 (07 Mar 2024 14:35)  T(F): 97.6 (08 Mar 2024 05:00), Max: 99.4 (07 Mar 2024 14:35)  HR: 85 (08 Mar 2024 05:00) (85 - 93)  BP: 155/94 (08 Mar 2024 05:00) (155/94 - 161/95)  BP(mean): --  RR: 18 (08 Mar 2024 05:00) (18 - 18)  SpO2: 97% (08 Mar 2024 05:00) (95% - 98%)    Parameters below as of 08 Mar 2024 05:00  Patient On (Oxygen Delivery Method): room air        Physical:  General: A&Ox3. NAD.  Abdomen: Soft nondistended, nontender to palpation diffusely. No rebound tenderness, no voluntary guarding. Midline incision well healed with packing in inferior portion covered with dry sterile gauze and medipore, No erythema or induration appreciated. No purulence, mild SS drainage from inferior portion of incision, eugene removed yesterday, covered with dry dressing     I&O's Detail      LABS:                        9.7    6.83  )-----------( 473      ( 07 Mar 2024 06:38 )             28.8             03-07    134<L>  |  103  |  12  ----------------------------<  157<H>  3.4<L>   |  25  |  0.79    Ca    9.5      07 Mar 2024 06:38  Phos  3.2     03-07  Mg     1.4     03-07

## 2024-03-08 NOTE — PROGRESS NOTE ADULT - PROVIDER SPECIALTY LIST ADULT
Endocrinology
Infectious Disease
Intervent Radiology
Nutrition Support
Surgery
Endocrinology
Endocrinology
Infectious Disease
Surgery
Surgery
Critical Care
Endocrinology
Endocrinology
Infectious Disease
Nutrition Support
Surgery
Critical Care
Endocrinology
Nutrition Support
Nutrition Support
Surgery
Infectious Disease
Surgery
Infectious Disease
Infectious Disease

## 2024-03-13 ENCOUNTER — APPOINTMENT (OUTPATIENT)
Dept: SURGERY | Facility: CLINIC | Age: 59
End: 2024-03-13
Payer: MEDICAID

## 2024-03-13 VITALS
TEMPERATURE: 96.3 F | HEIGHT: 64 IN | HEART RATE: 106 BPM | BODY MASS INDEX: 24.59 KG/M2 | DIASTOLIC BLOOD PRESSURE: 80 MMHG | WEIGHT: 144 LBS | OXYGEN SATURATION: 92 % | SYSTOLIC BLOOD PRESSURE: 114 MMHG

## 2024-03-13 PROCEDURE — 99024 POSTOP FOLLOW-UP VISIT: CPT

## 2024-03-13 RX ORDER — CHOLESTYRAMINE 4 G/9G
4 POWDER, FOR SUSPENSION ORAL TWICE DAILY
Qty: 60 | Refills: 0 | Status: ACTIVE | COMMUNITY
Start: 2024-03-13 | End: 1900-01-01

## 2024-03-13 NOTE — PHYSICAL EXAM
[Normal Breath Sounds] : Normal breath sounds [Normal Rate and Rhythm] : normal rate and rhythm [Normal Heart Sounds] : normal heart sounds [No Rash or Lesion] : No rash or lesion [Oriented to Person] : oriented to person [Alert] : alert [Oriented to Place] : oriented to place [Oriented to Time] : oriented to time [Calm] : calm [de-identified] : The patient is alert, well-groomed  [de-identified] : Normoactive bowel sounds, soft and nontender, no hepatosplenomegaly or masses noted,  Surgical incisions are healing well. No sign of inflammation or exudate. Dressings was changed and lower pole of the wound was packed [de-identified] : full range of motion and no deformities appreciated.

## 2024-03-13 NOTE — ASSESSMENT
[FreeTextEntry1] : AMY MONREAL is a 59 year old female who underwent a diagnostic laparoscopy, lysis of adhesions, conversion to laparotomy for adhesiolysis and incisional hernia repair without mesh 02/06/2024. She return to the [facility] with severe abdominal pain, found to have bowel perforation Then Exploratory laparotomy with bowel resection 02/19/2024   Patient is doing well. Patient able to tolerate regular diet with normal bowel movements. Surgical incisions are healing well. No sign of inflammation or exudate. Dressings was changed and lower pole of the wound was packed. Continue packing for now. Patient has visiting nurse coming for dressing change. She wishes to have visiting nurse daily.  Post-operative wound care, activity, restrictions and precautions reinforced.  Pathology results were discussed in detail. Patient was instructed no heavy lifting 2 to 4 weeks. Patient's questions and concerns addressed to patient's satisfaction.

## 2024-03-13 NOTE — HISTORY OF PRESENT ILLNESS
[de-identified] : AMY MONREAL is a 59 year old female who presents in the office for postop follow up visit. Patient was admitted to [facility] with abdominal pain. She underwent a diagnostic laparoscopy, lysis of adhesions, conversion to laparotomy for adhesiolysis and incisional hernia repair without mesh 02/06/2024. She return to the [facility] with severe abdominal pain, found to have bowel perforation Then Exploratory laparotomy with bowel resection 02/19/2024. Patient was discharged home with IV antibiotics. She will be seeing by Dr Panchito ARENAS. Today patient is doing well, c/o incisional pain take Ibuprofen for pain management. Denies any fevers, chills, nausea, vomiting, diarrhea or constipation. Patient able to tolerate regular diet with normal bowel movements. Surgical incisions are healing well. No sign of inflammation or exudate. Dressings was changed and lower pole of the wound was packed. Continue packing for now. Patient has visiting nurse coming for dressing change. She wishes to have visiting nurse daily.

## 2024-03-13 NOTE — DATA REVIEWED
[FreeTextEntry1] : ACC: [id number] EXAM: CT ABDOMEN AND PELVIS IC ORDERED BY: CHRISTOPH ALVARENGA  PROCEDURE DATE: 02/29/2024  INTERPRETATION: CLINICAL INFORMATION: 59 years Female with SBR, intra-abd collections. Status post exploratory laparotomy with lysis of adhesions and small bowel resection on 2/19/2024  COMPARISON: 2/25/2024  CONTRAST/COMPLICATIONS: IV Contrast: Omnipaque 350 90 cc administered 10 cc discarded Oral Contrast: NONE Complications: None reported at time of study completion  PROCEDURE: CT of the Abdomen and Pelvis was performed. Sagittal and coronal reformats were performed.  FINDINGS: LOWER CHEST: Mild bibasilar dependent atelectasis.  LIVER: Within normal limits. BILE DUCTS: Normal caliber. GALLBLADDER: Within normal limits. SPLEEN: Within normal limits. PANCREAS: Within normal limits. ADRENALS: Within normal limits. KIDNEYS/URETERS: Bilateral subcentimeter cortical hypodensities too small to characterize. Left upper pole cyst. No hydronephrosis. Symmetrical nephrograms.  BLADDER: Completely decompressed. REPRODUCTIVE ORGANS: Hysterectomy.  BOWEL: Improvement in prior small bowel distention. Appendix not visualized. Right lower quadrant small bowel anastomosis. Liquid stool throughout the colon. PERITONEUM: No ascites. Redemonstrated peripherally enhancing right pelvic collection measuring 7.0 cm AP x 4.7 cm transverse x 3.1 cm sagittal, previously 8.6 x 4.2 x 2.3 cm. The drainage catheter courses anterior to the collection. No pneumoperitoneum. Lower abdominal mesenteric edema, postoperative. VESSELS: Atherosclerotic changes. RETROPERITONEUM/LYMPH NODES: No lymphadenopathy. ABDOMINAL WALL: Recent postoperative changes. Percutaneous drainage catheter via a right lower abdominal approach with tip in the left lower quadrant. Small fluid collection deep to the incision is unchanged. BONES: Degenerative change.  IMPRESSION: Rim enhancing right pelvic collection without significant change. Again, the drainage catheter courses anterior to the collection.  Resolution of small bowel dilatation. Fluid-filled colon either infectious colitis or ileus.  Small fluid collection deep to the incision unchanged.    --- End of Report ---      BAILEY GREENWOOD MD; Attending Radiologist

## 2024-03-13 NOTE — PLAN
[FreeTextEntry1] : Please follow up at the office in 2 to 3 weeks and as needed for any questions or concerns

## 2024-03-15 ENCOUNTER — NON-APPOINTMENT (OUTPATIENT)
Age: 59
End: 2024-03-15

## 2024-03-20 ENCOUNTER — RESULT REVIEW (OUTPATIENT)
Age: 59
End: 2024-03-20

## 2024-03-27 ENCOUNTER — APPOINTMENT (OUTPATIENT)
Dept: SURGERY | Facility: CLINIC | Age: 59
End: 2024-03-27

## 2024-03-27 NOTE — HISTORY OF PRESENT ILLNESS
[de-identified] : AMY MONREAL is a 59 year old female who presents in the office for postop follow up visit. Patient was admitted to [facility] with abdominal pain. She underwent a diagnostic laparoscopy, lysis of adhesions, conversion to laparotomy for adhesiolysis and incisional hernia repair without mesh 02/06/2024. She return to the [facility] with severe abdominal pain, found to have bowel perforation Then Exploratory laparotomy with bowel resection 02/19/2024.

## 2024-03-27 NOTE — ASSESSMENT
[FreeTextEntry1] : AMY MONREAL is a 59 year old female who underwent a diagnostic laparoscopy, lysis of adhesions, conversion to laparotomy for adhesiolysis and incisional hernia repair without mesh 02/06/2024. She return to the [facility] with severe abdominal pain, found to have bowel perforation Then Exploratory laparotomy with bowel resection 02/19/2024

## 2024-03-28 ENCOUNTER — APPOINTMENT (OUTPATIENT)
Dept: CT IMAGING | Facility: CLINIC | Age: 59
End: 2024-03-28
Payer: MEDICAID

## 2024-03-28 PROCEDURE — 74177 CT ABD & PELVIS W/CONTRAST: CPT

## 2024-04-04 ENCOUNTER — APPOINTMENT (OUTPATIENT)
Dept: INFECTIOUS DISEASE | Facility: CLINIC | Age: 59
End: 2024-04-04
Payer: MEDICAID

## 2024-04-04 VITALS
BODY MASS INDEX: 24.75 KG/M2 | SYSTOLIC BLOOD PRESSURE: 138 MMHG | HEIGHT: 64 IN | OXYGEN SATURATION: 97 % | DIASTOLIC BLOOD PRESSURE: 83 MMHG | WEIGHT: 145 LBS | HEART RATE: 114 BPM | TEMPERATURE: 97.6 F

## 2024-04-04 PROCEDURE — 99215 OFFICE O/P EST HI 40 MIN: CPT

## 2024-04-04 NOTE — PHYSICAL EXAM
[General Appearance - Alert] : alert [General Appearance - In No Acute Distress] : in no acute distress [General Appearance - Well-Appearing] : healthy appearing [Sclera] : the sclera and conjunctiva were normal [Outer Ear] : the ears and nose were normal in appearance [Hearing Threshold Finger Rub Not [place]] : hearing was normal [Examination Of The Oral Cavity] : the lips and gums were normal [Neck Appearance] : the appearance of the neck was normal [] : no respiratory distress [Auscultation Breath Sounds / Voice Sounds] : lungs were clear to auscultation bilaterally [Heart Sounds] : normal S1 and S2 [Heart Sounds Gallop] : no gallops [Murmurs] : no murmurs [Heart Sounds Pericardial Friction Rub] : no pericardial rub [Abdomen Soft] : soft [Abdomen Tenderness] : non-tender [No Palpable Adenopathy] : no palpable adenopathy [Cranial Nerves] : cranial nerves 2-12 were intact [Oriented To Time, Place, And Person] : oriented to person, place, and time [Affect] : the affect was normal [FreeTextEntry1] : see above

## 2024-04-04 NOTE — PHYSICAL EXAM
[General Appearance - Alert] : alert [General Appearance - In No Acute Distress] : in no acute distress [General Appearance - Well-Appearing] : healthy appearing [Sclera] : the sclera and conjunctiva were normal [Outer Ear] : the ears and nose were normal in appearance [Hearing Threshold Finger Rub Not [place]] : hearing was normal [Examination Of The Oral Cavity] : the lips and gums were normal [Neck Appearance] : the appearance of the neck was normal [] : no respiratory distress [Auscultation Breath Sounds / Voice Sounds] : lungs were clear to auscultation bilaterally [Heart Sounds] : normal S1 and S2 [Heart Sounds Gallop] : no gallops [Murmurs] : no murmurs [Heart Sounds Pericardial Friction Rub] : no pericardial rub [Abdomen Soft] : soft [Abdomen Tenderness] : non-tender [No Palpable Adenopathy] : no palpable adenopathy [Cranial Nerves] : cranial nerves 2-12 were intact [Oriented To Time, Place, And Person] : oriented to person, place, and time [Affect] : the affect was normal [FreeTextEntry1] : see above

## 2024-04-04 NOTE — ASSESSMENT
[FreeTextEntry1] : 59 y o female with h/oHTN, DM admitted 2/19/24 to [facility] with severe sepsis. Pt had undergone diagnostic laparoscopy, lysis of adhesions, conversion to laparotomy for adhesion lysis and incisional hernia repair without mesh 2/6/24. Course in hospital significant for pneumoperitoneum due to bowel perforation and underwent exploratory lap with small bowel resection. Managed initially in the ICU for septic shock. Developed abscess with fluid growing E. coli, S. anginosus, lactobacillus rhamnosus. CT abd 2/25: 8.6cmx4.2cmx2.3 cm collection in R hemipelvis abutting the posterior wall of a distal small bowel loop proximal to the anastomosis. Pt treated with piperacillin/tazobactam from 2/18-3/8, then discharged on Ertapenem. Repeat CT abd 3/28/24: rim-enhancing fluid collection along the anterior abdominal midline measures 2.3 x 1.6 x 6.1 cm. and thick-walled rim-enhancing collection in the right pelvic sidewall region now measures 3.2 x 2.5 cm, decreased in both size and loculation. Pt noted to have diarrhea in hospital (Cdif neg), which stopped. For past 4d, has had 3-4 formed stools (no blood). Pt denies fever, chills, N, V .Has minimal abd pain and small amt of drainage around lower abd opening.  Although abscess is smaller, I recommended that pt continue IV Ab. She is refusing further IV Ab and wants PICC removed. [facility] to remove the line and stop Ertapenem. Pt is willing to take oral Abs. I contacted [facility] ([phone]) to dispense cefuroxime 500mg p.o. bid for 14d and metronidazole 500mg TID for 14d. Pt scheduled to follow up with Dr. Lopez next week who should repeat the CT abd in 2wks. Pt sh be treated with ABs until abscess resolution.

## 2024-04-04 NOTE — ASSESSMENT
[FreeTextEntry1] : 59 y o female with h/oHTN, DM admitted 2/19/24 to [facility] with severe sepsis. Pt had undergone diagnostic laparoscopy, lysis of adhesions, conversion to laparotomy for adhesion lysis and incisional hernia repair without mesh 2/6/24. Course in hospital significant for pneumoperitoneum due to bowel perforation and underwent exploratory lap with small bowel resection. Managed initially in the ICU for septic shock. Developed abscess with fluid growing E. coli, S. anginosus, lactobacillus rhamnosus. CT abd 2/25: 8.6cmx4.2cmx2.3 cm collection in R hemipelvis abutting the posterior wall of a distal small bowel loop proximal to the anastomosis. Pt treated with piperacillin/tazobactam from 2/18-3/8, then discharged on Ertapenem. Repeat CT abd 3/28/24: rim-enhancing fluid collection along the anterior abdominal midline measures 2.3 x 1.6 x 6.1 cm. and thick-walled rim-enhancing collection in the right pelvic sidewall region now measures 3.2 x 2.5 cm, decreased in both size and loculation. Pt noted to have diarrhea in hospital (Cdif neg), which stopped. For past 4d, has had 3-4 formed stools (no blood). Pt denies fever, chills, N, V .Has minimal abd pain and small amt of drainage around lower abd opening.  Although abscess is smaller, I recommended that pt continue IV Ab. She is refusing further IV Ab and wants PICC removed. [facility] contacted to remove the line and stop Ertapenem. Pt is willing to take oral Abs. I contacted [facility] ([phone]) to dispense cefuroxime 500mg p.o. bid for 14d and metronidazole 500mg TID for 14d. Pt scheduled to follow up with Dr. Lopez next week who should repeat the CT abd in 2wks. Pt sh be treated with ABs until abscess resolution.

## 2024-04-08 ENCOUNTER — NON-APPOINTMENT (OUTPATIENT)
Age: 59
End: 2024-04-08

## 2024-04-10 ENCOUNTER — NON-APPOINTMENT (OUTPATIENT)
Age: 59
End: 2024-04-10

## 2024-04-17 ENCOUNTER — APPOINTMENT (OUTPATIENT)
Dept: SURGERY | Facility: CLINIC | Age: 59
End: 2024-04-17

## 2024-04-30 RX ORDER — METFORMIN HYDROCHLORIDE 850 MG/1
1 TABLET ORAL
Refills: 0 | DISCHARGE

## 2024-04-30 RX ORDER — VALSARTAN 80 MG/1
0 TABLET ORAL
Refills: 0 | DISCHARGE

## 2024-04-30 RX ORDER — INSULIN GLARGINE 100 [IU]/ML
0 INJECTION, SOLUTION SUBCUTANEOUS
Qty: 0 | Refills: 0 | DISCHARGE

## 2024-04-30 RX ORDER — SEMAGLUTIDE 0.68 MG/ML
0.5 INJECTION, SOLUTION SUBCUTANEOUS
Refills: 0 | DISCHARGE

## 2024-04-30 RX ORDER — INSULIN GLARGINE 100 [IU]/ML
15 INJECTION, SOLUTION SUBCUTANEOUS
Refills: 0 | DISCHARGE

## 2024-04-30 RX ORDER — ATORVASTATIN CALCIUM 80 MG/1
1 TABLET, FILM COATED ORAL
Refills: 0 | DISCHARGE

## 2024-04-30 RX ORDER — METFORMIN HYDROCHLORIDE 850 MG/1
0 TABLET ORAL
Qty: 0 | Refills: 1 | DISCHARGE

## 2024-04-30 RX ORDER — GABAPENTIN 400 MG/1
0 CAPSULE ORAL
Qty: 0 | Refills: 0 | DISCHARGE

## 2024-04-30 RX ORDER — SEMAGLUTIDE 0.68 MG/ML
0 INJECTION, SOLUTION SUBCUTANEOUS
Qty: 0 | Refills: 2 | DISCHARGE

## 2024-05-01 ENCOUNTER — OUTPATIENT (OUTPATIENT)
Dept: OUTPATIENT SERVICES | Facility: HOSPITAL | Age: 59
LOS: 1 days | End: 2024-05-01
Payer: MEDICAID

## 2024-05-01 ENCOUNTER — APPOINTMENT (OUTPATIENT)
Dept: OBGYN | Facility: CLINIC | Age: 59
End: 2024-05-01
Payer: MEDICAID

## 2024-05-01 VITALS
DIASTOLIC BLOOD PRESSURE: 81 MMHG | OXYGEN SATURATION: 99 % | WEIGHT: 147 LBS | HEART RATE: 96 BPM | BODY MASS INDEX: 25.1 KG/M2 | SYSTOLIC BLOOD PRESSURE: 119 MMHG | HEIGHT: 64 IN | TEMPERATURE: 97.9 F | RESPIRATION RATE: 16 BRPM

## 2024-05-01 DIAGNOSIS — Z00.00 ENCOUNTER FOR GENERAL ADULT MEDICAL EXAMINATION WITHOUT ABNORMAL FINDINGS: ICD-10-CM

## 2024-05-01 DIAGNOSIS — Z71.2 PERSON CONSULTING FOR EXPLANATION OF EXAMINATION OR TEST FINDINGS: ICD-10-CM

## 2024-05-01 DIAGNOSIS — Z20.2 CONTACT WITH AND (SUSPECTED) EXPOSURE TO INFECTIONS WITH A PREDOMINANTLY SEXUAL MODE OF TRANSMISSION: ICD-10-CM

## 2024-05-01 DIAGNOSIS — Z01.419 ENCOUNTER FOR GYNECOLOGICAL EXAMINATION (GENERAL) (ROUTINE) W/OUT ABNORMAL FINDINGS: ICD-10-CM

## 2024-05-01 DIAGNOSIS — Z90.710 ACQUIRED ABSENCE OF BOTH CERVIX AND UTERUS: Chronic | ICD-10-CM

## 2024-05-01 DIAGNOSIS — Z78.0 ASYMPTOMATIC MENOPAUSAL STATE: ICD-10-CM

## 2024-05-01 DIAGNOSIS — Z98.890 OTHER SPECIFIED POSTPROCEDURAL STATES: ICD-10-CM

## 2024-05-01 DIAGNOSIS — R10.2 PELVIC AND PERINEAL PAIN: ICD-10-CM

## 2024-05-01 DIAGNOSIS — R18.8 OTHER ASCITES: ICD-10-CM

## 2024-05-01 DIAGNOSIS — N94.10 UNSPECIFIED DYSPAREUNIA: ICD-10-CM

## 2024-05-01 DIAGNOSIS — Z90.49 ACQUIRED ABSENCE OF OTHER SPECIFIED PARTS OF DIGESTIVE TRACT: ICD-10-CM

## 2024-05-01 DIAGNOSIS — Z12.39 ENCOUNTER FOR OTHER SCREENING FOR MALIGNANT NEOPLASM OF BREAST: ICD-10-CM

## 2024-05-01 DIAGNOSIS — Z11.3 ENCOUNTER FOR SCREENING FOR INFECTIONS WITH A PREDOMINANTLY SEXUAL MODE OF TRANSMISSION: ICD-10-CM

## 2024-05-01 DIAGNOSIS — N95.2 POSTMENOPAUSAL ATROPHIC VAGINITIS: ICD-10-CM

## 2024-05-01 PROBLEM — E13.9 OTHER SPECIFIED DIABETES MELLITUS WITHOUT COMPLICATIONS: Chronic | Status: ACTIVE | Noted: 2017-06-23

## 2024-05-01 PROBLEM — E11.9 TYPE 2 DIABETES MELLITUS WITHOUT COMPLICATIONS: Chronic | Status: ACTIVE | Noted: 2021-02-10

## 2024-05-01 PROBLEM — E78.5 HYPERLIPIDEMIA, UNSPECIFIED: Chronic | Status: ACTIVE | Noted: 2022-05-01

## 2024-05-01 PROBLEM — I10 ESSENTIAL (PRIMARY) HYPERTENSION: Chronic | Status: ACTIVE | Noted: 2018-05-17

## 2024-05-01 PROCEDURE — 99214 OFFICE O/P EST MOD 30 MIN: CPT

## 2024-05-01 PROCEDURE — G0463: CPT

## 2024-05-01 RX ORDER — GLYCERIN/MIN OIL/POLYCARBOPHIL
GEL WITH APPLICATOR (GRAM) VAGINAL
Qty: 1 | Refills: 5 | Status: ACTIVE | COMMUNITY
Start: 2024-01-19 | End: 1900-01-01

## 2024-05-01 NOTE — ED PROVIDER NOTE - CONSTITUTIONAL [-], MLM
The patient has a thyroid nodule. I explained the pathology of thyroid nodules including the risks of cancer. The options of surgery versus an observational course were discussed. Recommendation was made for a FINE NEEDLE ASPIRATION of the nodule/mass       no night sweats/no weight loss/no chills

## 2024-05-02 DIAGNOSIS — Z90.49 ACQUIRED ABSENCE OF OTHER SPECIFIED PARTS OF DIGESTIVE TRACT: ICD-10-CM

## 2024-05-02 DIAGNOSIS — N95.2 POSTMENOPAUSAL ATROPHIC VAGINITIS: ICD-10-CM

## 2024-05-02 DIAGNOSIS — N94.10 UNSPECIFIED DYSPAREUNIA: ICD-10-CM

## 2024-05-02 DIAGNOSIS — Z71.2 PERSON CONSULTING FOR EXPLANATION OF EXAMINATION OR TEST FINDINGS: ICD-10-CM

## 2024-05-02 DIAGNOSIS — Z78.0 ASYMPTOMATIC MENOPAUSAL STATE: ICD-10-CM

## 2024-05-02 DIAGNOSIS — Z20.2 CONTACT WITH AND (SUSPECTED) EXPOSURE TO INFECTIONS WITH A PREDOMINANTLY SEXUAL MODE OF TRANSMISSION: ICD-10-CM

## 2024-05-02 NOTE — PLAN
[FreeTextEntry1] : (+) HPV of Vaginal cuff - repeat in one year; Results of US reviewed - WNL; RTO 1 year.   -I spent a total of 35 minutes on the date of the encounter reviewing patient's labs, radiology reports, evaluating, counseling and treating the patient.

## 2024-05-02 NOTE — HISTORY OF PRESENT ILLNESS
[FreeTextEntry1] : Presents for f/u of Pelvic US results (01/19/24) for hx/o non-specific pelvic pain - hx/o MARY: Reviewed no pathology noted.   Pt had multiple surgeries after initial Gyn exam due to complications of bowel adhesions. s/p small bowel resection for perforation, currently healing well.   Hx/o dyspareunia due to atrophic vaginitis - counseled on perineal hygiene and generous use of Astroglide use during intercourse.    Hx/o postmenopausal vaginal dryness - counseled of perineal hygiene, use of Replens daily, rx renewed today.   Pap of vaginal cuff (01/19/24) Neg / (+) HPV / (-) 16/18/45 - repeat in one year.   [TextBox_4] : US results  [PapSmeardate] : 01/19/24 [TextBox_31] : wnl [HPVDate] : 01/19/24 [TextBox_78] : positive

## 2024-05-03 ENCOUNTER — APPOINTMENT (OUTPATIENT)
Dept: SURGERY | Facility: CLINIC | Age: 59
End: 2024-05-03
Payer: MEDICAID

## 2024-05-03 VITALS — TEMPERATURE: 97.6 F | WEIGHT: 148.9 LBS | BODY MASS INDEX: 25.56 KG/M2

## 2024-05-03 VITALS
DIASTOLIC BLOOD PRESSURE: 89 MMHG | RESPIRATION RATE: 16 BRPM | HEART RATE: 88 BPM | HEIGHT: 64 IN | SYSTOLIC BLOOD PRESSURE: 148 MMHG | OXYGEN SATURATION: 98 %

## 2024-05-03 DIAGNOSIS — Z98.890 OTHER SPECIFIED POSTPROCEDURAL STATES: ICD-10-CM

## 2024-05-03 DIAGNOSIS — Z87.19 OTHER SPECIFIED POSTPROCEDURAL STATES: ICD-10-CM

## 2024-05-03 PROCEDURE — 99024 POSTOP FOLLOW-UP VISIT: CPT

## 2024-05-03 RX ORDER — GABAPENTIN 100 MG/1
100 CAPSULE ORAL
Qty: 42 | Refills: 0 | Status: ACTIVE | COMMUNITY
Start: 2024-01-17 | End: 1900-01-01

## 2024-05-06 ENCOUNTER — INPATIENT (INPATIENT)
Facility: HOSPITAL | Age: 59
LOS: 2 days | Discharge: HOME | DRG: 392 | End: 2024-05-09
Attending: SURGERY | Admitting: SURGERY
Payer: MEDICAID

## 2024-05-06 VITALS
OXYGEN SATURATION: 98 % | RESPIRATION RATE: 18 BRPM | WEIGHT: 147.05 LBS | HEART RATE: 74 BPM | TEMPERATURE: 98 F | DIASTOLIC BLOOD PRESSURE: 94 MMHG | SYSTOLIC BLOOD PRESSURE: 155 MMHG

## 2024-05-06 DIAGNOSIS — R10.9 UNSPECIFIED ABDOMINAL PAIN: ICD-10-CM

## 2024-05-06 DIAGNOSIS — Z90.710 ACQUIRED ABSENCE OF BOTH CERVIX AND UTERUS: Chronic | ICD-10-CM

## 2024-05-06 LAB
ALBUMIN SERPL ELPH-MCNC: 3.7 G/DL — SIGNIFICANT CHANGE UP (ref 3.5–5)
ALP SERPL-CCNC: 94 U/L — SIGNIFICANT CHANGE UP (ref 40–120)
ALT FLD-CCNC: 29 U/L DA — SIGNIFICANT CHANGE UP (ref 10–60)
ANION GAP SERPL CALC-SCNC: 5 MMOL/L — SIGNIFICANT CHANGE UP (ref 5–17)
AST SERPL-CCNC: 23 U/L — SIGNIFICANT CHANGE UP (ref 10–40)
BILIRUB SERPL-MCNC: 0.4 MG/DL — SIGNIFICANT CHANGE UP (ref 0.2–1.2)
BLD GP AB SCN SERPL QL: SIGNIFICANT CHANGE UP
BUN SERPL-MCNC: 13 MG/DL — SIGNIFICANT CHANGE UP (ref 7–18)
CALCIUM SERPL-MCNC: 10.1 MG/DL — SIGNIFICANT CHANGE UP (ref 8.4–10.5)
CHLORIDE SERPL-SCNC: 101 MMOL/L — SIGNIFICANT CHANGE UP (ref 96–108)
CO2 SERPL-SCNC: 29 MMOL/L — SIGNIFICANT CHANGE UP (ref 22–31)
CREAT SERPL-MCNC: 0.81 MG/DL — SIGNIFICANT CHANGE UP (ref 0.5–1.3)
EGFR: 84 ML/MIN/1.73M2 — SIGNIFICANT CHANGE UP
GLUCOSE BLDC GLUCOMTR-MCNC: 223 MG/DL — HIGH (ref 70–99)
GLUCOSE SERPL-MCNC: 158 MG/DL — HIGH (ref 70–99)
HCT VFR BLD CALC: 36.5 % — SIGNIFICANT CHANGE UP (ref 34.5–45)
HGB BLD-MCNC: 11.8 G/DL — SIGNIFICANT CHANGE UP (ref 11.5–15.5)
INR BLD: 0.95 RATIO — SIGNIFICANT CHANGE UP (ref 0.85–1.18)
LACTATE SERPL-SCNC: 1.1 MMOL/L — SIGNIFICANT CHANGE UP (ref 0.7–2)
LIDOCAIN IGE QN: 47 U/L — SIGNIFICANT CHANGE UP (ref 13–75)
MCHC RBC-ENTMCNC: 26.9 PG — LOW (ref 27–34)
MCHC RBC-ENTMCNC: 32.3 GM/DL — SIGNIFICANT CHANGE UP (ref 32–36)
MCV RBC AUTO: 83.1 FL — SIGNIFICANT CHANGE UP (ref 80–100)
NRBC # BLD: 0 /100 WBCS — SIGNIFICANT CHANGE UP (ref 0–0)
PLATELET # BLD AUTO: 358 K/UL — SIGNIFICANT CHANGE UP (ref 150–400)
POTASSIUM SERPL-MCNC: 3.7 MMOL/L — SIGNIFICANT CHANGE UP (ref 3.5–5.3)
POTASSIUM SERPL-SCNC: 3.7 MMOL/L — SIGNIFICANT CHANGE UP (ref 3.5–5.3)
PROT SERPL-MCNC: 8.1 G/DL — SIGNIFICANT CHANGE UP (ref 6–8.3)
PROTHROM AB SERPL-ACNC: 10.9 SEC — SIGNIFICANT CHANGE UP (ref 9.5–13)
RBC # BLD: 4.39 M/UL — SIGNIFICANT CHANGE UP (ref 3.8–5.2)
RBC # FLD: 14.5 % — SIGNIFICANT CHANGE UP (ref 10.3–14.5)
SODIUM SERPL-SCNC: 135 MMOL/L — SIGNIFICANT CHANGE UP (ref 135–145)
WBC # BLD: 10.99 K/UL — HIGH (ref 3.8–10.5)
WBC # FLD AUTO: 10.99 K/UL — HIGH (ref 3.8–10.5)

## 2024-05-06 PROCEDURE — 74177 CT ABD & PELVIS W/CONTRAST: CPT | Mod: 26,MC

## 2024-05-06 PROCEDURE — 99285 EMERGENCY DEPT VISIT HI MDM: CPT

## 2024-05-06 RX ORDER — METRONIDAZOLE 500 MG
500 TABLET ORAL ONCE
Refills: 0 | Status: COMPLETED | OUTPATIENT
Start: 2024-05-06 | End: 2024-05-06

## 2024-05-06 RX ORDER — METRONIDAZOLE 500 MG
TABLET ORAL
Refills: 0 | Status: DISCONTINUED | OUTPATIENT
Start: 2024-05-06 | End: 2024-05-09

## 2024-05-06 RX ORDER — SODIUM CHLORIDE 9 MG/ML
1000 INJECTION, SOLUTION INTRAVENOUS
Refills: 0 | Status: DISCONTINUED | OUTPATIENT
Start: 2024-05-06 | End: 2024-05-09

## 2024-05-06 RX ORDER — DEXTROSE 50 % IN WATER 50 %
25 SYRINGE (ML) INTRAVENOUS ONCE
Refills: 0 | Status: DISCONTINUED | OUTPATIENT
Start: 2024-05-06 | End: 2024-05-09

## 2024-05-06 RX ORDER — CEFTRIAXONE 500 MG/1
2000 INJECTION, POWDER, FOR SOLUTION INTRAMUSCULAR; INTRAVENOUS EVERY 24 HOURS
Refills: 0 | Status: DISCONTINUED | OUTPATIENT
Start: 2024-05-07 | End: 2024-05-09

## 2024-05-06 RX ORDER — CEFTRIAXONE 500 MG/1
2000 INJECTION, POWDER, FOR SOLUTION INTRAMUSCULAR; INTRAVENOUS ONCE
Refills: 0 | Status: COMPLETED | OUTPATIENT
Start: 2024-05-06 | End: 2024-05-06

## 2024-05-06 RX ORDER — VALSARTAN 80 MG/1
160 TABLET ORAL DAILY
Refills: 0 | Status: DISCONTINUED | OUTPATIENT
Start: 2024-05-06 | End: 2024-05-09

## 2024-05-06 RX ORDER — MORPHINE SULFATE 50 MG/1
4 CAPSULE, EXTENDED RELEASE ORAL ONCE
Refills: 0 | Status: DISCONTINUED | OUTPATIENT
Start: 2024-05-06 | End: 2024-05-06

## 2024-05-06 RX ORDER — HYDROMORPHONE HYDROCHLORIDE 2 MG/ML
0.5 INJECTION INTRAMUSCULAR; INTRAVENOUS; SUBCUTANEOUS
Refills: 0 | Status: DISCONTINUED | OUTPATIENT
Start: 2024-05-06 | End: 2024-05-09

## 2024-05-06 RX ORDER — GLUCAGON INJECTION, SOLUTION 0.5 MG/.1ML
1 INJECTION, SOLUTION SUBCUTANEOUS ONCE
Refills: 0 | Status: DISCONTINUED | OUTPATIENT
Start: 2024-05-06 | End: 2024-05-09

## 2024-05-06 RX ORDER — DEXTROSE 10 % IN WATER 10 %
125 INTRAVENOUS SOLUTION INTRAVENOUS ONCE
Refills: 0 | Status: DISCONTINUED | OUTPATIENT
Start: 2024-05-06 | End: 2024-05-09

## 2024-05-06 RX ORDER — SODIUM CHLORIDE 9 MG/ML
1000 INJECTION, SOLUTION INTRAVENOUS ONCE
Refills: 0 | Status: COMPLETED | OUTPATIENT
Start: 2024-05-06 | End: 2024-05-06

## 2024-05-06 RX ORDER — HYDROMORPHONE HYDROCHLORIDE 2 MG/ML
1 INJECTION INTRAMUSCULAR; INTRAVENOUS; SUBCUTANEOUS ONCE
Refills: 0 | Status: DISCONTINUED | OUTPATIENT
Start: 2024-05-06 | End: 2024-05-06

## 2024-05-06 RX ORDER — DIATRIZOATE MEGLUMINE 180 MG/ML
30 INJECTION, SOLUTION INTRAVESICAL ONCE
Refills: 0 | Status: COMPLETED | OUTPATIENT
Start: 2024-05-06 | End: 2024-05-06

## 2024-05-06 RX ORDER — INSULIN LISPRO 100/ML
VIAL (ML) SUBCUTANEOUS AT BEDTIME
Refills: 0 | Status: DISCONTINUED | OUTPATIENT
Start: 2024-05-06 | End: 2024-05-09

## 2024-05-06 RX ORDER — INSULIN GLARGINE 100 [IU]/ML
15 INJECTION, SOLUTION SUBCUTANEOUS EVERY MORNING
Refills: 0 | Status: DISCONTINUED | OUTPATIENT
Start: 2024-05-07 | End: 2024-05-09

## 2024-05-06 RX ORDER — DEXTROSE 50 % IN WATER 50 %
12.5 SYRINGE (ML) INTRAVENOUS ONCE
Refills: 0 | Status: DISCONTINUED | OUTPATIENT
Start: 2024-05-06 | End: 2024-05-09

## 2024-05-06 RX ORDER — CEFTRIAXONE 500 MG/1
INJECTION, POWDER, FOR SOLUTION INTRAMUSCULAR; INTRAVENOUS
Refills: 0 | Status: DISCONTINUED | OUTPATIENT
Start: 2024-05-06 | End: 2024-05-09

## 2024-05-06 RX ORDER — ONDANSETRON 8 MG/1
4 TABLET, FILM COATED ORAL EVERY 6 HOURS
Refills: 0 | Status: DISCONTINUED | OUTPATIENT
Start: 2024-05-06 | End: 2024-05-09

## 2024-05-06 RX ORDER — ACETAMINOPHEN 500 MG
650 TABLET ORAL EVERY 6 HOURS
Refills: 0 | Status: DISCONTINUED | OUTPATIENT
Start: 2024-05-06 | End: 2024-05-09

## 2024-05-06 RX ORDER — ONDANSETRON 8 MG/1
4 TABLET, FILM COATED ORAL ONCE
Refills: 0 | Status: COMPLETED | OUTPATIENT
Start: 2024-05-06 | End: 2024-05-06

## 2024-05-06 RX ORDER — METRONIDAZOLE 500 MG
500 TABLET ORAL EVERY 8 HOURS
Refills: 0 | Status: DISCONTINUED | OUTPATIENT
Start: 2024-05-07 | End: 2024-05-09

## 2024-05-06 RX ORDER — INSULIN LISPRO 100/ML
VIAL (ML) SUBCUTANEOUS
Refills: 0 | Status: DISCONTINUED | OUTPATIENT
Start: 2024-05-06 | End: 2024-05-09

## 2024-05-06 RX ORDER — KETOROLAC TROMETHAMINE 30 MG/ML
30 SYRINGE (ML) INJECTION EVERY 6 HOURS
Refills: 0 | Status: DISCONTINUED | OUTPATIENT
Start: 2024-05-06 | End: 2024-05-09

## 2024-05-06 RX ORDER — DEXTROSE 50 % IN WATER 50 %
15 SYRINGE (ML) INTRAVENOUS ONCE
Refills: 0 | Status: DISCONTINUED | OUTPATIENT
Start: 2024-05-06 | End: 2024-05-09

## 2024-05-06 RX ADMIN — ONDANSETRON 4 MILLIGRAM(S): 8 TABLET, FILM COATED ORAL at 16:57

## 2024-05-06 RX ADMIN — HYDROMORPHONE HYDROCHLORIDE 1 MILLIGRAM(S): 2 INJECTION INTRAMUSCULAR; INTRAVENOUS; SUBCUTANEOUS at 19:08

## 2024-05-06 RX ADMIN — Medication 100 MILLIGRAM(S): at 23:14

## 2024-05-06 RX ADMIN — MORPHINE SULFATE 4 MILLIGRAM(S): 50 CAPSULE, EXTENDED RELEASE ORAL at 16:57

## 2024-05-06 RX ADMIN — HYDROMORPHONE HYDROCHLORIDE 1 MILLIGRAM(S): 2 INJECTION INTRAMUSCULAR; INTRAVENOUS; SUBCUTANEOUS at 22:31

## 2024-05-06 RX ADMIN — MORPHINE SULFATE 4 MILLIGRAM(S): 50 CAPSULE, EXTENDED RELEASE ORAL at 17:12

## 2024-05-06 RX ADMIN — DIATRIZOATE MEGLUMINE 30 MILLILITER(S): 180 INJECTION, SOLUTION INTRAVESICAL at 17:03

## 2024-05-06 RX ADMIN — SODIUM CHLORIDE 1000 MILLILITER(S): 9 INJECTION, SOLUTION INTRAVENOUS at 18:15

## 2024-05-06 RX ADMIN — SODIUM CHLORIDE 1000 MILLILITER(S): 9 INJECTION, SOLUTION INTRAVENOUS at 16:56

## 2024-05-06 RX ADMIN — ONDANSETRON 4 MILLIGRAM(S): 8 TABLET, FILM COATED ORAL at 20:47

## 2024-05-06 NOTE — ED PROVIDER NOTE - OBJECTIVE STATEMENT
59F with hx of hysterectomy, HTN, HLD, DM and s/p exploratory laparoscopy with lysis of adhesions and Small Bowel Resection secondary to 2/19 with PICC placed 2/22 for TPN presents to ed c/o diffuse abd pain jose enrique with BM x 3 days worsening. nausea, any po intake worsens sx. no fever, no vomiting. + flatus. normal urine. saw dr magdaleno 3 days ago and advise monitor sx and tylenol/nsaid. no relieve. pt also took imodium x 2 1 day ago and today another 2 dose since pt having loose BM

## 2024-05-06 NOTE — H&P ADULT - ASSESSMENT
59 y.o. F with PMH HTN, DM with enterocolitis    -Admit to surgery under Dr. Lopez  -CLD  -Conservative tx with bowel rest and abx  -Stool studies  -IVF  -Pain control prn  -DVT ppx    HTN  -Resume home meds    DM  -ISS

## 2024-05-06 NOTE — ED PROVIDER NOTE - PROGRESS NOTE DETAILS
lin: Morphine help with the pain however pain returned.  Dilaudid given.  CT results reviewed.  Spoke with surgery and will admit to their service.  No vomiting or hemodynamic instability at this time.

## 2024-05-06 NOTE — H&P ADULT - HISTORY OF PRESENT ILLNESS
59 y.o. F with PMH HTN DM, incisional hernia, bowel perforation presents to [facility] c/o severe lower abd pain for 3 days. Pt has recent complicated surgical history including dx-lap, YENNY, incisional hernia repair, followed by bowel perforation requiring exlap, and SBR. Pt's 2nd surgical admission complicated by intraabd collection not amenable to IR drainage due to lack of window. Pt discharged with PICC line and 6 weeks of abx. Pt f/u with Dr. Lopez last Friday and felt great. Pt states she was ready to try new food which included pasta and vegetables Friday night. Pt started feeling stomach fullness when eating, followed by loose BMs. Pt's pain continued this past weekend, loose BMs controlled with Imodium but pain persisted. Also YENNY due to feeling of fullness. Denies fever, chills, vomiting, constipation, dysuria, hematuria, BRBPR, melena.

## 2024-05-06 NOTE — H&P ADULT - NS ATTEND AMEND GEN_ALL_CORE FT
Pt seen and examined. Agree with note as outlined above. Having recurrent diarrhea, abdominal pain starting Saturday. CT scan reveals enteritis.  Repeat CT shows fluid collection smaller than previous. Reports feels a little better.   Abd- min distended, + periumbilical, lower abd tenderness, no guarding no rebound    Enteritis- stool culture. IV abx  IV hydration.

## 2024-05-06 NOTE — ED PROVIDER NOTE - CLINICAL SUMMARY MEDICAL DECISION MAKING FREE TEXT BOX
59F with hx of hysterectomy, HTN, HLD, DM and s/p exploratory laparoscopy with lysis of adhesions and Small Bowel Resection secondary to 2/19 with PICC placed 2/22 for TPN presents to ed c/o diffuse abd pain jose enrique with BM x 3 days worsening. nausea, any po intake worsens sx. no fever, no vomiting. + flatus. normal urine. saw dr magdaleno 3 days ago and advise monitor sx and tylenol/nsaid. no relieve. pt also took imodium x 2 1 day ago and today another 2 dose since pt having loose BM    r/o sbo vs constipation vs colitis- labs, ct, meds, re-assess

## 2024-05-07 PROBLEM — Z98.890 STATUS POST HERNIA REPAIR: Status: ACTIVE | Noted: 2024-03-13

## 2024-05-07 LAB
A1C WITH ESTIMATED AVERAGE GLUCOSE RESULT: 7.1 % — HIGH (ref 4–5.6)
ANION GAP SERPL CALC-SCNC: 9 MMOL/L — SIGNIFICANT CHANGE UP (ref 5–17)
BASOPHILS # BLD AUTO: 0.02 K/UL — SIGNIFICANT CHANGE UP (ref 0–0.2)
BASOPHILS NFR BLD AUTO: 0.2 % — SIGNIFICANT CHANGE UP (ref 0–2)
BUN SERPL-MCNC: 13 MG/DL — SIGNIFICANT CHANGE UP (ref 7–18)
CALCIUM SERPL-MCNC: 8.7 MG/DL — SIGNIFICANT CHANGE UP (ref 8.4–10.5)
CHLORIDE SERPL-SCNC: 100 MMOL/L — SIGNIFICANT CHANGE UP (ref 96–108)
CO2 SERPL-SCNC: 26 MMOL/L — SIGNIFICANT CHANGE UP (ref 22–31)
CREAT SERPL-MCNC: 0.94 MG/DL — SIGNIFICANT CHANGE UP (ref 0.5–1.3)
EGFR: 70 ML/MIN/1.73M2 — SIGNIFICANT CHANGE UP
EOSINOPHIL # BLD AUTO: 0.04 K/UL — SIGNIFICANT CHANGE UP (ref 0–0.5)
EOSINOPHIL NFR BLD AUTO: 0.4 % — SIGNIFICANT CHANGE UP (ref 0–6)
ESTIMATED AVERAGE GLUCOSE: 157 MG/DL — HIGH (ref 68–114)
GLUCOSE BLDC GLUCOMTR-MCNC: 135 MG/DL — HIGH (ref 70–99)
GLUCOSE BLDC GLUCOMTR-MCNC: 135 MG/DL — HIGH (ref 70–99)
GLUCOSE BLDC GLUCOMTR-MCNC: 187 MG/DL — HIGH (ref 70–99)
GLUCOSE BLDC GLUCOMTR-MCNC: 198 MG/DL — HIGH (ref 70–99)
GLUCOSE SERPL-MCNC: 149 MG/DL — HIGH (ref 70–99)
HCT VFR BLD CALC: 30.7 % — LOW (ref 34.5–45)
HGB BLD-MCNC: 10.2 G/DL — LOW (ref 11.5–15.5)
IMM GRANULOCYTES NFR BLD AUTO: 0.4 % — SIGNIFICANT CHANGE UP (ref 0–0.9)
LYMPHOCYTES # BLD AUTO: 19.4 % — SIGNIFICANT CHANGE UP (ref 13–44)
LYMPHOCYTES # BLD AUTO: 2.14 K/UL — SIGNIFICANT CHANGE UP (ref 1–3.3)
MCHC RBC-ENTMCNC: 27.3 PG — SIGNIFICANT CHANGE UP (ref 27–34)
MCHC RBC-ENTMCNC: 33.2 GM/DL — SIGNIFICANT CHANGE UP (ref 32–36)
MCV RBC AUTO: 82.1 FL — SIGNIFICANT CHANGE UP (ref 80–100)
MONOCYTES # BLD AUTO: 0.74 K/UL — SIGNIFICANT CHANGE UP (ref 0–0.9)
MONOCYTES NFR BLD AUTO: 6.7 % — SIGNIFICANT CHANGE UP (ref 2–14)
NEUTROPHILS # BLD AUTO: 8.05 K/UL — HIGH (ref 1.8–7.4)
NEUTROPHILS NFR BLD AUTO: 72.9 % — SIGNIFICANT CHANGE UP (ref 43–77)
NRBC # BLD: 0 /100 WBCS — SIGNIFICANT CHANGE UP (ref 0–0)
PLATELET # BLD AUTO: 327 K/UL — SIGNIFICANT CHANGE UP (ref 150–400)
POTASSIUM SERPL-MCNC: 3.6 MMOL/L — SIGNIFICANT CHANGE UP (ref 3.5–5.3)
POTASSIUM SERPL-SCNC: 3.6 MMOL/L — SIGNIFICANT CHANGE UP (ref 3.5–5.3)
RBC # BLD: 3.74 M/UL — LOW (ref 3.8–5.2)
RBC # FLD: 14.6 % — HIGH (ref 10.3–14.5)
SODIUM SERPL-SCNC: 135 MMOL/L — SIGNIFICANT CHANGE UP (ref 135–145)
WBC # BLD: 11.03 K/UL — HIGH (ref 3.8–10.5)
WBC # FLD AUTO: 11.03 K/UL — HIGH (ref 3.8–10.5)

## 2024-05-07 RX ADMIN — ONDANSETRON 4 MILLIGRAM(S): 8 TABLET, FILM COATED ORAL at 16:15

## 2024-05-07 RX ADMIN — Medication 100 MILLIGRAM(S): at 05:46

## 2024-05-07 RX ADMIN — Medication 100 MILLIGRAM(S): at 21:37

## 2024-05-07 RX ADMIN — Medication 30 MILLIGRAM(S): at 20:48

## 2024-05-07 RX ADMIN — Medication 2: at 17:27

## 2024-05-07 RX ADMIN — Medication 100 MILLIGRAM(S): at 13:22

## 2024-05-07 RX ADMIN — Medication 30 MILLIGRAM(S): at 03:05

## 2024-05-07 RX ADMIN — CEFTRIAXONE 100 MILLIGRAM(S): 500 INJECTION, POWDER, FOR SOLUTION INTRAMUSCULAR; INTRAVENOUS at 21:37

## 2024-05-07 RX ADMIN — VALSARTAN 160 MILLIGRAM(S): 80 TABLET ORAL at 05:46

## 2024-05-07 RX ADMIN — Medication 30 MILLIGRAM(S): at 02:50

## 2024-05-07 RX ADMIN — Medication 30 MILLIGRAM(S): at 21:03

## 2024-05-07 RX ADMIN — CEFTRIAXONE 100 MILLIGRAM(S): 500 INJECTION, POWDER, FOR SOLUTION INTRAMUSCULAR; INTRAVENOUS at 00:48

## 2024-05-07 RX ADMIN — INSULIN GLARGINE 15 UNIT(S): 100 INJECTION, SOLUTION SUBCUTANEOUS at 08:18

## 2024-05-07 NOTE — PROGRESS NOTE ADULT - NS ATTEND AMEND GEN_ALL_CORE FT
Pt seen and examined. Reports multiple loose stools. Toelrating clears but states after drinking has urge to have BM. + flatus  Abd-  soft min distended mostly upper abd, mild upper abd tenderness, no guarding no rebound  Advance to full liquids  Stool sample  ID f/u

## 2024-05-07 NOTE — PROGRESS NOTE ADULT - ASSESSMENT
hx sb resection, tony in past with pelvuc collection, post 6weeks iv abx at home  now admitted with ileus due to enterocolitis vs psbo  bm restored  abd still somewhat distended.    pt on abx  clear liquids  oob  will discuss w attendind diet plan for today

## 2024-05-07 NOTE — PROGRESS NOTE ADULT - SUBJECTIVE AND OBJECTIVE BOX
Pt admits bmx2 last night, formed stool. no n/v, abd pain improving.  ICU Vital Signs Last 24 Hrs  T(C): 36.9 (07 May 2024 06:06), Max: 36.9 (06 May 2024 19:37)  T(F): 98.4 (07 May 2024 06:06), Max: 98.4 (06 May 2024 19:37)  HR: 92 (07 May 2024 06:06) (74 - 92)  BP: 100/66 (07 May 2024 06:06) (100/66 - 155/94)  BP(mean): 77 (07 May 2024 06:06) (77 - 79)  ABP: --  ABP(mean): --  RR: 18 (07 May 2024 06:06) (18 - 18)  SpO2: 99% (07 May 2024 06:06) (95% - 99%)    O2 Parameters below as of 07 May 2024 06:06  Patient On (Oxygen Delivery Method): room air        Alert nad  Abd:mild generalized tenderness, voluntary guarding, no rebound tenderness, healed midline scars                        10.2   11.03 )-----------( 327      ( 07 May 2024 05:55 )             30.7   05-07    135  |  100  |  13  ----------------------------<  149<H>  3.6   |  26  |  0.94    Ca    8.7      07 May 2024 05:55    TPro  8.1  /  Alb  3.7  /  TBili  0.4  /  DBili  x   /  AST  23  /  ALT  29  /  AlkPhos  94  05-06     Respiratory Care arterial catheter insertion    Timeout: correct patient, correct procedure, completed informed consent reviewed and accurate, pertinent /relevant medical record detail reviewed, site and procedure with either patient or family if patient cannot provide confirmation, correct positioning of the patient, immediate availability of all necessary medication and immediate availability of necessary equipment    Indication:Hemodynamic measurement    Location:Right radial artery    Prep:Chlorhexidine    Drape:Local drape    Consent:Emergent procedure.  Consent implied.    Anesthesia: 1% Lidocaine infiltrated locally    Guidewire fully removed and intact: Yes    Ultrasound used for line placement?Yes    Waveform:Good     Complications:None

## 2024-05-08 DIAGNOSIS — K52.9 NONINFECTIVE GASTROENTERITIS AND COLITIS, UNSPECIFIED: ICD-10-CM

## 2024-05-08 LAB
GLUCOSE BLDC GLUCOMTR-MCNC: 139 MG/DL — HIGH (ref 70–99)
GLUCOSE BLDC GLUCOMTR-MCNC: 140 MG/DL — HIGH (ref 70–99)
GLUCOSE BLDC GLUCOMTR-MCNC: 148 MG/DL — HIGH (ref 70–99)
GLUCOSE BLDC GLUCOMTR-MCNC: 174 MG/DL — HIGH (ref 70–99)
HCT VFR BLD CALC: 30 % — LOW (ref 34.5–45)
HGB BLD-MCNC: 9.9 G/DL — LOW (ref 11.5–15.5)
MCHC RBC-ENTMCNC: 27.2 PG — SIGNIFICANT CHANGE UP (ref 27–34)
MCHC RBC-ENTMCNC: 33 GM/DL — SIGNIFICANT CHANGE UP (ref 32–36)
MCV RBC AUTO: 82.4 FL — SIGNIFICANT CHANGE UP (ref 80–100)
NRBC # BLD: 0 /100 WBCS — SIGNIFICANT CHANGE UP (ref 0–0)
PLATELET # BLD AUTO: 275 K/UL — SIGNIFICANT CHANGE UP (ref 150–400)
RBC # BLD: 3.64 M/UL — LOW (ref 3.8–5.2)
RBC # FLD: 14.6 % — HIGH (ref 10.3–14.5)
WBC # BLD: 7.47 K/UL — SIGNIFICANT CHANGE UP (ref 3.8–10.5)
WBC # FLD AUTO: 7.47 K/UL — SIGNIFICANT CHANGE UP (ref 3.8–10.5)

## 2024-05-08 PROCEDURE — 99223 1ST HOSP IP/OBS HIGH 75: CPT

## 2024-05-08 RX ADMIN — VALSARTAN 160 MILLIGRAM(S): 80 TABLET ORAL at 05:22

## 2024-05-08 RX ADMIN — Medication 30 MILLIGRAM(S): at 15:34

## 2024-05-08 RX ADMIN — HYDROMORPHONE HYDROCHLORIDE 0.5 MILLIGRAM(S): 2 INJECTION INTRAMUSCULAR; INTRAVENOUS; SUBCUTANEOUS at 02:00

## 2024-05-08 RX ADMIN — Medication 100 MILLIGRAM(S): at 13:20

## 2024-05-08 RX ADMIN — Medication 30 MILLIGRAM(S): at 23:01

## 2024-05-08 RX ADMIN — INSULIN GLARGINE 15 UNIT(S): 100 INJECTION, SOLUTION SUBCUTANEOUS at 08:29

## 2024-05-08 RX ADMIN — Medication 2: at 16:39

## 2024-05-08 RX ADMIN — HYDROMORPHONE HYDROCHLORIDE 0.5 MILLIGRAM(S): 2 INJECTION INTRAMUSCULAR; INTRAVENOUS; SUBCUTANEOUS at 02:15

## 2024-05-08 RX ADMIN — Medication 30 MILLIGRAM(S): at 14:54

## 2024-05-08 RX ADMIN — Medication 30 MILLIGRAM(S): at 23:16

## 2024-05-08 RX ADMIN — Medication 100 MILLIGRAM(S): at 05:22

## 2024-05-08 NOTE — CONSULT NOTE ADULT - SUBJECTIVE AND OBJECTIVE BOX
HPI:  59 y.o. F with PMH HTN DM, incisional hernia, bowel perforation presents to [facility] c/o severe lower abd pain for 3 days. Pt has recent complicated surgical history including dx-lap, YENNY, incisional hernia repair, followed by bowel perforation requiring exlap, and SBR. Pt's 2nd surgical admission complicated by intraabd collection not amenable to IR drainage due to lack of window. Pt discharged with PICC line and 6 weeks of abx. Pt f/u with Dr. Lopez last Friday and felt great. Pt states she was ready to try new food which included pasta and vegetables Friday night. Pt started feeling stomach fullness when eating, followed by loose BMs. Pt's pain continued this past weekend, loose BMs controlled with Imodium but pain persisted. Also YENNY due to feeling of fullness. Denies fever, chills, vomiting, constipation, dysuria, hematuria, BRBPR, melena. (06 May 2024 22:13)      PAST MEDICAL & SURGICAL HISTORY:  Other specified diabetes mellitus without complication, without long-term current use of insulin      HTN (hypertension)      DM (diabetes mellitus)      HLD (hyperlipidemia)      H/O abdominal hysterectomy          No Known Allergies      Meds:  acetaminophen     Tablet .. 650 milliGRAM(s) Oral every 6 hours PRN  cefTRIAXone   IVPB      cefTRIAXone   IVPB 2000 milliGRAM(s) IV Intermittent every 24 hours  dextrose 10% Bolus 125 milliLiter(s) IV Bolus once  dextrose 5%. 1000 milliLiter(s) IV Continuous <Continuous>  dextrose 5%. 1000 milliLiter(s) IV Continuous <Continuous>  dextrose 50% Injectable 25 Gram(s) IV Push once  dextrose 50% Injectable 12.5 Gram(s) IV Push once  dextrose Oral Gel 15 Gram(s) Oral once PRN  glucagon  Injectable 1 milliGRAM(s) IntraMuscular once  hydrochlorothiazide 25 milliGRAM(s) Oral daily  HYDROmorphone  Injectable 0.5 milliGRAM(s) IV Push every 3 hours PRN  insulin glargine Injectable (LANTUS) 15 Unit(s) SubCutaneous every morning  insulin lispro (ADMELOG) corrective regimen sliding scale   SubCutaneous at bedtime  insulin lispro (ADMELOG) corrective regimen sliding scale   SubCutaneous three times a day before meals  ketorolac   Injectable 30 milliGRAM(s) IV Push every 6 hours PRN  lactated ringers. 1000 milliLiter(s) IV Continuous <Continuous>  metroNIDAZOLE  IVPB      metroNIDAZOLE  IVPB 500 milliGRAM(s) IV Intermittent every 8 hours  ondansetron Injectable 4 milliGRAM(s) IV Push every 6 hours PRN  valsartan 160 milliGRAM(s) Oral daily      SOCIAL HISTORY:  Smoker:  YES / NO        PACK YEARS:                         WHEN QUIT?  ETOH use:  YES / NO               FREQUENCY / QUANTITY:  Ilicit Drug use:  YES / NO  Occupation:  Assisted device use (Cane / Walker):  Live with:    FAMILY HISTORY:      VITALS:  Vital Signs Last 24 Hrs  T(C): 36.6 (08 May 2024 14:48), Max: 37.1 (07 May 2024 21:20)  T(F): 97.9 (08 May 2024 14:48), Max: 98.8 (07 May 2024 21:20)  HR: 83 (08 May 2024 14:48) (81 - 83)  BP: 148/92 (08 May 2024 14:48) (115/71 - 148/92)  BP(mean): 85 (08 May 2024 06:08) (85 - 85)  RR: 16 (08 May 2024 14:48) (16 - 18)  SpO2: 99% (08 May 2024 14:48) (94% - 99%)    Parameters below as of 08 May 2024 14:48  Patient On (Oxygen Delivery Method): room air        LABS/DIAGNOSTIC TESTS:                          9.9    7.47  )-----------( 275      ( 08 May 2024 07:17 )             30.0     WBC Count: 7.47 K/uL (05-08 @ 07:17)  WBC Count: 11.03 K/uL (05-07 @ 05:55)  WBC Count: 10.99 K/uL (05-06 @ 16:49)      05-07    135  |  100  |  13  ----------------------------<  149<H>  3.6   |  26  |  0.94    Ca    8.7      07 May 2024 05:55    TPro  8.1  /  Alb  3.7  /  TBili  0.4  /  DBili  x   /  AST  23  /  ALT  29  /  AlkPhos  94  05-06      Urinalysis Basic - ( 07 May 2024 05:55 )    Color: x / Appearance: x / SG: x / pH: x  Gluc: 149 mg/dL / Ketone: x  / Bili: x / Urobili: x   Blood: x / Protein: x / Nitrite: x   Leuk Esterase: x / RBC: x / WBC x   Sq Epi: x / Non Sq Epi: x / Bacteria: x        LIVER FUNCTIONS - ( 06 May 2024 16:49 )  Alb: 3.7 g/dL / Pro: 8.1 g/dL / ALK PHOS: 94 U/L / ALT: 29 U/L DA / AST: 23 U/L / GGT: x             PT/INR - ( 06 May 2024 16:49 )   PT: 10.9 sec;   INR: 0.95 ratio             LACTATE:    ABG -     CULTURES:       .Abscess abdominal abcess  02-19 @ 23:20   Rare Escherichia coli  Few Alpha hemolytic strep "Susceptibilities not performed"  Numerous Streptococcus anginosus "Susceptibilities not performed"  Few Lactobacillus rhamnosus "Susceptibilities not performed"  --  Escherichia coli              RADIOLOGY:< from: CT Abdomen and Pelvis w/ Oral Cont and w/ IV Cont (05.06.24 @ 18:52) >    ACC: [id number] EXAM:  CT ABDOMEN AND PELVIS OC IC   ORDERED BY: FAISAL ORTIZ     PROCEDURE DATE:  05/06/2024          INTERPRETATION:  CLINICAL INFORMATION: Abdominal pain. History of bowel   resection 2/19/2024    COMPARISON: Multiple prior exams, with most recent CT abdomen/pelvis   3/28/2024    CONTRAST/COMPLICATIONS:  IV Contrast: Omnipaque 350  90 cc administered   10 cc discarded  Oral Contrast: Omnipaque 300  Complications: None reported at time of study completion    PROCEDURE:  CT of the Abdomen and Pelvis was performed.  Sagittal and coronal reformats were performed.    FINDINGS:  LOWER CHEST: Within normal limits.    LIVER: Within normal limits.  BILE DUCTS: Normal caliber.  GALLBLADDER: Within normal limits.  SPLEEN: Within normal limits.  PANCREAS: Within normal limits.  ADRENALS: Within normal limits.  KIDNEYS/URETERS: No renal stones or hydronephrosis. Left renal cysts.    BLADDER: Minimally distended.  REPRODUCTIVE ORGANS: Supracervical hysterectomy.    BOWEL: Diffusely dilated small bowel loops measuring up to 3.3 cm in   diameter with wall segment of small bowel fecalization. Gradual   transition point in the anterior pelvis in the region of the small bowel   anastomosis, where there are inflammatory changes. Long segment distal   small bowel wall thickening, mucosal hyperenhancement, and surrounding   mesenteric fluid.  Mild diffuse colonic wall thickening and mucosal   hyperenhancement.  PERITONEUM: Small amount of ascites. Minimal residual right pelvic   rim-enhancing collection measuring 1.3 x 1.3 cm, previously 3.2 x 2.5 cm.  VESSELS: Within normal limits.  RETROPERITONEUM/LYMPH NODES: No lymphadenopathy. Minimal residual soft   tissue enhancement along the midline incision at site of previous   anterior abdominal wall collection.  ABDOMINAL WALL: Postsurgical changes.  BONES: Degenerative changes.    IMPRESSION:  Inflammation of the distal small bowel and colon, likely infectious or   inflammatory enterocolitis. Inflammatory changes appear to be causinga   partial small bowel obstruction with gradual transition point in the   anterior pelvis in the region of previous small bowel anastomosis.    Mesenteric edema/mild ascites is likely reactive.    Decrease in size of the previous right pelvic collection.        --- End of Report ---             KLEBER SALAZAR MD; Attending Radiologist  This document has been electronically signed. May  6 2024  7:21PM    < end of copied text >        ROS  [  ] UNABLE TO ELICIT               HPI:  59 y.o. F with PMH HTN DM, incisional hernia, bowel perforation presents to [facility] c/o severe lower abd pain for 3 days. Pt has recent complicated surgical history including dx-lap, YENNY, incisional hernia repair, followed by bowel perforation requiring exlap, and SBR. Pt's 2nd surgical admission complicated by intraabd collection not amenable to IR drainage due to lack of window. Pt discharged with PICC line and 6 weeks of abx. Pt f/u with Dr. Lopez last Friday and felt great. Pt states she was ready to try new food which included pasta and vegetables Friday night. Pt started feeling stomach fullness when eating, followed by loose BMs. Pt's pain continued this past weekend, loose BMs controlled with Imodium but pain persisted. Also YENNY due to feeling of fullness. Denies fever, chills, vomiting, constipation, dysuria, hematuria, BRBPR, melena. (06 May 2024 22:13)        History as above, asked to see this patient who was here in end of March of this year with above history and who was treated with Zosyn while in the hospital and Invanz as and outpatient and also with some PO abxs prescribed by Dr Flanagan when she was seen by her at [facility] clinic, she had been seen by Dr Craven in the hospital. She was being treated for a large pelvic abscess that was drained and had grown out 3 organisms. She had followed up with her surgeon and had a repeat CT that was negative for abscess as per the patient. She was doing extremely well until 3 days ago when she developed abdominal pain.          PAST MEDICAL & SURGICAL HISTORY:  Other specified diabetes mellitus without complication, without long-term current use of insulin      HTN (hypertension)      DM (diabetes mellitus)      HLD (hyperlipidemia)      H/O abdominal hysterectomy          No Known Allergies      Meds:  acetaminophen     Tablet .. 650 milliGRAM(s) Oral every 6 hours PRN  cefTRIAXone   IVPB      cefTRIAXone   IVPB 2000 milliGRAM(s) IV Intermittent every 24 hours  dextrose 10% Bolus 125 milliLiter(s) IV Bolus once  dextrose 5%. 1000 milliLiter(s) IV Continuous <Continuous>  dextrose 5%. 1000 milliLiter(s) IV Continuous <Continuous>  dextrose 50% Injectable 25 Gram(s) IV Push once  dextrose 50% Injectable 12.5 Gram(s) IV Push once  dextrose Oral Gel 15 Gram(s) Oral once PRN  glucagon  Injectable 1 milliGRAM(s) IntraMuscular once  hydrochlorothiazide 25 milliGRAM(s) Oral daily  HYDROmorphone  Injectable 0.5 milliGRAM(s) IV Push every 3 hours PRN  insulin glargine Injectable (LANTUS) 15 Unit(s) SubCutaneous every morning  insulin lispro (ADMELOG) corrective regimen sliding scale   SubCutaneous at bedtime  insulin lispro (ADMELOG) corrective regimen sliding scale   SubCutaneous three times a day before meals  ketorolac   Injectable 30 milliGRAM(s) IV Push every 6 hours PRN  lactated ringers. 1000 milliLiter(s) IV Continuous <Continuous>  metroNIDAZOLE  IVPB      metroNIDAZOLE  IVPB 500 milliGRAM(s) IV Intermittent every 8 hours  ondansetron Injectable 4 milliGRAM(s) IV Push every 6 hours PRN  valsartan 160 milliGRAM(s) Oral daily      SOCIAL HISTORY:  Smoker:  YES / NO        PACK YEARS:                         WHEN QUIT?  ETOH use:  YES / NO               FREQUENCY / QUANTITY:  Ilicit Drug use:  YES / NO  Occupation:  Assisted device use (Cane / Walker):  Live with:    FAMILY HISTORY:      VITALS:  Vital Signs Last 24 Hrs  T(C): 36.6 (08 May 2024 14:48), Max: 37.1 (07 May 2024 21:20)  T(F): 97.9 (08 May 2024 14:48), Max: 98.8 (07 May 2024 21:20)  HR: 83 (08 May 2024 14:48) (81 - 83)  BP: 148/92 (08 May 2024 14:48) (115/71 - 148/92)  BP(mean): 85 (08 May 2024 06:08) (85 - 85)  RR: 16 (08 May 2024 14:48) (16 - 18)  SpO2: 99% (08 May 2024 14:48) (94% - 99%)    Parameters below as of 08 May 2024 14:48  Patient On (Oxygen Delivery Method): room air        LABS/DIAGNOSTIC TESTS:                          9.9    7.47  )-----------( 275      ( 08 May 2024 07:17 )             30.0     WBC Count: 7.47 K/uL (05-08 @ 07:17)  WBC Count: 11.03 K/uL (05-07 @ 05:55)  WBC Count: 10.99 K/uL (05-06 @ 16:49)      05-07    135  |  100  |  13  ----------------------------<  149<H>  3.6   |  26  |  0.94    Ca    8.7      07 May 2024 05:55    TPro  8.1  /  Alb  3.7  /  TBili  0.4  /  DBili  x   /  AST  23  /  ALT  29  /  AlkPhos  94  05-06      Urinalysis Basic - ( 07 May 2024 05:55 )    Color: x / Appearance: x / SG: x / pH: x  Gluc: 149 mg/dL / Ketone: x  / Bili: x / Urobili: x   Blood: x / Protein: x / Nitrite: x   Leuk Esterase: x / RBC: x / WBC x   Sq Epi: x / Non Sq Epi: x / Bacteria: x        LIVER FUNCTIONS - ( 06 May 2024 16:49 )  Alb: 3.7 g/dL / Pro: 8.1 g/dL / ALK PHOS: 94 U/L / ALT: 29 U/L DA / AST: 23 U/L / GGT: x             PT/INR - ( 06 May 2024 16:49 )   PT: 10.9 sec;   INR: 0.95 ratio             LACTATE:    ABG -     CULTURES:       .Abscess abdominal abcess  02-19 @ 23:20   Rare Escherichia coli  Few Alpha hemolytic strep "Susceptibilities not performed"  Numerous Streptococcus anginosus "Susceptibilities not performed"  Few Lactobacillus rhamnosus "Susceptibilities not performed"  --  Escherichia coli              RADIOLOGY:< from: CT Abdomen and Pelvis w/ Oral Cont and w/ IV Cont (05.06.24 @ 18:52) >    ACC: [id number] EXAM:  CT ABDOMEN AND PELVIS OC IC   ORDERED BY: FAISAL ORTIZ     PROCEDURE DATE:  05/06/2024          INTERPRETATION:  CLINICAL INFORMATION: Abdominal pain. History of bowel   resection 2/19/2024    COMPARISON: Multiple prior exams, with most recent CT abdomen/pelvis   3/28/2024    CONTRAST/COMPLICATIONS:  IV Contrast: Omnipaque 350  90 cc administered   10 cc discarded  Oral Contrast: Omnipaque 300  Complications: None reported at time of study completion    PROCEDURE:  CT of the Abdomen and Pelvis was performed.  Sagittal and coronal reformats were performed.    FINDINGS:  LOWER CHEST: Within normal limits.    LIVER: Within normal limits.  BILE DUCTS: Normal caliber.  GALLBLADDER: Within normal limits.  SPLEEN: Within normal limits.  PANCREAS: Within normal limits.  ADRENALS: Within normal limits.  KIDNEYS/URETERS: No renal stones or hydronephrosis. Left renal cysts.    BLADDER: Minimally distended.  REPRODUCTIVE ORGANS: Supracervical hysterectomy.    BOWEL: Diffusely dilated small bowel loops measuring up to 3.3 cm in   diameter with wall segment of small bowel fecalization. Gradual   transition point in the anterior pelvis in the region of the small bowel   anastomosis, where there are inflammatory changes. Long segment distal   small bowel wall thickening, mucosal hyperenhancement, and surrounding   mesenteric fluid.  Mild diffuse colonic wall thickening and mucosal   hyperenhancement.  PERITONEUM: Small amount of ascites. Minimal residual right pelvic   rim-enhancing collection measuring 1.3 x 1.3 cm, previously 3.2 x 2.5 cm.  VESSELS: Within normal limits.  RETROPERITONEUM/LYMPH NODES: No lymphadenopathy. Minimal residual soft   tissue enhancement along the midline incision at site of previous   anterior abdominal wall collection.  ABDOMINAL WALL: Postsurgical changes.  BONES: Degenerative changes.    IMPRESSION:  Inflammation of the distal small bowel and colon, likely infectious or   inflammatory enterocolitis. Inflammatory changes appear to be causinga   partial small bowel obstruction with gradual transition point in the   anterior pelvis in the region of previous small bowel anastomosis.    Mesenteric edema/mild ascites is likely reactive.    Decrease in size of the previous right pelvic collection.        --- End of Report ---             KLEBER SALAZAR MD; Attending Radiologist  This document has been electronically signed. May  6 2024  7:21PM    < end of copied text >        ROS  [  ] UNABLE TO ELICIT               HPI:  59 y.o. F with PMH HTN DM, incisional hernia, bowel perforation [facility] abd pain for 3 days. Pt has recent complicated surgical history including dx-lap, YENNY, incisional hernia repair, followed by bowel perforation requiring exlap, and SBR. Pt's 2nd surgical admission complicated by intraabd collection not amenable to IR drainage due to lack of window. Pt discharged with PICC line and 6 weeks of abx. Pt f/u with Dr. Lopez last Friday and felt great. Pt states she was ready to try new food which included pasta and vegetables Friday night. Pt started feeling stomach fullness when eating, followed by loose BMs. Pt's pain continued this past weekend, loose BMs controlled with Imodium but pain persisted. Also YENNY due to feeling of fullness. Denies fever, chills, vomiting, constipation, dysuria, hematuria, BRBPR, melena. (06 May 2024 22:13)        History as above, asked to see this patient who was here in end of March of this year with above history and who was treated with Zosyn while in the hospital and Invanz as and outpatient and also with some PO abxs prescribed by Dr Flanagan when she was seen by her at [facility], she had been seen by Dr Craven in the hospital. She was being treated for a large pelvic abscess that was drained and had grown out 3 organisms. She had followed up with her surgeon and had a repeat CT that was negative for abscess as per the patient. She was doing extremely well until 3 days ago when she developed abdominal pain and some  diarrhea but mainly post CT  and hence came to the hospital. She denied any nausea or vomiting until she got IV contrast here for her CT abdomen, no fevers or chills, or other complaints. She had a CT abd/ pelvis and shows her to have some Enterocolitis and a small abscess in the right lower pelvis 1.3 x 1.3 cm in size. She has no pain in the site and her pain is localized to the upper abdomen.          PAST MEDICAL & SURGICAL HISTORY:  Other specified diabetes mellitus without complication, without long-term current use of insulin      HTN (hypertension)      DM (diabetes mellitus)      HLD (hyperlipidemia)      H/O abdominal hysterectomy          No Known Allergies      Meds:  acetaminophen     Tablet .. 650 milliGRAM(s) Oral every 6 hours PRN  cefTRIAXone   IVPB      cefTRIAXone   IVPB 2000 milliGRAM(s) IV Intermittent every 24 hours  dextrose 10% Bolus 125 milliLiter(s) IV Bolus once  dextrose 5%. 1000 milliLiter(s) IV Continuous <Continuous>  dextrose 5%. 1000 milliLiter(s) IV Continuous <Continuous>  dextrose 50% Injectable 25 Gram(s) IV Push once  dextrose 50% Injectable 12.5 Gram(s) IV Push once  dextrose Oral Gel 15 Gram(s) Oral once PRN  glucagon  Injectable 1 milliGRAM(s) IntraMuscular once  hydrochlorothiazide 25 milliGRAM(s) Oral daily  HYDROmorphone  Injectable 0.5 milliGRAM(s) IV Push every 3 hours PRN  insulin glargine Injectable (LANTUS) 15 Unit(s) SubCutaneous every morning  insulin lispro (ADMELOG) corrective regimen sliding scale   SubCutaneous at bedtime  insulin lispro (ADMELOG) corrective regimen sliding scale   SubCutaneous three times a day before meals  ketorolac   Injectable 30 milliGRAM(s) IV Push every 6 hours PRN  lactated ringers. 1000 milliLiter(s) IV Continuous <Continuous>  metroNIDAZOLE  IVPB      metroNIDAZOLE  IVPB 500 milliGRAM(s) IV Intermittent every 8 hours  ondansetron Injectable 4 milliGRAM(s) IV Push every 6 hours PRN  valsartan 160 milliGRAM(s) Oral daily      SOCIAL HISTORY:  Smoker:  no  ETOH use: no   Ilicit Drug use: no      FAMILY HISTORY: not contributory      VITALS:  Vital Signs Last 24 Hrs  T(C): 36.6 (08 May 2024 14:48), Max: 37.1 (07 May 2024 21:20)  T(F): 97.9 (08 May 2024 14:48), Max: 98.8 (07 May 2024 21:20)  HR: 83 (08 May 2024 14:48) (81 - 83)  BP: 148/92 (08 May 2024 14:48) (115/71 - 148/92)  BP(mean): 85 (08 May 2024 06:08) (85 - 85)  RR: 16 (08 May 2024 14:48) (16 - 18)  SpO2: 99% (08 May 2024 14:48) (94% - 99%)    Parameters below as of 08 May 2024 14:48  Patient On (Oxygen Delivery Method): room air        LABS/DIAGNOSTIC TESTS:                          9.9    7.47  )-----------( 275      ( 08 May 2024 07:17 )             30.0     WBC Count: 7.47 K/uL (05-08 @ 07:17)  WBC Count: 11.03 K/uL (05-07 @ 05:55)  WBC Count: 10.99 K/uL (05-06 @ 16:49)      05-07    135  |  100  |  13  ----------------------------<  149<H>  3.6   |  26  |  0.94    Ca    8.7      07 May 2024 05:55    TPro  8.1  /  Alb  3.7  /  TBili  0.4  /  DBili  x   /  AST  23  /  ALT  29  /  AlkPhos  94  05-06          LIVER FUNCTIONS - ( 06 May 2024 16:49 )  Alb: 3.7 g/dL / Pro: 8.1 g/dL / ALK PHOS: 94 U/L / ALT: 29 U/L DA / AST: 23 U/L / GGT: x             PT/INR - ( 06 May 2024 16:49 )   PT: 10.9 sec;   INR: 0.95 ratio             LACTATE:    ABG -     CULTURES:       .Abscess abdominal abcess  02-19 @ 23:20   Rare Escherichia coli  Few Alpha hemolytic strep "Susceptibilities not performed"  Numerous Streptococcus anginosus "Susceptibilities not performed"  Few Lactobacillus rhamnosus "Susceptibilities not performed"  --  Escherichia coli              RADIOLOGY:< from: CT Abdomen and Pelvis w/ Oral Cont and w/ IV Cont (05.06.24 @ 18:52) >    ACC: [id number] EXAM:  CT ABDOMEN AND PELVIS OC IC   ORDERED BY: FIASAL ORTIZ     PROCEDURE DATE:  05/06/2024          INTERPRETATION:  CLINICAL INFORMATION: Abdominal pain. History of bowel   resection 2/19/2024    COMPARISON: Multiple prior exams, with most recent CT abdomen/pelvis   3/28/2024    CONTRAST/COMPLICATIONS:  IV Contrast: Omnipaque 350  90 cc administered   10 cc discarded  Oral Contrast: Omnipaque 300  Complications: None reported at time of study completion    PROCEDURE:  CT of the Abdomen and Pelvis was performed.  Sagittal and coronal reformats were performed.    FINDINGS:  LOWER CHEST: Within normal limits.    LIVER: Within normal limits.  BILE DUCTS: Normal caliber.  GALLBLADDER: Within normal limits.  SPLEEN: Within normal limits.  PANCREAS: Within normal limits.  ADRENALS: Within normal limits.  KIDNEYS/URETERS: No renal stones or hydronephrosis. Left renal cysts.    BLADDER: Minimally distended.  REPRODUCTIVE ORGANS: Supracervical hysterectomy.    BOWEL: Diffusely dilated small bowel loops measuring up to 3.3 cm in   diameter with wall segment of small bowel fecalization. Gradual   transition point in the anterior pelvis in the region of the small bowel   anastomosis, where there are inflammatory changes. Long segment distal   small bowel wall thickening, mucosal hyperenhancement, and surrounding   mesenteric fluid.  Mild diffuse colonic wall thickening and mucosal   hyperenhancement.  PERITONEUM: Small amount of ascites. Minimal residual right pelvic   rim-enhancing collection measuring 1.3 x 1.3 cm, previously 3.2 x 2.5 cm.  VESSELS: Within normal limits.  RETROPERITONEUM/LYMPH NODES: No lymphadenopathy. Minimal residual soft   tissue enhancement along the midline incision at site of previous   anterior abdominal wall collection.  ABDOMINAL WALL: Postsurgical changes.  BONES: Degenerative changes.    IMPRESSION:  Inflammation of the distal small bowel and colon, likely infectious or   inflammatory enterocolitis. Inflammatory changes appear to be causinga   partial small bowel obstruction with gradual transition point in the   anterior pelvis in the region of previous small bowel anastomosis.    Mesenteric edema/mild ascites is likely reactive.    Decrease in size of the previous right pelvic collection.        --- End of Report ---             KLEBER SALAZAR MD; Attending Radiologist  This document has been electronically signed. May  6 2024  7:21PM    < end of copied text >        ROS  [  ] UNABLE TO ELICIT

## 2024-05-08 NOTE — CONSULT NOTE ADULT - ASSESSMENT
GI asked to evaluate this hx sb resection, tony in past with pelvic collection, post 6weeks iv abx at home  now admitted with ileus due to enterocolitis vs psbo
Enterocolitis  Intraabdominal Abscess - very strange that this would recur or still be present post treatment ( very adequate treatment)    Plan - Cont Rocephin 1 gm iv q24hrs  Cont Flagyl 500mgs iv q8hrs  if doing well over the next few days and she tolerates a diet and her pain and distention improve will plan to send home on PO Ceftin and / Flagyl for 2 weeks and get a repeat CT in 2 weeks time.
oriented to person, place, time and situation

## 2024-05-08 NOTE — PHYSICAL EXAM
[Normal Breath Sounds] : Normal breath sounds [Normal Heart Sounds] : normal heart sounds [Normal Rate and Rhythm] : normal rate and rhythm [No Rash or Lesion] : No rash or lesion [Alert] : alert [Oriented to Person] : oriented to person [Oriented to Place] : oriented to place [Oriented to Time] : oriented to time [Calm] : calm [Abdomen Tenderness] : ~T ~M No abdominal tenderness [de-identified] : The patient is alert, well-groomed  [de-identified] : Normoactive bowel sounds, soft and nontender, no hepatosplenomegaly or masses noted,  Surgical incisions are healing well. No sign of inflammation or exudate. Incision sites are healing well.  [de-identified] : full range of motion and no deformities appreciated.

## 2024-05-08 NOTE — HISTORY OF PRESENT ILLNESS
[de-identified] : AMY MONREAL is a 59 year old female who presents in the office for postop follow up visit. Patient was admitted to [facility] with abdominal pain. She underwent a diagnostic laparoscopy, lysis of adhesions, conversion to laparotomy for adhesiolysis and incisional hernia repair without mesh 02/06/2024. She return to the [facility] with severe abdominal pain, found to have bowel perforation Then Exploratory laparotomy with bowel resection 02/19/2024. Today patient is doing well, c/o incisional pain will start on gabapentin . Denies any fevers, chills, nausea, vomiting, diarrhea or constipation. Patient able to tolerate regular diet with normal bowel movements. Patient is very happy that her BMs have normalized and reports that her diarrhea stopped after finishing the last course of antibiotics by Dr. Flanagan. Surgical incisions are healing well. No sign of inflammation or exudate. Patient stopped taking antibiotics 2 weeks ago. We will order CT of the abdomen and pelvis to evaluate postoperative collection.

## 2024-05-08 NOTE — PROGRESS NOTE ADULT - NS PANP COMMENT GEN_ALL_CORE FT
Pt seen and examined, Reports feeling better but still bloated. Had seven BM's today, more formed. Passing flatus. Is hungry. Tolerating full liquids e.g. pudding, but reports yogurt causing increased bloating.  Stool submitted, culture pending NTD  Gen- NAD  Abd- soft, slightly distended, non tender, no guarding no rebound  Would like to try soft food, to advance to soft low fiber in AM  Appreciate GI, ID recommendations.

## 2024-05-08 NOTE — CONSULT NOTE ADULT - SUBJECTIVE AND OBJECTIVE BOX
Patient is a 59y old  Female who presents with a chief complaint of Enterocolitis (08 May 2024 14:56)    HPI: 59y with hx of bowel resection 2/19/24 presented with abdominal pain associated with soft stools. No melena or hematochezia. No nausea or vomiting.            .     REVIEW OF SYSTEMS  Constitutional:   No fever, no fatigue, no pallor, no night sweats, no weight loss.  HEENT:   No eye pain, no vision changes, no icterus, no mouth ulcers.  Respiratory:   No shortness of breath, no cough, no respiratory distress.   Cardiovascular:   No chest pain, no palpitations.   Gastrointestinal: No abdominal pain, no nausea, no vomiting , no diahrrea, no constipation, no hematochezia,no melena.  Skin:   No rashes, no jaundice, no eczema.   Musculoskeletal:   No joint pain, no swelling, no myalgia.   Neurologic:   No headache, no seizure, no weakness.   Genitourinary:   No dysuria, no decreased urine output.  Psychiatric:  No depression, no anxiety,   Endocrine:   No thyroid disease, no diabetes.  Heme/Lymphatic:   No anemia, no blood transfusions, no lymph node enlargement, no bleeding, no bruising.  ___________________________________________________________________________________________  Allergies    No Known Allergies    Intolerances      MEDICATIONS  (STANDING):  cefTRIAXone   IVPB 2000 milliGRAM(s) IV Intermittent every 24 hours  cefTRIAXone   IVPB      dextrose 10% Bolus 125 milliLiter(s) IV Bolus once  dextrose 5%. 1000 milliLiter(s) (100 mL/Hr) IV Continuous <Continuous>  dextrose 5%. 1000 milliLiter(s) (50 mL/Hr) IV Continuous <Continuous>  dextrose 50% Injectable 25 Gram(s) IV Push once  dextrose 50% Injectable 12.5 Gram(s) IV Push once  glucagon  Injectable 1 milliGRAM(s) IntraMuscular once  hydrochlorothiazide 25 milliGRAM(s) Oral daily  insulin glargine Injectable (LANTUS) 15 Unit(s) SubCutaneous every morning  insulin lispro (ADMELOG) corrective regimen sliding scale   SubCutaneous at bedtime  insulin lispro (ADMELOG) corrective regimen sliding scale   SubCutaneous three times a day before meals  lactated ringers. 1000 milliLiter(s) (105 mL/Hr) IV Continuous <Continuous>  metroNIDAZOLE  IVPB 500 milliGRAM(s) IV Intermittent every 8 hours  metroNIDAZOLE  IVPB      valsartan 160 milliGRAM(s) Oral daily    MEDICATIONS  (PRN):  acetaminophen     Tablet .. 650 milliGRAM(s) Oral every 6 hours PRN Temp greater or equal to 38C (100.4F), Mild Pain (1 - 3)  dextrose Oral Gel 15 Gram(s) Oral once PRN Blood Glucose LESS THAN 70 milliGRAM(s)/deciliter  HYDROmorphone  Injectable 0.5 milliGRAM(s) IV Push every 3 hours PRN Severe Pain (7 - 10)  ketorolac   Injectable 30 milliGRAM(s) IV Push every 6 hours PRN Moderate Pain (4 - 6)  ondansetron Injectable 4 milliGRAM(s) IV Push every 6 hours PRN Nausea      PAST MEDICAL & SURGICAL HISTORY:  Other specified diabetes mellitus without complication, without long-term current use of insulin      HTN (hypertension)      DM (diabetes mellitus)      HLD (hyperlipidemia)      H/O abdominal hysterectomy        FAMILY HISTORY:    Social History: No hsitory of : Tobacco use, IVDA, EToH  ______________________________________________________________________________________    PHYSICAL EXAM    Daily     Daily   BMI: 25.2 (05-06 @ 15:19)  Change in Weight:  Vital Signs Last 24 Hrs  T(C): 36.6 (08 May 2024 14:48), Max: 37.1 (07 May 2024 21:20)  T(F): 97.9 (08 May 2024 14:48), Max: 98.8 (07 May 2024 21:20)  HR: 83 (08 May 2024 14:48) (81 - 83)  BP: 148/92 (08 May 2024 14:48) (115/71 - 148/92)  BP(mean): 85 (08 May 2024 06:08) (85 - 85)  RR: 16 (08 May 2024 14:48) (16 - 18)  SpO2: 99% (08 May 2024 14:48) (94% - 99%)    Parameters below as of 08 May 2024 14:48  Patient On (Oxygen Delivery Method): room air        General:  Well developed, well nourished, alert and active, no pallor, NAD.  HEENT:    Normal appearance of conjunctiva, ears, nose, lips, oropharynx, and oral mucosa, anicteric.  Neck:  No masses, no asymmetry.  Lymph Nodes:  No lymphadenopathy.   Cardiovascular:  RRR normal S1/S2, no murmur.  Respiratory:  CTA B/L, normal respiratory effort.   Abdominal:   soft, no masses or tenderness, normoactive BS, NT/ND, no HSM.  Extremities:   No clubbing or cyanosis, normal capillary refill, no edema.   Skin:   No rash, jaundice, lesions, eczema.   Musculoskeletal:  No joint swelling, erythema or tenderness.   Neuro: No focal deficits.   Other:   _______________________________________________________________________________________________  Lab Results:                          9.9    7.47  )-----------( 275      ( 08 May 2024 07:17 )             30.0     05-07    135  |  100  |  13  ----------------------------<  149<H>  3.6   |  26  |  0.94    Ca    8.7      07 May 2024 05:55    TPro  8.1  /  Alb  3.7  /  TBili  0.4  /  DBili  x   /  AST  23  /  ALT  29  /  AlkPhos  94  05-06    LIVER FUNCTIONS - ( 06 May 2024 16:49 )  Alb: 3.7 g/dL / Pro: 8.1 g/dL / ALK PHOS: 94 U/L / ALT: 29 U/L DA / AST: 23 U/L / GGT: x           PT/INR - ( 06 May 2024 16:49 )   PT: 10.9 sec;   INR: 0.95 ratio                 Stool Results:          RADIOLOGY RESULTS:    SURGICAL PATHOLOGY:      Patient is a 59y old  Female who presents with a chief complaint of Enterocolitis (08 May 2024 14:56)    HPI: 59y with hx of bowel resection 2/19/24 presented with abdominal pain associated with soft stools. No melena or hematochezia. No nausea or vomiting.     GI consult for enterocolitis on CT, which is actually more like dilation with downstream inflammatory stricturing.   Pt reports hernia repair Feb 2024 complicated by post-op peritonitis, requiring small bowel resection. 10 weeks of IV antibiotics, 4 weeks in house and 6 more PICC at home. Diarrhea throughout, improving slightly more recently, but admission with severe pain in the setting of persistent soft formed BMs.   Pt lost approx 12 lb since original surgery. Tolerating full liquids, but with some difficulty - ?post-prandial pain after yogurt just now. This might also be lactose intolerance. Does report recent nut consumption, which is not ideal considering inflammatory stricture area.      REVIEW OF SYSTEMS  Constitutional:   No fever, no fatigue, no pallor, no night sweats, no weight loss.  HEENT:   No eye pain, no vision changes, no icterus, no mouth ulcers.  Respiratory:   No shortness of breath, no cough, no respiratory distress.   Cardiovascular:   No chest pain, no palpitations.   Gastrointestinal: No abdominal pain, no nausea, no vomiting , no diahrrea, no constipation, no hematochezia,no melena.  Skin:   No rashes, no jaundice, no eczema.   Musculoskeletal:   No joint pain, no swelling, no myalgia.   Neurologic:   No headache, no seizure, no weakness.   Genitourinary:   No dysuria, no decreased urine output.  Psychiatric:  No depression, no anxiety,   Endocrine:   No thyroid disease, no diabetes.  Heme/Lymphatic:   No anemia, no blood transfusions, no lymph node enlargement, no bleeding, no bruising.  ___________________________________________________________________________________________  Allergies    No Known Allergies    Intolerances      MEDICATIONS  (STANDING):  cefTRIAXone   IVPB 2000 milliGRAM(s) IV Intermittent every 24 hours  cefTRIAXone   IVPB      dextrose 10% Bolus 125 milliLiter(s) IV Bolus once  dextrose 5%. 1000 milliLiter(s) (100 mL/Hr) IV Continuous <Continuous>  dextrose 5%. 1000 milliLiter(s) (50 mL/Hr) IV Continuous <Continuous>  dextrose 50% Injectable 25 Gram(s) IV Push once  dextrose 50% Injectable 12.5 Gram(s) IV Push once  glucagon  Injectable 1 milliGRAM(s) IntraMuscular once  hydrochlorothiazide 25 milliGRAM(s) Oral daily  insulin glargine Injectable (LANTUS) 15 Unit(s) SubCutaneous every morning  insulin lispro (ADMELOG) corrective regimen sliding scale   SubCutaneous at bedtime  insulin lispro (ADMELOG) corrective regimen sliding scale   SubCutaneous three times a day before meals  lactated ringers. 1000 milliLiter(s) (105 mL/Hr) IV Continuous <Continuous>  metroNIDAZOLE  IVPB 500 milliGRAM(s) IV Intermittent every 8 hours  metroNIDAZOLE  IVPB      valsartan 160 milliGRAM(s) Oral daily    MEDICATIONS  (PRN):  acetaminophen     Tablet .. 650 milliGRAM(s) Oral every 6 hours PRN Temp greater or equal to 38C (100.4F), Mild Pain (1 - 3)  dextrose Oral Gel 15 Gram(s) Oral once PRN Blood Glucose LESS THAN 70 milliGRAM(s)/deciliter  HYDROmorphone  Injectable 0.5 milliGRAM(s) IV Push every 3 hours PRN Severe Pain (7 - 10)  ketorolac   Injectable 30 milliGRAM(s) IV Push every 6 hours PRN Moderate Pain (4 - 6)  ondansetron Injectable 4 milliGRAM(s) IV Push every 6 hours PRN Nausea      PAST MEDICAL & SURGICAL HISTORY:  Other specified diabetes mellitus without complication, without long-term current use of insulin      HTN (hypertension)      DM (diabetes mellitus)      HLD (hyperlipidemia)      H/O abdominal hysterectomy        FAMILY HISTORY:    Social History: No hsitory of : Tobacco use, IVDA, EToH  ______________________________________________________________________________________    PHYSICAL EXAM    Daily     Daily   BMI: 25.2 (05-06 @ 15:19)  Change in Weight:  Vital Signs Last 24 Hrs  T(C): 36.6 (08 May 2024 14:48), Max: 37.1 (07 May 2024 21:20)  T(F): 97.9 (08 May 2024 14:48), Max: 98.8 (07 May 2024 21:20)  HR: 83 (08 May 2024 14:48) (81 - 83)  BP: 148/92 (08 May 2024 14:48) (115/71 - 148/92)  BP(mean): 85 (08 May 2024 06:08) (85 - 85)  RR: 16 (08 May 2024 14:48) (16 - 18)  SpO2: 99% (08 May 2024 14:48) (94% - 99%)    Parameters below as of 08 May 2024 14:48  Patient On (Oxygen Delivery Method): room air        General:  Well developed, well nourished, alert and active, no pallor, NAD.  HEENT:    Normal appearance of conjunctiva, ears, nose, lips, oropharynx, and oral mucosa, anicteric.  Neck:  No masses, no asymmetry.  Lymph Nodes:  No lymphadenopathy.   Cardiovascular:  RRR normal S1/S2, no murmur.  Respiratory:  CTA B/L, normal respiratory effort.   Abdominal:   distended; well healed surgical scars, tender in upper abdomen, diminished but present bowel sounds  Extremities:   No clubbing or cyanosis, normal capillary refill, no edema.   Skin:   No rash, jaundice, lesions, eczema.   Musculoskeletal:  No joint swelling, erythema or tenderness.   Neuro: No focal deficits.   Other:   _______________________________________________________________________________________________  Lab Results:                          9.9    7.47  )-----------( 275      ( 08 May 2024 07:17 )             30.0     05-07    135  |  100  |  13  ----------------------------<  149<H>  3.6   |  26  |  0.94    Ca    8.7      07 May 2024 05:55    TPro  8.1  /  Alb  3.7  /  TBili  0.4  /  DBili  x   /  AST  23  /  ALT  29  /  AlkPhos  94  05-06    LIVER FUNCTIONS - ( 06 May 2024 16:49 )  Alb: 3.7 g/dL / Pro: 8.1 g/dL / ALK PHOS: 94 U/L / ALT: 29 U/L DA / AST: 23 U/L / GGT: x           PT/INR - ( 06 May 2024 16:49 )   PT: 10.9 sec;   INR: 0.95 ratio                 Stool Results:          RADIOLOGY RESULTS: CT 5/6/24    INTERPRETATION:  CLINICAL INFORMATION: Abdominal pain. History of bowel   resection 2/19/2024    COMPARISON: Multiple prior exams, with most recent CT abdomen/pelvis   3/28/2024    CONTRAST/COMPLICATIONS:  IV Contrast: Omnipaque 350  90 cc administered   10 cc discarded  Oral Contrast: Omnipaque 300  Complications: None reported at time of study completion    PROCEDURE:  CT of the Abdomen and Pelvis was performed.  Sagittal and coronal reformats were performed.    FINDINGS:  LOWER CHEST: Within normal limits.    LIVER: Within normal limits.  BILE DUCTS: Normal caliber.  GALLBLADDER: Within normal limits.  SPLEEN: Within normal limits.  PANCREAS: Within normal limits.  ADRENALS: Within normal limits.  KIDNEYS/URETERS: No renal stones or hydronephrosis. Left renal cysts.    BLADDER: Minimally distended.  REPRODUCTIVE ORGANS: Supracervical hysterectomy.    BOWEL: Diffusely dilated small bowel loops measuring up to 3.3 cm in   diameter with wall segment of small bowel fecalization. Gradual   transition point in the anterior pelvis in the region of the small bowel   anastomosis, where there are inflammatory changes. Long segment distal   small bowel wall thickening, mucosal hyperenhancement, and surrounding   mesenteric fluid.  Mild diffuse colonic wall thickening and mucosal   hyperenhancement.  PERITONEUM: Small amount of ascites. Minimal residual right pelvic   rim-enhancing collection measuring 1.3 x 1.3 cm, previously 3.2 x 2.5 cm.  VESSELS: Within normal limits.  RETROPERITONEUM/LYMPH NODES: No lymphadenopathy. Minimal residual soft   tissue enhancement along the midline incision at site of previous   anterior abdominal wall collection.  ABDOMINAL WALL: Postsurgical changes.  BONES: Degenerative changes.    IMPRESSION:  Inflammation of the distal small bowel and colon, likely infectious or   inflammatory enterocolitis. Inflammatory changes appear to be causinga   partial small bowel obstruction with gradual transition point in the   anterior pelvis in the region of previous small bowel anastomosis.    Mesenteric edema/mild ascites is likely reactive.      Op report 2/19/24:  To attempt to limit the extent of the incision, I did insert the laparoscope initially, this was done in the prior left upper quadrant incision site. I utilized the [employer] optical 5 mm trocar under direct vision with low flow CO2. Once this was done, I entered the abdomen and was away from the area of any adhesions.       I could see the midline and the area of just underneath her infraumbilical incision, there was a large amount of omentum adherent to the anterior abdominal wall. There was also not a lot of room making visualization poor. A glimpse of matted together bowel was seen in the pelvis behind the omentum. I did not see gross contamination. However given the appearance of what I guessed would be hostile abdomen, I immediately proceeded to convert to open exploratory laparotomy. I entered the abdomen slowly and safely through the prior midline incision. I also extended a couple of centimeters at the superior aspect to ensure that I was in the area without any adhesions.     I entered the abdomen and encountered extensive adhesions of small bowel. These adhesions were dense. There was an inflammatory mass in the pelvis with adherent overlying omentum. There were dense interloop adhesions and dense adhesions, inflammatory reaction of the small bowel to mesentery and interloop adhesions of small bowel to itself. More proximally away from the infectious process, the adhesions more pliable; however, approaching the pelvis which was the area of the problem as was seen in the scan everything was very dense and there were a little to no planes of dissection between the bowel and adjacent structures to which it was adhesed.     Using the LigaSure device, I divided the omentum to be able to free up the bowel. I was able to identify terminal ileum that was coming from the cecum and attempted to trace this more proximally; however, ran into where it was stuck down in the pelvis. I carefully tried to chip its way as I knew that this was necessary to free this up in order to also free the proximal and distal aspects of all the bowel involved with the inflammatory reaction. In doing so, the abscess cavity was encountered in the pelvis.     There was some small bowel contents that were leaking into this. This inflammatory mass was not clear if it was adherent to colon or adherent to bladder. Re-reviewing the CAT scan, it showed that it was adherent to bladder, however, there was little to no plane of dissection between this mass and adjacent structure. I therefore proceeded to free up the bowel proximal to this inflammatory mass again, it was densely adherent and suspicious for possibility of involving sigmoid colon.       However, on appearance of CAT scan, this indicated the colon was not adjacent to area of abscess secondary to perforation. Due to the hostile nature of the abdomen and difficulty of case, I asked Dr. Cortes for assistance. He graciously came in to assist. He assisted with mobilizing the small bowel off the pelvic structure and partially off the colon and out of the pelvis. This required sharp dissection. Once this was done, the small bowel was also freed. We then ran the small bowel.     Portion of this bowel included bowel that was perforated and involved with the inflammatory mass in the pelvis with adherent omentum. Proximal and distal points of bowel were chosen that appeared to be healthy. This was a bit close to the terminal ileum entrance into the cecum. However, there was at least a 12 cm of bowel left proximal to the cecum which was added more than adequate to perform bowel anastomosis. SAUD blue load stapler was utilized, to resect the bowel proximal and distally. A side-to-side anastomosis given the anatomy was then created with an Endo SAUD 45 purple load stapler.     As with this was done, the remaining enterotomy was closed with 3-0 Vicryl suture in a running fashion and a second layer of interrupted Lembert was also placed, it should be noted that with 3-0 silk suture. Additional two interrupted 3-0 Vicryl sutures were placed before the Lembert in the area of suspected gaps of the first layer. The anastomosis was inspected pinched between the thumb and forefinger and felt patent. At this point, I then proceeded to copiously irrigated the pelvis with warm saline. A 19-Arabic Nolan drain was placed through stab incision in the right lower quadrant. A drain was laid in the pelvis. It was sutured into place with a drain stitch of 2-0 silk suture.     We then proceeded to close the abdomen. Number one Loop PDS suture was utilized to close the abdomen from above and from below. They were then tied at the middle. A malleable was utilized to protect the underlying bowel. It should be noted that I did lay the omentum over the bowel prior to closing fascia. The midline wound was irrigated. The dermis was then approximated with 2-0 Vicryl suture interrupted sutures. The epidermis was not closed.

## 2024-05-08 NOTE — CONSULT NOTE ADULT - NS ATTEND AMEND GEN_ALL_CORE FT
59 F h/o HTN, DM, admitted with abdominal pain after recent small bowel resection for SB perforation. The area of dilated SB with fecalized contents, upstream from the inflammatory narrowing, is concerning for chronicity - doubt infectious, suspect related to post-op blood supply.   Plan  please collect stool for cx  Tolerating liquids now, would continue with this today-tomorrow  probiotics can also be administered in pill form, would avoid yogurt if causing pain  If the strictured area persists, this will continue to cause sxatic obstructive type sx and might require surgical revision again (discussed this briefly with patient).

## 2024-05-08 NOTE — PROGRESS NOTE ADULT - SUBJECTIVE AND OBJECTIVE BOX
INTERVAL HPI/OVERNIGHT EVENTS:  Pt resting comfortably. Feeling better, but c/o eructation.  +normal BMs.  Improved abd pain. Feeling hungry.  Denies N/V.    MEDICATIONS  (STANDING):  cefTRIAXone   IVPB 2000 milliGRAM(s) IV Intermittent every 24 hours  cefTRIAXone   IVPB      dextrose 10% Bolus 125 milliLiter(s) IV Bolus once  dextrose 5%. 1000 milliLiter(s) (100 mL/Hr) IV Continuous <Continuous>  dextrose 5%. 1000 milliLiter(s) (50 mL/Hr) IV Continuous <Continuous>  dextrose 50% Injectable 25 Gram(s) IV Push once  dextrose 50% Injectable 12.5 Gram(s) IV Push once  glucagon  Injectable 1 milliGRAM(s) IntraMuscular once  hydrochlorothiazide 25 milliGRAM(s) Oral daily  insulin glargine Injectable (LANTUS) 15 Unit(s) SubCutaneous every morning  insulin lispro (ADMELOG) corrective regimen sliding scale   SubCutaneous at bedtime  insulin lispro (ADMELOG) corrective regimen sliding scale   SubCutaneous three times a day before meals  lactated ringers. 1000 milliLiter(s) (105 mL/Hr) IV Continuous <Continuous>  metroNIDAZOLE  IVPB 500 milliGRAM(s) IV Intermittent every 8 hours  metroNIDAZOLE  IVPB      valsartan 160 milliGRAM(s) Oral daily    MEDICATIONS  (PRN):  acetaminophen     Tablet .. 650 milliGRAM(s) Oral every 6 hours PRN Temp greater or equal to 38C (100.4F), Mild Pain (1 - 3)  dextrose Oral Gel 15 Gram(s) Oral once PRN Blood Glucose LESS THAN 70 milliGRAM(s)/deciliter  HYDROmorphone  Injectable 0.5 milliGRAM(s) IV Push every 3 hours PRN Severe Pain (7 - 10)  ketorolac   Injectable 30 milliGRAM(s) IV Push every 6 hours PRN Moderate Pain (4 - 6)  ondansetron Injectable 4 milliGRAM(s) IV Push every 6 hours PRN Nausea    Vital Signs Last 24 Hrs  T(C): 36.9 (08 May 2024 06:08), Max: 37.1 (07 May 2024 21:20)  T(F): 98.5 (08 May 2024 06:08), Max: 98.8 (07 May 2024 21:20)  HR: 81 (08 May 2024 06:08) (81 - 87)  BP: 115/71 (08 May 2024 06:08) (115/71 - 154/73)  BP(mean): 85 (08 May 2024 06:08) (85 - 85)  RR: 18 (08 May 2024 06:08) (16 - 18)  SpO2: 94% (08 May 2024 06:08) (94% - 99%)    Parameters below as of 08 May 2024 06:08  Patient On (Oxygen Delivery Method): room air    Physical:  General: A&Ox3. NAD.  Abdomen: Soft nondistended, mild epigastric tenderness.    I&O's Detail    07 May 2024 07:01  -  08 May 2024 07:00  --------------------------------------------------------  IN:    Lactated Ringers: 1260 mL  Total IN: 1260 mL    OUT:  Total OUT: 0 mL    Total NET: 1260 mL    LABS:                        9.9    7.47  )-----------( 275      ( 08 May 2024 07:17 )             30.0             05-07    135  |  100  |  13  ----------------------------<  149<H>  3.6   |  26  |  0.94    Ca    8.7      07 May 2024 05:55    TPro  8.1  /  Alb  3.7  /  TBili  0.4  /  DBili  x   /  AST  23  /  ALT  29  /  AlkPhos  94  05-06

## 2024-05-08 NOTE — ASSESSMENT
[FreeTextEntry1] : AMY MONREAL is a 59 year old female who underwent a diagnostic laparoscopy, lysis of adhesions, conversion to laparotomy for adhesiolysis and incisional hernia repair without mesh 02/06/2024. She return to the [facility] with severe abdominal pain, found to have bowel perforation Then Exploratory laparotomy with bowel resection 02/19/2024    Today patient is doing well, c/o some incisional pain. Patient was advised to alternate between Tylenol and Motrin for pain management. We will start gabapentin as needed for severe pain.   Physical examination findings were discussed, she was informed of significance of findings.  Exam pretty much benign,  wound completely closed. Patient having discomfort  at umbilicus likely due to scar tissue. All of the options, risks and benefits were explained.    Ms. MONREAL completed oral antibiotic 2 weeks ago. Last CT done at the end of March shows right pelvic fluid collection was still present but with improvement, decreased in size.  CT of the abdomen and pelvis to re-evaluate postoperative collection.  RTO post CT.

## 2024-05-08 NOTE — PROGRESS NOTE ADULT - ASSESSMENT
59y.o. Female with enterocolitis    -Complete abx course  -con't clears  -OOB ambulate  -Pain control prn  -DVT ppx  -Incentive spirometry     HTN  -con't home meds    DM  -Lantus in AM  -ISS     59y.o. Female with enterocolitis    -Complete abx course  -con't full liquid  -OOB ambulate  -Pain control prn  -DVT ppx  -GI consult  -ID f/u  -Incentive spirometry     HTN  -con't home meds    DM  -Lantus in AM  -ISS

## 2024-05-09 ENCOUNTER — TRANSCRIPTION ENCOUNTER (OUTPATIENT)
Age: 59
End: 2024-05-09

## 2024-05-09 VITALS
RESPIRATION RATE: 18 BRPM | SYSTOLIC BLOOD PRESSURE: 144 MMHG | HEART RATE: 81 BPM | DIASTOLIC BLOOD PRESSURE: 98 MMHG | OXYGEN SATURATION: 98 % | TEMPERATURE: 98 F

## 2024-05-09 LAB
ANION GAP SERPL CALC-SCNC: 6 MMOL/L — SIGNIFICANT CHANGE UP (ref 5–17)
BASOPHILS # BLD AUTO: 0.02 K/UL — SIGNIFICANT CHANGE UP (ref 0–0.2)
BASOPHILS NFR BLD AUTO: 0.3 % — SIGNIFICANT CHANGE UP (ref 0–2)
BUN SERPL-MCNC: 8 MG/DL — SIGNIFICANT CHANGE UP (ref 7–18)
CALCIUM SERPL-MCNC: 8.9 MG/DL — SIGNIFICANT CHANGE UP (ref 8.4–10.5)
CHLORIDE SERPL-SCNC: 104 MMOL/L — SIGNIFICANT CHANGE UP (ref 96–108)
CO2 SERPL-SCNC: 29 MMOL/L — SIGNIFICANT CHANGE UP (ref 22–31)
CREAT SERPL-MCNC: 0.69 MG/DL — SIGNIFICANT CHANGE UP (ref 0.5–1.3)
CULTURE RESULTS: SIGNIFICANT CHANGE UP
EGFR: 101 ML/MIN/1.73M2 — SIGNIFICANT CHANGE UP
EOSINOPHIL # BLD AUTO: 0.1 K/UL — SIGNIFICANT CHANGE UP (ref 0–0.5)
EOSINOPHIL NFR BLD AUTO: 1.3 % — SIGNIFICANT CHANGE UP (ref 0–6)
GLUCOSE BLDC GLUCOMTR-MCNC: 101 MG/DL — HIGH (ref 70–99)
GLUCOSE BLDC GLUCOMTR-MCNC: 248 MG/DL — HIGH (ref 70–99)
GLUCOSE SERPL-MCNC: 97 MG/DL — SIGNIFICANT CHANGE UP (ref 70–99)
HCT VFR BLD CALC: 29.8 % — LOW (ref 34.5–45)
HGB BLD-MCNC: 10 G/DL — LOW (ref 11.5–15.5)
IMM GRANULOCYTES NFR BLD AUTO: 0.3 % — SIGNIFICANT CHANGE UP (ref 0–0.9)
LYMPHOCYTES # BLD AUTO: 2.71 K/UL — SIGNIFICANT CHANGE UP (ref 1–3.3)
LYMPHOCYTES # BLD AUTO: 35.7 % — SIGNIFICANT CHANGE UP (ref 13–44)
MAGNESIUM SERPL-MCNC: 1.5 MG/DL — LOW (ref 1.6–2.6)
MCHC RBC-ENTMCNC: 27.2 PG — SIGNIFICANT CHANGE UP (ref 27–34)
MCHC RBC-ENTMCNC: 33.6 GM/DL — SIGNIFICANT CHANGE UP (ref 32–36)
MCV RBC AUTO: 81 FL — SIGNIFICANT CHANGE UP (ref 80–100)
MONOCYTES # BLD AUTO: 0.58 K/UL — SIGNIFICANT CHANGE UP (ref 0–0.9)
MONOCYTES NFR BLD AUTO: 7.6 % — SIGNIFICANT CHANGE UP (ref 2–14)
NEUTROPHILS # BLD AUTO: 4.17 K/UL — SIGNIFICANT CHANGE UP (ref 1.8–7.4)
NEUTROPHILS NFR BLD AUTO: 54.8 % — SIGNIFICANT CHANGE UP (ref 43–77)
NRBC # BLD: 0 /100 WBCS — SIGNIFICANT CHANGE UP (ref 0–0)
PHOSPHATE SERPL-MCNC: 3.4 MG/DL — SIGNIFICANT CHANGE UP (ref 2.5–4.5)
PLATELET # BLD AUTO: 291 K/UL — SIGNIFICANT CHANGE UP (ref 150–400)
POTASSIUM SERPL-MCNC: 2.9 MMOL/L — CRITICAL LOW (ref 3.5–5.3)
POTASSIUM SERPL-SCNC: 2.9 MMOL/L — CRITICAL LOW (ref 3.5–5.3)
RBC # BLD: 3.68 M/UL — LOW (ref 3.8–5.2)
RBC # FLD: 14.3 % — SIGNIFICANT CHANGE UP (ref 10.3–14.5)
SODIUM SERPL-SCNC: 139 MMOL/L — SIGNIFICANT CHANGE UP (ref 135–145)
SPECIMEN SOURCE: SIGNIFICANT CHANGE UP
WBC # BLD: 7.6 K/UL — SIGNIFICANT CHANGE UP (ref 3.8–10.5)
WBC # FLD AUTO: 7.6 K/UL — SIGNIFICANT CHANGE UP (ref 3.8–10.5)

## 2024-05-09 PROCEDURE — 99285 EMERGENCY DEPT VISIT HI MDM: CPT

## 2024-05-09 PROCEDURE — 83735 ASSAY OF MAGNESIUM: CPT

## 2024-05-09 PROCEDURE — 83605 ASSAY OF LACTIC ACID: CPT

## 2024-05-09 PROCEDURE — 83690 ASSAY OF LIPASE: CPT

## 2024-05-09 PROCEDURE — 86900 BLOOD TYPING SEROLOGIC ABO: CPT

## 2024-05-09 PROCEDURE — 87077 CULTURE AEROBIC IDENTIFY: CPT

## 2024-05-09 PROCEDURE — 86850 RBC ANTIBODY SCREEN: CPT

## 2024-05-09 PROCEDURE — 86901 BLOOD TYPING SEROLOGIC RH(D): CPT

## 2024-05-09 PROCEDURE — 87045 FECES CULTURE AEROBIC BACT: CPT

## 2024-05-09 PROCEDURE — 96374 THER/PROPH/DIAG INJ IV PUSH: CPT

## 2024-05-09 PROCEDURE — 80048 BASIC METABOLIC PNL TOTAL CA: CPT

## 2024-05-09 PROCEDURE — 80053 COMPREHEN METABOLIC PANEL: CPT

## 2024-05-09 PROCEDURE — 96375 TX/PRO/DX INJ NEW DRUG ADDON: CPT

## 2024-05-09 PROCEDURE — 85027 COMPLETE CBC AUTOMATED: CPT

## 2024-05-09 PROCEDURE — 82962 GLUCOSE BLOOD TEST: CPT

## 2024-05-09 PROCEDURE — 83036 HEMOGLOBIN GLYCOSYLATED A1C: CPT

## 2024-05-09 PROCEDURE — 84100 ASSAY OF PHOSPHORUS: CPT

## 2024-05-09 PROCEDURE — 85025 COMPLETE CBC W/AUTO DIFF WBC: CPT

## 2024-05-09 PROCEDURE — 96361 HYDRATE IV INFUSION ADD-ON: CPT

## 2024-05-09 PROCEDURE — 36415 COLL VENOUS BLD VENIPUNCTURE: CPT

## 2024-05-09 PROCEDURE — 87046 STOOL CULTR AEROBIC BACT EA: CPT

## 2024-05-09 PROCEDURE — 85610 PROTHROMBIN TIME: CPT

## 2024-05-09 PROCEDURE — 74177 CT ABD & PELVIS W/CONTRAST: CPT | Mod: MC

## 2024-05-09 RX ORDER — METRONIDAZOLE 500 MG
1 TABLET ORAL
Qty: 42 | Refills: 0
Start: 2024-05-09 | End: 2024-05-22

## 2024-05-09 RX ORDER — CEFUROXIME AXETIL 250 MG
1 TABLET ORAL
Qty: 28 | Refills: 0
Start: 2024-05-09 | End: 2024-05-22

## 2024-05-09 RX ORDER — POTASSIUM CHLORIDE 20 MEQ
10 PACKET (EA) ORAL
Refills: 0 | Status: COMPLETED | OUTPATIENT
Start: 2024-05-09 | End: 2024-05-09

## 2024-05-09 RX ADMIN — Medication 4: at 11:16

## 2024-05-09 RX ADMIN — VALSARTAN 160 MILLIGRAM(S): 80 TABLET ORAL at 06:52

## 2024-05-09 RX ADMIN — Medication 100 MILLIEQUIVALENT(S): at 11:14

## 2024-05-09 RX ADMIN — Medication 100 MILLIEQUIVALENT(S): at 12:22

## 2024-05-09 RX ADMIN — Medication 100 MILLIEQUIVALENT(S): at 10:26

## 2024-05-09 RX ADMIN — SODIUM CHLORIDE 105 MILLILITER(S): 9 INJECTION, SOLUTION INTRAVENOUS at 06:53

## 2024-05-09 NOTE — PROGRESS NOTE ADULT - SUBJECTIVE AND OBJECTIVE BOX
[facility] GI Progress Note    Patient is a 59y old  Female who presents with a chief complaint of Enterocolitis (09 May 2024 08:53)          HPI:  59 y.o. F with PMH HTN DM, incisional hernia, bowel perforation presents to [facility] c/o severe lower abd pain for 3 days. Pt has recent complicated surgical history including dx-lap, YENNY, incisional hernia repair, followed by bowel perforation requiring exlap, and SBR. Pt's 2nd surgical admission complicated by intraabd collection not amenable to IR drainage due to lack of window. Pt discharged with PICC line and 6 weeks of abx. Pt f/u with Dr. Lopez last Friday and felt great. Pt states she was ready to try new food which included pasta and vegetables Friday night. Pt started feeling stomach fullness when eating, followed by loose BMs. Pt's pain continued this past weekend, loose BMs controlled with Imodium but pain persisted. Also YENNY due to feeling of fullness. Denies fever, chills, vomiting, constipation, dysuria, hematuria, BRBPR, melena. (06 May 2024 22:13)       GI INTERVAL HPI/OVERNIGHT EVENTS: no new complaints    MEDICATIONS  (STANDING):  cefTRIAXone   IVPB      cefTRIAXone   IVPB 2000 milliGRAM(s) IV Intermittent every 24 hours  dextrose 10% Bolus 125 milliLiter(s) IV Bolus once  dextrose 5%. 1000 milliLiter(s) (100 mL/Hr) IV Continuous <Continuous>  dextrose 5%. 1000 milliLiter(s) (50 mL/Hr) IV Continuous <Continuous>  dextrose 50% Injectable 25 Gram(s) IV Push once  dextrose 50% Injectable 12.5 Gram(s) IV Push once  glucagon  Injectable 1 milliGRAM(s) IntraMuscular once  hydrochlorothiazide 25 milliGRAM(s) Oral daily  insulin glargine Injectable (LANTUS) 15 Unit(s) SubCutaneous every morning  insulin lispro (ADMELOG) corrective regimen sliding scale   SubCutaneous at bedtime  insulin lispro (ADMELOG) corrective regimen sliding scale   SubCutaneous three times a day before meals  lactated ringers. 1000 milliLiter(s) (105 mL/Hr) IV Continuous <Continuous>  metroNIDAZOLE  IVPB      metroNIDAZOLE  IVPB 500 milliGRAM(s) IV Intermittent every 8 hours  valsartan 160 milliGRAM(s) Oral daily    MEDICATIONS  (PRN):  acetaminophen     Tablet .. 650 milliGRAM(s) Oral every 6 hours PRN Temp greater or equal to 38C (100.4F), Mild Pain (1 - 3)  dextrose Oral Gel 15 Gram(s) Oral once PRN Blood Glucose LESS THAN 70 milliGRAM(s)/deciliter  HYDROmorphone  Injectable 0.5 milliGRAM(s) IV Push every 3 hours PRN Severe Pain (7 - 10)  ketorolac   Injectable 30 milliGRAM(s) IV Push every 6 hours PRN Moderate Pain (4 - 6)  ondansetron Injectable 4 milliGRAM(s) IV Push every 6 hours PRN Nausea      __________________________________________________  REVIEW OF SYSTEMS:        ________________________________________________  PHYSICAL EXAM    Vital Signs Last 24 Hrs  T(C): 36.6 (09 May 2024 06:04), Max: 36.7 (08 May 2024 19:31)  T(F): 97.9 (09 May 2024 06:04), Max: 98 (08 May 2024 19:31)  HR: 81 (09 May 2024 06:04) (77 - 81)  BP: 144/98 (09 May 2024 06:04) (144/98 - 152/90)  BP(mean): --  RR: 18 (09 May 2024 06:04) (18 - 18)  SpO2: 98% (09 May 2024 06:04) (97% - 98%)    Parameters below as of 09 May 2024 06:04  Patient On (Oxygen Delivery Method): room air        _________________________________________________  LABS:                        10.0   7.60  )-----------( 291      ( 09 May 2024 06:40 )             29.8     05-09    139  |  104  |  8   ----------------------------<  97  2.9<LL>   |  29  |  0.69    Ca    8.9      09 May 2024 06:40  Phos  3.4     05-09  Mg     1.5     05-09        Urinalysis Basic - ( 09 May 2024 06:40 )    Color: x / Appearance: x / SG: x / pH: x  Gluc: 97 mg/dL / Ketone: x  / Bili: x / Urobili: x   Blood: x / Protein: x / Nitrite: x   Leuk Esterase: x / RBC: x / WBC x   Sq Epi: x / Non Sq Epi: x / Bacteria: x      CAPILLARY BLOOD GLUCOSE      POCT Blood Glucose.: 248 mg/dL (09 May 2024 11:05)  POCT Blood Glucose.: 101 mg/dL (09 May 2024 07:52)  POCT Blood Glucose.: 139 mg/dL (08 May 2024 21:14)  POCT Blood Glucose.: 174 mg/dL (08 May 2024 16:24)                RADIOLOGY & ADDITIONAL TESTS:                     [facility] Progress Note    Patient is a 59y old  Female who presents with a chief complaint of Enterocolitis (09 May 2024 08:53)          HPI:  59 y.o. F with PMH HTN DM, incisional hernia, bowel perforation presents to [facility] c/o severe lower abd pain for 3 days. Pt has recent complicated surgical history including dx-lap, YENNY, incisional hernia repair, followed by bowel perforation requiring exlap, and SBR. Pt's 2nd surgical admission complicated by intraabd collection not amenable to IR drainage due to lack of window. Pt discharged with PICC line and 6 weeks of abx. Pt f/u with Dr. Lopez last Friday and felt great. Pt states she was ready to try new food which included pasta and vegetables Friday night. Pt started feeling stomach fullness when eating, followed by loose BMs. Pt's pain continued this past weekend, loose BMs controlled with Imodium but pain persisted. Also YENNY due to feeling of fullness. Denies fever, chills, vomiting, constipation, dysuria, hematuria, BRBPR, melena. (06 May 2024 22:13)       GI INTERVAL HPI/OVERNIGHT EVENTS: no new complaints  Pt seen and examined this morning. Pt endorses feeling "so much better" after normal BM this morning. Pt had diarrhea few times overnight. Tolerating po. NAD.     MEDICATIONS  (STANDING):  cefTRIAXone   IVPB      cefTRIAXone   IVPB 2000 milliGRAM(s) IV Intermittent every 24 hours  dextrose 10% Bolus 125 milliLiter(s) IV Bolus once  dextrose 5%. 1000 milliLiter(s) (100 mL/Hr) IV Continuous <Continuous>  dextrose 5%. 1000 milliLiter(s) (50 mL/Hr) IV Continuous <Continuous>  dextrose 50% Injectable 25 Gram(s) IV Push once  dextrose 50% Injectable 12.5 Gram(s) IV Push once  glucagon  Injectable 1 milliGRAM(s) IntraMuscular once  hydrochlorothiazide 25 milliGRAM(s) Oral daily  insulin glargine Injectable (LANTUS) 15 Unit(s) SubCutaneous every morning  insulin lispro (ADMELOG) corrective regimen sliding scale   SubCutaneous at bedtime  insulin lispro (ADMELOG) corrective regimen sliding scale   SubCutaneous three times a day before meals  lactated ringers. 1000 milliLiter(s) (105 mL/Hr) IV Continuous <Continuous>  metroNIDAZOLE  IVPB      metroNIDAZOLE  IVPB 500 milliGRAM(s) IV Intermittent every 8 hours  valsartan 160 milliGRAM(s) Oral daily    MEDICATIONS  (PRN):  acetaminophen     Tablet .. 650 milliGRAM(s) Oral every 6 hours PRN Temp greater or equal to 38C (100.4F), Mild Pain (1 - 3)  dextrose Oral Gel 15 Gram(s) Oral once PRN Blood Glucose LESS THAN 70 milliGRAM(s)/deciliter  HYDROmorphone  Injectable 0.5 milliGRAM(s) IV Push every 3 hours PRN Severe Pain (7 - 10)  ketorolac   Injectable 30 milliGRAM(s) IV Push every 6 hours PRN Moderate Pain (4 - 6)  ondansetron Injectable 4 milliGRAM(s) IV Push every 6 hours PRN Nausea      __________________________________________________  REVIEW OF SYSTEMS:    CONSTITUTIONAL:  No fever, chills, or weakness  HEENT:  No oral discomfort or  throat pain, no odynophagia  SKIN:  No rash or itching.  CARDIOVASCULAR:  No chest  discomfort.   RESPIRATORY: No SOB.  GASTROINTESTINAL:  SEE HPI  GENITOURINARY:  No dysuria, hematuria, urinary frequency  NEUROLOGICAL:  No headache, dizziness,  MUSCULOSKELETAL:  No muscle, or  joint pain   HEMATOLOGIC:  No  bleeding      ________________________________________________  PHYSICAL EXAM    Vital Signs Last 24 Hrs  T(C): 36.6 (09 May 2024 06:04), Max: 36.7 (08 May 2024 19:31)  T(F): 97.9 (09 May 2024 06:04), Max: 98 (08 May 2024 19:31)  HR: 81 (09 May 2024 06:04) (77 - 81)  BP: 144/98 (09 May 2024 06:04) (144/98 - 152/90)  BP(mean): --  RR: 18 (09 May 2024 06:04) (18 - 18)  SpO2: 98% (09 May 2024 06:04) (97% - 98%)    Parameters below as of 09 May 2024 06:04  Patient On (Oxygen Delivery Method): room air      Gen: NAD  HEENT: PERRL, anicteric, no oral mucositis  Resp: Clear breath sounds on auscultation. No rales, no wheezing  CVS: S1S2 present, regular  GI: BS(+), Soft, ND, NT  Extremities : BLE No edema or calf tenderness. PPP  Neuro: Awake/alert.  no new neuro deficits  Skin: warm,dry    _________________________________________________  LABS:                        10.0   7.60  )-----------( 291      ( 09 May 2024 06:40 )             29.8     05-09    139  |  104  |  8   ----------------------------<  97  2.9<LL>   |  29  |  0.69    Ca    8.9      09 May 2024 06:40  Phos  3.4     05-09  Mg     1.5     05-09        Urinalysis Basic - ( 09 May 2024 06:40 )    Color: x / Appearance: x / SG: x / pH: x  Gluc: 97 mg/dL / Ketone: x  / Bili: x / Urobili: x   Blood: x / Protein: x / Nitrite: x   Leuk Esterase: x / RBC: x / WBC x   Sq Epi: x / Non Sq Epi: x / Bacteria: x      CAPILLARY BLOOD GLUCOSE      POCT Blood Glucose.: 248 mg/dL (09 May 2024 11:05)  POCT Blood Glucose.: 101 mg/dL (09 May 2024 07:52)  POCT Blood Glucose.: 139 mg/dL (08 May 2024 21:14)  POCT Blood Glucose.: 174 mg/dL (08 May 2024 16:24)      RADIOLOGY & ADDITIONAL TESTS:    Culture - Stool (05.07.24 @ 01:53)   Specimen Source: .Stool Feces  Culture Results:   No enteric pathogens to date: Final culture pending< from: CT Abdomen and Pelvis w/ Oral Cont and w/ IV Cont (05.06.24 @ 18:52) >  ACC: [id number] EXAM:  CT ABDOMEN AND PELVIS OC IC   ORDERED BY: FAISAL ORTIZ     PROCEDURE DATE:  05/06/2024          INTERPRETATION:  CLINICAL INFORMATION: Abdominal pain. History of bowel   resection 2/19/2024    COMPARISON: Multiple prior exams, with most recent CT abdomen/pelvis   3/28/2024    CONTRAST/COMPLICATIONS:  IV Contrast: Omnipaque 350  90 cc administered   10 cc discarded  Oral Contrast: Omnipaque 300  Complications: None reported at time of study completion    PROCEDURE:  CT of the Abdomen and Pelvis was performed.  Sagittal and coronal reformats were performed.    FINDINGS:  LOWER CHEST: Within normal limits.    LIVER: Within normal limits.  BILE DUCTS: Normal caliber.  GALLBLADDER: Within normal limits.  SPLEEN: Within normal limits.  PANCREAS: Within normal limits.  ADRENALS: Within normal limits.  KIDNEYS/URETERS: No renal stones or hydronephrosis. Left renal cysts.    BLADDER: Minimally distended.  REPRODUCTIVE ORGANS: Supracervical hysterectomy.    BOWEL: Diffusely dilated small bowel loops measuring up to 3.3 cm in   diameter with wall segment of small bowel fecalization. Gradual   transition point in the anterior pelvis in the region of the small bowel   anastomosis, where there are inflammatory changes. Long segment distal   small bowel wall thickening, mucosal hyperenhancement, and surrounding   mesenteric fluid.  Mild diffuse colonic wall thickening and mucosal   hyperenhancement.  PERITONEUM: Small amount of ascites. Minimal residual right pelvic   rim-enhancing collection measuring 1.3 x 1.3 cm, previously 3.2 x 2.5 cm.  VESSELS: Within normal limits.  RETROPERITONEUM/LYMPH NODES: No lymphadenopathy. Minimal residual soft   tissue enhancement along the midline incision at site of previous   anterior abdominal wall collection.  ABDOMINAL WALL: Postsurgical changes.  BONES: Degenerative changes.    IMPRESSION:  Inflammation of the distal small bowel and colon, likely infectious or   inflammatory enterocolitis. Inflammatory changes appear to be causinga   partial small bowel obstruction with gradual transition point in the   anterior pelvis in the region of previous small bowel anastomosis.    Mesenteric edema/mild ascites is likely reactive.    Decrease in size of the previous right pelvic collection.    < end of copied text >

## 2024-05-09 NOTE — DISCHARGE NOTE PROVIDER - HOSPITAL COURSE
HPI:  59 y.o. F with PMH HTN DM, incisional hernia, bowel perforation presents to [facility] c/o severe lower abd pain for 3 days. Pt has recent complicated surgical history including dx-lap, YENNY, incisional hernia repair, followed by bowel perforation requiring exlap, and SBR. Pt's 2nd surgical admission complicated by intraabd collection not amenable to IR drainage due to lack of window. Pt discharged with PICC line and 6 weeks of abx. Pt f/u with Dr. Lopez last Friday and felt great. Pt states she was ready to try new food which included pasta and vegetables Friday night. Pt started feeling stomach fullness when eating, followed by loose BMs. Pt's pain continued this past weekend, loose BMs controlled with Imodium but pain persisted. Also YENNY due to feeling of fullness. Denies fever, chills, vomiting, constipation, dysuria, hematuria, BRBPR, melena. (06 May 2024 22:13)  Ct was done on presentation which showed Inflammation of the distal small bowel and colon, likely infectious or inflammatory enterocolitis. Inflammatory changes appear to be causing a partial small bowel obstruction with gradual transition point in the anterior pelvis in the region of previous small bowel anastomosis. Mesenteric edema/mild ascites is likely reactive. Decrease in size of the previous right pelvic collection.  Patient was admitted for conservative management with bowel rest and antibiotics.   She was discharged when tolerating a regular diet, ambulating, voiding.

## 2024-05-09 NOTE — DISCHARGE NOTE PROVIDER - NSDCMRMEDTOKEN_GEN_ALL_CORE_FT
Lantus 100 units/mL subcutaneous solution: 15 subcutaneous once a day (in the morning)  metFORMIN 500 mg oral tablet, extended release: 1 tab(s) orally 2 times a day  Ozempic 2 mg/1.5 mL (0.25 mg or 0.5 mg dose) subcutaneous solution: 0.5 milligram(s) subcutaneously  valsartan-hydrochlorothiazide 160 mg-25 mg oral tablet: 1 tab(s) orally once a day   cefuroxime 500 mg oral tablet: 1 tab(s) orally 2 times a day  Lantus 100 units/mL subcutaneous solution: 15 subcutaneous once a day (in the morning)  metFORMIN 500 mg oral tablet, extended release: 1 tab(s) orally 2 times a day  Ozempic 2 mg/1.5 mL (0.25 mg or 0.5 mg dose) subcutaneous solution: 0.5 milligram(s) subcutaneously  valsartan-hydrochlorothiazide 160 mg-25 mg oral tablet: 1 tab(s) orally once a day

## 2024-05-09 NOTE — PROGRESS NOTE ADULT - ASSESSMENT
59 year old female with enterocolitis with diarrhea    - advance to low fiber diet  - follow up ID recommendations  - discharge planning  - discuss with Dr. Lopez

## 2024-05-09 NOTE — PROGRESS NOTE ADULT - PROBLEM SELECTOR PLAN 1
stool culture findings no enteric pathogens  BM normal caliber this morning.  Abx per primary team  Probiotic in pill form. Avoid yogurt if causing pain.   If the strictured area persists, this will continue to cause sxatic obstructive type sx and might require surgical revision again (discussed this briefly with patient).  Reconsult GI prn

## 2024-05-09 NOTE — DISCHARGE NOTE PROVIDER - CARE PROVIDER_API CALL
Maritza Lopez  Surgery  [address], [address]  [place] [address]  Phone: [phone]  Fax: [phone]  Follow Up Time: 2 weeks    Catrina Flanagan [facility]  Infectious Disease  [address] [address] [place] [address]  Phone: [phone]  Fax: [phone]  Follow Up Time: 2 weeks

## 2024-05-09 NOTE — DISCHARGE NOTE PROVIDER - NSDCCPCAREPLAN_GEN_ALL_CORE_FT
PRINCIPAL DISCHARGE DIAGNOSIS  Diagnosis: Abdominal pain  Assessment and Plan of Treatment: enterocolitis   Please follow-up with your surgeon in 1 week. Drink plenty of fluids and rest as needed. Call for any fever over 101, nausea, vomiting, severe pain, no passing of gas or bowel movement.   DIET: You may resume your regular diet as normal.   Continue antibiotics for two more weeks as directed, follow up with Dr. Flanagan

## 2024-05-09 NOTE — DISCHARGE NOTE NURSING/CASE MANAGEMENT/SOCIAL WORK - PATIENT PORTAL LINK FT
You can access the FollowMyHealth Patient Portal offered by [facility] by registering at the following website: [email or web address]. By joining [employer]’s FollowMyHealth portal, you will also be able to view your health information using other applications (apps) compatible with our system.

## 2024-05-09 NOTE — DISCHARGE NOTE NURSING/CASE MANAGEMENT/SOCIAL WORK - NSDCPEFALRISK_GEN_ALL_CORE
For information on Fall & Injury Prevention, visit: [email or web address] OR  [email or web address] OR  https://www.cdc.gov/steadi/patient.html

## 2024-05-09 NOTE — PROGRESS NOTE ADULT - ASSESSMENT
GI asked to evaluate this hx sb resection, tony in past with pelvic collection, post 6weeks iv abx at home  now admitted with ileus due to enterocolitis vs psbo

## 2024-05-09 NOTE — PROGRESS NOTE ADULT - SUBJECTIVE AND OBJECTIVE BOX
Patient seen and examined at bedside  Admits to diarrhea with soft stool   mild belching with walking     Vital Signs Last 24 Hrs  T(F): 97.9 (05-09-24 @ 06:04), Max: 98 (05-08-24 @ 19:31)  HR: 81 (05-09-24 @ 06:04)  BP: 144/98 (05-09-24 @ 06:04)  RR: 18 (05-09-24 @ 06:04)  SpO2: 98% (05-09-24 @ 06:04)  POCT Blood Glucose.: 101 mg/dL (09 May 2024 07:52)    GENERAL: Alert, NAD  CHEST/LUNG: respirations nonlabored  ABDOMEN: soft, Nontender, Nondistended  EXTREMITIES:  no calf tenderness, No edema    I&O's Detail    08 May 2024 07:01  -  09 May 2024 07:00  --------------------------------------------------------  IN:    Lactated Ringers: 1155 mL  Total IN: 1155 mL    OUT:  Total OUT: 0 mL    Total NET: 1155 mL    LABS:                        10.0   7.60  )-----------( 291      ( 09 May 2024 06:40 )             29.8     05-09    139  |  104  |  8   ----------------------------<  97  2.9<LL>   |  29  |  0.69    Ca    8.9      09 May 2024 06:40

## 2024-05-09 NOTE — DISCHARGE NOTE PROVIDER - CARE PROVIDERS DIRECT ADDRESSES
[email or web address]

## 2024-05-09 NOTE — DISCHARGE NOTE PROVIDER - PROVIDER TOKENS
PROVIDER:[TOKEN:[[id number]],FOLLOWUP:[2 weeks]],PROVIDER:[TOKEN:[[id number]],FOLLOWUP:[2 weeks]]

## 2024-05-20 NOTE — HISTORY OF PRESENT ILLNESS
[de-identified] : AMY MONREAL is a 59 year old female who presents in the office for postop follow up visit. She underwent a diagnostic laparoscopy, lysis of adhesions, conversion to laparotomy for adhesiolysis and incisional hernia repair without mesh 02/06/2024. She return to the [facility] with severe abdominal pain, found to have bowel perforation Then Exploratory laparotomy with bowel resection 02/19/2024.

## 2024-05-22 ENCOUNTER — APPOINTMENT (OUTPATIENT)
Dept: SURGERY | Facility: CLINIC | Age: 59
End: 2024-05-22

## 2024-05-30 DIAGNOSIS — K90.9 INTESTINAL MALABSORPTION, UNSPECIFIED: ICD-10-CM

## 2024-05-30 RX ORDER — PANCRELIPASE 60000; 12000; 38000 [USP'U]/1; [USP'U]/1; [USP'U]/1
12000-38000 CAPSULE, DELAYED RELEASE PELLETS ORAL
Qty: 90 | Refills: 1 | Status: ACTIVE | COMMUNITY
Start: 2024-05-30 | End: 1900-01-01

## 2024-06-13 ENCOUNTER — NON-APPOINTMENT (OUTPATIENT)
Age: 59
End: 2024-06-13

## 2024-07-01 ENCOUNTER — RESULT REVIEW (OUTPATIENT)
Age: 59
End: 2024-07-01

## 2024-07-03 ENCOUNTER — APPOINTMENT (OUTPATIENT)
Dept: CT IMAGING | Facility: CLINIC | Age: 59
End: 2024-07-03

## 2024-07-10 ENCOUNTER — APPOINTMENT (OUTPATIENT)
Dept: CT IMAGING | Facility: CLINIC | Age: 59
End: 2024-07-10

## 2024-07-10 PROCEDURE — 74177 CT ABD & PELVIS W/CONTRAST: CPT

## 2024-07-25 ENCOUNTER — APPOINTMENT (OUTPATIENT)
Dept: SURGERY | Facility: CLINIC | Age: 59
End: 2024-07-25
Payer: MEDICAID

## 2024-07-25 VITALS
HEIGHT: 64 IN | HEART RATE: 83 BPM | BODY MASS INDEX: 25.27 KG/M2 | WEIGHT: 148 LBS | DIASTOLIC BLOOD PRESSURE: 83 MMHG | OXYGEN SATURATION: 100 % | TEMPERATURE: 98.2 F | SYSTOLIC BLOOD PRESSURE: 137 MMHG

## 2024-07-25 DIAGNOSIS — R10.9 UNSPECIFIED ABDOMINAL PAIN: ICD-10-CM

## 2024-07-25 PROCEDURE — 99212 OFFICE O/P EST SF 10 MIN: CPT

## 2024-07-25 NOTE — HISTORY OF PRESENT ILLNESS
[de-identified] : 51-year-old female with complex surgical history managed by Dr. Duvall who presents to the office for evaluation of abdominal wall tenderness.  She recently underwent a CT scan which reveals no evidence of hernia.  On further questioning she continues to states that she has a tightness around umbilicus.  There is no evidence of hernia on physical exam.  Plan: Trial of Neurontin at 200 mg daily for a week with follow-up and if the pain appears to be subsiding, we will increase the dose.  Furthermore patient also had complaints of diarrhea which she is sporadically taking Lomotil.  She was advised to take Lomotil twice daily in addition to fibers and increase consumption of fluids.  The office to follow-up with her in a week to check in with her.

## 2024-07-26 RX ORDER — GABAPENTIN 100 MG/1
100 CAPSULE ORAL
Qty: 30 | Refills: 0 | Status: ACTIVE | COMMUNITY
Start: 2024-07-26 | End: 1900-01-01

## 2024-07-31 ENCOUNTER — APPOINTMENT (OUTPATIENT)
Dept: SURGERY | Facility: CLINIC | Age: 59
End: 2024-07-31

## 2024-07-31 VITALS
HEIGHT: 64 IN | TEMPERATURE: 98 F | HEART RATE: 88 BPM | BODY MASS INDEX: 24.75 KG/M2 | SYSTOLIC BLOOD PRESSURE: 149 MMHG | OXYGEN SATURATION: 98 % | DIASTOLIC BLOOD PRESSURE: 86 MMHG | WEIGHT: 145 LBS

## 2024-07-31 DIAGNOSIS — Z98.890 OTHER SPECIFIED POSTPROCEDURAL STATES: ICD-10-CM

## 2024-07-31 DIAGNOSIS — Z87.19 OTHER SPECIFIED POSTPROCEDURAL STATES: ICD-10-CM

## 2024-07-31 PROCEDURE — 99213 OFFICE O/P EST LOW 20 MIN: CPT

## 2024-07-31 NOTE — HISTORY OF PRESENT ILLNESS
[de-identified] : AMY MONREAL is a 59 year old female who presents in the office for postop follow up visit. She underwent a diagnostic laparoscopy, lysis of adhesions, conversion to laparotomy for adhesiolysis and incisional hernia repair without mesh 02/06/2024. She return to the [facility] with severe abdominal pain, found to have bowel perforation Then Exploratory laparotomy with bowel resection 02/19/2024. Patient is doing well, c/o incisional pain stated that she restarted on Neurontin 100 mg for this week, stated that she will increase to  Neurontin 200 mg next week. Patient reports that diarrhea improved since last visit. Patient wishes to go back to work now. Patient reports that she had a  colonoscopy and endoscopy and was told that she has hiatal hernia and some a polyp in the colon that was removed. She denies any reflux or discomfort due to hiatal hernia.

## 2024-07-31 NOTE — HISTORY OF PRESENT ILLNESS
[de-identified] : AMY MONREAL is a 59 year old female who presents in the office for postop follow up visit. She underwent a diagnostic laparoscopy, lysis of adhesions, conversion to laparotomy for adhesiolysis and incisional hernia repair without mesh 02/06/2024. She return to the [facility] with severe abdominal pain, found to have bowel perforation Then Exploratory laparotomy with bowel resection 02/19/2024. Patient is doing well, c/o incisional pain stated that she restarted on Neurontin 100 mg for this week, stated that she will increase to  Neurontin 200 mg next week. Patient reports that diarrhea improved since last visit. Patient wishes to go back to work now. Patient reports that she had a  colonoscopy and endoscopy and was told that she has hiatal hernia and some a polyp in the colon that was removed. She denies any reflux or discomfort due to hiatal hernia.

## 2024-07-31 NOTE — PHYSICAL EXAM
[Normal Breath Sounds] : Normal breath sounds [Normal Heart Sounds] : normal heart sounds [Normal Rate and Rhythm] : normal rate and rhythm [No Rash or Lesion] : No rash or lesion [Alert] : alert [Oriented to Person] : oriented to person [Oriented to Place] : oriented to place [Oriented to Time] : oriented to time [Calm] : calm [de-identified] : The patient is alert, well-groomed  [de-identified] : Normoactive bowel sounds, soft and nontender, no hepatosplenomegaly or masses noted,  Surgical incisions are healing well. No sign of inflammation or exudate. Incision sites are healing well.  [de-identified] : full range of motion and no deformities appreciated.

## 2024-07-31 NOTE — PHYSICAL EXAM
[Normal Breath Sounds] : Normal breath sounds [Normal Heart Sounds] : normal heart sounds [Normal Rate and Rhythm] : normal rate and rhythm [No Rash or Lesion] : No rash or lesion [Alert] : alert [Oriented to Person] : oriented to person [Oriented to Place] : oriented to place [Oriented to Time] : oriented to time [Calm] : calm [de-identified] : The patient is alert, well-groomed  [de-identified] : Normoactive bowel sounds, soft and nontender, no hepatosplenomegaly or masses noted,  Surgical incisions are healing well. No sign of inflammation or exudate. Incision sites are healing well.  [de-identified] : full range of motion and no deformities appreciated.

## 2024-07-31 NOTE — ASSESSMENT
[FreeTextEntry1] : AMY MONREAL is a 59 year old female who underwent a diagnostic laparoscopy, lysis of adhesions, conversion to laparotomy for adhesiolysis and incisional hernia repair without mesh 02/06/2024. She return to the [facility] with severe abdominal pain, found to have bowel perforation Then Exploratory laparotomy with bowel resection 02/19/2024    Today patient is doing well, c/o some incisional pain, continue on Neurontin.   Results of her recent imaging and physical examination findings were discussed in detail. she was informed of significance of findings. All of the options, risks and benefits were explained.

## 2024-07-31 NOTE — ASSESSMENT
[FreeTextEntry1] : AMY MONREAL is a 59 year old female who underwent a diagnostic laparoscopy, lysis of adhesions, conversion to laparotomy for adhesiolysis and incisional hernia repair without mesh 02/06/2024. She return to the [facility] with severe abdominal pain, found to have bowel perforation Then Exploratory laparotomy with bowel resection 02/19/2024    Today patient is doing well, c/o some incisional pain, continue on Neurontin.   Results of her recent imaging and physical examination findings were discussed in detail. she was informed of significance of findings. All of the options, risks and benefits were explained.

## 2024-08-12 ENCOUNTER — APPOINTMENT (OUTPATIENT)
Dept: ENDOCRINOLOGY | Facility: CLINIC | Age: 59
End: 2024-08-12

## 2024-09-04 ENCOUNTER — APPOINTMENT (OUTPATIENT)
Dept: SURGERY | Facility: CLINIC | Age: 59
End: 2024-09-04

## 2024-09-04 VITALS
TEMPERATURE: 98 F | BODY MASS INDEX: 24.92 KG/M2 | WEIGHT: 146 LBS | OXYGEN SATURATION: 98 % | HEIGHT: 64 IN | SYSTOLIC BLOOD PRESSURE: 146 MMHG | HEART RATE: 94 BPM | DIASTOLIC BLOOD PRESSURE: 81 MMHG

## 2024-09-04 DIAGNOSIS — Z87.19 OTHER SPECIFIED POSTPROCEDURAL STATES: ICD-10-CM

## 2024-09-04 DIAGNOSIS — Z98.890 OTHER SPECIFIED POSTPROCEDURAL STATES: ICD-10-CM

## 2024-09-04 PROCEDURE — 99213 OFFICE O/P EST LOW 20 MIN: CPT

## 2024-09-04 NOTE — HISTORY OF PRESENT ILLNESS
[de-identified] : AMY MONREAL is a 59 year old female who presents in the office for postop follow up visit. She underwent a diagnostic laparoscopy, lysis of adhesions, conversion to laparotomy for adhesiolysis and incisional hernia repair without mesh 02/06/2024. She return to the [facility] with severe abdominal pain, found to have bowel perforation Then Exploratory laparotomy with bowel resection 02/19/2024. Patient is doing well, c/o incisional pain mostly at umbilicus stated that Neurontin it is not helping. Patient stated that she is unable to work due to the pain.  Her job requires her to do heavy lifting. 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 Today she reports this did not work out and she returned to her prior position with [employer]. 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 She asked if we can provide a letter for activity restrictions.

## 2024-09-04 NOTE — PHYSICAL EXAM
[Normal Breath Sounds] : Normal breath sounds [Normal Heart Sounds] : normal heart sounds [Normal Rate and Rhythm] : normal rate and rhythm [No Rash or Lesion] : No rash or lesion [Alert] : alert [Oriented to Person] : oriented to person [Oriented to Place] : oriented to place [Oriented to Time] : oriented to time [Calm] : calm [de-identified] : The patient is alert, well-groomed  [de-identified] : Normoactive bowel sounds, soft and nontender, no hepatosplenomegaly or masses noted,  Surgical incisions are healing well. No sign of inflammation or exudate. Incision sites are well healed. Midline incision with tenderness on palpation umbilicus, no guarding no rebound. No hernia found on palpation [de-identified] : full range of motion and no deformities appreciated.

## 2024-09-04 NOTE — ASSESSMENT
[FreeTextEntry1] : AMY MONREAL is a 59 year old female who underwent a diagnostic laparoscopy, lysis of adhesions, conversion to laparotomy for adhesiolysis and incisional hernia repair without mesh 02/06/2024. She return to the [facility] with severe abdominal pain, found to have bowel perforation and underwent exploratory laparotomy with bowel resection 02/19/2024. She recovered fine from that surgery, was having some issues with diarrhea postoperatively which has since improved.   Today patient is doing well, c/o some incisional pain, continue on Neurontin.  She reports she is able to walk a couple of miles at a time.   Physical examination findings were discussed in detail. she was informed of significance of findings. No evidence of incisional hernia. All of the options, risks and benefits were explained.   Patient currently working as [employer] and due to heavy lifting, she is unable to work.

## 2024-12-31 ENCOUNTER — NON-APPOINTMENT (OUTPATIENT)
Age: 59
End: 2024-12-31

## 2025-01-07 ENCOUNTER — NON-APPOINTMENT (OUTPATIENT)
Age: 60
End: 2025-01-07

## 2025-01-07 DIAGNOSIS — L90.5 SCAR CONDITIONS AND FIBROSIS OF SKIN: ICD-10-CM

## 2025-01-07 DIAGNOSIS — R10.33 PERIUMBILICAL PAIN: ICD-10-CM

## 2025-01-08 PROBLEM — R10.33 ABDOMINAL WALL PAIN IN PERIUMBILICAL REGION: Status: ACTIVE | Noted: 2025-01-08

## 2025-01-08 PROBLEM — L90.5 TENDER SCAR: Status: ACTIVE | Noted: 2025-01-08

## 2025-02-06 ENCOUNTER — APPOINTMENT (OUTPATIENT)
Dept: ORTHOPEDIC SURGERY | Facility: CLINIC | Age: 60
End: 2025-02-06
Payer: OTHER MISCELLANEOUS

## 2025-02-06 DIAGNOSIS — I10 ESSENTIAL (PRIMARY) HYPERTENSION: ICD-10-CM

## 2025-02-06 DIAGNOSIS — E11.9 TYPE 2 DIABETES MELLITUS W/OUT COMPLICATIONS: ICD-10-CM

## 2025-02-06 DIAGNOSIS — S39.012A STRAIN OF MUSCLE, FASCIA AND TENDON OF LOWER BACK, INITIAL ENCOUNTER: ICD-10-CM

## 2025-02-06 PROCEDURE — 99204 OFFICE O/P NEW MOD 45 MIN: CPT

## 2025-02-06 RX ORDER — MELOXICAM 15 MG/1
15 TABLET ORAL DAILY
Qty: 20 | Refills: 0 | Status: ACTIVE | COMMUNITY
Start: 2025-02-06 | End: 1900-01-01

## 2025-02-06 RX ORDER — CYCLOBENZAPRINE HYDROCHLORIDE 5 MG/1
5 TABLET, FILM COATED ORAL
Qty: 45 | Refills: 1 | Status: ACTIVE | COMMUNITY
Start: 2025-02-06 | End: 1900-01-01

## 2025-02-14 ENCOUNTER — NON-APPOINTMENT (OUTPATIENT)
Age: 60
End: 2025-02-14

## 2025-03-05 NOTE — PHYSICAL THERAPY INITIAL EVALUATION ADULT - DIAGNOSIS, PT EVAL
Last visit 2/14/25  No f/u scheduled    (ICF Model) Pt. present w/deficits in Body Structures/Function (Impairments), incl: Strength, Balance, Endurance, and pain, leading to deficits in performing the below noted Activities (Limitations).

## 2025-03-06 ENCOUNTER — NON-APPOINTMENT (OUTPATIENT)
Age: 60
End: 2025-03-06

## 2025-03-27 ENCOUNTER — APPOINTMENT (OUTPATIENT)
Dept: ORTHOPEDIC SURGERY | Facility: CLINIC | Age: 60
End: 2025-03-27

## 2025-06-03 NOTE — ED ADULT NURSE NOTE - TEMPLATE LIST FOR HEAD TO TOE ASSESSMENT
Airway       Patient location during procedure: OR       Procedure Start/Stop Times: 6/3/2025 8:47 AM  Staff -        Anesthesiologist:  Allen Chandler MD       Resident/Fellow: Sebastien Cheng MD       Performed By: resident  Consent for Airway        Urgency: elective  Indications and Patient Condition       Indications for airway management: jorden-procedural       Induction type:intravenous       Mask difficulty assessment: 2 - vent by mask + OA or adjuvant +/- NMBA    Final Airway Details       Final airway type: endotracheal airway       Successful airway: ETT - single  Endotracheal Airway Details        ETT size (mm): 7.5       Cuffed: yes       Successful intubation technique: video laryngoscopy       VL Blade Size: Glidescope 4       Grade View of Cords: 1       Adjucts: stylet       Position: Right       Measured from: gums/teeth       Secured at (cm): 23       Bite block used: Soft    Post intubation assessment        Placement verified by: capnometry and equal breath sounds        Number of attempts at approach: 1       Number of other approaches attempted: 0       Secured with: tape       Ease of procedure: easy       Dentition: Intact    Medication(s) Administered   Medication Administration Time: 6/3/2025 8:47 AM        
Arterial Line Procedure Note    Pre-Procedure   Staff -        Anesthesiologist:  Allen Chandler MD       Resident/Fellow: Sebastien Cheng MD       Performed By: resident       Location: OR       Pre-Anesthestic Checklist: patient identified, IV checked and risks and benefits discussed  Line Placement:   This line was placed Post Induction  Procedure   Procedure: arterial line       Diagnosis: intraoperative monitoring       Laterality: left       Insertion Site: radial.  Sterile Prep        Standard elements of sterile barrier followed       Skin prep: Chloraprep  Insertion/Injection        Technique: ultrasound guided and Seldinger Technique        1. Ultrasound was used to evaluate the access site.       2. Artery evaluated via ultrasound for patency/adequacy.       3. Using real-time ultrasound the needle/catheter was observed entering the artery/vein.       Catheter Type/Size: 20 G, 1.75 in/4.5 cm quick cath (integral wire)  Narrative         Secured by: other       Tegaderm dressing used.       Complications: None apparent,        Arterial waveform: Yes        IBP within 10% of NIBP: Yes      
Abdominal Pain, N/V/D

## (undated) DEVICE — NDL HYPO SAFE 22G X 1.5" (BLACK)

## (undated) DEVICE — SUT VICRYL 0 27" UR-6

## (undated) DEVICE — FOLEY TRAY 16FR 5CC LF UMETER CLOSED

## (undated) DEVICE — DRAPE LAPAROTOMY W POUCHES

## (undated) DEVICE — SPONGE ENDO PEANUT 5MM

## (undated) DEVICE — MEDICATION LABELS W MARKER

## (undated) DEVICE — GLV 8 PROTEXIS (WHITE)

## (undated) DEVICE — LAP PAD W RING 18 X 18"

## (undated) DEVICE — DRSG BENZOIN 0.6CC

## (undated) DEVICE — LIGASURE IMPACT

## (undated) DEVICE — SOL IRR POUR NS 0.9% 500ML

## (undated) DEVICE — DRSG CURITY GAUZE SPONGE 4 X 4" 12-PLY

## (undated) DEVICE — WARMING BLANKET LOWER ADULT

## (undated) DEVICE — TUBING STRYKER PNEUMOSURE HI FLOW INSUFFLATOR

## (undated) DEVICE — DRAPE MAYO STAND 30"

## (undated) DEVICE — DRAPE LIGHT HANDLE COVER (BLUE)

## (undated) DEVICE — GLV 7.5 PROTEXIS (WHITE)

## (undated) DEVICE — GOWN XL

## (undated) DEVICE — SUT POLYSORB 0 30" GU-46

## (undated) DEVICE — FOLEY TRAY 16FR SURESTEP LTX BG

## (undated) DEVICE — SPECIMEN CONTAINER 90ML

## (undated) DEVICE — POSITIONER FOAM MATTRESS PAD CONVOLUTED (PINK)

## (undated) DEVICE — DRAPE INSTRUMENT POUCH 6.75" X 11"

## (undated) DEVICE — BLADE SCALPEL SAFETYLOCK #10

## (undated) DEVICE — Device

## (undated) DEVICE — SPECIMEN CONTAINER 100ML

## (undated) DEVICE — SOL IRR POUR H2O 250ML

## (undated) DEVICE — VENODYNE/SCD SLEEVE FOOT

## (undated) DEVICE — VENODYNE/SCD SLEEVE CALF MEDIUM

## (undated) DEVICE — XI ARM DISSECTOR CURVED BIPOLAR 8MM

## (undated) DEVICE — PREP CHLORAPREP HI-LITE ORANGE 26ML

## (undated) DEVICE — XI DRAPE COLUMN

## (undated) DEVICE — PACK MAJOR ABDOMINAL WITH LAP

## (undated) DEVICE — STAPLER COVIDIEN ENDO GIA SHORT HANDLE

## (undated) DEVICE — XI TIP COVER

## (undated) DEVICE — ELCTR BOVIE BLADE 3/4" EXTENDED LENGTH 6"

## (undated) DEVICE — D HELP - CLEARVIEW CLEARIFY SYSTEM

## (undated) DEVICE — DRSG PICO NPWT 4X12"

## (undated) DEVICE — PACK ROBOTIC

## (undated) DEVICE — SUT POLYSORB 2-0 36" GS-21 UNDYED

## (undated) DEVICE — DRSG DERMABOND 0.7ML

## (undated) DEVICE — LIGASURE MARYLAND 37CM

## (undated) DEVICE — FOR-ESU VALLEYLAB T7E14999DX: Type: DURABLE MEDICAL EQUIPMENT

## (undated) DEVICE — SUT BIOSYN 4-0 18" P-12

## (undated) DEVICE — SUT SOFSILK 3-0 18" V-20 (POP-OFF)

## (undated) DEVICE — CANISTER DISPOSABLE THIN WALL 3000CC

## (undated) DEVICE — PROTECTOR HEEL / ELBOW FLUFFY

## (undated) DEVICE — GLV 7.5 PROTEXIS (CREAM) MICRO

## (undated) DEVICE — TUBING SUCTION 20FT

## (undated) DEVICE — XI OBTURATOR OPTICAL BLADELESS 8MM

## (undated) DEVICE — PACK MINOR NO DRAPE

## (undated) DEVICE — POSITIONER STRAP ARMBOARD VELCRO TS-30

## (undated) DEVICE — VENODYNE/SCD SLEEVE CALF LARGE

## (undated) DEVICE — DRSG TEGADERM 6"X8"

## (undated) DEVICE — TROCAR APPLIED MEDICAL KII BALLOON BLUNT TIP 12MM X 100MM

## (undated) DEVICE — STAPLER COVIDIEN ENDO GIA STANDARD HANDLE

## (undated) DEVICE — SUT SOFSILK 2-0 18" TIES

## (undated) DEVICE — POSITIONER FOAM HEAD CRADLE (PINK)

## (undated) DEVICE — STAPLER SKIN VISI-STAT 35 WIDE

## (undated) DEVICE — ENDOCATCH 10MM SPECIMEN POUCH

## (undated) DEVICE — TROCAR COVIDIEN VERSAONE BLADED FIXATION 12MM STANDARD

## (undated) DEVICE — FOR-ESU VALLEYLAB T7E14848DX: Type: DURABLE MEDICAL EQUIPMENT

## (undated) DEVICE — DRAPE 1/2 SHEET 40X57"

## (undated) DEVICE — SOL IRR POUR NS 0.9% 1500ML

## (undated) DEVICE — FOLEY HOLDER STATLOCK 2 WAY ADULT

## (undated) DEVICE — FOLEY TRAY 16FR 5CC LTX UMETER CLOSED

## (undated) DEVICE — SUT POLYSORB 3-0 30" V-20 UNDYED

## (undated) DEVICE — DRSG TEGADERM 2.5X3"

## (undated) DEVICE — SOL IRR POUR H2O 500ML

## (undated) DEVICE — DRSG OPSITE 13.75 X 4"

## (undated) DEVICE — DRSG MASTISOL

## (undated) DEVICE — SUT SOFSILK 0 18" TIES

## (undated) DEVICE — DRSG STERISTRIPS 0.5 X 4"

## (undated) DEVICE — ELCTR GROUNDING PAD ADULT COVIDIEN

## (undated) DEVICE — XI ARM GRASPER TIP UP FENESTRATED

## (undated) DEVICE — SUT SILK 0 30" TIES

## (undated) DEVICE — MARKING PEN W RULER

## (undated) DEVICE — ELCTR BOVIE TIP BLADE INSULATED 2.75" EDGE

## (undated) DEVICE — BASIN SET DOUBLE

## (undated) DEVICE — WARMING BLANKET UPPER ADULT

## (undated) DEVICE — TRAP SPECIMEN SPUTUM 40CC

## (undated) DEVICE — XI ARM FORCEP CADIERE 8MM

## (undated) DEVICE — SUT MAXON 1 96" T-60

## (undated) DEVICE — BLADE SCALPEL SAFETYLOCK #15

## (undated) DEVICE — PACK GENERAL LAPAROSCOPY

## (undated) DEVICE — SUT VLOC 90 2-0 6" GS-22 UNDYED

## (undated) DEVICE — ELCTR BOVIE TIP BLADE INSULATED 6.5" EDGE

## (undated) DEVICE — ABDOMINAL BINDER MED/LG 12" X 36"-54"

## (undated) DEVICE — XI SEAL UNIV 5- 8 MM

## (undated) DEVICE — TROCAR ETHICON ENDOPATH XCEL BLADELESS 12MM X 100MM STABILITY

## (undated) DEVICE — DRAPE TOWEL BLUE 17" X 24"

## (undated) DEVICE — SOL IRR POUR H2O 1500ML

## (undated) DEVICE — BLADE SURGICAL #15 CARBON

## (undated) DEVICE — SUT SOFSILK 3-0 30" TIES

## (undated) DEVICE — POSITIONER FOAM EGG CRATE ULNAR 2PCS (PINK)

## (undated) DEVICE — POSITIONER PURPLE ARM ONE STEP (LARGE)

## (undated) DEVICE — XI ARM NEEDLE DRIVER LARGE

## (undated) DEVICE — TIP METZENBAUM SCISSOR MONOPOLAR ENDOCUT (ORANGE)

## (undated) DEVICE — SUT SOFSILK 2-0 30" TIES

## (undated) DEVICE — TROCAR COVIDIEN VERSAONE BLADELESS FIXATION 5MM STANDARD

## (undated) DEVICE — XI ARM SCISSOR MONO CURVED

## (undated) DEVICE — DRSG MEDIPORE DRESS IT 5-7/8 X 11"

## (undated) DEVICE — TUBING STRYKEFLOW II SUCTION / IRRIGATOR

## (undated) DEVICE — XI ARM NEEDLE DRIVER SUTURECUT MEGA 8MM

## (undated) DEVICE — XI ARM FORCEP TENACULUM

## (undated) DEVICE — SUT MONOCRYL 4-0 27" PS-2 UNDYED

## (undated) DEVICE — TUBING STRYKER PNEUMOCLEAR SMOKE HEAT HUMID

## (undated) DEVICE — SHEARS COVIDIEN ENDO SHEAR 5MM X 31CM W UNIPOLAR CAUTERY

## (undated) DEVICE — TROCAR COVIDIEN VERSAPORT BLADELESS OPTICAL 5MM STANDARD

## (undated) DEVICE — GLV 7 PROTEXIS (WHITE)

## (undated) DEVICE — SUT VLOC 180 2-0 6" GS-22 GREEN

## (undated) DEVICE — XI DRAPE ARM

## (undated) DEVICE — TROCAR ETHICON ENDOPATH XCEL BLADELESS 5MM X 100MM STABILITY

## (undated) DEVICE — LUBRICATING JELLY ONESHOT 1.25OZ